# Patient Record
Sex: FEMALE | Race: WHITE | NOT HISPANIC OR LATINO | Employment: OTHER | URBAN - NONMETROPOLITAN AREA
[De-identification: names, ages, dates, MRNs, and addresses within clinical notes are randomized per-mention and may not be internally consistent; named-entity substitution may affect disease eponyms.]

---

## 2017-02-17 ENCOUNTER — APPOINTMENT (OUTPATIENT)
Dept: GENERAL RADIOLOGY | Facility: HOSPITAL | Age: 82
End: 2017-02-17

## 2017-02-17 ENCOUNTER — APPOINTMENT (OUTPATIENT)
Dept: ULTRASOUND IMAGING | Facility: HOSPITAL | Age: 82
End: 2017-02-17

## 2017-02-17 ENCOUNTER — HOSPITAL ENCOUNTER (INPATIENT)
Facility: HOSPITAL | Age: 82
LOS: 5 days | Discharge: HOME | End: 2017-02-22
Attending: EMERGENCY MEDICINE | Admitting: INTERNAL MEDICINE

## 2017-02-17 DIAGNOSIS — Z74.09 IMPAIRED FUNCTIONAL MOBILITY, BALANCE, GAIT, AND ENDURANCE: ICD-10-CM

## 2017-02-17 DIAGNOSIS — I10 ESSENTIAL HYPERTENSION: ICD-10-CM

## 2017-02-17 DIAGNOSIS — I50.23 ACUTE ON CHRONIC SYSTOLIC CONGESTIVE HEART FAILURE (HCC): Primary | ICD-10-CM

## 2017-02-17 DIAGNOSIS — R09.02 HYPOXIA: ICD-10-CM

## 2017-02-17 DIAGNOSIS — R06.09 DYSPNEA ON EXERTION: ICD-10-CM

## 2017-02-17 DIAGNOSIS — J43.9 PULMONARY EMPHYSEMA, UNSPECIFIED EMPHYSEMA TYPE (HCC): ICD-10-CM

## 2017-02-17 LAB
ALBUMIN SERPL-MCNC: 4.2 G/DL (ref 3.5–5)
ALBUMIN/GLOB SERPL: 1.5 G/DL (ref 1.1–2.5)
ALP SERPL-CCNC: 80 U/L (ref 24–120)
ALT SERPL W P-5'-P-CCNC: 30 U/L (ref 0–54)
ANION GAP SERPL CALCULATED.3IONS-SCNC: 10 MMOL/L (ref 4–13)
ARTERIAL PATENCY WRIST A: ABNORMAL
AST SERPL-CCNC: 19 U/L (ref 7–45)
ATMOSPHERIC PRESS: ABNORMAL MMHG
BACTERIA UR QL AUTO: ABNORMAL /HPF
BASE EXCESS BLDA CALC-SCNC: 2.2 MMOL/L (ref -2–2)
BASOPHILS # BLD AUTO: 0.01 10*3/MM3 (ref 0–0.2)
BASOPHILS NFR BLD AUTO: 0.2 % (ref 0–2)
BDY SITE: ABNORMAL
BILIRUB SERPL-MCNC: 0.7 MG/DL (ref 0.1–1)
BILIRUB UR QL STRIP: NEGATIVE
BUN BLD-MCNC: 19 MG/DL (ref 5–21)
BUN/CREAT SERPL: 16.5 (ref 7–25)
CALCIUM SPEC-SCNC: 9.2 MG/DL (ref 8.4–10.4)
CHLORIDE SERPL-SCNC: 101 MMOL/L (ref 98–110)
CLARITY UR: CLEAR
CO2 SERPL-SCNC: 31 MMOL/L (ref 24–31)
COLOR UR: YELLOW
CREAT BLD-MCNC: 1.15 MG/DL (ref 0.5–1.4)
DEPRECATED RDW RBC AUTO: 43.9 FL (ref 40–54)
DIGOXIN SERPL-MCNC: <0.4 NG/ML (ref 0.8–2)
EOSINOPHIL # BLD AUTO: 0.06 10*3/MM3 (ref 0–0.7)
EOSINOPHIL NFR BLD AUTO: 1.1 % (ref 0–4)
ERYTHROCYTE [DISTWIDTH] IN BLOOD BY AUTOMATED COUNT: 15.3 % (ref 12–15)
FLUAV AG NPH QL: NEGATIVE
FLUBV AG NPH QL IA: NEGATIVE
GAS FLOW AIRWAY: 4 LPM
GFR SERPL CREATININE-BSD FRML MDRD: 46 ML/MIN/1.73
GLOBULIN UR ELPH-MCNC: 2.8 GM/DL
GLUCOSE BLD-MCNC: 126 MG/DL (ref 70–100)
GLUCOSE UR STRIP-MCNC: NEGATIVE MG/DL
HCO3 BLDA-SCNC: 28.3 MMOL/L (ref 22–26)
HCT VFR BLD AUTO: 37.1 % (ref 37–47)
HGB BLD-MCNC: 11.9 G/DL (ref 12–16)
HGB UR QL STRIP.AUTO: NEGATIVE
HOLD SPECIMEN: NORMAL
HOLD SPECIMEN: NORMAL
HYALINE CASTS UR QL AUTO: ABNORMAL /LPF
IMM GRANULOCYTES # BLD: 0.04 10*3/MM3 (ref 0–0.03)
IMM GRANULOCYTES NFR BLD: 0.7 % (ref 0–5)
KETONES UR QL STRIP: NEGATIVE
LEUKOCYTE ESTERASE UR QL STRIP.AUTO: ABNORMAL
LYMPHOCYTES # BLD AUTO: 1.25 10*3/MM3 (ref 0.72–4.86)
LYMPHOCYTES NFR BLD AUTO: 21.9 % (ref 15–45)
MCH RBC QN AUTO: 25.5 PG (ref 28–32)
MCHC RBC AUTO-ENTMCNC: 32.1 G/DL (ref 33–36)
MCV RBC AUTO: 79.6 FL (ref 82–98)
MODALITY: ABNORMAL
MONOCYTES # BLD AUTO: 0.25 10*3/MM3 (ref 0.19–1.3)
MONOCYTES NFR BLD AUTO: 4.4 % (ref 4–12)
NEUTROPHILS # BLD AUTO: 4.09 10*3/MM3 (ref 1.87–8.4)
NEUTROPHILS NFR BLD AUTO: 71.7 % (ref 39–78)
NITRITE UR QL STRIP: NEGATIVE
NRBC BLD MANUAL-RTO: 0 /100 WBC (ref 0–0)
NT-PROBNP SERPL-MCNC: 3250 PG/ML (ref 0–1800)
NT-PROBNP SERPL-MCNC: 3300 PG/ML (ref 0–1800)
PCO2 BLDA: 49.5 MM HG (ref 35–45)
PH BLDA: 7.38 PH UNITS (ref 7.35–7.45)
PH UR STRIP.AUTO: 6 [PH] (ref 5–8)
PLATELET # BLD AUTO: 101 10*3/MM3 (ref 130–400)
PMV BLD AUTO: 10.2 FL (ref 6–12)
PO2 BLDA: 78.7 MM HG (ref 80–100)
POTASSIUM BLD-SCNC: 4.3 MMOL/L (ref 3.5–5.3)
PROT SERPL-MCNC: 7 G/DL (ref 6.3–8.7)
PROT UR QL STRIP: ABNORMAL
RBC # BLD AUTO: 4.66 10*6/MM3 (ref 4.2–5.4)
RBC # UR: ABNORMAL /HPF
REF LAB TEST METHOD: ABNORMAL
SAO2 % BLDCOA: 95.3 % (ref 94–100)
SAO2 % BLDCOA: 95.3 % (ref 94–100)
SODIUM BLD-SCNC: 142 MMOL/L (ref 135–145)
SP GR UR STRIP: 1.02 (ref 1–1.03)
SQUAMOUS #/AREA URNS HPF: ABNORMAL /HPF
TROPONIN I SERPL-MCNC: 0 NG/ML (ref 0–0.07)
TROPONIN I SERPL-MCNC: 0.02 NG/ML (ref 0–0.03)
UROBILINOGEN UR QL STRIP: ABNORMAL
WBC NRBC COR # BLD: 5.7 10*3/MM3 (ref 4.8–10.8)
WBC UR QL AUTO: ABNORMAL /HPF
WHOLE BLOOD HOLD SPECIMEN: NORMAL
WHOLE BLOOD HOLD SPECIMEN: NORMAL

## 2017-02-17 PROCEDURE — 93005 ELECTROCARDIOGRAM TRACING: CPT | Performed by: EMERGENCY MEDICINE

## 2017-02-17 PROCEDURE — 85025 COMPLETE CBC W/AUTO DIFF WBC: CPT | Performed by: EMERGENCY MEDICINE

## 2017-02-17 PROCEDURE — 99285 EMERGENCY DEPT VISIT HI MDM: CPT

## 2017-02-17 PROCEDURE — 25010000002 FUROSEMIDE PER 20 MG: Performed by: INTERNAL MEDICINE

## 2017-02-17 PROCEDURE — 80162 ASSAY OF DIGOXIN TOTAL: CPT | Performed by: EMERGENCY MEDICINE

## 2017-02-17 PROCEDURE — 93970 EXTREMITY STUDY: CPT | Performed by: SURGERY

## 2017-02-17 PROCEDURE — 25010000002 ENOXAPARIN PER 10 MG: Performed by: INTERNAL MEDICINE

## 2017-02-17 PROCEDURE — 25010000002 FUROSEMIDE PER 20 MG: Performed by: EMERGENCY MEDICINE

## 2017-02-17 PROCEDURE — 94640 AIRWAY INHALATION TREATMENT: CPT

## 2017-02-17 PROCEDURE — 83880 ASSAY OF NATRIURETIC PEPTIDE: CPT | Performed by: EMERGENCY MEDICINE

## 2017-02-17 PROCEDURE — 84484 ASSAY OF TROPONIN QUANT: CPT

## 2017-02-17 PROCEDURE — 81001 URINALYSIS AUTO W/SCOPE: CPT | Performed by: EMERGENCY MEDICINE

## 2017-02-17 PROCEDURE — 82803 BLOOD GASES ANY COMBINATION: CPT

## 2017-02-17 PROCEDURE — 93970 EXTREMITY STUDY: CPT

## 2017-02-17 PROCEDURE — 71010 HC CHEST PA OR AP: CPT

## 2017-02-17 PROCEDURE — 80053 COMPREHEN METABOLIC PANEL: CPT | Performed by: EMERGENCY MEDICINE

## 2017-02-17 PROCEDURE — 84484 ASSAY OF TROPONIN QUANT: CPT | Performed by: EMERGENCY MEDICINE

## 2017-02-17 PROCEDURE — 94799 UNLISTED PULMONARY SVC/PX: CPT

## 2017-02-17 PROCEDURE — 93010 ELECTROCARDIOGRAM REPORT: CPT | Performed by: INTERNAL MEDICINE

## 2017-02-17 PROCEDURE — 87086 URINE CULTURE/COLONY COUNT: CPT | Performed by: EMERGENCY MEDICINE

## 2017-02-17 PROCEDURE — 87804 INFLUENZA ASSAY W/OPTIC: CPT | Performed by: EMERGENCY MEDICINE

## 2017-02-17 PROCEDURE — 36600 WITHDRAWAL OF ARTERIAL BLOOD: CPT

## 2017-02-17 RX ORDER — LABETALOL HYDROCHLORIDE 5 MG/ML
10 INJECTION, SOLUTION INTRAVENOUS EVERY 4 HOURS PRN
Status: DISCONTINUED | OUTPATIENT
Start: 2017-02-17 | End: 2017-02-22 | Stop reason: HOSPADM

## 2017-02-17 RX ORDER — ASPIRIN 81 MG/1
81 TABLET ORAL
COMMUNITY

## 2017-02-17 RX ORDER — ACETAZOLAMIDE 125 MG/1
125 TABLET ORAL EVERY MORNING
COMMUNITY
End: 2017-02-22 | Stop reason: HOSPADM

## 2017-02-17 RX ORDER — FUROSEMIDE 10 MG/ML
80 INJECTION INTRAMUSCULAR; INTRAVENOUS 2 TIMES DAILY
Status: DISCONTINUED | OUTPATIENT
Start: 2017-02-17 | End: 2017-02-19

## 2017-02-17 RX ORDER — HYDROCODONE BITARTRATE AND ACETAMINOPHEN 7.5; 325 MG/1; MG/1
1 TABLET ORAL 2 TIMES DAILY PRN
COMMUNITY
Start: 2017-02-06 | End: 2018-04-06 | Stop reason: HOSPADM

## 2017-02-17 RX ORDER — ONDANSETRON 2 MG/ML
4 INJECTION INTRAMUSCULAR; INTRAVENOUS EVERY 6 HOURS PRN
Status: DISCONTINUED | OUTPATIENT
Start: 2017-02-17 | End: 2017-02-22 | Stop reason: HOSPADM

## 2017-02-17 RX ORDER — FUROSEMIDE 40 MG/1
40 TABLET ORAL DAILY
COMMUNITY
Start: 2016-08-08 | End: 2017-02-22 | Stop reason: HOSPADM

## 2017-02-17 RX ORDER — BUDESONIDE AND FORMOTEROL FUMARATE DIHYDRATE 160; 4.5 UG/1; UG/1
2 AEROSOL RESPIRATORY (INHALATION)
COMMUNITY

## 2017-02-17 RX ORDER — FUROSEMIDE 10 MG/ML
40 INJECTION INTRAMUSCULAR; INTRAVENOUS ONCE
Status: COMPLETED | OUTPATIENT
Start: 2017-02-17 | End: 2017-02-17

## 2017-02-17 RX ORDER — NITROGLYCERIN 0.4 MG/1
0.4 TABLET SUBLINGUAL
Status: DISCONTINUED | OUTPATIENT
Start: 2017-02-17 | End: 2017-02-22 | Stop reason: HOSPADM

## 2017-02-17 RX ORDER — CLONIDINE HYDROCHLORIDE 0.1 MG/1
0.1 TABLET ORAL ONCE
Status: COMPLETED | OUTPATIENT
Start: 2017-02-17 | End: 2017-02-17

## 2017-02-17 RX ORDER — ACETAZOLAMIDE 250 MG/1
125 TABLET ORAL EVERY MORNING
Status: DISCONTINUED | OUTPATIENT
Start: 2017-02-18 | End: 2017-02-20

## 2017-02-17 RX ORDER — ACETAZOLAMIDE 250 MG/1
125 TABLET ORAL DAILY
COMMUNITY
End: 2017-02-17 | Stop reason: SDDI

## 2017-02-17 RX ORDER — ASPIRIN 81 MG/1
81 TABLET ORAL
Status: DISCONTINUED | OUTPATIENT
Start: 2017-02-18 | End: 2017-02-22 | Stop reason: HOSPADM

## 2017-02-17 RX ORDER — PHENOL 1.4 %
600 AEROSOL, SPRAY (ML) MUCOUS MEMBRANE 2 TIMES DAILY WITH MEALS
COMMUNITY
End: 2017-02-17 | Stop reason: ALTCHOICE

## 2017-02-17 RX ORDER — SODIUM CHLORIDE 0.9 % (FLUSH) 0.9 %
1-10 SYRINGE (ML) INJECTION AS NEEDED
Status: DISCONTINUED | OUTPATIENT
Start: 2017-02-17 | End: 2017-02-22 | Stop reason: HOSPADM

## 2017-02-17 RX ORDER — SODIUM CHLORIDE 0.9 % (FLUSH) 0.9 %
10 SYRINGE (ML) INJECTION AS NEEDED
Status: DISCONTINUED | OUTPATIENT
Start: 2017-02-17 | End: 2017-02-22 | Stop reason: HOSPADM

## 2017-02-17 RX ORDER — HYDROXYZINE 50 MG/1
50 TABLET, FILM COATED ORAL DAILY
COMMUNITY
End: 2018-04-06 | Stop reason: HOSPADM

## 2017-02-17 RX ORDER — PANTOPRAZOLE SODIUM 40 MG/1
40 TABLET, DELAYED RELEASE ORAL DAILY
Status: DISCONTINUED | OUTPATIENT
Start: 2017-02-17 | End: 2017-02-22 | Stop reason: HOSPADM

## 2017-02-17 RX ORDER — ACETAMINOPHEN 325 MG/1
650 TABLET ORAL EVERY 4 HOURS PRN
Status: DISCONTINUED | OUTPATIENT
Start: 2017-02-17 | End: 2017-02-22 | Stop reason: HOSPADM

## 2017-02-17 RX ORDER — IPRATROPIUM BROMIDE AND ALBUTEROL SULFATE 2.5; .5 MG/3ML; MG/3ML
3 SOLUTION RESPIRATORY (INHALATION)
Status: DISCONTINUED | OUTPATIENT
Start: 2017-02-17 | End: 2017-02-22 | Stop reason: HOSPADM

## 2017-02-17 RX ORDER — HYDROCODONE BITARTRATE AND ACETAMINOPHEN 7.5; 325 MG/1; MG/1
1 TABLET ORAL 2 TIMES DAILY PRN
Status: DISCONTINUED | OUTPATIENT
Start: 2017-02-17 | End: 2017-02-17

## 2017-02-17 RX ORDER — LISINOPRIL 10 MG/1
10 TABLET ORAL 2 TIMES DAILY
COMMUNITY
Start: 2016-08-08

## 2017-02-17 RX ORDER — GABAPENTIN 300 MG/1
300 CAPSULE ORAL 3 TIMES DAILY PRN
COMMUNITY
Start: 2016-08-08 | End: 2018-02-21 | Stop reason: HOSPADM

## 2017-02-17 RX ORDER — LEVOTHYROXINE SODIUM 0.15 MG/1
150 TABLET ORAL
Status: DISCONTINUED | OUTPATIENT
Start: 2017-02-18 | End: 2017-02-22 | Stop reason: HOSPADM

## 2017-02-17 RX ORDER — HYDROCODONE BITARTRATE AND ACETAMINOPHEN 7.5; 325 MG/1; MG/1
1 TABLET ORAL 2 TIMES DAILY PRN
Status: DISCONTINUED | OUTPATIENT
Start: 2017-02-17 | End: 2017-02-22 | Stop reason: HOSPADM

## 2017-02-17 RX ORDER — POTASSIUM CHLORIDE 20 MEQ/1
20 TABLET, EXTENDED RELEASE ORAL 2 TIMES DAILY
COMMUNITY
End: 2017-02-22 | Stop reason: HOSPADM

## 2017-02-17 RX ORDER — ATORVASTATIN CALCIUM 20 MG/1
20 TABLET, FILM COATED ORAL NIGHTLY
COMMUNITY

## 2017-02-17 RX ORDER — ATORVASTATIN CALCIUM 10 MG/1
20 TABLET, FILM COATED ORAL NIGHTLY
Status: DISCONTINUED | OUTPATIENT
Start: 2017-02-17 | End: 2017-02-22 | Stop reason: HOSPADM

## 2017-02-17 RX ORDER — POTASSIUM CHLORIDE 750 MG/1
10 TABLET, EXTENDED RELEASE ORAL 2 TIMES DAILY WITH MEALS
Status: DISCONTINUED | OUTPATIENT
Start: 2017-02-17 | End: 2017-02-18 | Stop reason: CLARIF

## 2017-02-17 RX ORDER — GABAPENTIN 300 MG/1
300 CAPSULE ORAL 3 TIMES DAILY PRN
Status: DISCONTINUED | OUTPATIENT
Start: 2017-02-17 | End: 2017-02-22 | Stop reason: HOSPADM

## 2017-02-17 RX ORDER — BUDESONIDE AND FORMOTEROL FUMARATE DIHYDRATE 160; 4.5 UG/1; UG/1
2 AEROSOL RESPIRATORY (INHALATION)
Status: DISCONTINUED | OUTPATIENT
Start: 2017-02-17 | End: 2017-02-22 | Stop reason: HOSPADM

## 2017-02-17 RX ORDER — LISINOPRIL 10 MG/1
10 TABLET ORAL EVERY 12 HOURS SCHEDULED
Status: DISCONTINUED | OUTPATIENT
Start: 2017-02-17 | End: 2017-02-22 | Stop reason: HOSPADM

## 2017-02-17 RX ORDER — TRAMADOL HYDROCHLORIDE 50 MG/1
50 TABLET ORAL EVERY 8 HOURS PRN
Status: DISCONTINUED | OUTPATIENT
Start: 2017-02-17 | End: 2017-02-22 | Stop reason: HOSPADM

## 2017-02-17 RX ADMIN — POTASSIUM CHLORIDE 10 MEQ: 750 TABLET, EXTENDED RELEASE ORAL at 18:46

## 2017-02-17 RX ADMIN — ENOXAPARIN SODIUM 30 MG: 30 INJECTION SUBCUTANEOUS at 17:47

## 2017-02-17 RX ADMIN — ATORVASTATIN CALCIUM 20 MG: 10 TABLET, FILM COATED ORAL at 20:54

## 2017-02-17 RX ADMIN — TRAMADOL HYDROCHLORIDE 50 MG: 50 TABLET, COATED ORAL at 17:47

## 2017-02-17 RX ADMIN — PANTOPRAZOLE SODIUM 40 MG: 40 TABLET, DELAYED RELEASE ORAL at 17:47

## 2017-02-17 RX ADMIN — METOPROLOL TARTRATE 25 MG: 25 TABLET, FILM COATED ORAL at 20:54

## 2017-02-17 RX ADMIN — BUDESONIDE AND FORMOTEROL FUMARATE DIHYDRATE 2 PUFF: 160; 4.5 AEROSOL RESPIRATORY (INHALATION) at 23:11

## 2017-02-17 RX ADMIN — FUROSEMIDE 80 MG: 10 INJECTION, SOLUTION INTRAMUSCULAR; INTRAVENOUS at 17:47

## 2017-02-17 RX ADMIN — HYDROCODONE BITARTRATE AND ACETAMINOPHEN 1 TABLET: 7.5; 325 TABLET ORAL at 20:54

## 2017-02-17 RX ADMIN — GABAPENTIN 300 MG: 300 CAPSULE ORAL at 22:42

## 2017-02-17 RX ADMIN — CLONIDINE HYDROCHLORIDE 0.1 MG: 0.1 TABLET ORAL at 15:04

## 2017-02-17 RX ADMIN — FUROSEMIDE 40 MG: 10 INJECTION, SOLUTION INTRAMUSCULAR; INTRAVENOUS at 14:23

## 2017-02-17 NOTE — ED PROVIDER NOTES
Subjective shortness of breath and leg swelling  History of Present Illness  Ms. Cox is a 81-year-old white female who presents today with chief complaint of shortness of breath.  The patient states that she has been feeling bad for the last 2-3 days.  He describes feeling achy all over.  She denied any chills but later tells me that she has been feeling hot all over and sweaty.  She is also noticed recently that her legs have been swelling as well as her feet.   She is currently on home O2 at 2-1/2 L. Despite this she has been feeling increasingly short of breath.  SHe denies any nausea vomiting or diffuse arthralgias or myalgias.  She has been compliant with her medications until today.  Review of Systems   Constitutional: Positive for diaphoresis. Negative for chills.   HENT: Negative.    Eyes: Negative.    Respiratory: Positive for shortness of breath.    Cardiovascular: Positive for leg swelling.   Gastrointestinal: Negative.  Negative for diarrhea, nausea and vomiting.   Endocrine: Negative.    Genitourinary: Negative.    Musculoskeletal: Positive for arthralgias and myalgias.   Skin: Negative.    Allergic/Immunologic: Negative.    Neurological: Negative.    Hematological: Negative.    Psychiatric/Behavioral: Negative.    All other systems reviewed and are negative.      Past Medical History   Diagnosis Date   • CAD (coronary artery disease)    • CHF (congestive heart failure)    • COPD (chronic obstructive pulmonary disease)    • Hypertension    • Renal disorder        Allergies   Allergen Reactions   • Fluarix [Flu Virus Vaccine] Swelling   • Pneumococcal Vaccines Swelling   • Codeine Rash       Past Surgical History   Procedure Laterality Date   • Cholecystectomy     • Back surgery     • Eye surgery Bilateral      cataract       Family History   Problem Relation Age of Onset   • Coronary artery disease Father        Social History     Social History   • Marital status:      Spouse name: N/A   •  Number of children: N/A   • Years of education: N/A     Social History Main Topics   • Smoking status: Former Smoker   • Smokeless tobacco: None   • Alcohol use No   • Drug use: No   • Sexual activity: Defer     Other Topics Concern   • None     Social History Narrative           Objective   Physical Exam   Constitutional: She is oriented to person, place, and time. She appears well-developed and well-nourished.   HENT:   Head: Normocephalic and atraumatic.   Right Ear: External ear normal.   Left Ear: External ear normal.   Nose: Nose normal.   Mouth/Throat: Oropharynx is clear and moist.   Eyes: Conjunctivae and EOM are normal. Pupils are equal, round, and reactive to light. Right eye exhibits no discharge. Left eye exhibits no discharge.   Neck: Normal range of motion. Neck supple. No thyromegaly present.   Cardiovascular: Normal rate, regular rhythm, normal heart sounds and intact distal pulses.  Exam reveals no friction rub.    No murmur heard.  Pulmonary/Chest: Effort normal and breath sounds normal. No respiratory distress.   Abdominal: Soft. Bowel sounds are normal. She exhibits no distension. There is no tenderness.   Musculoskeletal: Normal range of motion. She exhibits no edema or deformity.   Neurological: She is alert and oriented to person, place, and time. She has normal reflexes. No cranial nerve deficit.   Skin: Skin is warm and dry. No rash noted.   Psychiatric: Judgment normal.   Nursing note and vitals reviewed.      Procedures         ED Course  ED Course   Comment By Time   The lab data has been reviewed with the family and the patient.  She has received a dose of Lasix while here.   She will need to be admitted for further diuresis.   I have reviewed all the findings with the family.    Melida Walker MD 02/17 2649          Differential diagnosis included but was not limited to Dyspnea: COPD, Pneumonia, PE and Drug interactions>>CHF. All labs and imaging results were reviewed by  Melida Walker MD        [facility]  Number of Diagnoses or Management Options  Acute on chronic systolic congestive heart failure: new and requires workup  Dyspnea on exertion: new and requires workup  Essential hypertension: new and requires workup  Hypoxia: new and requires workup     Amount and/or Complexity of Data Reviewed  Clinical lab tests: ordered and reviewed  Tests in the radiology section of CPT®: ordered and reviewed  Discuss the patient with other providers: yes    Risk of Complications, Morbidity, and/or Mortality  Presenting problems: high  Diagnostic procedures: high  Management options: high    Patient Progress  Patient progress: stable      Final diagnoses:   Acute on chronic systolic congestive heart failure   Hypoxia   Dyspnea on exertion   Essential hypertension            Melida Walker MD  02/19/17 0641

## 2017-02-17 NOTE — H&P
"    [facility] Medicine Services  HISTORY AND PHYSICAL    Date of Admission: 2/17/2017  Primary Care Physician: Jeremi Kan MD    Subjective     Chief Complaint: Shortness of Breath    Shortness of Breath   Associated symptoms include leg swelling. Pertinent negatives include no wheezing.   Leg Swelling   Associated symptoms include weakness.    patient is a 81-year-old  female with past medical history significant for systolic congestive heart failure (last echocardiogram revealing ejection fraction of 45%), chronic objective pulmonary disease on 2-1/2 L by nasal cannula chronically, that presented to our hospital today with a chief complaint shortness of breath.  Patient reports the symptoms got worse over the course of the past 48 hours.  She reports that her legs have gotten much more swollen and \"tight\".  She has had orthopnea and states that she \"suffocates\" if she were to attempt to lie flat.  She does report paroxysmal nocturnal dyspnea.  She does not check her weight on a daily basis, but she does estimate that she is holding onto a lot of extra water weight.  She has tried to be compliant with her outpatient diuretic regimen, however she does report that her urine output has not been as good after taking her diuretic.  She reports no fevers or chills.  She has not had any significant cough or congestion.  She reports no significant wheezing.  She denies any chest pain or palpitations.  Finally, she reports that her swelling in her legs has gotten so tight that it is making it difficult for her to ambulate.    Review of Systems   HENT: Negative.    Eyes: Negative.    Respiratory: Positive for shortness of breath. Negative for wheezing.    Cardiovascular: Positive for leg swelling.   Gastrointestinal: Negative.    Endocrine: Negative.    Skin: Negative.    Neurological: Positive for weakness.   Hematological: Negative.    Psychiatric/Behavioral: Negative.     "   Otherwise complete ROS reviewed and negative except as mentioned in the HPI.      Past Medical History:   Past Medical History   Diagnosis Date   • CHF (congestive heart failure)    • COPD (chronic obstructive pulmonary disease)    • Hypertension    • Renal disorder        Past Surgical History:  Past Surgical History   Procedure Laterality Date   • Cholecystectomy     • Back surgery     • Eye surgery Bilateral      cataract       Social History:  reports that she has quit smoking. She does not have any smokeless tobacco history on file. She reports that she does not drink alcohol or use illicit drugs. Patient states that she quit smoking about 10 years ago.    Family History:   Coronary artery disease and diabetes run in the family    Allergies:  Allergies   Allergen Reactions   • Fluarix [Flu Virus Vaccine] Swelling   • Pneumococcal Vaccines Swelling   • Codeine Rash       Medications:  Prior to Admission medications    Medication Sig Start Date End Date Taking? Authorizing Provider   aspirin 81 MG EC tablet Take 81 mg by mouth Every Morning Before Breakfast. Taken with breakfast   Yes NEYDA Acevedo   atorvastatin (LIPITOR) 20 MG tablet Take 20 mg by mouth Every Night.   Yes Historical Provider, MD   budesonide-formoterol (SYMBICORT) 160-4.5 MCG/ACT inhaler Inhale 2 puffs 2 (Two) Times a Day.   Yes Historical Provider, MD   calcium carbonate (OS-NICOLE) 600 MG tablet Take 600 mg by mouth 2 (Two) Times a Day With Meals.   Yes Historical Provider, MD   furosemide (LASIX) 40 MG tablet Take 40 mg by mouth Daily.   Yes Historical Provider, MD   HYDROcodone-acetaminophen (NORCO) 7.5-325 MG per tablet Take 1 tablet by mouth 2 (Two) Times a Day As Needed for moderate pain (4-6).   Yes Historical Provider, MD   potassium chloride (K-DUR,KLOR-CON) 20 MEQ CR tablet Take 20 mEq by mouth 2 (Two) Times a Day.   Yes Historical Provider, MD   traMADol (ULTRAM) 50 MG tablet Take 50 mg by mouth Every 8 (Eight) Hours As  Needed for moderate pain (4-6).   Yes Jeremi Kan MD   bumetanide (BUMEX) 0.5 MG tablet Take 0.5 mg by mouth Daily. Patient does not recall taking this medication.    Historical Provider, MD   Calcium Carb-Cholecalciferol (OYSTER SHELL CALCIUM + D) 500-200 MG-UNIT tablet Take  by mouth.    Historical Provider, MD   carboxymethylcellulose (REFRESH TEARS) 0.5 % solution 3 (Three) Times a Day As Needed for dry eyes.    Historical Provider, MD   digoxin (LANOXIN) 125 MCG tablet Take 125 mcg by mouth Daily.    Historical Provider, MD   dronedarone (MULTAQ) 400 MG tablet Take 400 mg by mouth 2 (Two) Times a Day With Meals.    Historical Provider, MD   ezetimibe-simvastatin (VYTORIN) 10-20 MG per tablet Take 1 tablet by mouth Every Night.    Historical Provider, MD   gabapentin (NEURONTIN) 300 MG capsule Take 300 mg by mouth 3 (Three) Times a Day. Pharmacy states this med on hold.    Historical Provider, MD   GABAPENTIN, ONCE-DAILY, PO Take  by mouth.    Historical Provider, MD   hydrOXYzine (ATARAX) 50 MG tablet Take 50 mg by mouth Daily.    Historical Provider, MD   levothyroxine (SYNTHROID, LEVOTHROID) 150 MCG tablet Take 150 mcg by mouth Daily.    Historical Provider, MD   lisinopril (PRINIVIL,ZESTRIL) 10 MG tablet Take 10 mg by mouth 2 (Two) Times a Day. Pharmacy states this med is on hold.    Historical Provider, MD   metoprolol tartrate (LOPRESSOR) 25 MG tablet Take 25 mg by mouth 2 (Two) Times a Day.    Historical Provider, MD   nitroglycerin (NITROSTAT) 0.4 MG SL tablet Place 0.4 mg under the tongue Every 5 (Five) Minutes As Needed for chest pain. Take no more than 3 doses in 15 minutes.    Historical Provider, MD   pantoprazole (PROTONIX) 40 MG EC tablet Take 40 mg by mouth Daily.    Historical Provider, MD   potassium chloride (K-DUR,KLOR-CON) 10 MEQ CR tablet Take 10 mEq by mouth 2 (Two) Times a Day. Patient unsure if they are taking this medication.    Historical Provider, MD   acetaZOLAMIDE (DIAMOX) 250  "MG tablet Take 125 mg by mouth Daily.  2/17/17  Historical Provider, MD       Objective     Vital Signs:   Visit Vitals   • /88   • Pulse 97   • Temp 97.9 °F (36.6 °C) (Oral)   • Resp 23   • Ht 66\" (167.6 cm)   • Wt 220 lb (99.8 kg)   • SpO2 94%   • BMI 35.51 kg/m2     Physical Exam   Constitutional: She is oriented to person, place, and time. She appears well-developed. No distress.   disheveled   HENT:   Head: Normocephalic and atraumatic.   Mouth/Throat: No oropharyngeal exudate (poor dentition; hirsutism).   Eyes: Pupils are equal, round, and reactive to light. No scleral icterus.   Neck: Normal range of motion. No tracheal deviation present.   Cardiovascular: Normal rate and regular rhythm.    Pulmonary/Chest: Effort normal. No respiratory distress. She has wheezes. She has rales.   Abdominal: Soft.   Musculoskeletal: She exhibits edema.   Neurological: She is alert and oriented to person, place, and time.   Skin: Skin is warm and dry.   Psychiatric: She has a normal mood and affect. Her behavior is normal.   Vitals reviewed.    Results Reviewed:  Lab Results (last 24 hours)     Procedure Component Value Units Date/Time    Blood Gas, Arterial [[id number]]  (Abnormal) Collected:  02/17/17 1140    Specimen:  Arterial Blood Updated:  02/17/17 1142     Site Arterial: right brachial      Rickey's Test --       Documented in Rapid Comm        pH, Arterial 7.375 pH units      pCO2, Arterial 49.5 (H) mm Hg      pO2, Arterial 78.7 (L) mm Hg      HCO3, Arterial 28.3 (H) mmol/L      Base Excess, Arterial 2.2 (H) mmol/L      O2 Saturation, Arterial 95.3 %      O2 Saturation Calculated 95.3 %      Barometric Pressure for Blood Gas -- mmHg       Component not reported at this site.        Modality Cannula      Flow Rate 4.00 lpm     Narrative:       Serial Number: [id number]    : [id number]    Lavender Top [[id number]] Collected:  02/17/17 1202    Specimen:  Blood Updated:  02/17/17 1206    Light Blue Top [[id number]] " Collected:  02/17/17 1203    Specimen:  Blood Updated:  02/17/17 1206    Red Top [[id number]] Collected:  02/17/17 1203    Specimen:  Blood Updated:  02/17/17 1206    Green Top (Gel) [[id number]] Collected:  02/17/17 1202    Specimen:  Blood Updated:  02/17/17 1206    POC Troponin, Rapid [[id number]]  (Normal) Collected:  02/17/17 1207    Specimen:  Blood Updated:  02/17/17 1221     Troponin I 0.00 ng/mL       Serial Number: [id number]    : [id number]       Comprehensive Metabolic Panel [[id number]]  (Abnormal) Collected:  02/17/17 1202    Specimen:  Blood Updated:  02/17/17 1221     Glucose 126 (H) mg/dL      BUN 19 mg/dL      Creatinine 1.15 mg/dL      Sodium 142 mmol/L      Potassium 4.3 mmol/L      Chloride 101 mmol/L      CO2 31.0 mmol/L      Calcium 9.2 mg/dL      Total Protein 7.0 g/dL      Albumin 4.20 g/dL      ALT (SGPT) 30 U/L      AST (SGOT) 19 U/L      Alkaline Phosphatase 80 U/L      Total Bilirubin 0.7 mg/dL      eGFR Non African Amer 46 (L) mL/min/1.73      Globulin 2.8 gm/dL      A/G Ratio 1.5 g/dL      BUN/Creatinine Ratio 16.5      Anion Gap 10.0 mmol/L     Narrative:       The MDRD GFR formula is only valid for adults with stable renal function between ages 18 and 70.    CBC & Differential [[id number]] Collected:  02/17/17 1202    Specimen:  Blood Updated:  02/17/17 1225    Narrative:       The following orders were created for panel order CBC & Differential.  Procedure                               Abnormality         Status                     ---------                               -----------         ------                     CBC Auto Differential[[id number]]         Abnormal            Final result                 Please view results for these tests on the individual orders.    CBC Auto Differential [[id number]]  (Abnormal) Collected:  02/17/17 1202    Specimen:  Blood Updated:  02/17/17 1225     WBC 5.70 10*3/mm3      RBC 4.66 10*6/mm3      Hemoglobin 11.9 (L) g/dL      Hematocrit 37.1 %      MCV 79.6  (L) fL      MCH 25.5 (L) pg      MCHC 32.1 (L) g/dL      RDW 15.3 (H) %      RDW-SD 43.9 fl      MPV 10.2 fL      Platelets 101 (L) 10*3/mm3      Neutrophil % 71.7 %      Lymphocyte % 21.9 %      Monocyte % 4.4 %      Eosinophil % 1.1 %      Basophil % 0.2 %      Immature Grans % 0.7 %      Neutrophils, Absolute 4.09 10*3/mm3      Lymphocytes, Absolute 1.25 10*3/mm3      Monocytes, Absolute 0.25 10*3/mm3      Eosinophils, Absolute 0.06 10*3/mm3      Basophils, Absolute 0.01 10*3/mm3      Immature Grans, Absolute 0.04 (H) 10*3/mm3      nRBC 0.0 /100 WBC     Influenza Antigen [[id number]]  (Normal) Collected:  02/17/17 1207    Specimen:  Swab from Nasopharynx Updated:  02/17/17 1232     Influenza A Ag, EIA Negative      Influenza B Ag, EIA Negative     Narrative:         Recommend confirmation of negative results by viral culture or molecular assay.    BNP [[id number]]  (Abnormal) Collected:  02/17/17 1202    Specimen:  Blood Updated:  02/17/17 1234     proBNP 3300.0 (H) pg/mL     Troponin [[id number]]  (Normal) Collected:  02/17/17 1202    Specimen:  Blood Updated:  02/17/17 1234     Troponin I 0.020 ng/mL     Digoxin Level [[id number]]  (Abnormal) Collected:  02/17/17 1203    Specimen:  Blood Updated:  02/17/17 1256     Digoxin <0.40 (L) ng/mL     BNP [[id number]]  (Abnormal) Collected:  02/17/17 1203    Specimen:  Blood Updated:  02/17/17 1302     proBNP 3250.0 (H) pg/mL     [place] Draw [[id number]] Collected:  02/17/17 1202    Specimen:  Blood Updated:  02/17/17 1306    Narrative:       The following orders were created for panel order [place] Draw.  Procedure                               Abnormality         Status                     ---------                               -----------         ------                     Light Blue Top[[id number]]                                    In process                 Green Top (Gel)[[id number]]                                   In process                 Lavender Top[[id number]]                                       In process                 Red Top[[id number]]                                           In process                 Green Top (No Gel)[[id number]]                                                             Please view results for these tests on the individual orders.    Urine Culture [[id number]] Collected:  02/17/17 1426    Specimen:  Urine from Urine, Clean Catch Updated:  02/17/17 1436    Urinalysis With / Culture If Indicated [[id number]]  (Abnormal) Collected:  02/17/17 1426    Specimen:  Urine from Urine, Clean Catch Updated:  02/17/17 1500     Color, UA Yellow      Appearance, UA Clear      pH, UA 6.0      Specific Gravity, UA 1.020      Glucose, UA Negative      Ketones, UA Negative      Bilirubin, UA Negative      Blood, UA Negative      Protein, UA 30 mg/dL (1+) (A)      Leuk Esterase, UA Moderate (2+) (A)      Nitrite, UA Negative      Urobilinogen, UA 1.0 E.U./dL     Urinalysis, Microscopic Only [[id number]]  (Abnormal) Collected:  02/17/17 1426    Specimen:  Urine from Urine, Clean Catch Updated:  02/17/17 1500     RBC, UA 3-5 (A) /HPF      WBC, UA 13-20 (A) /HPF      Bacteria, UA None Seen /HPF      Squamous Epithelial Cells, UA 7-12 (A) /HPF      Hyaline Casts, UA 3-6 /LPF      Methodology Automated Microscopy         Imaging Results (last 24 hours)     Procedure Component Value Units Date/Time    XR Chest 1 View [[id number]] Collected:  02/17/17 1318     Updated:  02/17/17 1322    Narrative:       EXAMINATION: XR CHEST 1 VW- 2/17/2017 1:18 PM CST     HISTORY: Shortness of breath.     REPORT: Comparison is made with the study from 09/03/2016.     Lungs are grossly hypoaerated, there is diffuse cardiomegaly which has  increased, as well as central vascular congestion and mild pulmonary  edema. No pneumothorax is seen. Small bilateral pleural effusions are  not excluded. The osseous structures show nothing acute. There is  moderate ectasia of the thoracic aorta.       Impression:        Gross hypoaeration of the lungs with cardiomegaly and  evidence of CHF. Probable small bilateral pleural effusions. No  pneumothorax is seen.  This report was finalized on 02/17/2017 13:19 by Dr. Onel Renae MD.    US Venous Doppler Lower Extremity Bilateral (duplex) [[id number]]      Updated:  02/17/17 1421          I have personally reviewed and interpreted the radiology studies and ECG obtained at time of admission.     Assessment / Plan     Assessment & Plan  Hospital Problem List     Acute on chronic systolic congestive heart failure        Assessment:  1.  Acute systolic CHF with exacerbation  2.  COPD - no exac; home 02 at 2.5LNC  3.  History of CKD III  4.  Generalized Weakness  5.  HTN  6.  History of SVT and MAT  7.  Hypothyroid    Plan:  1.  Lasix 80mg IV BID  2.  Check Echo  3.  Strict I/Os; daily weights  4.  Supp 02  5.  Nebs  6.  Telemetry  7.  Elevate lower extremities  8.  Will need PT eval  9.  Follow-up urine culture  10.  Repeat CXR tomorrow  11.  Workup ongoing    Code Status: DNI     Salo Bruce MD   02/17/17   3:35 PM

## 2017-02-18 ENCOUNTER — APPOINTMENT (OUTPATIENT)
Dept: CARDIOLOGY | Facility: HOSPITAL | Age: 82
End: 2017-02-18
Attending: INTERNAL MEDICINE

## 2017-02-18 ENCOUNTER — APPOINTMENT (OUTPATIENT)
Dept: GENERAL RADIOLOGY | Facility: HOSPITAL | Age: 82
End: 2017-02-18

## 2017-02-18 LAB
ANION GAP SERPL CALCULATED.3IONS-SCNC: 12 MMOL/L (ref 4–13)
BH CV ECHO MEAS - AO MAX PG (FULL): 4.4 MMHG
BH CV ECHO MEAS - AO MAX PG: 8.1 MMHG
BH CV ECHO MEAS - AO MEAN PG (FULL): 2 MMHG
BH CV ECHO MEAS - AO MEAN PG: 4 MMHG
BH CV ECHO MEAS - AO ROOT AREA: 8.6 CM^2
BH CV ECHO MEAS - AO ROOT DIAM: 3.3 CM
BH CV ECHO MEAS - AO V2 MAX: 142 CM/SEC
BH CV ECHO MEAS - AO V2 MEAN: 92.7 CM/SEC
BH CV ECHO MEAS - AO V2 VTI: 31.6 CM
BH CV ECHO MEAS - AVA(I,A): 1.7 CM^2
BH CV ECHO MEAS - AVA(I,D): 1.7 CM^2
BH CV ECHO MEAS - AVA(V,A): 1.9 CM^2
BH CV ECHO MEAS - AVA(V,D): 1.9 CM^2
BH CV ECHO MEAS - CONTRAST EF 4CH: 40 ML/M^2
BH CV ECHO MEAS - EDV(CUBED): 156 ML
BH CV ECHO MEAS - EDV(MOD-SP4): 205 ML
BH CV ECHO MEAS - EDV(TEICH): 140.3 ML
BH CV ECHO MEAS - EF(CUBED): 54.2 %
BH CV ECHO MEAS - EF(TEICH): 45.6 %
BH CV ECHO MEAS - ESV(CUBED): 71.5 ML
BH CV ECHO MEAS - ESV(MOD-SP4): 123 ML
BH CV ECHO MEAS - ESV(TEICH): 76.4 ML
BH CV ECHO MEAS - FS: 22.9 %
BH CV ECHO MEAS - IVS/LVPW: 1.1
BH CV ECHO MEAS - IVSD: 1.3 CM
BH CV ECHO MEAS - LA DIMENSION: 3.8 CM
BH CV ECHO MEAS - LA/AO: 1.2
BH CV ECHO MEAS - LV MASS(C)D: 283.1 GRAMS
BH CV ECHO MEAS - LV MAX PG: 3.7 MMHG
BH CV ECHO MEAS - LV MEAN PG: 2 MMHG
BH CV ECHO MEAS - LV V1 MAX: 96.1 CM/SEC
BH CV ECHO MEAS - LV V1 MEAN: 62.6 CM/SEC
BH CV ECHO MEAS - LV V1 VTI: 18.8 CM
BH CV ECHO MEAS - LVIDD: 5.4 CM
BH CV ECHO MEAS - LVIDS: 4.2 CM
BH CV ECHO MEAS - LVLD AP4: 8.7 CM
BH CV ECHO MEAS - LVLS AP4: 8.2 CM
BH CV ECHO MEAS - LVOT AREA (M): 2.8 CM^2
BH CV ECHO MEAS - LVOT AREA: 2.8 CM^2
BH CV ECHO MEAS - LVOT DIAM: 1.9 CM
BH CV ECHO MEAS - LVPWD: 1.2 CM
BH CV ECHO MEAS - MV A MAX VEL: 148 CM/SEC
BH CV ECHO MEAS - MV DEC TIME: 0.22 SEC
BH CV ECHO MEAS - MV E MAX VEL: 141 CM/SEC
BH CV ECHO MEAS - MV E/A: 0.95
BH CV ECHO MEAS - SV(AO): 270.3 ML
BH CV ECHO MEAS - SV(CUBED): 84.5 ML
BH CV ECHO MEAS - SV(LVOT): 53.3 ML
BH CV ECHO MEAS - SV(MOD-SP4): 82 ML
BH CV ECHO MEAS - SV(TEICH): 63.9 ML
BUN BLD-MCNC: 27 MG/DL (ref 5–21)
BUN/CREAT SERPL: 19.1 (ref 7–25)
CALCIUM SPEC-SCNC: 9.3 MG/DL (ref 8.4–10.4)
CHLORIDE SERPL-SCNC: 93 MMOL/L (ref 98–110)
CO2 SERPL-SCNC: 36 MMOL/L (ref 24–31)
CREAT BLD-MCNC: 1.41 MG/DL (ref 0.5–1.4)
GFR SERPL CREATININE-BSD FRML MDRD: 36 ML/MIN/1.73
GLUCOSE BLD-MCNC: 122 MG/DL (ref 70–100)
MAGNESIUM SERPL-MCNC: 1.8 MG/DL (ref 1.4–2.2)
POTASSIUM BLD-SCNC: 3.9 MMOL/L (ref 3.5–5.3)
SODIUM BLD-SCNC: 141 MMOL/L (ref 135–145)
TSH SERPL DL<=0.05 MIU/L-ACNC: 1.38 MIU/ML (ref 0.47–4.68)

## 2017-02-18 PROCEDURE — 71020 HC CHEST PA AND LATERAL: CPT

## 2017-02-18 PROCEDURE — 97161 PT EVAL LOW COMPLEX 20 MIN: CPT

## 2017-02-18 PROCEDURE — 80048 BASIC METABOLIC PNL TOTAL CA: CPT | Performed by: NURSE PRACTITIONER

## 2017-02-18 PROCEDURE — 84443 ASSAY THYROID STIM HORMONE: CPT | Performed by: NURSE PRACTITIONER

## 2017-02-18 PROCEDURE — 83735 ASSAY OF MAGNESIUM: CPT | Performed by: NURSE PRACTITIONER

## 2017-02-18 PROCEDURE — 94640 AIRWAY INHALATION TREATMENT: CPT

## 2017-02-18 PROCEDURE — G8978 MOBILITY CURRENT STATUS: HCPCS

## 2017-02-18 PROCEDURE — 94799 UNLISTED PULMONARY SVC/PX: CPT

## 2017-02-18 PROCEDURE — 99222 1ST HOSP IP/OBS MODERATE 55: CPT | Performed by: INTERNAL MEDICINE

## 2017-02-18 PROCEDURE — 25010000002 PERFLUTREN (DEFINITY) 8.476 MG IN SODIUM CHLORIDE 10 ML INJECTION: Performed by: INTERNAL MEDICINE

## 2017-02-18 PROCEDURE — 25010000002 FUROSEMIDE PER 20 MG: Performed by: INTERNAL MEDICINE

## 2017-02-18 PROCEDURE — G8979 MOBILITY GOAL STATUS: HCPCS

## 2017-02-18 PROCEDURE — 94760 N-INVAS EAR/PLS OXIMETRY 1: CPT

## 2017-02-18 PROCEDURE — 93306 TTE W/DOPPLER COMPLETE: CPT | Performed by: INTERNAL MEDICINE

## 2017-02-18 PROCEDURE — C8929 TTE W OR WO FOL WCON,DOPPLER: HCPCS

## 2017-02-18 RX ORDER — POTASSIUM CHLORIDE 750 MG/1
10 CAPSULE, EXTENDED RELEASE ORAL 2 TIMES DAILY WITH MEALS
Status: COMPLETED | OUTPATIENT
Start: 2017-02-18 | End: 2017-02-18

## 2017-02-18 RX ORDER — POTASSIUM CHLORIDE 750 MG/1
20 CAPSULE, EXTENDED RELEASE ORAL DAILY
Status: DISCONTINUED | OUTPATIENT
Start: 2017-02-19 | End: 2017-02-22 | Stop reason: HOSPADM

## 2017-02-18 RX ADMIN — LISINOPRIL 10 MG: 10 TABLET ORAL at 09:41

## 2017-02-18 RX ADMIN — GABAPENTIN 300 MG: 300 CAPSULE ORAL at 20:09

## 2017-02-18 RX ADMIN — ACETAZOLAMIDE 125 MG: 250 TABLET ORAL at 20:09

## 2017-02-18 RX ADMIN — BUDESONIDE AND FORMOTEROL FUMARATE DIHYDRATE 2 PUFF: 160; 4.5 AEROSOL RESPIRATORY (INHALATION) at 19:50

## 2017-02-18 RX ADMIN — ATORVASTATIN CALCIUM 20 MG: 10 TABLET, FILM COATED ORAL at 20:09

## 2017-02-18 RX ADMIN — IPRATROPIUM BROMIDE AND ALBUTEROL SULFATE 3 ML: .5; 3 SOLUTION RESPIRATORY (INHALATION) at 07:40

## 2017-02-18 RX ADMIN — METOPROLOL TARTRATE 25 MG: 25 TABLET, FILM COATED ORAL at 09:41

## 2017-02-18 RX ADMIN — FUROSEMIDE 80 MG: 10 INJECTION, SOLUTION INTRAMUSCULAR; INTRAVENOUS at 09:41

## 2017-02-18 RX ADMIN — METOPROLOL TARTRATE 12.5 MG: 25 TABLET, FILM COATED ORAL at 20:09

## 2017-02-18 RX ADMIN — BUDESONIDE AND FORMOTEROL FUMARATE DIHYDRATE 2 PUFF: 160; 4.5 AEROSOL RESPIRATORY (INHALATION) at 07:41

## 2017-02-18 RX ADMIN — POTASSIUM CHLORIDE 10 MEQ: 750 CAPSULE, EXTENDED RELEASE ORAL at 17:42

## 2017-02-18 RX ADMIN — IPRATROPIUM BROMIDE AND ALBUTEROL SULFATE 3 ML: .5; 3 SOLUTION RESPIRATORY (INHALATION) at 15:25

## 2017-02-18 RX ADMIN — POTASSIUM CHLORIDE 10 MEQ: 750 CAPSULE, EXTENDED RELEASE ORAL at 09:47

## 2017-02-18 RX ADMIN — HYDROCODONE BITARTRATE AND ACETAMINOPHEN 1 TABLET: 7.5; 325 TABLET ORAL at 15:07

## 2017-02-18 RX ADMIN — IPRATROPIUM BROMIDE AND ALBUTEROL SULFATE 3 ML: .5; 3 SOLUTION RESPIRATORY (INHALATION) at 19:50

## 2017-02-18 RX ADMIN — TRAMADOL HYDROCHLORIDE 50 MG: 50 TABLET, COATED ORAL at 01:50

## 2017-02-18 RX ADMIN — PANTOPRAZOLE SODIUM 40 MG: 40 TABLET, DELAYED RELEASE ORAL at 09:41

## 2017-02-18 RX ADMIN — SODIUM CHLORIDE 8 ML: 9 INJECTION INTRAMUSCULAR; INTRAVENOUS; SUBCUTANEOUS at 10:11

## 2017-02-18 RX ADMIN — LISINOPRIL 10 MG: 10 TABLET ORAL at 20:09

## 2017-02-18 RX ADMIN — IPRATROPIUM BROMIDE AND ALBUTEROL SULFATE 3 ML: .5; 3 SOLUTION RESPIRATORY (INHALATION) at 11:48

## 2017-02-18 RX ADMIN — TRAMADOL HYDROCHLORIDE 50 MG: 50 TABLET, COATED ORAL at 20:09

## 2017-02-18 RX ADMIN — FUROSEMIDE 80 MG: 10 INJECTION, SOLUTION INTRAMUSCULAR; INTRAVENOUS at 17:41

## 2017-02-18 NOTE — PLAN OF CARE
Problem: Patient Care Overview (Adult)  Goal: Plan of Care Review  Outcome: Ongoing (interventions implemented as appropriate)    02/17/17 1723   Coping/Psychosocial Response Interventions   Plan Of Care Reviewed With patient;family   Patient Care Overview   Progress no change   Outcome Evaluation   Outcome Summary/Follow up Plan pt on 3L O2 NC. normally on 2-2.5L at home. c/o chronic leg pain. medicated with good relief. VSS. diuresing        Goal: Adult Individualization and Mutuality  Outcome: Ongoing (interventions implemented as appropriate)  Goal: Discharge Needs Assessment  Outcome: Ongoing (interventions implemented as appropriate)    Problem: Cardiac: Heart Failure (Adult)  Goal: Signs and Symptoms of Listed Potential Problems Will be Absent or Manageable (Cardiac: Heart Failure)  Outcome: Ongoing (interventions implemented as appropriate)

## 2017-02-18 NOTE — PLAN OF CARE
Problem: Patient Care Overview (Adult)  Goal: Plan of Care Review  Outcome: Ongoing (interventions implemented as appropriate)    02/18/17 1115   Coping/Psychosocial Response Interventions   Plan Of Care Reviewed With patient   Outcome Evaluation   Outcome Summary/Follow up Plan PT eval completed. Pt completed bed mobility with supervision. Pt transfered sit<>stand with Jeison to steady using RW, and transfered bed<>bedside commode with RW and Jeison. Pt's O2 staturation dropped to 84% with O2 at 3L via nc, but quickly raised to WNL with cues to breath in nose. Pt would benefit from skilled PT intervention to address decreased mobility, weakness, decreased balance, and impaired activity tolerance. Anticipated d/c is to home with assist and HH or with 24/7 assist and HH pending pt progress(SNF if 24/7 care indicated and unavailable).         Problem: Inpatient Physical Therapy  Goal: Bed Mobility Goal LTG- PT  Outcome: Ongoing (interventions implemented as appropriate)    02/18/17 1115   Bed Mobility PT LTG   Bed Mobility PT LTG, Date Established 02/18/17   Bed Mobility PT LTG, Time to Achieve by discharge   Bed Mobility PT LTG, Activity Type all bed mobility   Bed Mobility PT LTG, [place] Level conditional independence  (HOB elevated PRN)   Bed Mobility PT Goal LTG, Assist Device bed rails       Goal: Transfer Training Goal 1 LTG- PT  Outcome: Ongoing (interventions implemented as appropriate)    02/18/17 1115   Transfer Training PT LTG   Transfer Training PT LTG, Date Established 02/18/17   Transfer Training PT LTG, Time to Achieve by discharge   Transfer Training PT LTG, Activity Type bed to chair /chair to bed;sit to stand/stand to sit   Transfer Training PT LTG, [place] Level conditional independence   Transfer Training PT LTG, Assist Device walker, rolling       Goal: Gait Training Goal LTG- PT  Outcome: Ongoing (interventions implemented as appropriate)    02/18/17 1115   Gait Training PT LTG   Gait  Training Goal PT LTG, Date Established 02/18/17   Gait Training Goal PT LTG, Time to Achieve by discharge   Gait Training Goal PT LTG, [place] Level conditional independence  (assist to manage O2)   Gait Training Goal PT LTG, Assist Device walker, rolling   Gait Training Goal PT LTG, Distance to Achieve 150ft with standing rests PRN        Goal: Strength Goal LTG- PT  Outcome: Ongoing (interventions implemented as appropriate)    02/18/17 1115   Strength Goal PT LTG   Strength Goal PT LTG, Date Established 02/18/17   Strength Goal PT LTG, Time to Achieve by discharge   Strength Goal PT LTG, Functional Goal BLE AROM x20 in sitting and supine

## 2017-02-18 NOTE — CONSULTS
Consults   Consult from:  Abhishek Bruce MD, Hospitalist service  Reason for Consultation:  CHF and heart block    Chief Complaint   Patient presents with   • Shortness of Breath   • Leg Swelling     HPI: 81-year-old white female with a history of mild chronic systolic dysfunction, coronary artery disease, status post PCI per Dr. Hicks, presents with shortness of breath and increasing lower extremity edema.   The patient notes that she has severe COPD and is on home oxygen therapy 24 hours a day.  Reportedly, over the last 2 or 3 days, she noted increasing lower extremity edema and orthopnea.  She denies any fevers, chills, wheezing, chest pain, palpitations.  She was admitted to the hospitalist service and has been given diuretics and feels somewhat better today.  She notes that her legs are less tight than they were yesterday.  Overnight, on telemetry, she was noted to have variable degrees of heart block.  The patient denies any lightheadedness, dizziness, syncope, palpitations, etc.  The patient notes not 100% compliant with medical therapy - has missed some dose of lasix recently.  Also failed to keep most recent follow-up with Dr. Hicks    Shortness of Breath   This is a chronic problem. The problem has been gradually worsening. Associated symptoms include leg swelling, orthopnea and PND. Pertinent negatives include no abdominal pain, chest pain, claudication, fever, headaches, hemoptysis, neck pain, rash, sputum production, syncope, vomiting or wheezing. The symptoms are aggravated by any activity. Her past medical history is significant for CAD, chronic lung disease, COPD and a heart failure.   Leg Swelling   This is a recurrent problem. The problem has been gradually improving. Pertinent negatives include no abdominal pain, change in bowel habit, chest pain, chills, congestion, coughing, fever, headaches, joint swelling, myalgias, nausea, neck pain, numbness, rash or vomiting. Nothing aggravates the symptoms.  Treatments tried: Diuretics. The treatment provided mild relief.     Past Medical History   Diagnosis Date   • CAD (coronary artery disease)    • CHF (congestive heart failure)    • COPD (chronic obstructive pulmonary disease)    • Hypertension    • Renal disorder      Past Surgical History   Procedure Laterality Date   • Cholecystectomy     • Back surgery     • Eye surgery Bilateral      cataract     Medications:    acetaZOLAMIDE 125 mg Oral QAM   aspirin 81 mg Oral QAM AC   atorvastatin 20 mg Oral Nightly   budesonide-formoterol 2 puff Inhalation BID - RT   furosemide 80 mg Intravenous BID   ipratropium-albuterol 3 mL Nebulization 4x Daily - RT   levothyroxine 150 mcg Oral Q AM   lisinopril 10 mg Oral Q12H   metoprolol tartrate 12.5 mg Oral Q12H   pantoprazole 40 mg Oral Daily   potassium chloride 10 mEq Oral BID With Meals   [START ON 2/19/2017] potassium chloride 20 mEq Oral Daily     Allergies   Allergen Reactions   • Fluarix [Flu Virus Vaccine] Swelling   • Pneumococcal Vaccines Swelling   • Codeine Rash     Social History   Substance Use Topics   • Smoking status: Former Smoker   • Smokeless tobacco: Not on file   • Alcohol use No     Family History   Problem Relation Age of Onset   • Coronary artery disease Father      Review of Systems   Constitution: Negative for chills, fever, night sweats, weight gain and weight loss.   HENT: Negative for congestion, headaches and hearing loss.    Eyes: Negative for blurred vision and pain.   Cardiovascular: Positive for dyspnea on exertion, leg swelling, orthopnea and paroxysmal nocturnal dyspnea. Negative for chest pain, claudication, irregular heartbeat, palpitations and syncope.   Respiratory: Positive for shortness of breath and sleep disturbances due to breathing. Negative for cough, hemoptysis, sputum production and wheezing.    Endocrine: Negative for cold intolerance, heat intolerance, polydipsia and polyuria.   Hematologic/Lymphatic: Negative for adenopathy and  "bleeding problem. Does not bruise/bleed easily.   Skin: Negative for color change, poor wound healing and rash.   Musculoskeletal: Positive for arthritis and muscle cramps. Negative for back pain, joint pain, joint swelling, myalgias and neck pain.   Gastrointestinal: Negative for abdominal pain, change in bowel habit, constipation, diarrhea, heartburn, hematochezia, melena, nausea and vomiting.   Genitourinary: Negative for bladder incontinence, dysuria, frequency, hematuria and nocturia.   Neurological: Negative for dizziness, focal weakness, light-headedness, loss of balance, numbness and seizures.   Psychiatric/Behavioral: Negative for altered mental status, memory loss and substance abuse.   Allergic/Immunologic: Negative for hives and persistent infections.     Physical Exam:  Visit Vitals   • /67 (BP Location: Left arm, Patient Position: Sitting)   • Pulse 61   • Temp 97.9 °F (36.6 °C) (Temporal Artery )   • Resp 20   • Ht 64\" (162.6 cm)   • Wt 219 lb 6.4 oz (99.5 kg)   • SpO2 97%   • BMI 37.66 kg/m2     Temp:  [97.6 °F (36.4 °C)-98.2 °F (36.8 °C)] 97.9 °F (36.6 °C)  Heart Rate:  [58-97] 61  Resp:  [20-23] 20  BP: (113-162)/(50-91) 145/67    Physical Exam   Constitutional: She is oriented to person, place, and time. Vital signs are normal. She appears well-developed and well-nourished. She is cooperative.  Non-toxic appearance. No distress.   HENT:   Head: Normocephalic and atraumatic.   Right Ear: External ear normal.   Left Ear: External ear normal.   Nose: Nose normal.   Mouth/Throat: Oropharynx is clear and moist. No oropharyngeal exudate.   Eyes: Conjunctivae, EOM and lids are normal. Pupils are equal, round, and reactive to light. No scleral icterus.   Neck: Neck supple. No hepatojugular reflux and no JVD present. Carotid bruit is not present. No thyroid mass and no thyromegaly present.   Cardiovascular: Normal rate, regular rhythm, S1 normal, S2 normal, normal heart sounds and intact distal " pulses.   No extrasystoles are present. Exam reveals no gallop and no friction rub.    No murmur heard.  Pulses:       Carotid pulses are 2+ on the right side, and 2+ on the left side.       Radial pulses are 2+ on the right side, and 2+ on the left side.        Femoral pulses are 2+ on the right side, and 2+ on the left side.       Left popliteal pulse not accessible.        Dorsalis pedis pulses are 1+ on the right side, and 2+ on the left side.        Posterior tibial pulses are 1+ on the right side, and 1+ on the left side.   Pulmonary/Chest: Effort normal. No respiratory distress. She has decreased breath sounds. She has no wheezes. She has no rhonchi. She has no rales. She exhibits no tenderness.   Abdominal: Soft. Normal appearance and bowel sounds are normal. She exhibits no distension, no abdominal bruit and no mass. There is no hepatosplenomegaly. There is no tenderness. There is no rebound and no guarding.   Musculoskeletal: Normal range of motion. She exhibits edema. She exhibits no tenderness or deformity.   Lymphadenopathy:     She has no cervical adenopathy.   Neurological: She is alert and oriented to person, place, and time. No cranial nerve deficit. She exhibits normal muscle tone.   Skin: Skin is warm, dry and intact. No rash noted. She is not diaphoretic. No erythema. No pallor.   Psychiatric: She has a normal mood and affect. Her behavior is normal. Judgment and thought content normal.   Vitals reviewed.    Diagnostic Data:    Lab Results   Component Value Date    WBC 5.70 02/17/2017    HGB 11.9 (L) 02/17/2017    HCT 37.1 02/17/2017    MCV 79.6 (L) 02/17/2017     (L) 02/17/2017     Lab Results   Component Value Date    GLUCOSE 126 (H) 02/17/2017    CALCIUM 9.2 02/17/2017     02/17/2017    K 4.3 02/17/2017    CO2 31.0 02/17/2017     02/17/2017    BUN 19 02/17/2017    CREATININE 1.15 02/17/2017    EGFRIFNONA 46 (L) 02/17/2017    BCR 16.5 02/17/2017    ANIONGAP 10.0 02/17/2017      CXR: Cardiomegaly with bibasilar atelectasis and slight increase in small right-sided effusion.    Venous Duplex, LE: No DVT or STP    Echocardiogram:  · Left ventricular function is mildly decreased. Difficult to determine LVEF, given suboptimal images.  · No obvious valvular abnormalities, although cardiac valves not well visualized.  · Trace to small pericardial effusion without any obvious evidence of tamponade.    ASSESSMENT/PLAN:    1. Acute on chronic systolic congestive heart failure  2. 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  Also some pauses overnight.  Does not appear, in my opinion to represent complete heart block.  Either way, however, the patient has remained asymptomatic and hemodynamically stable.  She does admit to being somewhat noncompliant with medical therapy, so one must wonder if she has been taking beta blocker therapy at home.  3.  Peripheral edema, due to problem #1  4. History of native CAD, s/p PCI per Dr. Hicks in the past - no signs or symptoms of cardiac ischemia at present time  5. History of stage III CKD  6. Essential HTN  7. Morbid obesity  8. Medical noncompliance, to some degree    - At this time, she appears to be clinically improving with diuresis.  - Continue current diuretic regimen with Lasix 80 mg IV bid and monitor output and clinical response.  - In regards to her heart block, would lower metoprolol to 12.5 mg PO bid and continue telemetry monitoring.  No indication for pacemaker or other measures at this time, as the patient is asymptomatic and hemodynamically stable.  - Continue other medical therapies, including ASA, Lipitor, lisinopril.  - Will continue to follow.  Thank you for this consultation.

## 2017-02-18 NOTE — PROGRESS NOTES
Acute Care - Physical Therapy Initial Evaluation  [facility]     Patient Name: Erin Cox  : 1935  MRN: [id number]  Today's Date: 2017   Onset of Illness/Injury or Date of Surgery Date: 17  Date of Referral to PT: 17  Referring Physician: Dr. Bruce       Admit Date: 2017     Visit Dx:    ICD-10-CM ICD-9-CM   1. Acute on chronic systolic congestive heart failure I50.23 428.23     428.0   2. Hypoxia R09.02 799.02   3. Dyspnea on exertion R06.09 786.09   4. Essential hypertension I10 401.9   5. Impaired functional mobility, balance, gait, and endurance Z74.09 V49.89     Patient Active Problem List   Diagnosis   • Mitral regurgitation   • Aortic stenosis   • Acute on chronic systolic congestive heart failure     Past Medical History   Diagnosis Date   • CHF (congestive heart failure)    • COPD (chronic obstructive pulmonary disease)    • Hypertension    • Renal disorder      Past Surgical History   Procedure Laterality Date   • Cholecystectomy     • Back surgery     • Eye surgery Bilateral      cataract          PT ASSESSMENT (last 72 hours)      PT Evaluation       17 0851 17 0420    Rehab Evaluation    Document Type evaluation   see MAR  -PB (r) MC (t) PB (c)     Subjective Information agree to therapy;complains of;fatigue;dyspnea;pain  -PB (r) MC (t) PB (c)     Patient Effort, Rehab Treatment adequate  -PB (r) MC (t) PB (c)     Symptoms Noted During/After Treatment shortness of breath  -PB (r) MC (t) PB (c)     General Information    Patient Profile Review yes  -PB (r) MC (t) PB (c)     Onset of Illness/Injury or Date of Surgery Date 17  -PB (r) MC (t) PB (c)     Referring Physician Dr. Bruce   -PB (r) MC (t) PB (c)     General Observations Pt sitting EOB and appears to be in distress. She has telemetry on, O2 at 3L via nc, and appears to have just transfered from bedside commode to bed without assist  -PB (r) MC (t) PB (c)     Pertinent History Of Current  Problem 2/17 CC: SOB and BLE swelling; Dx: acute systolic CHF exacerbation, COPD, CKD III, generalized weakness, HTN, Hx of SVT & MAT, hypothyroid  -PB (r) MC (t) PB (c)     Precautions/Limitations fall precautions;oxygen therapy device and L/min  -PB (r) MC (t) PB (c)     Prior Level of Function max assist:;cooking;ADL's;grooming;dressing;bathing;dependent:;home management;using stairs;shopping;independent:;all household mobility  -PB (r) MC (t) PB (c)     Equipment Currently Used at Home oxygen;shower chair;grab bar;dressing device;hospital bed   rollator, 2.5-3L O2 nc, grab bars at toilet and shower  -PB (r) MC (t) PB (c) oxygen  -KJ    Plans/Goals Discussed With patient;agreed upon  -PB (r) MC (t) PB (c)     Risks Reviewed patient:;LOB;nausea/vomiting;dizziness;increased discomfort;change in vital signs;increased drainage  -PB (r) MC (t) PB (c)     Benefits Reviewed patient:;improve function;increase independence;increase strength;increase balance;decrease pain;decrease risk of DVT  -PB (r) MC (t) PB (c)     Barriers to Rehab medically complex;previous functional deficit  -PB (r) MC (t) PB (c)     Living Environment    Lives With alone  -PB (r) MC (t) PB (c)     Living Arrangements apartment  -PB (r) MC (t) PB (c)     Home Accessibility grab bars present (bathtub);grab bars present (toilet);bed and bath on same level  -PB (r) MC (t) PB (c)     Clinical Impression    Date of Referral to PT 02/17/17  -PB (r) MC (t) PB (c)     PT Diagnosis impaired mobility, decreased balance, weakness, decreased activity tolerance  -PB (r) MC (t) PB (c)     Patient/Family Goals Statement return home  -PB (r) MC (t) PB (c)     Criteria for Skilled Therapeutic Interventions Met yes;treatment indicated  -PB (r) MC (t) PB (c)     Pathology/Pathophysiology Noted (Describe Specifically for Each System) musculoskeletal;pulmonary  -PB (r) MC (t) PB (c)     Impairments Found (describe specific impairments) aerobic capacity/endurance;gait,  locomotion, and balance  -PB (r) MC (t) PB (c)     Functional Limitations in Following Categories (Describe Specific Limitations) self-care  -PB (r) MC (t) PB (c)     Disability: Inability to Perform Actions/Activities of Required Roles (describe specific disability) community/leisure  -PB (r) MC (t) PB (c)     Rehab Potential good, to achieve stated therapy goals  -PB (r) MC (t) PB (c)     Predicted Duration of Therapy Intervention (days/wks) until d/c   -PB (r) MC (t) PB (c)     Vital Signs    Pre SpO2 (%) 96  -PB (r) MC (t) PB (c)     O2 Delivery Pre Treatment supplemental O2   3L  -PB (r) MC (t) PB (c)     Intra SpO2 (%) 84  -PB (r) MC (t) PB (c)     O2 Delivery Intra Treatment supplemental O2   3L  -PB (r) MC (t) PB (c)     Post SpO2 (%) 92  -PB (r) MC (t) PB (c)     O2 Delivery Post Treatment supplemental O2   3L  -PB (r) MC (t) PB (c)     Pre Patient Position Sitting  -PB (r) MC (t) PB (c)     Intra Patient Position Standing  -PB (r) MC (t) PB (c)     Post Patient Position Sitting  -PB (r) MC (t) PB (c)     Pain Assessment    Pain Assessment 0-10  -PB (r) MC (t) PB (c)     Pain Score 5  -PB (r) MC (t) PB (c)     Pain Type Acute pain  -PB (r) MC (t) PB (c)     Pain Location Leg  -PB (r) MC (t) PB (c)     Pain Orientation Right;Left;Lower  -PB (r) MC (t) PB (c)     Pain Descriptors Cramping  -PB (r) MC (t) PB (c)     Pain Frequency Intermittent   several times off day  -PB (r) MC (t) PB (c)     Pain Intervention(s) Medication (See MAR);Repositioned;Ambulation/increased activity;Heat applied   stretch  -PB (r) MC (t) PB (c)     Vision Assessment/Intervention    Visual Impairment WFL  -PB (r) MC (t) PB (c)     Cognitive Assessment/Intervention    Current Cognitive/Communication Assessment functional  -PB (r) MC (t) PB (c)     Orientation Status oriented x 4  -PB (r) MC (t) PB (c)     Follows Commands/Answers Questions 100% of the time;able to follow multi-step instructions  -PB (r) MC (t) PB (c)     Personal  Safety decreased awareness, need for assist;decreased awareness, need for safety;decreased insight to deficits;unaware of consequences of deficits;impulsive  -PB (r) MC (t) PB (c)     Personal Safety Interventions fall prevention program maintained;gait belt;nonskid shoes/slippers when out of bed;supervised activity  -PB (r) MC (t) PB (c)     ROM (Range of Motion)    General ROM Detail BLE AROM WFL  -PB (r) MC (t) PB (c)     MMT (Manual Muscle Testing)    General MMT Assessment Detail BLE functionally 3+/5  -PB (r) MC (t) PB (c)     Bed Mobility, Assessment/Treatment    Bed Mobility, Assistive Device bed rails;head of bed elevated  -PB (r) MC (t) PB (c)     Bed Mobility, Scoot/Bridge, [place] supervision required  -PB (r) MC (t) PB (c)     Bed Mob, Supine to Sit, [place] supervision required;verbal cues required;nonverbal cues required (demo/gesture)  -PB (r) MC (t) PB (c)     Bed Mob, Sit to Supine, [place] supervision required;verbal cues required;nonverbal cues required (demo/gesture)  -PB (r) MC (t) PB (c)     Bed Mobility, Safety Issues decreased use of arms for pushing/pulling;decreased use of legs for bridging/pushing   pt is not mindful of EOB  -PB (r) MC (t) PB (c)     Bed Mobility, Impairments strength decreased;impaired balance;pain   SOB, hypoxia  -PB (r) MC (t) PB (c)     Bed Mobility, Comment pt likes to get into bed on hands and knees  -PB (r) MC (t) PB (c)     Transfer Assessment/Treatment    Transfers, Bed-Chair [place] verbal cues required;nonverbal cues required (demo/gesture);minimum assist (75% patient effort)   bs commode  -PB (r) MC (t) PB (c)     Transfers, Chair-Bed [place] verbal cues required;nonverbal cues required (demo/gesture);minimum assist (75% patient effort)   bs commode  -PB (r) MC (t) PB (c)     Transfers, Bed-Chair-Bed, Assist Device rolling walker  -PB (r) MC (t) PB (c)     Transfers, Sit-Stand [place] verbal cues required;nonverbal cues required  (demo/gesture);minimum assist (75% patient effort)  -PB (r) MC (t) PB (c)     Transfers, Stand-Sit [place] verbal cues required;nonverbal cues required (demo/gesture);contact guard assist  -PB (r) MC (t) PB (c)     Transfers, Sit-Stand-Sit, Assist Device rolling walker  -PB (r) MC (t) PB (c)     Transfer, Safety Issues balance decreased during turns;step length decreased;impulsivity;sequencing ability decreased  -PB (r) MC (t) PB (c)     Transfer, Impairments strength decreased;impaired balance   SOB, hypoxia  -PB (r) MC (t) PB (c)     Transfer, Comment pt states that she doesnt think she needs help getting from bed to commode, and she doesn't think she needs the RW  -PB (r) MC (t) PB (c)     Gait Assessment/Treatment    Gait, Comment pt refused walking  -PB (r) MC (t) PB (c)     Motor Skills/Interventions    Additional Documentation Balance Skills Training (Group)  -PB (r) MC (t) PB (c) Balance Skills Training (Group)  -PB (r) MC (t) PB (c)    Balance Skills Training    Sitting-Level of Assistance Independent;Close supervision   independent long sitting, close supervision EOB  -PB (r) MC (t) PB (c)     Sitting-Balance Support Right upper extremity supported;Left upper extremity supported;Feet supported  -PB (r) MC (t) PB (c)     Standing-Level of Assistance Contact guard;Minimum assistance   Jeison to steady   -PB (r) MC (t) PB (c)     Static Standing Balance Support assistive device  -PB (r) MC (t) PB (c)     Sensory Assessment/Intervention    Light Touch --   BLE WFL  -PB (r) MC (t) PB (c)     Positioning and Restraints    Pre-Treatment Position in bed  -PB (r) MC (t) PB (c)     Post Treatment Position bed  -PB (r) MC (t) PB (c)     In Bed call light within reach;encouraged to call for assist;sitting;side rails up x2   long sitting   -PB (r) MC (t) PB (c)       02/17/17 1643 02/17/17 1640    General Information    Equipment Currently Used at Home  none  -DW    Living Environment    Lives With alone  -DW      Living Arrangements apartment  -DW     Home Accessibility no concerns  -DW     Stair Railings at Home none  -DW     Type of Financial/Environmental Concern none  -DW     Transportation Available none  -DW       User Key  (r) = Recorded By, (t) = Taken By, (c) = Cosigned By    Initials Name Provider Type    OLINDA Shelley, RN Registered Nurse    CATHI Xie, RN Registered Nurse    POLLY Nixon, PT DPT Physical Therapist    SHAZIA Black, PT Student PT Student          Physical Therapy Education     Title: PT OT SLP Therapies (Active)     Topic: Physical Therapy (Active)     Point: Mobility training (Active)    Learning Progress Summary    Learner Readiness Method Response Comment Documented by Status   Patient Acceptance E NR Discussed the role of PT. Instructed the pt to always call for assist before getting out of bed to prevent falls/injuries. Discussed the benefits of mobility and activity and the appropriate usage of a RW for transfer and gait.  02/18/17 1111 Active               Point: Precautions (Active)    Learning Progress Summary    Learner Readiness Method Response Comment Documented by Status   Patient Acceptance E NR Discussed the role of PT. Instructed the pt to always call for assist before getting out of bed to prevent falls/injuries. Discussed the benefits of mobility and activity and the appropriate usage of a RW for transfer and gait.  02/18/17 1111 Active                      User Key     Initials Effective Dates Name Provider Type Discipline     12/19/16 -  Shaniqua Black, PT Student PT Student PT                PT Recommendation and Plan  Anticipated Equipment Needs At Discharge: front wheeled walker  Anticipated Discharge Disposition: home with 24/7 care, home with home health, skilled nursing facility (pending ability to get 24/7 care at home and pt progress)  Planned Therapy Interventions: balance training, bed mobility training, gait training, home exercise  program, patient/family education, strengthening, transfer training  PT Frequency: daily, 2 times/day, per priority policy  Plan of Care Review  Plan Of Care Reviewed With: patient  Outcome Summary/Follow up Plan: PT eval completed. Pt completed bed mobility with supervision. Pt transfered sit<>stand with Jeison to steady using RW, and transfered bed<>bedside commode with RW and Jeison. Pt's O2 staturation dropped to 84% with O2 at 3L via nc, but quickly raised to WNL with cues to breath in nose. Pt would benefit from skilled PT intervention to address decreased mobility, weakness, decreased balance, and impaired activity tolerance. Anticipated d/c is to home with assist and HH or with 24/7 assist and HH pending pt progress(SNF if 24/7 care indicated and unavailable).          IP PT Goals       02/18/17 1115          Bed Mobility PT LTG    Bed Mobility PT LTG, Date Established 02/18/17  -PB (r) MC (t) PB (c)      Bed Mobility PT LTG, Time to Achieve by discharge  -PB (r) MC (t) PB (c)      Bed Mobility PT LTG, Activity Type all bed mobility  -PB (r) MC (t) PB (c)      Bed Mobility PT LTG, [place] Level conditional independence   HOB elevated PRN  -PB (r) MC (t) PB (c)      Bed Mobility PT Goal  LTG, Assist Device bed rails  -PB (r) MC (t) PB (c)      Transfer Training PT LTG    Transfer Training PT LTG, Date Established 02/18/17  -PB (r) MC (t) PB (c)      Transfer Training PT LTG, Time to Achieve by discharge  -PB (r) MC (t) PB (c)      Transfer Training PT LTG, Activity Type bed to chair /chair to bed;sit to stand/stand to sit  -PB (r) MC (t) PB (c)      Transfer Training PT LTG, [place] Level conditional independence  -PB (r) MC (t) PB (c)      Transfer Training PT LTG, Assist Device walker, rolling  -PB (r) MC (t) PB (c)      Gait Training PT LTG    Gait Training Goal PT LTG, Date Established 02/18/17  -PB (r) MC (t) PB (c)      Gait Training Goal PT LTG, Time to Achieve by discharge  -PB (r) MC (t) PB (c)       Gait Training Goal PT LTG, [place] Level conditional independence   assist to manage O2  -PB (r) MC (t) PB (c)      Gait Training Goal PT LTG, Assist Device walker, rolling  -PB (r) MC (t) PB (c)      Gait Training Goal PT LTG, Distance to Achieve 150ft with standing rests PRN   -PB (r) MC (t) PB (c)      Strength Goal PT LTG    Strength Goal PT LTG, Date Established 02/18/17  -PB (r) MC (t) PB (c)      Strength Goal PT LTG, Time to Achieve by discharge  -PB (r) MC (t) PB (c)      Strength Goal PT LTG, Functional Goal BLE AROM x20 in sitting and supine  -PB (r) MC (t) PB (c)        User Key  (r) = Recorded By, (t) = Taken By, (c) = Cosigned By    Initials Name Provider Type    POLLY Nixon, PT DPT Physical Therapist    SHAZIA Black, PT Student PT Student                Outcome Measures       02/18/17 0851          How much help from another person do you currently need...    Turning from your back to your side while in flat bed without using bedrails? 3  -PB (r) MC (t) PB (c)      Moving from lying on back to sitting on the side of a flat bed without bedrails? 3  -PB (r) MC (t) PB (c)      Moving to and from a bed to a chair (including a wheelchair)? 3  -PB (r) MC (t) PB (c)      Standing up from a chair using your arms (e.g., wheelchair, bedside chair)? 3  -PB (r) MC (t) PB (c)      Climbing 3-5 steps with a railing? 2  -PB (r) MC (t) PB (c)      To walk in hospital room? 3  -PB (r) MC (t) PB (c)      AM-PAC 6 Clicks Score 17  -PB (r) MC (t)      Functional Assessment    Outcome Measure Options AM-PAC 6 Clicks Basic Mobility (PT)  -PB (r) MC (t) PB (c)        User Key  (r) = Recorded By, (t) = Taken By, (c) = Cosigned By    Initials Name Provider Type    POLLY Nixon, PT DPT Physical Therapist    SHAZIA Black, PT Student PT Student           Time Calculation:         PT Charges       02/18/17 1112          Time Calculation    Start Time 1011   additional time [id number]; total PT  time 44 min  -PB (r) MC (t) PB (c)      Stop Time 1043  -PB (r) MC (t) PB (c)      Time Calculation (min) 32 min  -PB (r) MC (t)      PT Received On 02/18/17  -PB (r) MC (t) PB (c)      PT Goal Re-Cert Due Date 02/28/17  -PB (r) MC (t) PB (c)        User Key  (r) = Recorded By, (t) = Taken By, (c) = Cosigned By    Initials Name Provider Type    POLLY Nixon, PT DPT Physical Therapist    SHAZIA Black, PT Student PT Student          Therapy Charges for Today     Code Description Service Date Service Provider Modifiers Qty    [id number] HC PT EVAL LOW COMPLEXITY 3 2/18/2017 Shaniqua Black, PT Student GP 1          PT G-Codes  Outcome Measure Options: AM-PAC 6 Clicks Basic Mobility (PT)  Score: 17  Functional Limitation: Mobility: Walking and moving around  Mobility: Walking and Moving Around Current Status (): At least 40 percent but less than 60 percent impaired, limited or restricted  Mobility: Walking and Moving Around Goal Status (): At least 20 percent but less than 40 percent impaired, limited or restricted      Shaniqua Black, PT Student  2/18/2017

## 2017-02-18 NOTE — PROGRESS NOTES
[facility] Medicine Services  INPATIENT PROGRESS NOTE    Length of Stay: 1  Date of Admission: 2/17/2017  Primary Care Physician: Jeremi Kan MD    Subjective   Chief Complaint: shortness of breath  HPI   Pt sitting up on side of bed. States she is feeling better. Less SOA. Is having good UOP from Lasix. Does complain of leg cramps. Had a bowel movement today, was previously constipated. Denies any chest pain. Family tells me she is trying to have them get her some Cheetos. There is a bag of goldfish crackers at the bedside. I explained to pt that she cannot eat those. On telemetry, she is running sinus rhythm with 1st degree with intermittent 2nd degree type 2 and had 2nd degree and 3rd degree heart block through the noc. Urine culture reveals contaminant.     Review of Systems   All pertinent negatives and positives are as above. All other systems have been reviewed and are negative unless otherwise stated.     Objective    Temp:  [97.6 °F (36.4 °C)-98.2 °F (36.8 °C)] 97.9 °F (36.6 °C)  Heart Rate:  [58-97] 61  Resp:  [20-23] 20  BP: (113-162)/(50-91) 145/67  Physical Exam   Constitutional: She is oriented to person, place, and time. She appears well-developed and well-nourished.   obese   HENT:   Head: Normocephalic and atraumatic.   Eyes: Conjunctivae and EOM are normal. Pupils are equal, round, and reactive to light.   Neck: Neck supple. No JVD present. No thyromegaly present.   Cardiovascular: Normal rate, regular rhythm, normal heart sounds and intact distal pulses.  Exam reveals no gallop and no friction rub.    No murmur heard.  Pulmonary/Chest: Effort normal. No respiratory distress. She has no wheezes. She has no rales. She exhibits no tenderness.   Grossly diminished R>L   Abdominal: Soft. Bowel sounds are normal. She exhibits no distension. There is no tenderness. There is no rebound and no guarding.   Musculoskeletal: Normal range of motion. She exhibits edema  (1+ edema bilaterally. pt states is improvement-VENKATA hose noted. ). She exhibits no tenderness or deformity.   Lymphadenopathy:     She has no cervical adenopathy.   Neurological: She is alert and oriented to person, place, and time. She displays normal reflexes. No cranial nerve deficit. She exhibits normal muscle tone.   Skin: Skin is warm and dry. No rash noted.   Psychiatric: She has a normal mood and affect. Her behavior is normal. Judgment and thought content normal.     Results Review:  I have reviewed the labs, radiology results, and diagnostic studies.    Laboratory Data:     Results from last 7 days  Lab Units 02/17/17  1202   WBC 10*3/mm3 5.70   HEMOGLOBIN g/dL 11.9*   HEMATOCRIT % 37.1   PLATELETS 10*3/mm3 101*          Results from last 7 days  Lab Units 02/17/17  1202   SODIUM mmol/L 142   POTASSIUM mmol/L 4.3   CHLORIDE mmol/L 101   TOTAL CO2 mmol/L 31.0   BUN mg/dL 19   CREATININE mg/dL 1.15   CALCIUM mg/dL 9.2   BILIRUBIN mg/dL 0.7   ALK PHOS U/L 80   ALT (SGPT) U/L 30   AST (SGOT) U/L 19   GLUCOSE mg/dL 126*       Culture Data:   URINE CULTURE   Date Value Ref Range Status   02/17/2017 >100,000 CFU/mL Mixed Gram Positive Lenora (A)  Preliminary   · 2 d echocardiogram  · Left ventricular function is mildly decreased. Difficult to determine LVEF, given suboptimal images.  · No obvious valvular abnormalities, although cardiac valves not well visualized.  · Trace to small pericardial effusion without any obvious evidence of tamponade.    Radiology Data:   Imaging Results (last 24 hours)     Procedure Component Value Units Date/Time    XR Chest PA & Lateral [[id number]] Collected:  02/18/17 0841     Updated:  02/18/17 0909    Narrative:       HISTORY: Follow-up CHF     CHEST 2 VIEWS, 0813 HOURS:     Comparison study dated the preceding day is reviewed. There is continued  cardiomegaly with some slight increase in small right-sided effusion.  Bibasilar atelectasis is felt present.     IMPRESSIONS:    Cardiomegaly with bibasilar atelectasis and slight increase in small  right-sided effusion.  This report was finalized on 02/18/2017 09:07 by Dr. Jordan Bearden MD.    US Venous Doppler Lower Extremity Bilateral (duplex) [[id number]] Collected:  02/18/17 1142     Updated:  02/18/17 1144    Narrative:       History: Swelling       Impression:       Impression: There is no evidence of deep venous thrombosis or  superficial thrombophlebitis of right or left lower extremities.     Comments: Bilateral lower extremity venous duplex exam was performed  using color Doppler flow, Doppler waveform analysis, and grayscale  imaging, with and without compression. There is no evidence of deep  venous thrombosis in the common femoral, superficial femoral, popliteal,  peroneal, anterior tibial, and posterior tibial veins bilaterally. No  thrombus is identified in the saphenofemoral junctions and greater  saphenous veins bilaterally.         This report was finalized on 02/18/2017 11:42 by Dr. Raymon Stern MD.          I have reviewed the patient current medications.     Assessment/Plan     Hospital Problem List     Acute on chronic systolic congestive heart failure      Assessment:  1. Acute systolic CHF with exacerbation   2. COPD - no exac; home 02 at 2.5LNC  3. History of CKD III  4. Generalized Weakness  5. HTN  6. History of SVT and MAT  7. Hypothyroidism  8. 2nd and 3rd degree heart block    Plan:  1. Continue Lasix 80 mg bid for now as pt is diuresing well.   2. Consult cardiology regarding heart block  3. Daily weights, strict I&O  4. Will consult physical therapy regarding weakness.   5. Repeat labs in am, cbc, bmp.   6. Will get bmp, TSH, Magnesium now.       Discharge Planning: I expect patient to be discharged to home in 2-3 days.    NEYDA Chi   02/18/17   1:27 PM     I personally evaluated and examined the patient in conjunction with NEYDA Cardoso and agree with the assessment, treatment  plan, and disposition of the patient as recorded by her. My history, exam, and further recommendations are: Continue diuresis with IV Lasix.  Patient did report that she has not been compliant with her Lasix on an outpatient basis, stating that she missed a couple of days of diuresis, and then transition from twice a day dosing down to once daily dosing. We will check a metabolic panel today to follow-up on her renal function and electrolyte status.  Her telemetry does not appear to be consistent with third degree heart block, and discussed second-degree AV block versus sinus pauses with cardiology.  Please note that the patient has remained asymptomatic as it pertains to the events on telemetry.  We will go ahead and reduce her dose of beta blocker (25 mg to 12.5 mg of metoprolol tartrate).  Case was discussed with Dr. Louis.  Echo results reviewed revealing that her LV function was slightly decreased but difficult to determine ejection fraction given suboptimal images.  There is also a trace to small pericardial effusion but no evidence of tamponade.  Clinically, patient reports that she feels better.  She is wearing her compression stockings (knee-high).  She continues to have some crackles at the bases, however this is better today as compared to yesterday.    Salo Bruce MD  02/18/17  2:29 PM

## 2017-02-18 NOTE — PLAN OF CARE
Problem: Patient Care Overview (Adult)  Goal: Plan of Care Review  Outcome: Ongoing (interventions implemented as appropriate)    02/18/17 0420   Coping/Psychosocial Response Interventions   Plan Of Care Reviewed With patient   Patient Care Overview   Progress no change   Outcome Evaluation   Outcome Summary/Follow up Plan VSS. c/o right leg cramps. Medicated with Norco and tramadol. Applied aqua K pad to leg with relief. Good output throughout shift. Patient HR dropped to 32 once during the night. No labs ordered for today.        Goal: Adult Individualization and Mutuality  Outcome: Ongoing (interventions implemented as appropriate)  Goal: Discharge Needs Assessment  Outcome: Ongoing (interventions implemented as appropriate)    Problem: Cardiac: Heart Failure (Adult)  Goal: Signs and Symptoms of Listed Potential Problems Will be Absent or Manageable (Cardiac: Heart Failure)  Outcome: Ongoing (interventions implemented as appropriate)    Problem: Fall Risk (Adult)  Goal: Identify Related Risk Factors and Signs and Symptoms  Outcome: Ongoing (interventions implemented as appropriate)  Goal: Absence of Falls  Outcome: Ongoing (interventions implemented as appropriate)

## 2017-02-19 LAB
ANION GAP SERPL CALCULATED.3IONS-SCNC: ABNORMAL MMOL/L (ref 4–13)
BACTERIA SPEC AEROBE CULT: ABNORMAL
BUN BLD-MCNC: 28 MG/DL (ref 5–21)
BUN/CREAT SERPL: 15.9 (ref 7–25)
CALCIUM SPEC-SCNC: 9.1 MG/DL (ref 8.4–10.4)
CHLORIDE SERPL-SCNC: 90 MMOL/L (ref 98–110)
CO2 SERPL-SCNC: >40 MMOL/L (ref 24–31)
CREAT BLD-MCNC: 1.76 MG/DL (ref 0.5–1.4)
GFR SERPL CREATININE-BSD FRML MDRD: 28 ML/MIN/1.73
GLUCOSE BLD-MCNC: 108 MG/DL (ref 70–100)
POTASSIUM BLD-SCNC: 3.6 MMOL/L (ref 3.5–5.3)
SODIUM BLD-SCNC: 143 MMOL/L (ref 135–145)

## 2017-02-19 PROCEDURE — 99232 SBSQ HOSP IP/OBS MODERATE 35: CPT | Performed by: NURSE PRACTITIONER

## 2017-02-19 PROCEDURE — 25010000002 FUROSEMIDE PER 20 MG: Performed by: INTERNAL MEDICINE

## 2017-02-19 PROCEDURE — 94760 N-INVAS EAR/PLS OXIMETRY 1: CPT

## 2017-02-19 PROCEDURE — 80048 BASIC METABOLIC PNL TOTAL CA: CPT | Performed by: NURSE PRACTITIONER

## 2017-02-19 PROCEDURE — 94640 AIRWAY INHALATION TREATMENT: CPT

## 2017-02-19 PROCEDURE — 94799 UNLISTED PULMONARY SVC/PX: CPT

## 2017-02-19 RX ORDER — NYSTATIN 100000 [USP'U]/G
POWDER TOPICAL EVERY 12 HOURS SCHEDULED
Status: DISCONTINUED | OUTPATIENT
Start: 2017-02-19 | End: 2017-02-22 | Stop reason: HOSPADM

## 2017-02-19 RX ORDER — FUROSEMIDE 10 MG/ML
40 INJECTION INTRAMUSCULAR; INTRAVENOUS 2 TIMES DAILY
Status: DISCONTINUED | OUTPATIENT
Start: 2017-02-19 | End: 2017-02-19

## 2017-02-19 RX ORDER — POLYETHYLENE GLYCOL 3350 17 G/17G
17 POWDER, FOR SOLUTION ORAL 2 TIMES DAILY
Status: DISCONTINUED | OUTPATIENT
Start: 2017-02-19 | End: 2017-02-22 | Stop reason: HOSPADM

## 2017-02-19 RX ADMIN — ASPIRIN 81 MG: 81 TABLET ORAL at 17:28

## 2017-02-19 RX ADMIN — POTASSIUM CHLORIDE 20 MEQ: 750 CAPSULE, EXTENDED RELEASE ORAL at 09:45

## 2017-02-19 RX ADMIN — BUDESONIDE AND FORMOTEROL FUMARATE DIHYDRATE 2 PUFF: 160; 4.5 AEROSOL RESPIRATORY (INHALATION) at 19:27

## 2017-02-19 RX ADMIN — PANTOPRAZOLE SODIUM 40 MG: 40 TABLET, DELAYED RELEASE ORAL at 09:45

## 2017-02-19 RX ADMIN — IPRATROPIUM BROMIDE AND ALBUTEROL SULFATE 3 ML: .5; 3 SOLUTION RESPIRATORY (INHALATION) at 10:47

## 2017-02-19 RX ADMIN — POLYETHYLENE GLYCOL 3350 17 G: 17 POWDER, FOR SOLUTION ORAL at 17:28

## 2017-02-19 RX ADMIN — ACETAZOLAMIDE 125 MG: 250 TABLET ORAL at 09:45

## 2017-02-19 RX ADMIN — METOPROLOL TARTRATE 12.5 MG: 25 TABLET, FILM COATED ORAL at 09:45

## 2017-02-19 RX ADMIN — IPRATROPIUM BROMIDE AND ALBUTEROL SULFATE 3 ML: .5; 3 SOLUTION RESPIRATORY (INHALATION) at 06:47

## 2017-02-19 RX ADMIN — FUROSEMIDE 80 MG: 10 INJECTION, SOLUTION INTRAMUSCULAR; INTRAVENOUS at 09:46

## 2017-02-19 RX ADMIN — NYSTATIN: 100000 POWDER TOPICAL at 17:28

## 2017-02-19 RX ADMIN — ATORVASTATIN CALCIUM 20 MG: 10 TABLET, FILM COATED ORAL at 20:05

## 2017-02-19 RX ADMIN — IPRATROPIUM BROMIDE AND ALBUTEROL SULFATE 3 ML: .5; 3 SOLUTION RESPIRATORY (INHALATION) at 15:04

## 2017-02-19 RX ADMIN — HYDROCODONE BITARTRATE AND ACETAMINOPHEN 1 TABLET: 7.5; 325 TABLET ORAL at 05:13

## 2017-02-19 RX ADMIN — BUDESONIDE AND FORMOTEROL FUMARATE DIHYDRATE 2 PUFF: 160; 4.5 AEROSOL RESPIRATORY (INHALATION) at 06:47

## 2017-02-19 RX ADMIN — HYDROCODONE BITARTRATE AND ACETAMINOPHEN 1 TABLET: 7.5; 325 TABLET ORAL at 20:05

## 2017-02-19 RX ADMIN — METOPROLOL TARTRATE 12.5 MG: 25 TABLET, FILM COATED ORAL at 20:05

## 2017-02-19 RX ADMIN — LISINOPRIL 10 MG: 10 TABLET ORAL at 09:45

## 2017-02-19 RX ADMIN — NYSTATIN: 100000 POWDER TOPICAL at 20:09

## 2017-02-19 RX ADMIN — GABAPENTIN 300 MG: 300 CAPSULE ORAL at 20:05

## 2017-02-19 RX ADMIN — IPRATROPIUM BROMIDE AND ALBUTEROL SULFATE 3 ML: .5; 3 SOLUTION RESPIRATORY (INHALATION) at 19:27

## 2017-02-19 RX ADMIN — LISINOPRIL 10 MG: 10 TABLET ORAL at 20:05

## 2017-02-19 NOTE — PROGRESS NOTES
[facility] Medicine Services  INPATIENT PROGRESS NOTE    Length of Stay: 2  Date of Admission: 2/17/2017  Primary Care Physician: Jeremi Kan MD    Subjective   Chief Complaint: dyspnea  HPI   Has remained normal sinus rhythm with intermittent 2nd degree type II heart block. Pt denies any chest pain. States breathing is overall better. Complains of leg cramps. Using AK pad for comfort. States legs are less edematous.    Review of Systems   All pertinent negatives and positives are as above. All other systems have been reviewed and are negative unless otherwise stated.     Objective    Temp:  [96.9 °F (36.1 °C)-97.9 °F (36.6 °C)] 96.9 °F (36.1 °C)  Heart Rate:  [46-75] 75  Resp:  [18-24] 20  BP: (126-147)/(63-90) 147/78     Physical Exam   Constitutional: She is oriented to person, place, and time. She appears well-developed and well-nourished.   HENT:   Head: Normocephalic and atraumatic.   Eyes: Conjunctivae and EOM are normal. Pupils are equal, round, and reactive to light.   Neck: Normal range of motion. Neck supple. No JVD present. No thyromegaly present.   Cardiovascular: Normal rate, regular rhythm, normal heart sounds and intact distal pulses.  Exam reveals no gallop and no friction rub.    No murmur heard.  Pulmonary/Chest: No respiratory distress. She has no wheezes. She has no rales. She exhibits no tenderness.   Pt purse lipped breathes with minimal exertion. Diminished bilaterally R>L   Abdominal: Soft. Bowel sounds are normal. She exhibits no distension. There is no tenderness. There is no rebound and no guarding.   Musculoskeletal: Normal range of motion. She exhibits no edema, tenderness or deformity.   Lymphadenopathy:     She has no cervical adenopathy.   Neurological: She is alert and oriented to person, place, and time. She displays normal reflexes. No cranial nerve deficit. She exhibits normal muscle tone.   Skin: Skin is warm and dry. No rash (redness  under bilateral breasts) noted.   Psychiatric: She has a normal mood and affect. Her behavior is normal. Judgment and thought content normal.        Results Review:  I have reviewed the labs, radiology results, and diagnostic studies.    Laboratory Data:     Results from last 7 days  Lab Units 02/17/17  1202   WBC 10*3/mm3 5.70   HEMOGLOBIN g/dL 11.9*   HEMATOCRIT % 37.1   PLATELETS 10*3/mm3 101*          Results from last 7 days  Lab Units 02/19/17  0347 02/18/17  1424 02/17/17  1202   SODIUM mmol/L 143 141 142   POTASSIUM mmol/L 3.6 3.9 4.3   CHLORIDE mmol/L 90* 93* 101   TOTAL CO2 mmol/L >40.0* 36.0* 31.0   BUN mg/dL 28* 27* 19   CREATININE mg/dL 1.76* 1.41* 1.15   CALCIUM mg/dL 9.1 9.3 9.2   BILIRUBIN mg/dL  --   --  0.7   ALK PHOS U/L  --   --  80   ALT (SGPT) U/L  --   --  30   AST (SGOT) U/L  --   --  19   GLUCOSE mg/dL 108* 122* 126*       Culture Data:   URINE CULTURE   Date Value Ref Range Status   02/17/2017 >100,000 CFU/mL Mixed Gram Positive Lenora (A)  Final       Radiology Data:   Imaging Results (last 24 hours)     Procedure Component Value Units Date/Time    US Venous Doppler Lower Extremity Bilateral (duplex) [[id number]] Collected:  02/18/17 1142     Updated:  02/18/17 1144    Narrative:       History: Swelling       Impression:       Impression: There is no evidence of deep venous thrombosis or  superficial thrombophlebitis of right or left lower extremities.     Comments: Bilateral lower extremity venous duplex exam was performed  using color Doppler flow, Doppler waveform analysis, and grayscale  imaging, with and without compression. There is no evidence of deep  venous thrombosis in the common femoral, superficial femoral, popliteal,  peroneal, anterior tibial, and posterior tibial veins bilaterally. No  thrombus is identified in the saphenofemoral junctions and greater  saphenous veins bilaterally.         This report was finalized on 02/18/2017 11:42 by Dr. Raymon Stern MD.          I  have reviewed the patient current medications.     Assessment/Plan     Hospital Problem List     Acute on chronic systolic congestive heart failure      Assessment:  1. Acute systolic CHF with exacerbation   2. COPD - no exac; home 02 at 2.5LNC  3. History of CKD III  4. Generalized Weakness  5. HTN  6. History of SVT and MAT  7. Hypothyroidism  8. 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 Constipation    Plan:  1. PT recommends continued work with PT.   2. Start Miralax.   3. Decrease lasix to 40 mg bid.   4. Add nystatin powder to breasts      NEYDA Chi   02/19/17   10:07 AM     I personally evaluated and examined the patient in conjunction with NEYDA Cardoso and agree with the assessment, treatment plan, and disposition of the patient as recorded by her. My history, exam, and further recommendations are: Patient is breathing much better, and by physical examination her breath sounds are improved.  She is down 6 pounds compared to her admission weight following diuresis.  I will hold her diuresis this evening as she is starting to have a bump in her creatinine.  Check basic metabolic panel in the morning tomorrow.  Physical therapy will continue to follow patient.  Continue reduced dose of oral metoprolol; continue telemetry monitoring.  She reports that her primary symptom now is weakness in her legs which is generalized with difficulty ambulating.      Salo Bruce MD  02/19/17  1:58 PM

## 2017-02-19 NOTE — PLAN OF CARE
Problem: Patient Care Overview (Adult)  Goal: Plan of Care Review  Outcome: Ongoing (interventions implemented as appropriate)    02/19/17 0417   Coping/Psychosocial Response Interventions   Plan Of Care Reviewed With patient   Patient Care Overview   Progress no change   Outcome Evaluation   Outcome Summary/Follow up Plan VSS. Pt c/o leg cramping once, medicated with relief. Patient has had large urine output. Labs pending for this AM.          Problem: Cardiac: Heart Failure (Adult)  Goal: Signs and Symptoms of Listed Potential Problems Will be Absent or Manageable (Cardiac: Heart Failure)  Outcome: Ongoing (interventions implemented as appropriate)    Problem: Fall Risk (Adult)  Goal: Identify Related Risk Factors and Signs and Symptoms  Outcome: Ongoing (interventions implemented as appropriate)  Goal: Absence of Falls  Outcome: Ongoing (interventions implemented as appropriate)

## 2017-02-19 NOTE — PROGRESS NOTES
[facility] -  Progress Note     LOS: 2 days   Patient Care Team:  Jeremi Kan MD as PCP - General (Internal Medicine)  Rubens Hicks MD as Cardiologist (Cardiology)    Chief Complaint: Shortness of Breath, Swelling  Subjective     Interval History:   I feel much better, Breathing better, swelling down.     Review of Systems:   Review of Systems   Constitution: Negative for chills, decreased appetite, fever, malaise/fatigue, weight gain and weight loss.   HENT: Negative for headaches and nosebleeds.    Eyes: Negative for visual disturbance.   Cardiovascular: Positive for dyspnea on exertion and leg swelling. Negative for chest pain, near-syncope, orthopnea, palpitations, paroxysmal nocturnal dyspnea and syncope.   Respiratory: Positive for shortness of breath. Negative for cough, hemoptysis and snoring.    Endocrine: Negative for cold intolerance and heat intolerance.   Hematologic/Lymphatic: Negative for bleeding problem. Does not bruise/bleed easily.   Skin: Negative for rash.   Musculoskeletal: Negative for back pain and falls.   Gastrointestinal: Negative for abdominal pain, constipation, diarrhea, heartburn, melena, nausea and vomiting.   Genitourinary: Negative for hematuria.   Neurological: Negative for dizziness and light-headedness.   Psychiatric/Behavioral: Negative for altered mental status.   Allergic/Immunologic: Negative for persistent infections.        Objective     Vital Sign Min/Max for last 24 hours  Temp  Min: 96.9 °F (36.1 °C)  Max: 97.9 °F (36.6 °C)   BP  Min: 126/90  Max: 147/78   Pulse  Min: 46  Max: 75   Resp  Min: 18  Max: 24   SpO2  Min: 92 %  Max: 99 %   Flow (L/min)  Min: 2  Max: 3   Weight  Min: 214 lb 9.6 oz (97.3 kg)  Max: 214 lb 9.6 oz (97.3 kg)     Last Weight    02/18/17  1900   Weight: 214 lb 9.6 oz (97.3 kg)         Intake/Output Summary (Last 24 hours) at 02/19/17 1226  Last data filed at 02/19/17 0348   Gross per 24 hour   Intake    220 ml   Output    3600 ml   Net  -3380 ml         Physical Exam:  Physical Exam   Constitutional: She is oriented to person, place, and time. She appears well-developed and well-nourished.   HENT:   Head: Normocephalic and atraumatic.   Eyes: Pupils are equal, round, and reactive to light.   Neck: Normal range of motion. Neck supple. No JVD present. Carotid bruit is not present.   Cardiovascular: Normal rate, regular rhythm, normal heart sounds and intact distal pulses.    Edema improving   Pulmonary/Chest: Effort normal.   Coarse breath sounds improving   Abdominal: Soft. Bowel sounds are normal.   Musculoskeletal: Normal range of motion.   Neurological: She is alert and oriented to person, place, and time. She has normal reflexes.   Skin: Skin is warm and dry.   Psychiatric: She has a normal mood and affect. Her behavior is normal. Judgment and thought content normal.        Results Review:   Lab Results (last 72 hours)     Procedure Component Value Units Date/Time    Blood Gas, Arterial [[id number]]  (Abnormal) Collected:  02/17/17 1140    Specimen:  Arterial Blood Updated:  02/17/17 1142     Site Arterial: right brachial      Rickey's Test --       Documented in Rapid Comm        pH, Arterial 7.375 pH units      pCO2, Arterial 49.5 (H) mm Hg      pO2, Arterial 78.7 (L) mm Hg      HCO3, Arterial 28.3 (H) mmol/L      Base Excess, Arterial 2.2 (H) mmol/L      O2 Saturation, Arterial 95.3 %      O2 Saturation Calculated 95.3 %      Barometric Pressure for Blood Gas -- mmHg       Component not reported at this site.        Modality Cannula      Flow Rate 4.00 lpm     Narrative:       Serial Number: [id number]    : [id number]    POC Troponin, Rapid [[id number]]  (Normal) Collected:  02/17/17 1207    Specimen:  Blood Updated:  02/17/17 1221     Troponin I 0.00 ng/mL       Serial Number: [id number]    : [id number]       Comprehensive Metabolic Panel [[id number]]  (Abnormal) Collected:  02/17/17 1202    Specimen:  Blood Updated:  02/17/17 1221      Glucose 126 (H) mg/dL      BUN 19 mg/dL      Creatinine 1.15 mg/dL      Sodium 142 mmol/L      Potassium 4.3 mmol/L      Chloride 101 mmol/L      CO2 31.0 mmol/L      Calcium 9.2 mg/dL      Total Protein 7.0 g/dL      Albumin 4.20 g/dL      ALT (SGPT) 30 U/L      AST (SGOT) 19 U/L      Alkaline Phosphatase 80 U/L      Total Bilirubin 0.7 mg/dL      eGFR Non African Amer 46 (L) mL/min/1.73      Globulin 2.8 gm/dL      A/G Ratio 1.5 g/dL      BUN/Creatinine Ratio 16.5      Anion Gap 10.0 mmol/L     Narrative:       The MDRD GFR formula is only valid for adults with stable renal function between ages 18 and 70.    CBC & Differential [[id number]] Collected:  02/17/17 1202    Specimen:  Blood Updated:  02/17/17 1225    Narrative:       The following orders were created for panel order CBC & Differential.  Procedure                               Abnormality         Status                     ---------                               -----------         ------                     CBC Auto Differential[[id number]]         Abnormal            Final result                 Please view results for these tests on the individual orders.    CBC Auto Differential [[id number]]  (Abnormal) Collected:  02/17/17 1202    Specimen:  Blood Updated:  02/17/17 1225     WBC 5.70 10*3/mm3      RBC 4.66 10*6/mm3      Hemoglobin 11.9 (L) g/dL      Hematocrit 37.1 %      MCV 79.6 (L) fL      MCH 25.5 (L) pg      MCHC 32.1 (L) g/dL      RDW 15.3 (H) %      RDW-SD 43.9 fl      MPV 10.2 fL      Platelets 101 (L) 10*3/mm3      Neutrophil % 71.7 %      Lymphocyte % 21.9 %      Monocyte % 4.4 %      Eosinophil % 1.1 %      Basophil % 0.2 %      Immature Grans % 0.7 %      Neutrophils, Absolute 4.09 10*3/mm3      Lymphocytes, Absolute 1.25 10*3/mm3      Monocytes, Absolute 0.25 10*3/mm3      Eosinophils, Absolute 0.06 10*3/mm3      Basophils, Absolute 0.01 10*3/mm3      Immature Grans, Absolute 0.04 (H) 10*3/mm3      nRBC 0.0 /100 WBC     Influenza Antigen  [[id number]]  (Normal) Collected:  02/17/17 1207    Specimen:  Swab from Nasopharynx Updated:  02/17/17 1232     Influenza A Ag, EIA Negative      Influenza B Ag, EIA Negative     Narrative:         Recommend confirmation of negative results by viral culture or molecular assay.    BNP [[id number]]  (Abnormal) Collected:  02/17/17 1202    Specimen:  Blood Updated:  02/17/17 1234     proBNP 3300.0 (H) pg/mL     Troponin [[id number]]  (Normal) Collected:  02/17/17 1202    Specimen:  Blood Updated:  02/17/17 1234     Troponin I 0.020 ng/mL     Digoxin Level [[id number]]  (Abnormal) Collected:  02/17/17 1203    Specimen:  Blood Updated:  02/17/17 1256     Digoxin <0.40 (L) ng/mL     BNP [[id number]]  (Abnormal) Collected:  02/17/17 1203    Specimen:  Blood Updated:  02/17/17 1302     proBNP 3250.0 (H) pg/mL     Urinalysis With / Culture If Indicated [[id number]]  (Abnormal) Collected:  02/17/17 1426    Specimen:  Urine from Urine, Clean Catch Updated:  02/17/17 1500     Color, UA Yellow      Appearance, UA Clear      pH, UA 6.0      Specific Gravity, UA 1.020      Glucose, UA Negative      Ketones, UA Negative      Bilirubin, UA Negative      Blood, UA Negative      Protein, UA 30 mg/dL (1+) (A)      Leuk Esterase, UA Moderate (2+) (A)      Nitrite, UA Negative      Urobilinogen, UA 1.0 E.U./dL     Urinalysis, Microscopic Only [[id number]]  (Abnormal) Collected:  02/17/17 1426    Specimen:  Urine from Urine, Clean Catch Updated:  02/17/17 1500     RBC, UA 3-5 (A) /HPF      WBC, UA 13-20 (A) /HPF      Bacteria, UA None Seen /HPF      Squamous Epithelial Cells, UA 7-12 (A) /HPF      Hyaline Casts, UA 3-6 /LPF      Methodology Automated Microscopy     Basic Metabolic Panel [[id number]]  (Abnormal) Collected:  02/18/17 1424    Specimen:  Blood Updated:  02/18/17 1449     Glucose 122 (H) mg/dL      BUN 27 (H) mg/dL      Creatinine 1.41 (H) mg/dL      Sodium 141 mmol/L      Potassium 3.9 mmol/L      Chloride 93 (L) mmol/L      CO2 36.0 (H)  mmol/L      Calcium 9.3 mg/dL      eGFR Non African Amer 36 (L) mL/min/1.73      BUN/Creatinine Ratio 19.1      Anion Gap 12.0 mmol/L     Narrative:       The MDRD GFR formula is only valid for adults with stable renal function between ages 18 and 70.    Magnesium [[id number]]  (Normal) Collected:  02/18/17 1424    Specimen:  Blood Updated:  02/18/17 1449     Magnesium 1.8 mg/dL     TSH [[id number]]  (Normal) Collected:  02/18/17 1424    Specimen:  Blood Updated:  02/18/17 1519     TSH 1.380 mIU/mL     Basic Metabolic Panel [[id number]]  (Abnormal) Collected:  02/19/17 0347    Specimen:  Blood Updated:  02/19/17 0441     Glucose 108 (H) mg/dL      BUN 28 (H) mg/dL      Creatinine 1.76 (H) mg/dL      Sodium 143 mmol/L      Potassium 3.6 mmol/L      Chloride 90 (L) mmol/L      CO2 >40.0 (C) mmol/L      Calcium 9.1 mg/dL      eGFR Non African Amer 28 (L) mL/min/1.73      BUN/Creatinine Ratio 15.9      Anion Gap -- mmol/L       Unable to calculate Anion Gap.       Narrative:       The MDRD GFR formula is only valid for adults with stable renal function between ages 18 and 70.    Urine Culture [[id number]]  (Abnormal) Collected:  02/17/17 1426    Specimen:  Urine from Urine, Clean Catch Updated:  02/19/17 0746     Urine Culture        >100,000 CFU/mL Mixed Gram Positive Lenora (A)    Narrative:       Probable contaminant        Medication Review: yes  Current Facility-Administered Medications   Medication Dose Route Frequency Provider Last Rate Last Dose   • acetaminophen (TYLENOL) tablet 650 mg  650 mg Oral Q4H PRN Salo Bruce MD       • acetaZOLAMIDE (DIAMOX) tablet 125 mg  125 mg Oral QAM Salo Bruce MD   125 mg at 02/19/17 0945   • aspirin EC tablet 81 mg  81 mg Oral QAM AC Salo Bruce MD   81 mg at 02/18/17 0900   • atorvastatin (LIPITOR) tablet 20 mg  20 mg Oral Nightly Salo Bruce MD   20 mg at 02/18/17 2009   • budesonide-formoterol (SYMBICORT) 160-4.5 MCG/ACT inhaler 2 puff  2 puff  Inhalation BID - RT Salo Bruce MD   2 puff at 02/19/17 0647   • furosemide (LASIX) injection 40 mg  40 mg Intravenous BID NEYDA Chi       • gabapentin (NEURONTIN) capsule 300 mg  300 mg Oral TID PRN Salo Bruce MD   300 mg at 02/18/17 2009   • HYDROcodone-acetaminophen (NORCO) 7.5-325 MG per tablet 1 tablet  1 tablet Oral BID PRN Salo Bruce MD   1 tablet at 02/19/17 0513   • ipratropium-albuterol (DUO-NEB) nebulizer solution 3 mL  3 mL Nebulization 4x Daily - RT Salo Bruce MD   3 mL at 02/19/17 1047   • labetalol (NORMODYNE,TRANDATE) injection 10 mg  10 mg Intravenous Q4H PRN Salo Bruce MD       • levothyroxine (SYNTHROID, LEVOTHROID) tablet 150 mcg  150 mcg Oral Q AM Salo Bruce MD   150 mcg at 02/18/17 0534   • lisinopril (PRINIVIL,ZESTRIL) tablet 10 mg  10 mg Oral Q12H Salo Bruce MD   10 mg at 02/19/17 0945   • metoprolol tartrate (LOPRESSOR) tablet 12.5 mg  12.5 mg Oral Q12H Salo Bruce MD   12.5 mg at 02/19/17 0945   • nitroglycerin (NITROSTAT) SL tablet 0.4 mg  0.4 mg Sublingual Q5 Min PRN Salo Bruce MD       • nystatin (MYCOSTATIN) powder   Topical Q12H NEYDA Chi       • ondansetron (ZOFRAN) injection 4 mg  4 mg Intravenous Q6H PRN Salo Bruce MD       • pantoprazole (PROTONIX) EC tablet 40 mg  40 mg Oral Daily Salo Bruce MD   40 mg at 02/19/17 0945   • polyethylene glycol (MIRALAX) packet 17 g  17 g Oral BID NEYDA Chi       • potassium chloride (MICRO-K) CR capsule 20 mEq  20 mEq Oral Daily NEYDA Chi   20 mEq at 02/19/17 0945   • sodium chloride 0.9 % flush 1-10 mL  1-10 mL Intravenous PRN Salo Bruce MD       • sodium chloride 0.9 % flush 10 mL  10 mL Intravenous PRN Melida Walker MD       • traMADol (ULTRAM) tablet 50 mg  50 mg Oral Q8H PRN Salo Bruce MD   50 mg at 02/18/17 2009          Assessment/Plan     Active Problems:    Acute on chronic systolic congestive heart failure    Heart Block, patient remains asymptomatic     History of CAD    CKD Stage III    Essential HTN    Morbid Obesity    Medical noncompliance    Plan:  CPT  Heart Block improved with   Daily Weights  Strict I &O  Low Salt Diet  Continue Telemetry    Further orders per Dr. Heriberto Norman, APRN  02/19/17  12:26 PM

## 2017-02-20 LAB
ANION GAP SERPL CALCULATED.3IONS-SCNC: ABNORMAL MMOL/L (ref 4–13)
ARTERIAL PATENCY WRIST A: ABNORMAL
ATMOSPHERIC PRESS: ABNORMAL MMHG
BASE EXCESS BLDA CALC-SCNC: 7.7 MMOL/L (ref -2–2)
BDY SITE: ABNORMAL
BUN BLD-MCNC: 32 MG/DL (ref 5–21)
BUN/CREAT SERPL: 16.9 (ref 7–25)
CALCIUM SPEC-SCNC: 8.9 MG/DL (ref 8.4–10.4)
CHLORIDE SERPL-SCNC: 93 MMOL/L (ref 98–110)
CO2 SERPL-SCNC: >40 MMOL/L (ref 24–31)
CREAT BLD-MCNC: 1.89 MG/DL (ref 0.5–1.4)
GAS FLOW AIRWAY: 2.5 LPM
GFR SERPL CREATININE-BSD FRML MDRD: 26 ML/MIN/1.73
GLUCOSE BLD-MCNC: 104 MG/DL (ref 70–100)
HCO3 BLDA-SCNC: 35 MMOL/L (ref 22–26)
MODALITY: ABNORMAL
PCO2 BLDA: 60.3 MM HG (ref 35–45)
PH BLDA: 7.38 PH UNITS (ref 7.35–7.45)
PO2 BLDA: 82.8 MM HG (ref 80–100)
POTASSIUM BLD-SCNC: 3.7 MMOL/L (ref 3.5–5.3)
SAO2 % BLDCOA: 95.7 % (ref 94–100)
SAO2 % BLDCOA: 95.7 % (ref 94–100)
SODIUM BLD-SCNC: 142 MMOL/L (ref 135–145)

## 2017-02-20 PROCEDURE — 82803 BLOOD GASES ANY COMBINATION: CPT

## 2017-02-20 PROCEDURE — 94799 UNLISTED PULMONARY SVC/PX: CPT

## 2017-02-20 PROCEDURE — 94640 AIRWAY INHALATION TREATMENT: CPT

## 2017-02-20 PROCEDURE — 94762 N-INVAS EAR/PLS OXIMTRY CONT: CPT

## 2017-02-20 PROCEDURE — 97116 GAIT TRAINING THERAPY: CPT

## 2017-02-20 PROCEDURE — 99233 SBSQ HOSP IP/OBS HIGH 50: CPT | Performed by: INTERNAL MEDICINE

## 2017-02-20 PROCEDURE — 36600 WITHDRAWAL OF ARTERIAL BLOOD: CPT

## 2017-02-20 PROCEDURE — 80048 BASIC METABOLIC PNL TOTAL CA: CPT | Performed by: NURSE PRACTITIONER

## 2017-02-20 PROCEDURE — 97530 THERAPEUTIC ACTIVITIES: CPT

## 2017-02-20 PROCEDURE — 97110 THERAPEUTIC EXERCISES: CPT

## 2017-02-20 RX ADMIN — LISINOPRIL 10 MG: 10 TABLET ORAL at 21:32

## 2017-02-20 RX ADMIN — POLYETHYLENE GLYCOL 3350 17 G: 17 POWDER, FOR SOLUTION ORAL at 17:01

## 2017-02-20 RX ADMIN — ATORVASTATIN CALCIUM 20 MG: 10 TABLET, FILM COATED ORAL at 21:31

## 2017-02-20 RX ADMIN — ASPIRIN 81 MG: 81 TABLET ORAL at 08:25

## 2017-02-20 RX ADMIN — IPRATROPIUM BROMIDE AND ALBUTEROL SULFATE 3 ML: .5; 3 SOLUTION RESPIRATORY (INHALATION) at 15:09

## 2017-02-20 RX ADMIN — TRAMADOL HYDROCHLORIDE 50 MG: 50 TABLET, COATED ORAL at 08:24

## 2017-02-20 RX ADMIN — METOPROLOL TARTRATE 12.5 MG: 25 TABLET, FILM COATED ORAL at 08:25

## 2017-02-20 RX ADMIN — NYSTATIN 1 APPLICATION: 100000 POWDER TOPICAL at 08:26

## 2017-02-20 RX ADMIN — BUDESONIDE AND FORMOTEROL FUMARATE DIHYDRATE 2 PUFF: 160; 4.5 AEROSOL RESPIRATORY (INHALATION) at 07:01

## 2017-02-20 RX ADMIN — ACETAZOLAMIDE 125 MG: 250 TABLET ORAL at 08:25

## 2017-02-20 RX ADMIN — IPRATROPIUM BROMIDE AND ALBUTEROL SULFATE 3 ML: .5; 3 SOLUTION RESPIRATORY (INHALATION) at 20:09

## 2017-02-20 RX ADMIN — PANTOPRAZOLE SODIUM 40 MG: 40 TABLET, DELAYED RELEASE ORAL at 08:25

## 2017-02-20 RX ADMIN — LISINOPRIL 10 MG: 10 TABLET ORAL at 08:25

## 2017-02-20 RX ADMIN — BUDESONIDE AND FORMOTEROL FUMARATE DIHYDRATE 2 PUFF: 160; 4.5 AEROSOL RESPIRATORY (INHALATION) at 20:10

## 2017-02-20 RX ADMIN — HYDROCODONE BITARTRATE AND ACETAMINOPHEN 1 TABLET: 7.5; 325 TABLET ORAL at 15:21

## 2017-02-20 RX ADMIN — POTASSIUM CHLORIDE 20 MEQ: 750 CAPSULE, EXTENDED RELEASE ORAL at 08:25

## 2017-02-20 RX ADMIN — METOPROLOL TARTRATE 12.5 MG: 25 TABLET, FILM COATED ORAL at 21:30

## 2017-02-20 RX ADMIN — IPRATROPIUM BROMIDE AND ALBUTEROL SULFATE 3 ML: .5; 3 SOLUTION RESPIRATORY (INHALATION) at 07:01

## 2017-02-20 RX ADMIN — IPRATROPIUM BROMIDE AND ALBUTEROL SULFATE 3 ML: .5; 3 SOLUTION RESPIRATORY (INHALATION) at 10:54

## 2017-02-20 RX ADMIN — NYSTATIN: 100000 POWDER TOPICAL at 21:34

## 2017-02-20 RX ADMIN — GABAPENTIN 300 MG: 300 CAPSULE ORAL at 08:24

## 2017-02-20 NOTE — PROGRESS NOTES
[facility] Medicine Services  INPATIENT PROGRESS NOTE    Length of Stay: 3  Date of Admission: 2/17/2017  Primary Care Physician: Jeremi Kan MD    Subjective   Chief Complaint: dyspnea  HPI   Pt ambulated a total of 20 feet with PT. Still having muscle cramps. Off diuretics for now. States her breathing is fair. Had difficulty breathing with ambulation.  No nausea/vomiting. No bowel movement. Pt remains in normal sinus rhythm at 65 with intermittent 2nd degree type II heart block. Beta blocker was decreased in dose.     Review of Systems   All pertinent negatives and positives are as above. All other systems have been reviewed and are negative unless otherwise stated.     Objective    Temp:  [96.9 °F (36.1 °C)-97.9 °F (36.6 °C)] 97.9 °F (36.6 °C)  Heart Rate:  [61-69] 66  Resp:  [18] 18  BP: (116-147)/(51-71) 116/61  Physical Exam   Constitutional: She is oriented to person, place, and time. She appears well-developed and well-nourished.   HENT:   Head: Normocephalic and atraumatic.   Eyes: Conjunctivae and EOM are normal. Pupils are equal, round, and reactive to light.   Neck: Neck supple. No JVD present. No thyromegaly present.   Cardiovascular: Normal rate, regular rhythm, normal heart sounds and intact distal pulses.  Exam reveals no gallop and no friction rub.    No murmur heard.  Pulmonary/Chest: Effort normal. No respiratory distress. She has no wheezes. She has no rales. She exhibits no tenderness.   diminished with mild crackles in the bases.    Abdominal: Soft. Bowel sounds are normal. She exhibits no distension. There is no tenderness. There is no rebound and no guarding.   Musculoskeletal: Normal range of motion. She exhibits no edema, tenderness or deformity.   Lymphadenopathy:     She has no cervical adenopathy.   Neurological: She is alert and oriented to person, place, and time. She displays normal reflexes. No cranial nerve deficit. She exhibits normal  muscle tone.   Skin: Skin is warm and dry. No rash noted.   Psychiatric: She has a normal mood and affect. Her behavior is normal. Judgment and thought content normal.     Results Review:  I have reviewed the labs, radiology results, and diagnostic studies.    Laboratory Data:     Results from last 7 days  Lab Units 02/17/17  1202   WBC 10*3/mm3 5.70   HEMOGLOBIN g/dL 11.9*   HEMATOCRIT % 37.1   PLATELETS 10*3/mm3 101*          Results from last 7 days  Lab Units 02/20/17  0317 02/19/17  0347 02/18/17  1424 02/17/17  1202   SODIUM mmol/L 142 143 141 142   POTASSIUM mmol/L 3.7 3.6 3.9 4.3   CHLORIDE mmol/L 93* 90* 93* 101   TOTAL CO2 mmol/L >40.0* >40.0* 36.0* 31.0   BUN mg/dL 32* 28* 27* 19   CREATININE mg/dL 1.89* 1.76* 1.41* 1.15   CALCIUM mg/dL 8.9 9.1 9.3 9.2   BILIRUBIN mg/dL  --   --   --  0.7   ALK PHOS U/L  --   --   --  80   ALT (SGPT) U/L  --   --   --  30   AST (SGOT) U/L  --   --   --  19   GLUCOSE mg/dL 104* 108* 122* 126*       Culture Data:   URINE CULTURE   Date Value Ref Range Status   02/17/2017 >100,000 CFU/mL Mixed Gram Positive Lenora (A)  Final       Radiology Data:   Imaging Results (last 24 hours)     ** No results found for the last 24 hours. **          I have reviewed the patient current medications.     Assessment/Plan     Hospital Problem List     Acute on chronic systolic congestive heart failure      Assessment:  1. Acute systolic CHF with exacerbation   2. COPD - no exac; home 02 at 2.5LNC  3. History of CKD III  4. Generalized Weakness  5. HTN  6. History of SVT and MAT  7. Hypothyroidism  8. 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 Constipation    Plan:  1. Will check ABG's. Determine if home biPap would be beneficial.   2. Will check overnight pulse ox with oxygen in place.   3. Repeat labs in am  4. Pt declines TCU or SNF. States she has help at home.     Dr. Neil:  Patient seen and examined with NP. Agree with plan. Reviewed ABG. Patient up in chair. Will attempt to get Bipap for  chronic use. Patient has heating pads on her legs. Appears to be diuresing well.    Discharge Planning: I expect patient to be discharged to home in 1-2 days.    Xena Boland, APRN   02/20/17   12:31 PM     Dr. Neil:

## 2017-02-20 NOTE — PLAN OF CARE
Problem: Patient Care Overview (Adult)  Goal: Plan of Care Review  Outcome: Ongoing (interventions implemented as appropriate)    02/20/17 1126   Coping/Psychosocial Response Interventions   Plan Of Care Reviewed With patient   Patient Care Overview   Progress progress toward functional goals is gradual   Outcome Evaluation   Outcome Summary/Follow up Plan SBA bed mobililty, Min2 sit to stand, Min-Mod2 gait with RW x 5', 5',10' with one sitting rest break, Pt flexes knees due to pain in calves when up walking.

## 2017-02-20 NOTE — PLAN OF CARE
Problem: Patient Care Overview (Adult)  Goal: Plan of Care Review  Outcome: Ongoing (interventions implemented as appropriate)    02/20/17 1434   Coping/Psychosocial Response Interventions   Plan Of Care Reviewed With patient   Patient Care Overview   Progress no change   Outcome Evaluation   Outcome Summary/Follow up Plan PATIENT C/O LEG PAIN. ULTRAM GIVEN AND HEAT APPLIED. D/C'D DIAMOX. OVERNIGHT PULSE OX TONIGHT ON 02. CONTINUE TO MONITOR.       Goal: Adult Individualization and Mutuality  Outcome: Ongoing (interventions implemented as appropriate)  Goal: Discharge Needs Assessment  Outcome: Ongoing (interventions implemented as appropriate)    Problem: Cardiac: Heart Failure (Adult)  Goal: Signs and Symptoms of Listed Potential Problems Will be Absent or Manageable (Cardiac: Heart Failure)  Outcome: Ongoing (interventions implemented as appropriate)    Problem: Fall Risk (Adult)  Goal: Identify Related Risk Factors and Signs and Symptoms  Outcome: Ongoing (interventions implemented as appropriate)  Goal: Absence of Falls  Outcome: Ongoing (interventions implemented as appropriate)

## 2017-02-20 NOTE — PLAN OF CARE
Problem: Patient Care Overview (Adult)  Goal: Plan of Care Review  Outcome: Ongoing (interventions implemented as appropriate)    02/20/17 0321   Coping/Psychosocial Response Interventions   Plan Of Care Reviewed With patient   Patient Care Overview   Progress no change   Outcome Evaluation   Outcome Summary/Follow up Plan VSS. Pt c/o leg cramps once, medicated with relief. Good urine output. Pt has lost a large amount of weight since admission. Labs pending for this AM         Problem: Cardiac: Heart Failure (Adult)  Goal: Signs and Symptoms of Listed Potential Problems Will be Absent or Manageable (Cardiac: Heart Failure)  Outcome: Ongoing (interventions implemented as appropriate)    Problem: Fall Risk (Adult)  Goal: Identify Related Risk Factors and Signs and Symptoms  Outcome: Ongoing (interventions implemented as appropriate)  Goal: Absence of Falls  Outcome: Ongoing (interventions implemented as appropriate)

## 2017-02-20 NOTE — PROGRESS NOTES
Discharge Planning Assessment  [facility]     Patient Name: Erin Cox  MRN: [id number]  Today's Date: 2/20/2017    Admit Date: 2/17/2017          Discharge Needs Assessment       02/20/17 1050    Living Environment    Lives With (P)  alone    Living Arrangements (P)  house    Quality Of Family Relationships (P)  supportive    Able to Return to Prior Living Arrangements (P)  yes    Discharge Needs Assessment    Concerns To Be Addressed (P)  no discharge needs identified;denies needs/concerns at this time    Readmission Within The Last 30 Days (P)  no previous admission in last 30 days    Equipment Currently Used at Home (P)  walker, standard;walker, rolling;shower chair;oxygen;hospital bed    Equipment Needed After Discharge (P)  none    Transportation Available (P)  family or friend will provide            Discharge Plan       02/20/17 1052    Case Management/Social Work Plan    Plan (P)  PT plans to dc home alone. PT does have family avaliable to help. PT is connected to [facility] for contracted homemaker services and gets her oxygen from [facility]. PT has a call for assistance device that she uses. PT is refusing  services and TCU. PT denies any dc needs at this time. Sw will follow.     Patient/Family In Agreement With Plan (P)  yes        Discharge Placement     No information found                Demographic Summary     None            Functional Status     None            Psychosocial     None            Abuse/Neglect     None            Legal     None            Substance Abuse     None            Patient Forms     None          Jennie Roman

## 2017-02-21 LAB
ANION GAP SERPL CALCULATED.3IONS-SCNC: 9 MMOL/L (ref 4–13)
BASOPHILS # BLD AUTO: 0.01 10*3/MM3 (ref 0–0.2)
BASOPHILS NFR BLD AUTO: 0.1 % (ref 0–2)
BUN BLD-MCNC: 41 MG/DL (ref 5–21)
BUN/CREAT SERPL: 22.5 (ref 7–25)
CALCIUM SPEC-SCNC: 8.5 MG/DL (ref 8.4–10.4)
CHLORIDE SERPL-SCNC: 95 MMOL/L (ref 98–110)
CO2 SERPL-SCNC: 37 MMOL/L (ref 24–31)
CREAT BLD-MCNC: 1.82 MG/DL (ref 0.5–1.4)
DEPRECATED RDW RBC AUTO: 44.9 FL (ref 40–54)
EOSINOPHIL # BLD AUTO: 0.08 10*3/MM3 (ref 0–0.7)
EOSINOPHIL NFR BLD AUTO: 1.2 % (ref 0–4)
ERYTHROCYTE [DISTWIDTH] IN BLOOD BY AUTOMATED COUNT: 15.5 % (ref 12–15)
GFR SERPL CREATININE-BSD FRML MDRD: 27 ML/MIN/1.73
GLUCOSE BLD-MCNC: 106 MG/DL (ref 70–100)
HCT VFR BLD AUTO: 33.8 % (ref 37–47)
HGB BLD-MCNC: 10.3 G/DL (ref 12–16)
IMM GRANULOCYTES # BLD: 0.02 10*3/MM3 (ref 0–0.03)
IMM GRANULOCYTES NFR BLD: 0.3 % (ref 0–5)
LYMPHOCYTES # BLD AUTO: 2.42 10*3/MM3 (ref 0.72–4.86)
LYMPHOCYTES NFR BLD AUTO: 35.9 % (ref 15–45)
MCH RBC QN AUTO: 25 PG (ref 28–32)
MCHC RBC AUTO-ENTMCNC: 30.5 G/DL (ref 33–36)
MCV RBC AUTO: 82 FL (ref 82–98)
MONOCYTES # BLD AUTO: 0.59 10*3/MM3 (ref 0.19–1.3)
MONOCYTES NFR BLD AUTO: 8.8 % (ref 4–12)
NEUTROPHILS # BLD AUTO: 3.62 10*3/MM3 (ref 1.87–8.4)
NEUTROPHILS NFR BLD AUTO: 53.7 % (ref 39–78)
PLATELET # BLD AUTO: 114 10*3/MM3 (ref 130–400)
PMV BLD AUTO: 10 FL (ref 6–12)
POTASSIUM BLD-SCNC: 4.3 MMOL/L (ref 3.5–5.3)
RBC # BLD AUTO: 4.12 10*6/MM3 (ref 4.2–5.4)
SODIUM BLD-SCNC: 141 MMOL/L (ref 135–145)
WBC NRBC COR # BLD: 6.74 10*3/MM3 (ref 4.8–10.8)

## 2017-02-21 PROCEDURE — 94799 UNLISTED PULMONARY SVC/PX: CPT

## 2017-02-21 PROCEDURE — 97110 THERAPEUTIC EXERCISES: CPT

## 2017-02-21 PROCEDURE — 80048 BASIC METABOLIC PNL TOTAL CA: CPT | Performed by: NURSE PRACTITIONER

## 2017-02-21 PROCEDURE — 94660 CPAP INITIATION&MGMT: CPT

## 2017-02-21 PROCEDURE — 97116 GAIT TRAINING THERAPY: CPT

## 2017-02-21 PROCEDURE — 97530 THERAPEUTIC ACTIVITIES: CPT

## 2017-02-21 PROCEDURE — 85025 COMPLETE CBC W/AUTO DIFF WBC: CPT | Performed by: NURSE PRACTITIONER

## 2017-02-21 PROCEDURE — 99233 SBSQ HOSP IP/OBS HIGH 50: CPT | Performed by: INTERNAL MEDICINE

## 2017-02-21 RX ADMIN — ASPIRIN 81 MG: 81 TABLET ORAL at 10:07

## 2017-02-21 RX ADMIN — TRAMADOL HYDROCHLORIDE 50 MG: 50 TABLET, COATED ORAL at 10:07

## 2017-02-21 RX ADMIN — NYSTATIN: 100000 POWDER TOPICAL at 09:35

## 2017-02-21 RX ADMIN — IPRATROPIUM BROMIDE AND ALBUTEROL SULFATE 3 ML: .5; 3 SOLUTION RESPIRATORY (INHALATION) at 14:57

## 2017-02-21 RX ADMIN — POTASSIUM CHLORIDE 20 MEQ: 750 CAPSULE, EXTENDED RELEASE ORAL at 09:33

## 2017-02-21 RX ADMIN — HYDROCODONE BITARTRATE AND ACETAMINOPHEN 1 TABLET: 7.5; 325 TABLET ORAL at 15:00

## 2017-02-21 RX ADMIN — IPRATROPIUM BROMIDE AND ALBUTEROL SULFATE 3 ML: .5; 3 SOLUTION RESPIRATORY (INHALATION) at 06:49

## 2017-02-21 RX ADMIN — LISINOPRIL 10 MG: 10 TABLET ORAL at 20:40

## 2017-02-21 RX ADMIN — POLYETHYLENE GLYCOL 3350 17 G: 17 POWDER, FOR SOLUTION ORAL at 17:44

## 2017-02-21 RX ADMIN — METOPROLOL TARTRATE 12.5 MG: 25 TABLET, FILM COATED ORAL at 09:34

## 2017-02-21 RX ADMIN — BUDESONIDE AND FORMOTEROL FUMARATE DIHYDRATE 2 PUFF: 160; 4.5 AEROSOL RESPIRATORY (INHALATION) at 06:49

## 2017-02-21 RX ADMIN — POLYETHYLENE GLYCOL 3350 17 G: 17 POWDER, FOR SOLUTION ORAL at 09:33

## 2017-02-21 RX ADMIN — ATORVASTATIN CALCIUM 20 MG: 10 TABLET, FILM COATED ORAL at 20:40

## 2017-02-21 RX ADMIN — PANTOPRAZOLE SODIUM 40 MG: 40 TABLET, DELAYED RELEASE ORAL at 09:33

## 2017-02-21 RX ADMIN — LISINOPRIL 10 MG: 10 TABLET ORAL at 09:34

## 2017-02-21 RX ADMIN — NYSTATIN: 100000 POWDER TOPICAL at 20:40

## 2017-02-21 RX ADMIN — TRAMADOL HYDROCHLORIDE 50 MG: 50 TABLET, COATED ORAL at 20:43

## 2017-02-21 RX ADMIN — IPRATROPIUM BROMIDE AND ALBUTEROL SULFATE 3 ML: .5; 3 SOLUTION RESPIRATORY (INHALATION) at 20:08

## 2017-02-21 RX ADMIN — LEVOTHYROXINE SODIUM 150 MCG: 150 TABLET ORAL at 06:03

## 2017-02-21 RX ADMIN — BUDESONIDE AND FORMOTEROL FUMARATE DIHYDRATE 2 PUFF: 160; 4.5 AEROSOL RESPIRATORY (INHALATION) at 20:08

## 2017-02-21 RX ADMIN — IPRATROPIUM BROMIDE AND ALBUTEROL SULFATE 3 ML: .5; 3 SOLUTION RESPIRATORY (INHALATION) at 10:39

## 2017-02-21 NOTE — PROGRESS NOTES
[facility] Medicine Services  INPATIENT PROGRESS NOTE    Length of Stay: 4  Date of Admission: 2/17/2017  Primary Care Physician: Jeremi Kan MD    Subjective   Chief Complaint: generalized weakness    HPI   Patient setting in bed side chair.  She denies shortness of breathing or chest pain.  She states lower extremity pain has improved.  She does reports continued weakness.  Again she refuses placement in rehabilitation or TCU.  She states she has done that before and does not think she needs that as she has plenty of help at home.  She is agreeable to home BiPAP.  She has no needs moist today.    Review of Systems   Unable to determine, all palpation efforts elicit painful response    All pertinent negatives and positives are as above. All other systems have been reviewed and are negative unless otherwise stated.     Objective    Temp:  [96.3 °F (35.7 °C)-98.1 °F (36.7 °C)] 97.4 °F (36.3 °C)  Heart Rate:  [53-73] 54  Resp:  [18-20] 18  BP: ()/(53-62) 140/62    Physical Exam   Constitutional: She is oriented to person, place, and time. She appears well-developed and well-nourished. No distress.   HENT:   Head: Normocephalic and atraumatic.   Eyes: Conjunctivae and EOM are normal. Pupils are equal, round, and reactive to light. No scleral icterus.   Neck: Normal range of motion. Neck supple. No JVD present. No tracheal deviation present.   Cardiovascular: Normal rate, regular rhythm, normal heart sounds and intact distal pulses.  Exam reveals no gallop.    No murmur heard.  Pulmonary/Chest: Effort normal and breath sounds normal. No respiratory distress. She has no wheezes. She has no rales.   Abdominal: Soft. Bowel sounds are normal. She exhibits no distension. There is no tenderness. There is no guarding.   Musculoskeletal: Normal range of motion. She exhibits no edema.   Neurological: She is alert and oriented to person, place, and time.   No obvious deficits noted.    Skin: Skin is warm and dry. No rash noted. She is not diaphoretic. No erythema. No pallor.   Psychiatric: She has a normal mood and affect. Her behavior is normal.   Vitals reviewed.      Results Review:  Recent Results (from the past 12 hour(s))   Basic Metabolic Panel    Collection Time: 02/21/17  4:41 AM   Result Value Ref Range    Glucose 106 (H) 70 - 100 mg/dL    BUN 41 (H) 5 - 21 mg/dL    Creatinine 1.82 (H) 0.50 - 1.40 mg/dL    Sodium 141 135 - 145 mmol/L    Potassium 4.3 3.5 - 5.3 mmol/L    Chloride 95 (L) 98 - 110 mmol/L    CO2 37.0 (H) 24.0 - 31.0 mmol/L    Calcium 8.5 8.4 - 10.4 mg/dL    eGFR Non African Amer 27 (L) >60 mL/min/1.73    BUN/Creatinine Ratio 22.5 7.0 - 25.0    Anion Gap 9.0 4.0 - 13.0 mmol/L   CBC Auto Differential    Collection Time: 02/21/17  4:41 AM   Result Value Ref Range    WBC 6.74 4.80 - 10.80 10*3/mm3    RBC 4.12 (L) 4.20 - 5.40 10*6/mm3    Hemoglobin 10.3 (L) 12.0 - 16.0 g/dL    Hematocrit 33.8 (L) 37.0 - 47.0 %    MCV 82.0 82.0 - 98.0 fL    MCH 25.0 (L) 28.0 - 32.0 pg    MCHC 30.5 (L) 33.0 - 36.0 g/dL    RDW 15.5 (H) 12.0 - 15.0 %    RDW-SD 44.9 40.0 - 54.0 fl    MPV 10.0 6.0 - 12.0 fL    Platelets 114 (L) 130 - 400 10*3/mm3    Neutrophil % 53.7 39.0 - 78.0 %    Lymphocyte % 35.9 15.0 - 45.0 %    Monocyte % 8.8 4.0 - 12.0 %    Eosinophil % 1.2 0.0 - 4.0 %    Basophil % 0.1 0.0 - 2.0 %    Immature Grans % 0.3 0.0 - 5.0 %    Neutrophils, Absolute 3.62 1.87 - 8.40 10*3/mm3    Lymphocytes, Absolute 2.42 0.72 - 4.86 10*3/mm3    Monocytes, Absolute 0.59 0.19 - 1.30 10*3/mm3    Eosinophils, Absolute 0.08 0.00 - 0.70 10*3/mm3    Basophils, Absolute 0.01 0.00 - 0.20 10*3/mm3    Immature Grans, Absolute 0.02 0.00 - 0.03 10*3/mm3       Cultures:  URINE CULTURE   Date Value Ref Range Status   02/17/2017 >100,000 CFU/mL Mixed Gram Positive Lenora (A)  Final       Radiology Data:    Imaging Results (last 24 hours)     ** No results found for the last 24 hours. **            Intake/Output  "Summary (Last 24 hours) at 02/21/17 0930  Last data filed at 02/21/17 0805   Gross per 24 hour   Intake    600 ml   Output   1150 ml   Net   -550 ml       Allergies   Allergen Reactions   • Fluarix [Flu Virus Vaccine] Swelling   • Pneumococcal Vaccines Swelling   • Codeine Rash       Scheduled meds:     aspirin 81 mg Oral QAM AC   atorvastatin 20 mg Oral Nightly   budesonide-formoterol 2 puff Inhalation BID - RT   ipratropium-albuterol 3 mL Nebulization 4x Daily - RT   levothyroxine 150 mcg Oral Q AM   lisinopril 10 mg Oral Q12H   metoprolol tartrate 12.5 mg Oral Q12H   nystatin  Topical Q12H   pantoprazole 40 mg Oral Daily   polyethylene glycol 17 g Oral BID   potassium chloride 20 mEq Oral Daily       PRN meds:  •  acetaminophen  •  gabapentin  •  HYDROcodone-acetaminophen  •  labetalol  •  nitroglycerin  •  ondansetron  •  sodium chloride  •  sodium chloride  •  traMADol    Assessment/Plan     Active Problems:    Acute on chronic systolic congestive heart failure    Assessment:  1. Acute systolic CHF with exacerbation   2. COPD - no exac; home 02 at 2.5LNC  3. History of CKD III - now stage 4  4. Generalized Weakness  5. HTN  6. History of SVT and MAT  7. Hypothyroidism  8. 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 Constipation  10. Thrombocytopenia, normocytic anemia     Plan:  1. Order for home biPap    2. Ongoing PT   3. Repeat labs in am  4. Check anemia substrates  5. Will need to see nephrology after discharge.      Discharge Planning: I expect patient to be discharged to home tomorrow with [facility] for O2 needs     Dr. Neil:  Patient seen and examined, sitting up in chair. States she was having leg cramps in her left leg. Heating pad \"popped\" last night. Breathing improved, and has been ambulating. Lung sounds improved. Skin dry.    Dhara Bruce, APRN   02/21/17   9:30 AM                "

## 2017-02-21 NOTE — PLAN OF CARE
Problem: Patient Care Overview (Adult)  Goal: Plan of Care Review  Outcome: Ongoing (interventions implemented as appropriate)    02/21/17 0843   Coping/Psychosocial Response Interventions   Plan Of Care Reviewed With patient   Patient Care Overview   Progress progress toward functional goals is gradual   Outcome Evaluation   Outcome Summary/Follow up Plan Pt conditionally independent with bed mobility, sba-cga for transfers, cga for gait with rw x 75' x 2 reps.

## 2017-02-21 NOTE — PROGRESS NOTES
LOS: 3 days   Patient Care Team:  Jeremi Kan MD as PCP - General (Internal Medicine)  Rubens Hicks MD as Cardiologist (Cardiology)    Chief Complaint: Shortness of breath  Subjective  Feeling  better  No chest pain   mild shortness of breath  No new complaints    Interval History: Improved overall    Patient Complaints:   Denies chest pain currently. Mild shortness of breath. Denies abdominal pain, nausea vomiting or diarrhoea.    Telemetry: no malignant arrhythmia. No significant pauses.    Review of Systems:    The following systems were reviewed and negative; ENT, gastrointestinal, integument, hematologic / lymphatic, neurological, behavioral/psych and allergies / immunologic    Labs:    WBC No results found for: WBC   HGB No results found for: HGB   HCT No results found for: HCT   Platlets No results found for: PLT   MCV No results found for: MCV     Results from last 7 days  Lab Units 02/20/17  0317 02/19/17  0347 02/18/17  1424 02/17/17  1202   SODIUM mmol/L 142 143 141 142   POTASSIUM mmol/L 3.7 3.6 3.9 4.3   CHLORIDE mmol/L 93* 90* 93* 101   TOTAL CO2 mmol/L >40.0* >40.0* 36.0* 31.0   BUN mg/dL 32* 28* 27* 19   CREATININE mg/dL 1.89* 1.76* 1.41* 1.15   CALCIUM mg/dL 8.9 9.1 9.3 9.2   BILIRUBIN mg/dL  --   --   --  0.7   ALK PHOS U/L  --   --   --  80   ALT (SGPT) U/L  --   --   --  30   AST (SGOT) U/L  --   --   --  19   GLUCOSE mg/dL 104* 108* 122* 126*     Lab Results   Component Value Date    CKMB 0.34 05/02/2015    TROPONINI 0.020 02/17/2017     PT/INR:  No results found for: PROTIME/No results found for: INR    Imaging Results (last 72 hours)     Procedure Component Value Units Date/Time    XR Chest PA & Lateral [[id number]] Collected:  02/18/17 0841     Updated:  02/18/17 0909    Narrative:       HISTORY: Follow-up CHF     CHEST 2 VIEWS, 0813 HOURS:     Comparison study dated the preceding day is reviewed. There is continued  cardiomegaly with some slight increase in small right-sided  effusion.  Bibasilar atelectasis is felt present.     IMPRESSIONS:   Cardiomegaly with bibasilar atelectasis and slight increase in small  right-sided effusion.  This report was finalized on 02/18/2017 09:07 by Dr. Jordan Bearden MD.    US Venous Doppler Lower Extremity Bilateral (duplex) [[id number]] Collected:  02/18/17 1142     Updated:  02/18/17 1144    Narrative:       History: Swelling       Impression:       Impression: There is no evidence of deep venous thrombosis or  superficial thrombophlebitis of right or left lower extremities.     Comments: Bilateral lower extremity venous duplex exam was performed  using color Doppler flow, Doppler waveform analysis, and grayscale  imaging, with and without compression. There is no evidence of deep  venous thrombosis in the common femoral, superficial femoral, popliteal,  peroneal, anterior tibial, and posterior tibial veins bilaterally. No  thrombus is identified in the saphenofemoral junctions and greater  saphenous veins bilaterally.         This report was finalized on 02/18/2017 11:42 by Dr. Raymon Stern MD.          Objective     Medication Review:   Current Facility-Administered Medications   Medication Dose Route Frequency Provider Last Rate Last Dose   • acetaminophen (TYLENOL) tablet 650 mg  650 mg Oral Q4H PRN Salo Bruce MD       • aspirin EC tablet 81 mg  81 mg Oral QAM AC Salo Bruce MD   81 mg at 02/20/17 0825   • atorvastatin (LIPITOR) tablet 20 mg  20 mg Oral Nightly Salo Bruce MD   20 mg at 02/20/17 2131   • budesonide-formoterol (SYMBICORT) 160-4.5 MCG/ACT inhaler 2 puff  2 puff Inhalation BID - RT Salo Bruce MD   2 puff at 02/20/17 2010   • gabapentin (NEURONTIN) capsule 300 mg  300 mg Oral TID PRN Salo Bruce MD   300 mg at 02/20/17 0824   • HYDROcodone-acetaminophen (NORCO) 7.5-325 MG per tablet 1 tablet  1 tablet Oral BID PRN Salo Bruce MD   1 tablet at 02/20/17 1521   •  "ipratropium-albuterol (DUO-NEB) nebulizer solution 3 mL  3 mL Nebulization 4x Daily - RT Salo Bruce MD   3 mL at 02/20/17 2009   • labetalol (NORMODYNE,TRANDATE) injection 10 mg  10 mg Intravenous Q4H PRN Salo Bruce MD       • levothyroxine (SYNTHROID, LEVOTHROID) tablet 150 mcg  150 mcg Oral Q AM Salo Bruce MD   150 mcg at 02/18/17 0534   • lisinopril (PRINIVIL,ZESTRIL) tablet 10 mg  10 mg Oral Q12H Salo Bruce MD   10 mg at 02/20/17 2132   • metoprolol tartrate (LOPRESSOR) tablet 12.5 mg  12.5 mg Oral Q12H Salo Bruce MD   12.5 mg at 02/20/17 2130   • nitroglycerin (NITROSTAT) SL tablet 0.4 mg  0.4 mg Sublingual Q5 Min PRN Salo Bruce MD       • nystatin (MYCOSTATIN) powder   Topical Q12H NEYDA Chi       • ondansetron (ZOFRAN) injection 4 mg  4 mg Intravenous Q6H PRN Salo Bruce MD       • pantoprazole (PROTONIX) EC tablet 40 mg  40 mg Oral Daily Salo Bruce MD   40 mg at 02/20/17 0825   • polyethylene glycol (MIRALAX) packet 17 g  17 g Oral BID NEYDA Chi   17 g at 02/20/17 1701   • potassium chloride (MICRO-K) CR capsule 20 mEq  20 mEq Oral Daily NEYDA Chi   20 mEq at 02/20/17 0825   • sodium chloride 0.9 % flush 1-10 mL  1-10 mL Intravenous PRN Salo Bruce MD       • sodium chloride 0.9 % flush 10 mL  10 mL Intravenous PRN Melida Walker MD       • traMADol (ULTRAM) tablet 50 mg  50 mg Oral Q8H PRN Salo Bruce MD   50 mg at 02/20/17 0824       Vital Sign Min/Max for last 24 hours  Temp  Min: 97.3 °F (36.3 °C)  Max: 98.1 °F (36.7 °C)   BP  Min: 94/58  Max: 147/51   Pulse  Min: 61  Max: 73   Resp  Min: 18  Max: 20   SpO2  Min: 93 %  Max: 98 %   Flow (L/min)  Min: 2  Max: 33   Weight  Min: 206 lb 6 oz (93.6 kg)  Max: 206 lb 6 oz (93.6 kg)     Flowsheet Rows         First Filed Value    Admission Height  66\" (167.6 cm) Documented at 02/17/2017 1147    Admission " Weight  220 lb (99.8 kg) Documented at 02/17/2017 1147          Physical Exam:  Ears ears appear intact with no abnormalities noted  Nose nares normal, septum midline, mucosa normal and no drainage  Neck suppple, trachea midline, no thyromegaly, no carotid bruit and no JVD  Back no kyphosis present, no scoliosis present, no skin lesions, erythema, or scars, no tenderness to percussion or palpation and range of motion normal  Lungs respirations regular, respirations even and respirations unlabored  Heart normal S1, S2,  2/6 pansystolic murmur in the left sternal border,   no rub and no click  Abdomen normal bowel sounds, no masses, no hepatomegaly, no splenomegaly, no guarding and no rebound tenderness  Skin no bleeding, bruising or rash     Results Review:   I reviewed the patient's new clinical results.  I reviewed the patient's new imaging results and agree with the interpretation.  I reviewed the patient's other test results and agree with the interpretation  I personally viewed and interpreted the patient's EKG/Telemetry data  Discussed with patient    Medication Review: Performed    Assessment/Plan     Active Problems:    Acute on chronic systolic congestive heart failure  COPD  History of CKD III  Generalized Weakness  HTN  History of SVT and MAT  Hypothyroidism  Constipation  Mild LV dysfunction    Plan  Same treatment  Agree with TCU  Will monitor Telemetry for now  Supportive care  Optimal medical therapy  Deep vein thrombosis prophylaxis/precautions  Counseled regarding disease appropriate diet, fluid, sodium, caffeine, stimulants intake   Stressed compliance to diet and medications   Avoid NSAIDS  Monitor kidney functions    Rubens Hicks MD  02/20/17  11:12 PM

## 2017-02-21 NOTE — PLAN OF CARE
Problem: Patient Care Overview (Adult)  Goal: Plan of Care Review  Outcome: Ongoing (interventions implemented as appropriate)    02/21/17 0334   Coping/Psychosocial Response Interventions   Plan Of Care Reviewed With patient   Patient Care Overview   Progress progress toward functional goals as expected   Outcome Evaluation   Outcome Summary/Follow up Plan No c/o pain voiced, EF 45-50%. DC lasix and diamox. NO iv access. Possible d/c home today. Continue to monitor.        Goal: Adult Individualization and Mutuality  Outcome: Ongoing (interventions implemented as appropriate)  Goal: Discharge Needs Assessment  Outcome: Ongoing (interventions implemented as appropriate)    Problem: Cardiac: Heart Failure (Adult)  Goal: Signs and Symptoms of Listed Potential Problems Will be Absent or Manageable (Cardiac: Heart Failure)  Outcome: Ongoing (interventions implemented as appropriate)    Problem: Fall Risk (Adult)  Goal: Identify Related Risk Factors and Signs and Symptoms  Outcome: Ongoing (interventions implemented as appropriate)  Goal: Absence of Falls  Outcome: Ongoing (interventions implemented as appropriate)

## 2017-02-21 NOTE — DISCHARGE PLACEMENT REQUEST
"Erin Domingo (81 y.o. Female)     Date of Birth Social Security Number Address Home Phone MRN    [id number] [address] [phone] [id number]    Zoroastrian Marital Status          None        Admission Date Admission Type Admitting Provider Attending Provider Department, Room/Bed    2/17/17 Emergency Tito Neil DO Jessee, Ali Janell, DO [facility] 4B, 411/1    Discharge Date Discharge Disposition Discharge Destination                      Attending Provider: Tito Neil DO     Allergies:  Fluarix [Flu Virus Vaccine], Pneumococcal Vaccines, Codeine    Isolation:  None   Infection:  None   Code Status:  Conditional    Ht:  64\" (162.6 cm)   Wt:  206 lb 6 oz (93.6 kg)    Admission Cmt:  None   Principal Problem:  None                Active Insurance as of 2/17/2017     Primary Coverage     Payor Plan Insurance Group Employer/Plan Group    MEDICARE MEDICARE A & B      Payor Plan Address Payor Plan Phone Number Effective From Effective To    [address] [phone] 11/1/1992     [place] [address]       Subscriber Name Subscriber Birth Date Member ID       ERIN DOMINGO 1935 [id number]                 Emergency Contacts      (Rel.) Home Phone Work Phone Mobile Phone    Aziza Gold (Daughter) [phone] -- --               History & Physical      Salo Bruce MD at 2/17/2017  3:35 PM              [facility] Medicine Services  HISTORY AND PHYSICAL    Date of Admission: 2/17/2017  Primary Care Physician: Jeremi Kan MD    Subjective     Chief Complaint: Shortness of Breath    Shortness of Breath   Associated symptoms include leg swelling. Pertinent negatives include no wheezing.   Leg Swelling   Associated symptoms include weakness.    patient is a 81-year-old  female with past medical history significant for systolic congestive heart failure (last echocardiogram " "revealing ejection fraction of 45%), chronic objective pulmonary disease on 2-1/2 L by nasal cannula chronically, that presented to our hospital today with a chief complaint shortness of breath.  Patient reports the symptoms got worse over the course of the past 48 hours.  She reports that her legs have gotten much more swollen and \"tight\".  She has had orthopnea and states that she \"suffocates\" if she were to attempt to lie flat.  She does report paroxysmal nocturnal dyspnea.  She does not check her weight on a daily basis, but she does estimate that she is holding onto a lot of extra water weight.  She has tried to be compliant with her outpatient diuretic regimen, however she does report that her urine output has not been as good after taking her diuretic.  She reports no fevers or chills.  She has not had any significant cough or congestion.  She reports no significant wheezing.  She denies any chest pain or palpitations.  Finally, she reports that her swelling in her legs has gotten so tight that it is making it difficult for her to ambulate.    Review of Systems   HENT: Negative.    Eyes: Negative.    Respiratory: Positive for shortness of breath. Negative for wheezing.    Cardiovascular: Positive for leg swelling.   Gastrointestinal: Negative.    Endocrine: Negative.    Skin: Negative.    Neurological: Positive for weakness.   Hematological: Negative.    Psychiatric/Behavioral: Negative.       Otherwise complete ROS reviewed and negative except as mentioned in the HPI.      Past Medical History:   Past Medical History   Diagnosis Date   • CHF (congestive heart failure)    • COPD (chronic obstructive pulmonary disease)    • Hypertension    • Renal disorder        Past Surgical History:  Past Surgical History   Procedure Laterality Date   • Cholecystectomy     • Back surgery     • Eye surgery Bilateral      cataract       Social History:  reports that she has quit smoking. She does not have any smokeless " tobacco history on file. She reports that she does not drink alcohol or use illicit drugs. Patient states that she quit smoking about 10 years ago.    Family History:   Coronary artery disease and diabetes run in the family    Allergies:  Allergies   Allergen Reactions   • Fluarix [Flu Virus Vaccine] Swelling   • Pneumococcal Vaccines Swelling   • Codeine Rash       Medications:  Prior to Admission medications    Medication Sig Start Date End Date Taking? Authorizing Provider   aspirin 81 MG EC tablet Take 81 mg by mouth Every Morning Before Breakfast. Taken with breakfast   Yes NEYDA Acevedo   atorvastatin (LIPITOR) 20 MG tablet Take 20 mg by mouth Every Night.   Yes Historical Provider, MD   budesonide-formoterol (SYMBICORT) 160-4.5 MCG/ACT inhaler Inhale 2 puffs 2 (Two) Times a Day.   Yes Historical Provider, MD   calcium carbonate (OS-NICOLE) 600 MG tablet Take 600 mg by mouth 2 (Two) Times a Day With Meals.   Yes Historical Provider, MD   furosemide (LASIX) 40 MG tablet Take 40 mg by mouth Daily.   Yes Historical Provider, MD   HYDROcodone-acetaminophen (NORCO) 7.5-325 MG per tablet Take 1 tablet by mouth 2 (Two) Times a Day As Needed for moderate pain (4-6).   Yes Historical Provider, MD   potassium chloride (K-DUR,KLOR-CON) 20 MEQ CR tablet Take 20 mEq by mouth 2 (Two) Times a Day.   Yes Historical Provider, MD   traMADol (ULTRAM) 50 MG tablet Take 50 mg by mouth Every 8 (Eight) Hours As Needed for moderate pain (4-6).   Yes Jeremi Kan MD   bumetanide (BUMEX) 0.5 MG tablet Take 0.5 mg by mouth Daily. Patient does not recall taking this medication.    Historical Provider, MD   Calcium Carb-Cholecalciferol (OYSTER SHELL CALCIUM + D) 500-200 MG-UNIT tablet Take  by mouth.    Historical Provider, MD   carboxymethylcellulose (REFRESH TEARS) 0.5 % solution 3 (Three) Times a Day As Needed for dry eyes.    Historical Provider, MD   digoxin (LANOXIN) 125 MCG tablet Take 125 mcg by mouth Daily.    Historical  "Provider, MD   dronedarone (MULTAQ) 400 MG tablet Take 400 mg by mouth 2 (Two) Times a Day With Meals.    Historical Provider, MD   ezetimibe-simvastatin (VYTORIN) 10-20 MG per tablet Take 1 tablet by mouth Every Night.    Historical Provider, MD   gabapentin (NEURONTIN) 300 MG capsule Take 300 mg by mouth 3 (Three) Times a Day. Pharmacy states this med on hold.    Historical Provider, MD   GABAPENTIN, ONCE-DAILY, PO Take  by mouth.    Historical Provider, MD   hydrOXYzine (ATARAX) 50 MG tablet Take 50 mg by mouth Daily.    Historical Provider, MD   levothyroxine (SYNTHROID, LEVOTHROID) 150 MCG tablet Take 150 mcg by mouth Daily.    Historical Provider, MD   lisinopril (PRINIVIL,ZESTRIL) 10 MG tablet Take 10 mg by mouth 2 (Two) Times a Day. Pharmacy states this med is on hold.    Historical Provider, MD   metoprolol tartrate (LOPRESSOR) 25 MG tablet Take 25 mg by mouth 2 (Two) Times a Day.    Historical Provider, MD   nitroglycerin (NITROSTAT) 0.4 MG SL tablet Place 0.4 mg under the tongue Every 5 (Five) Minutes As Needed for chest pain. Take no more than 3 doses in 15 minutes.    Historical Provider, MD   pantoprazole (PROTONIX) 40 MG EC tablet Take 40 mg by mouth Daily.    Historical Provider, MD   potassium chloride (K-DUR,KLOR-CON) 10 MEQ CR tablet Take 10 mEq by mouth 2 (Two) Times a Day. Patient unsure if they are taking this medication.    Historical Provider, MD   acetaZOLAMIDE (DIAMOX) 250 MG tablet Take 125 mg by mouth Daily.  2/17/17  Historical Provider, MD       Objective     Vital Signs:   Visit Vitals   • /88   • Pulse 97   • Temp 97.9 °F (36.6 °C) (Oral)   • Resp 23   • Ht 66\" (167.6 cm)   • Wt 220 lb (99.8 kg)   • SpO2 94%   • BMI 35.51 kg/m2     Physical Exam   Constitutional: She is oriented to person, place, and time. She appears well-developed. No distress.   disheveled   HENT:   Head: Normocephalic and atraumatic.   Mouth/Throat: No oropharyngeal exudate (poor dentition; hirsutism). "   Eyes: Pupils are equal, round, and reactive to light. No scleral icterus.   Neck: Normal range of motion. No tracheal deviation present.   Cardiovascular: Normal rate and regular rhythm.    Pulmonary/Chest: Effort normal. No respiratory distress. She has wheezes. She has rales.   Abdominal: Soft.   Musculoskeletal: She exhibits edema.   Neurological: She is alert and oriented to person, place, and time.   Skin: Skin is warm and dry.   Psychiatric: She has a normal mood and affect. Her behavior is normal.   Vitals reviewed.    Results Reviewed:  Lab Results (last 24 hours)     Procedure Component Value Units Date/Time    Blood Gas, Arterial [[id number]]  (Abnormal) Collected:  02/17/17 1140    Specimen:  Arterial Blood Updated:  02/17/17 1142     Site Arterial: right brachial      Rickey's Test --       Documented in Rapid Comm        pH, Arterial 7.375 pH units      pCO2, Arterial 49.5 (H) mm Hg      pO2, Arterial 78.7 (L) mm Hg      HCO3, Arterial 28.3 (H) mmol/L      Base Excess, Arterial 2.2 (H) mmol/L      O2 Saturation, Arterial 95.3 %      O2 Saturation Calculated 95.3 %      Barometric Pressure for Blood Gas -- mmHg       Component not reported at this site.        Modality Cannula      Flow Rate 4.00 lpm     Narrative:       Serial Number: [id number]    : [id number]    Lavender Top [[id number]] Collected:  02/17/17 1202    Specimen:  Blood Updated:  02/17/17 1206    Light Blue Top [[id number]] Collected:  02/17/17 1203    Specimen:  Blood Updated:  02/17/17 1206    Red Top [[id number]] Collected:  02/17/17 1203    Specimen:  Blood Updated:  02/17/17 1206    Green Top (Gel) [[id number]] Collected:  02/17/17 1202    Specimen:  Blood Updated:  02/17/17 1206    POC Troponin, Rapid [[id number]]  (Normal) Collected:  02/17/17 1207    Specimen:  Blood Updated:  02/17/17 1221     Troponin I 0.00 ng/mL       Serial Number: [id number]    : [id number]       Comprehensive Metabolic Panel [[id number]]  (Abnormal) Collected:   02/17/17 1202    Specimen:  Blood Updated:  02/17/17 1221     Glucose 126 (H) mg/dL      BUN 19 mg/dL      Creatinine 1.15 mg/dL      Sodium 142 mmol/L      Potassium 4.3 mmol/L      Chloride 101 mmol/L      CO2 31.0 mmol/L      Calcium 9.2 mg/dL      Total Protein 7.0 g/dL      Albumin 4.20 g/dL      ALT (SGPT) 30 U/L      AST (SGOT) 19 U/L      Alkaline Phosphatase 80 U/L      Total Bilirubin 0.7 mg/dL      eGFR Non African Amer 46 (L) mL/min/1.73      Globulin 2.8 gm/dL      A/G Ratio 1.5 g/dL      BUN/Creatinine Ratio 16.5      Anion Gap 10.0 mmol/L     Narrative:       The MDRD GFR formula is only valid for adults with stable renal function between ages 18 and 70.    CBC & Differential [[id number]] Collected:  02/17/17 1202    Specimen:  Blood Updated:  02/17/17 1225    Narrative:       The following orders were created for panel order CBC & Differential.  Procedure                               Abnormality         Status                     ---------                               -----------         ------                     CBC Auto Differential[[id number]]         Abnormal            Final result                 Please view results for these tests on the individual orders.    CBC Auto Differential [[id number]]  (Abnormal) Collected:  02/17/17 1202    Specimen:  Blood Updated:  02/17/17 1225     WBC 5.70 10*3/mm3      RBC 4.66 10*6/mm3      Hemoglobin 11.9 (L) g/dL      Hematocrit 37.1 %      MCV 79.6 (L) fL      MCH 25.5 (L) pg      MCHC 32.1 (L) g/dL      RDW 15.3 (H) %      RDW-SD 43.9 fl      MPV 10.2 fL      Platelets 101 (L) 10*3/mm3      Neutrophil % 71.7 %      Lymphocyte % 21.9 %      Monocyte % 4.4 %      Eosinophil % 1.1 %      Basophil % 0.2 %      Immature Grans % 0.7 %      Neutrophils, Absolute 4.09 10*3/mm3      Lymphocytes, Absolute 1.25 10*3/mm3      Monocytes, Absolute 0.25 10*3/mm3      Eosinophils, Absolute 0.06 10*3/mm3      Basophils, Absolute 0.01 10*3/mm3      Immature Grans, Absolute 0.04  (H) 10*3/mm3      nRBC 0.0 /100 WBC     Influenza Antigen [[id number]]  (Normal) Collected:  02/17/17 1207    Specimen:  Swab from Nasopharynx Updated:  02/17/17 1232     Influenza A Ag, EIA Negative      Influenza B Ag, EIA Negative     Narrative:         Recommend confirmation of negative results by viral culture or molecular assay.    BNP [[id number]]  (Abnormal) Collected:  02/17/17 1202    Specimen:  Blood Updated:  02/17/17 1234     proBNP 3300.0 (H) pg/mL     Troponin [[id number]]  (Normal) Collected:  02/17/17 1202    Specimen:  Blood Updated:  02/17/17 1234     Troponin I 0.020 ng/mL     Digoxin Level [[id number]]  (Abnormal) Collected:  02/17/17 1203    Specimen:  Blood Updated:  02/17/17 1256     Digoxin <0.40 (L) ng/mL     BNP [[id number]]  (Abnormal) Collected:  02/17/17 1203    Specimen:  Blood Updated:  02/17/17 1302     proBNP 3250.0 (H) pg/mL     [place] Draw [[id number]] Collected:  02/17/17 1202    Specimen:  Blood Updated:  02/17/17 1306    Narrative:       The following orders were created for panel order [place] Draw.  Procedure                               Abnormality         Status                     ---------                               -----------         ------                     Light Blue Top[[id number]]                                    In process                 Green Top (Gel)[[id number]]                                   In process                 Lavender Top[[id number]]                                      In process                 Red Top[[id number]]                                           In process                 Green Top (No Gel)[[id number]]                                                             Please view results for these tests on the individual orders.    Urine Culture [[id number]] Collected:  02/17/17 1426    Specimen:  Urine from Urine, Clean Catch Updated:  02/17/17 1436    Urinalysis With / Culture If Indicated [[id number]]  (Abnormal) Collected:  02/17/17 1426    Specimen:   Urine from Urine, Clean Catch Updated:  02/17/17 1500     Color, UA Yellow      Appearance, UA Clear      pH, UA 6.0      Specific Gravity, UA 1.020      Glucose, UA Negative      Ketones, UA Negative      Bilirubin, UA Negative      Blood, UA Negative      Protein, UA 30 mg/dL (1+) (A)      Leuk Esterase, UA Moderate (2+) (A)      Nitrite, UA Negative      Urobilinogen, UA 1.0 E.U./dL     Urinalysis, Microscopic Only [[id number]]  (Abnormal) Collected:  02/17/17 1426    Specimen:  Urine from Urine, Clean Catch Updated:  02/17/17 1500     RBC, UA 3-5 (A) /HPF      WBC, UA 13-20 (A) /HPF      Bacteria, UA None Seen /HPF      Squamous Epithelial Cells, UA 7-12 (A) /HPF      Hyaline Casts, UA 3-6 /LPF      Methodology Automated Microscopy         Imaging Results (last 24 hours)     Procedure Component Value Units Date/Time    XR Chest 1 View [[id number]] Collected:  02/17/17 1318     Updated:  02/17/17 1322    Narrative:       EXAMINATION: XR CHEST 1 VW- 2/17/2017 1:18 PM CST     HISTORY: Shortness of breath.     REPORT: Comparison is made with the study from 09/03/2016.     Lungs are grossly hypoaerated, there is diffuse cardiomegaly which has  increased, as well as central vascular congestion and mild pulmonary  edema. No pneumothorax is seen. Small bilateral pleural effusions are  not excluded. The osseous structures show nothing acute. There is  moderate ectasia of the thoracic aorta.       Impression:       Gross hypoaeration of the lungs with cardiomegaly and  evidence of CHF. Probable small bilateral pleural effusions. No  pneumothorax is seen.  This report was finalized on 02/17/2017 13:19 by Dr. Onel Renae MD.    US Venous Doppler Lower Extremity Bilateral (duplex) [[id number]]      Updated:  02/17/17 1421          I have personally reviewed and interpreted the radiology studies and ECG obtained at time of admission.     Assessment / Plan     Assessment & Plan  Hospital Problem List     Acute on chronic  systolic congestive heart failure        Assessment:  1.  Acute systolic CHF with exacerbation  2.  COPD - no exac; home 02 at 2.5LNC  3.  History of CKD III  4.  Generalized Weakness  5.  HTN  6.  History of SVT and MAT  7.  Hypothyroid    Plan:  1.  Lasix 80mg IV BID  2.  Check Echo  3.  Strict I/Os; daily weights  4.  Supp 02  5.  Nebs  6.  Telemetry  7.  Elevate lower extremities  8.  Will need PT eval  9.  Follow-up urine culture  10.  Repeat CXR tomorrow  11.  Workup ongoing    Code Status: DNI     Salo Bruce MD   02/17/17   3:35 PM             Electronically signed by Salo Bruce MD at 2/17/2017  3:56 PM             Physician Progress Notes (most recent note)      Tito Neil DO at 2/21/2017  9:30 AM  Version 1 of 1             [facility] Medicine Services  INPATIENT PROGRESS NOTE    Length of Stay: 4  Date of Admission: 2/17/2017  Primary Care Physician: Jeremi Kan MD    Subjective   Chief Complaint: generalized weakness    HPI   Patient setting in bed side chair.  She denies shortness of breathing or chest pain.  She states lower extremity pain has improved.  She does reports continued weakness.  Again she refuses placement in rehabilitation or TCU.  She states she has done that before and does not think she needs that as she has plenty of help at home.  She is agreeable to home BiPAP.  She has no needs moist today.    Review of Systems   Unable to determine, all palpation efforts elicit painful response    All pertinent negatives and positives are as above. All other systems have been reviewed and are negative unless otherwise stated.     Objective    Temp:  [96.3 °F (35.7 °C)-98.1 °F (36.7 °C)] 97.4 °F (36.3 °C)  Heart Rate:  [53-73] 54  Resp:  [18-20] 18  BP: ()/(53-62) 140/62    Physical Exam   Constitutional: She is oriented to person, place, and time. She appears well-developed and well-nourished. No distress.   HENT:   Head:  Normocephalic and atraumatic.   Eyes: Conjunctivae and EOM are normal. Pupils are equal, round, and reactive to light. No scleral icterus.   Neck: Normal range of motion. Neck supple. No JVD present. No tracheal deviation present.   Cardiovascular: Normal rate, regular rhythm, normal heart sounds and intact distal pulses.  Exam reveals no gallop.    No murmur heard.  Pulmonary/Chest: Effort normal and breath sounds normal. No respiratory distress. She has no wheezes. She has no rales.   Abdominal: Soft. Bowel sounds are normal. She exhibits no distension. There is no tenderness. There is no guarding.   Musculoskeletal: Normal range of motion. She exhibits no edema.   Neurological: She is alert and oriented to person, place, and time.   No obvious deficits noted.   Skin: Skin is warm and dry. No rash noted. She is not diaphoretic. No erythema. No pallor.   Psychiatric: She has a normal mood and affect. Her behavior is normal.   Vitals reviewed.      Results Review:  Recent Results (from the past 12 hour(s))   Basic Metabolic Panel    Collection Time: 02/21/17  4:41 AM   Result Value Ref Range    Glucose 106 (H) 70 - 100 mg/dL    BUN 41 (H) 5 - 21 mg/dL    Creatinine 1.82 (H) 0.50 - 1.40 mg/dL    Sodium 141 135 - 145 mmol/L    Potassium 4.3 3.5 - 5.3 mmol/L    Chloride 95 (L) 98 - 110 mmol/L    CO2 37.0 (H) 24.0 - 31.0 mmol/L    Calcium 8.5 8.4 - 10.4 mg/dL    eGFR Non African Amer 27 (L) >60 mL/min/1.73    BUN/Creatinine Ratio 22.5 7.0 - 25.0    Anion Gap 9.0 4.0 - 13.0 mmol/L   CBC Auto Differential    Collection Time: 02/21/17  4:41 AM   Result Value Ref Range    WBC 6.74 4.80 - 10.80 10*3/mm3    RBC 4.12 (L) 4.20 - 5.40 10*6/mm3    Hemoglobin 10.3 (L) 12.0 - 16.0 g/dL    Hematocrit 33.8 (L) 37.0 - 47.0 %    MCV 82.0 82.0 - 98.0 fL    MCH 25.0 (L) 28.0 - 32.0 pg    MCHC 30.5 (L) 33.0 - 36.0 g/dL    RDW 15.5 (H) 12.0 - 15.0 %    RDW-SD 44.9 40.0 - 54.0 fl    MPV 10.0 6.0 - 12.0 fL    Platelets 114 (L) 130 - 400  10*3/mm3    Neutrophil % 53.7 39.0 - 78.0 %    Lymphocyte % 35.9 15.0 - 45.0 %    Monocyte % 8.8 4.0 - 12.0 %    Eosinophil % 1.2 0.0 - 4.0 %    Basophil % 0.1 0.0 - 2.0 %    Immature Grans % 0.3 0.0 - 5.0 %    Neutrophils, Absolute 3.62 1.87 - 8.40 10*3/mm3    Lymphocytes, Absolute 2.42 0.72 - 4.86 10*3/mm3    Monocytes, Absolute 0.59 0.19 - 1.30 10*3/mm3    Eosinophils, Absolute 0.08 0.00 - 0.70 10*3/mm3    Basophils, Absolute 0.01 0.00 - 0.20 10*3/mm3    Immature Grans, Absolute 0.02 0.00 - 0.03 10*3/mm3       Cultures:  URINE CULTURE   Date Value Ref Range Status   02/17/2017 >100,000 CFU/mL Mixed Gram Positive Lenora (A)  Final       Radiology Data:    Imaging Results (last 24 hours)     ** No results found for the last 24 hours. **            Intake/Output Summary (Last 24 hours) at 02/21/17 0930  Last data filed at 02/21/17 0805   Gross per 24 hour   Intake    600 ml   Output   1150 ml   Net   -550 ml       Allergies   Allergen Reactions   • Fluarix [Flu Virus Vaccine] Swelling   • Pneumococcal Vaccines Swelling   • Codeine Rash       Scheduled meds:     aspirin 81 mg Oral QAM AC   atorvastatin 20 mg Oral Nightly   budesonide-formoterol 2 puff Inhalation BID - RT   ipratropium-albuterol 3 mL Nebulization 4x Daily - RT   levothyroxine 150 mcg Oral Q AM   lisinopril 10 mg Oral Q12H   metoprolol tartrate 12.5 mg Oral Q12H   nystatin  Topical Q12H   pantoprazole 40 mg Oral Daily   polyethylene glycol 17 g Oral BID   potassium chloride 20 mEq Oral Daily       PRN meds:  •  acetaminophen  •  gabapentin  •  HYDROcodone-acetaminophen  •  labetalol  •  nitroglycerin  •  ondansetron  •  sodium chloride  •  sodium chloride  •  traMADol    Assessment/Plan     Active Problems:    Acute on chronic systolic congestive heart failure    Assessment:  1. Acute systolic CHF with exacerbation   2. COPD - no exac; home 02 at 2.5LNC  3. History of CKD III - now stage 4  4. Generalized Weakness  5. HTN  6. History of SVT and MAT  7.  "Hypothyroidism  8. 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 Constipation  10. Thrombocytopenia, normocytic anemia     Plan:  1. Order for home biPap    2. Ongoing PT   3. Repeat labs in am  4. Check anemia substrates  5. Will need to see nephrology after discharge.      Discharge Planning: I expect patient to be discharged to home tomorrow with [facility] for O2 needs     Dr. Neil:  Patient seen and examined, sitting up in chair. States she was having leg cramps in her left leg. Heating pad \"popped\" last night. Breathing improved, and has been ambulating. Lung sounds improved. Skin dry.    Dhara Bruce, APRN   02/21/17   9:30 AM                   Electronically signed by Tito Neil DO at 2/21/2017 11:33 AM        NIPPV CPAP OR BIPAP [RT9] (Order [id number])   Respiratory Care   Date: 2/21/2017 Department: [address] Released By/Authorizing: Tito Neil DO (auto-released)   Order History   Inpatient   Date/Time Action Taken User Additional Information   02/21/17 1354 Release Tito Neil DO (auto-released) From Order: [id number]   Order Details   Frequency Duration Priority Order Class   Until Discontinued Until Specified Routine Hospital Performed   Start Date/Time   Start Date Start Time   02/21/17 1354   Comments   Chronic CO2 retention   Reprint Order Requisition   NIPPV CPAP OR BIPAP (Order #[id number]) on 2/21/17   Encounter-Level Cardiology Documents:   There are no encounter-level cardiology documents.   Encounter   View Encounter   Order Provider Info       Office phone Pager E-mail   Ordering User Tito Neil [phone] -- --   Authorizing Provider Tito Neil [phone] -- --   Attending Provider Tito Neil [phone] -- --   Linked Charges   No charges linked to this procedure   Order Transmittal Tracking   NIPPV CPAP OR BIPAP (Order #[id number]) on 2/21/17   Authorized by: Tito Neil DO (NPI: [id number])           "

## 2017-02-21 NOTE — PROGRESS NOTES
Continued Stay Note  LIDIA Dickerson     Patient Name: Erin Cox  MRN: [id number]  Today's Date: 2/21/2017    Admit Date: 2/17/2017          Discharge Plan       02/21/17 1500    Case Management/Social Work Plan    Plan PT has orders for BIPAP for home use. PT prefers to use [facility], as this is where she currently gets her home O2. BIPAP orders have been faxed to [facility], will await set up.     Patient/Family In Agreement With Plan yes              Discharge Codes     None            DEMETRICE Navarrete

## 2017-02-21 NOTE — PLAN OF CARE
Problem: Patient Care Overview (Adult)  Goal: Plan of Care Review  Outcome: Ongoing (interventions implemented as appropriate)    02/21/17 1018   Coping/Psychosocial Response Interventions   Plan Of Care Reviewed With patient   Patient Care Overview   Progress no change   Outcome Evaluation   Outcome Summary/Follow up Plan Initial RD assessment. Pt reports eating very well. RD and Pt did review Low Na diet recommendations.

## 2017-02-21 NOTE — PLAN OF CARE
"Problem: Patient Care Overview (Adult)  Goal: Plan of Care Review  Outcome: Ongoing (interventions implemented as appropriate)    02/21/17 1433   Coping/Psychosocial Response Interventions   Plan Of Care Reviewed With patient   Patient Care Overview   Progress progress towards functional goals is fair   Outcome Evaluation   Outcome Summary/Follow up Plan Pt continues to demonstrate weakness, BLANTON, decreased tolerance to activity, and balance deficits. She is progressing with her functional ability and levels but these impairments continue to limit her from further progression. She amb a total of 128ft today w/ RW but the furthest she made it without a sitting break was 34ft. She still requires CGAx2 with one therapist following with the chair in order to provide her seated rests when she becomes SOB and feels her \"knees are buckling.\" We will continue to address these limitations in order to increase her independence with functional mobility before her return home. We suggest  PT at this time upon d/c acute.            "

## 2017-02-22 VITALS
RESPIRATION RATE: 20 BRPM | SYSTOLIC BLOOD PRESSURE: 139 MMHG | WEIGHT: 210.5 LBS | HEART RATE: 66 BPM | HEIGHT: 64 IN | OXYGEN SATURATION: 98 % | BODY MASS INDEX: 35.94 KG/M2 | TEMPERATURE: 96.9 F | DIASTOLIC BLOOD PRESSURE: 51 MMHG

## 2017-02-22 LAB
ANION GAP SERPL CALCULATED.3IONS-SCNC: 8 MMOL/L (ref 4–13)
BUN BLD-MCNC: 39 MG/DL (ref 5–21)
BUN/CREAT SERPL: 25 (ref 7–25)
CALCIUM SPEC-SCNC: 8.9 MG/DL (ref 8.4–10.4)
CHLORIDE SERPL-SCNC: 98 MMOL/L (ref 98–110)
CO2 SERPL-SCNC: 35 MMOL/L (ref 24–31)
CREAT BLD-MCNC: 1.56 MG/DL (ref 0.5–1.4)
DEPRECATED RDW RBC AUTO: 44.8 FL (ref 40–54)
ERYTHROCYTE [DISTWIDTH] IN BLOOD BY AUTOMATED COUNT: 15.4 % (ref 12–15)
FERRITIN SERPL-MCNC: 42 NG/ML (ref 11.1–264)
FOLATE SERPL-MCNC: 5.62 NG/ML
GFR SERPL CREATININE-BSD FRML MDRD: 32 ML/MIN/1.73
GLUCOSE BLD-MCNC: 104 MG/DL (ref 70–100)
HCT VFR BLD AUTO: 34.5 % (ref 37–47)
HGB BLD-MCNC: 10.7 G/DL (ref 12–16)
IRON 24H UR-MRATE: 41 MCG/DL (ref 42–180)
IRON SATN MFR SERPL: 14 % (ref 20–45)
MCH RBC QN AUTO: 25.4 PG (ref 28–32)
MCHC RBC AUTO-ENTMCNC: 31 G/DL (ref 33–36)
MCV RBC AUTO: 81.8 FL (ref 82–98)
PLATELET # BLD AUTO: 108 10*3/MM3 (ref 130–400)
PMV BLD AUTO: 10 FL (ref 6–12)
POTASSIUM BLD-SCNC: 4.9 MMOL/L (ref 3.5–5.3)
RBC # BLD AUTO: 4.22 10*6/MM3 (ref 4.2–5.4)
SODIUM BLD-SCNC: 141 MMOL/L (ref 135–145)
TIBC SERPL-MCNC: 302 MCG/DL (ref 225–420)
VIT B12 BLD-MCNC: 399 PG/ML (ref 239–931)
WBC NRBC COR # BLD: 5.57 10*3/MM3 (ref 4.8–10.8)

## 2017-02-22 PROCEDURE — 94799 UNLISTED PULMONARY SVC/PX: CPT

## 2017-02-22 PROCEDURE — 82746 ASSAY OF FOLIC ACID SERUM: CPT | Performed by: NURSE PRACTITIONER

## 2017-02-22 PROCEDURE — 94760 N-INVAS EAR/PLS OXIMETRY 1: CPT

## 2017-02-22 PROCEDURE — 82607 VITAMIN B-12: CPT | Performed by: NURSE PRACTITIONER

## 2017-02-22 PROCEDURE — 97110 THERAPEUTIC EXERCISES: CPT

## 2017-02-22 PROCEDURE — 82728 ASSAY OF FERRITIN: CPT | Performed by: NURSE PRACTITIONER

## 2017-02-22 PROCEDURE — 99233 SBSQ HOSP IP/OBS HIGH 50: CPT | Performed by: INTERNAL MEDICINE

## 2017-02-22 PROCEDURE — 94660 CPAP INITIATION&MGMT: CPT

## 2017-02-22 PROCEDURE — 83550 IRON BINDING TEST: CPT | Performed by: NURSE PRACTITIONER

## 2017-02-22 PROCEDURE — 83540 ASSAY OF IRON: CPT | Performed by: NURSE PRACTITIONER

## 2017-02-22 PROCEDURE — 97116 GAIT TRAINING THERAPY: CPT

## 2017-02-22 PROCEDURE — 85027 COMPLETE CBC AUTOMATED: CPT | Performed by: NURSE PRACTITIONER

## 2017-02-22 PROCEDURE — 80048 BASIC METABOLIC PNL TOTAL CA: CPT | Performed by: NURSE PRACTITIONER

## 2017-02-22 RX ORDER — POLYETHYLENE GLYCOL 3350 17 G/17G
17 POWDER, FOR SOLUTION ORAL 2 TIMES DAILY
Qty: 7 EACH
Start: 2017-02-22

## 2017-02-22 RX ORDER — NYSTATIN 100000 [USP'U]/G
POWDER TOPICAL EVERY 12 HOURS SCHEDULED
Qty: 1 EACH | Refills: 0 | Status: SHIPPED | OUTPATIENT
Start: 2017-02-22 | End: 2018-02-21 | Stop reason: HOSPADM

## 2017-02-22 RX ADMIN — LISINOPRIL 10 MG: 10 TABLET ORAL at 09:12

## 2017-02-22 RX ADMIN — IPRATROPIUM BROMIDE AND ALBUTEROL SULFATE 3 ML: .5; 3 SOLUTION RESPIRATORY (INHALATION) at 15:35

## 2017-02-22 RX ADMIN — IPRATROPIUM BROMIDE AND ALBUTEROL SULFATE 3 ML: .5; 3 SOLUTION RESPIRATORY (INHALATION) at 11:44

## 2017-02-22 RX ADMIN — NYSTATIN: 100000 POWDER TOPICAL at 09:13

## 2017-02-22 RX ADMIN — HYDROCODONE BITARTRATE AND ACETAMINOPHEN 1 TABLET: 7.5; 325 TABLET ORAL at 06:33

## 2017-02-22 RX ADMIN — METOPROLOL TARTRATE 12.5 MG: 25 TABLET, FILM COATED ORAL at 09:12

## 2017-02-22 RX ADMIN — PANTOPRAZOLE SODIUM 40 MG: 40 TABLET, DELAYED RELEASE ORAL at 09:13

## 2017-02-22 RX ADMIN — IPRATROPIUM BROMIDE AND ALBUTEROL SULFATE 3 ML: .5; 3 SOLUTION RESPIRATORY (INHALATION) at 07:40

## 2017-02-22 RX ADMIN — POLYETHYLENE GLYCOL 3350 17 G: 17 POWDER, FOR SOLUTION ORAL at 09:13

## 2017-02-22 RX ADMIN — BUDESONIDE AND FORMOTEROL FUMARATE DIHYDRATE 2 PUFF: 160; 4.5 AEROSOL RESPIRATORY (INHALATION) at 07:41

## 2017-02-22 RX ADMIN — ASPIRIN 81 MG: 81 TABLET ORAL at 09:12

## 2017-02-22 RX ADMIN — LEVOTHYROXINE SODIUM 150 MCG: 150 TABLET ORAL at 06:33

## 2017-02-22 RX ADMIN — POTASSIUM CHLORIDE 20 MEQ: 750 CAPSULE, EXTENDED RELEASE ORAL at 09:12

## 2017-02-22 NOTE — PLAN OF CARE
Problem: Patient Care Overview (Adult)  Goal: Plan of Care Review  Outcome: Ongoing (interventions implemented as appropriate)    02/22/17 0916   Coping/Psychosocial Response Interventions   Plan Of Care Reviewed With patient   Patient Care Overview   Progress progress toward functional goals as expected   Outcome Evaluation   Outcome Summary/Follow up Plan Pt conditionally independent with bed mobility, sit to stand and stand pivot transfers with RW to/from bedside commode, cga with rw x 25'.. .plan to DC home today.

## 2017-02-22 NOTE — DISCHARGE PLACEMENT REQUEST
"Erin Domingo (81 y.o. Female)     Date of Birth Social Security Number Address Home Phone MRN    1935 [id number] [address] [phone] [id number]    [employer] Marital Status          None        Admission Date Admission Type Admitting Provider Attending Provider Department, Room/Bed    2/17/17 Emergency Tito Neil DO Jessee, Ali Janell, DO [facility] 4B, 411/1    Discharge Date Discharge Disposition Discharge Destination         Home or Self Care             Attending Provider: Tito Neil DO     Allergies:  Fluarix [Flu Virus Vaccine], Pneumococcal Vaccines, Codeine    Isolation:  None   Infection:  None   Code Status:  Conditional    Ht:  64\" (162.6 cm)   Wt:  210 lb 8 oz (95.5 kg)    Admission Cmt:  None   Principal Problem:  None                Active Insurance as of 2/17/2017     Primary Coverage     Payor Plan Insurance Group Employer/Plan Group    MEDICARE MEDICARE A & B      Payor Plan Address Payor Plan Phone Number Effective From Effective To    [address] [phone] 11/1/1992     [place] [address]       Subscriber Name Subscriber Birth Date Member ID       ERIN DOMINGO 1935 [id number]                 Emergency Contacts      (Rel.) Home Phone Work Phone Mobile Phone    Aziza Gold (Daughter) [phone] -- --               History & Physical      Salo Bruce MD at 2/17/2017  3:35 PM              [facility] Medicine Services  HISTORY AND PHYSICAL    Date of Admission: 2/17/2017  Primary Care Physician: Jeremi Kan MD    Subjective     Chief Complaint: Shortness of Breath    Shortness of Breath   Associated symptoms include leg swelling. Pertinent negatives include no wheezing.   Leg Swelling   Associated symptoms include weakness.    patient is a 81-year-old  female with past medical history significant for systolic congestive heart failure (last " "echocardiogram revealing ejection fraction of 45%), chronic objective pulmonary disease on 2-1/2 L by nasal cannula chronically, that presented to our hospital today with a chief complaint shortness of breath.  Patient reports the symptoms got worse over the course of the past 48 hours.  She reports that her legs have gotten much more swollen and \"tight\".  She has had orthopnea and states that she \"suffocates\" if she were to attempt to lie flat.  She does report paroxysmal nocturnal dyspnea.  She does not check her weight on a daily basis, but she does estimate that she is holding onto a lot of extra water weight.  She has tried to be compliant with her outpatient diuretic regimen, however she does report that her urine output has not been as good after taking her diuretic.  She reports no fevers or chills.  She has not had any significant cough or congestion.  She reports no significant wheezing.  She denies any chest pain or palpitations.  Finally, she reports that her swelling in her legs has gotten so tight that it is making it difficult for her to ambulate.    Review of Systems   HENT: Negative.    Eyes: Negative.    Respiratory: Positive for shortness of breath. Negative for wheezing.    Cardiovascular: Positive for leg swelling.   Gastrointestinal: Negative.    Endocrine: Negative.    Skin: Negative.    Neurological: Positive for weakness.   Hematological: Negative.    Psychiatric/Behavioral: Negative.       Otherwise complete ROS reviewed and negative except as mentioned in the HPI.      Past Medical History:   Past Medical History   Diagnosis Date   • CHF (congestive heart failure)    • COPD (chronic obstructive pulmonary disease)    • Hypertension    • Renal disorder        Past Surgical History:  Past Surgical History   Procedure Laterality Date   • Cholecystectomy     • Back surgery     • Eye surgery Bilateral      cataract       Social History:  reports that she has quit smoking. She does not have any " smokeless tobacco history on file. She reports that she does not drink alcohol or use illicit drugs. Patient states that she quit smoking about 10 years ago.    Family History:   Coronary artery disease and diabetes run in the family    Allergies:  Allergies   Allergen Reactions   • Fluarix [Flu Virus Vaccine] Swelling   • Pneumococcal Vaccines Swelling   • Codeine Rash       Medications:  Prior to Admission medications    Medication Sig Start Date End Date Taking? Authorizing Provider   aspirin 81 MG EC tablet Take 81 mg by mouth Every Morning Before Breakfast. Taken with breakfast   Yes NEYDA Acevedo   atorvastatin (LIPITOR) 20 MG tablet Take 20 mg by mouth Every Night.   Yes Historical Provider, MD   budesonide-formoterol (SYMBICORT) 160-4.5 MCG/ACT inhaler Inhale 2 puffs 2 (Two) Times a Day.   Yes Historical Provider, MD   calcium carbonate (OS-NICOLE) 600 MG tablet Take 600 mg by mouth 2 (Two) Times a Day With Meals.   Yes Historical Provider, MD   furosemide (LASIX) 40 MG tablet Take 40 mg by mouth Daily.   Yes Historical Provider, MD   HYDROcodone-acetaminophen (NORCO) 7.5-325 MG per tablet Take 1 tablet by mouth 2 (Two) Times a Day As Needed for moderate pain (4-6).   Yes Historical Provider, MD   potassium chloride (K-DUR,KLOR-CON) 20 MEQ CR tablet Take 20 mEq by mouth 2 (Two) Times a Day.   Yes Historical Provider, MD   traMADol (ULTRAM) 50 MG tablet Take 50 mg by mouth Every 8 (Eight) Hours As Needed for moderate pain (4-6).   Yes Jeremi Kan MD   bumetanide (BUMEX) 0.5 MG tablet Take 0.5 mg by mouth Daily. Patient does not recall taking this medication.    Historical Provider, MD   Calcium Carb-Cholecalciferol (OYSTER SHELL CALCIUM + D) 500-200 MG-UNIT tablet Take  by mouth.    Historical Provider, MD   carboxymethylcellulose (REFRESH TEARS) 0.5 % solution 3 (Three) Times a Day As Needed for dry eyes.    Historical Provider, MD   digoxin (LANOXIN) 125 MCG tablet Take 125 mcg by mouth Daily.     "Historical Provider, MD   dronedarone (MULTAQ) 400 MG tablet Take 400 mg by mouth 2 (Two) Times a Day With Meals.    Historical Provider, MD   ezetimibe-simvastatin (VYTORIN) 10-20 MG per tablet Take 1 tablet by mouth Every Night.    Historical Provider, MD   gabapentin (NEURONTIN) 300 MG capsule Take 300 mg by mouth 3 (Three) Times a Day. Pharmacy states this med on hold.    Historical Provider, MD   GABAPENTIN, ONCE-DAILY, PO Take  by mouth.    Historical Provider, MD   hydrOXYzine (ATARAX) 50 MG tablet Take 50 mg by mouth Daily.    Historical Provider, MD   levothyroxine (SYNTHROID, LEVOTHROID) 150 MCG tablet Take 150 mcg by mouth Daily.    Historical Provider, MD   lisinopril (PRINIVIL,ZESTRIL) 10 MG tablet Take 10 mg by mouth 2 (Two) Times a Day. Pharmacy states this med is on hold.    Historical Provider, MD   metoprolol tartrate (LOPRESSOR) 25 MG tablet Take 25 mg by mouth 2 (Two) Times a Day.    Historical Provider, MD   nitroglycerin (NITROSTAT) 0.4 MG SL tablet Place 0.4 mg under the tongue Every 5 (Five) Minutes As Needed for chest pain. Take no more than 3 doses in 15 minutes.    Historical Provider, MD   pantoprazole (PROTONIX) 40 MG EC tablet Take 40 mg by mouth Daily.    Historical Provider, MD   potassium chloride (K-DUR,KLOR-CON) 10 MEQ CR tablet Take 10 mEq by mouth 2 (Two) Times a Day. Patient unsure if they are taking this medication.    Historical Provider, MD   acetaZOLAMIDE (DIAMOX) 250 MG tablet Take 125 mg by mouth Daily.  2/17/17  Historical Provider, MD       Objective     Vital Signs:   Visit Vitals   • /88   • Pulse 97   • Temp 97.9 °F (36.6 °C) (Oral)   • Resp 23   • Ht 66\" (167.6 cm)   • Wt 220 lb (99.8 kg)   • SpO2 94%   • BMI 35.51 kg/m2     Physical Exam   Constitutional: She is oriented to person, place, and time. She appears well-developed. No distress.   disheveled   HENT:   Head: Normocephalic and atraumatic.   Mouth/Throat: No oropharyngeal exudate (poor dentition; " hirsutism).   Eyes: Pupils are equal, round, and reactive to light. No scleral icterus.   Neck: Normal range of motion. No tracheal deviation present.   Cardiovascular: Normal rate and regular rhythm.    Pulmonary/Chest: Effort normal. No respiratory distress. She has wheezes. She has rales.   Abdominal: Soft.   Musculoskeletal: She exhibits edema.   Neurological: She is alert and oriented to person, place, and time.   Skin: Skin is warm and dry.   Psychiatric: She has a normal mood and affect. Her behavior is normal.   Vitals reviewed.    Results Reviewed:  Lab Results (last 24 hours)     Procedure Component Value Units Date/Time    Blood Gas, Arterial [[id number]]  (Abnormal) Collected:  02/17/17 1140    Specimen:  Arterial Blood Updated:  02/17/17 1142     Site Arterial: right brachial      Rickey's Test --       Documented in Rapid Comm        pH, Arterial 7.375 pH units      pCO2, Arterial 49.5 (H) mm Hg      pO2, Arterial 78.7 (L) mm Hg      HCO3, Arterial 28.3 (H) mmol/L      Base Excess, Arterial 2.2 (H) mmol/L      O2 Saturation, Arterial 95.3 %      O2 Saturation Calculated 95.3 %      Barometric Pressure for Blood Gas -- mmHg       Component not reported at this site.        Modality Cannula      Flow Rate 4.00 lpm     Narrative:       Serial Number: [id number]    : [id number]    Lavender Top [[id number]] Collected:  02/17/17 1202    Specimen:  Blood Updated:  02/17/17 1206    Light Blue Top [[id number]] Collected:  02/17/17 1203    Specimen:  Blood Updated:  02/17/17 1206    Red Top [[id number]] Collected:  02/17/17 1203    Specimen:  Blood Updated:  02/17/17 1206    Green Top (Gel) [[id number]] Collected:  02/17/17 1202    Specimen:  Blood Updated:  02/17/17 1206    POC Troponin, Rapid [[id number]]  (Normal) Collected:  02/17/17 1207    Specimen:  Blood Updated:  02/17/17 1221     Troponin I 0.00 ng/mL       Serial Number: [id number]    : [id number]       Comprehensive Metabolic Panel [[id number]]  (Abnormal)  Collected:  02/17/17 1202    Specimen:  Blood Updated:  02/17/17 1221     Glucose 126 (H) mg/dL      BUN 19 mg/dL      Creatinine 1.15 mg/dL      Sodium 142 mmol/L      Potassium 4.3 mmol/L      Chloride 101 mmol/L      CO2 31.0 mmol/L      Calcium 9.2 mg/dL      Total Protein 7.0 g/dL      Albumin 4.20 g/dL      ALT (SGPT) 30 U/L      AST (SGOT) 19 U/L      Alkaline Phosphatase 80 U/L      Total Bilirubin 0.7 mg/dL      eGFR Non African Amer 46 (L) mL/min/1.73      Globulin 2.8 gm/dL      A/G Ratio 1.5 g/dL      BUN/Creatinine Ratio 16.5      Anion Gap 10.0 mmol/L     Narrative:       The MDRD GFR formula is only valid for adults with stable renal function between ages 18 and 70.    CBC & Differential [[id number]] Collected:  02/17/17 1202    Specimen:  Blood Updated:  02/17/17 1225    Narrative:       The following orders were created for panel order CBC & Differential.  Procedure                               Abnormality         Status                     ---------                               -----------         ------                     CBC Auto Differential[[id number]]         Abnormal            Final result                 Please view results for these tests on the individual orders.    CBC Auto Differential [[id number]]  (Abnormal) Collected:  02/17/17 1202    Specimen:  Blood Updated:  02/17/17 1225     WBC 5.70 10*3/mm3      RBC 4.66 10*6/mm3      Hemoglobin 11.9 (L) g/dL      Hematocrit 37.1 %      MCV 79.6 (L) fL      MCH 25.5 (L) pg      MCHC 32.1 (L) g/dL      RDW 15.3 (H) %      RDW-SD 43.9 fl      MPV 10.2 fL      Platelets 101 (L) 10*3/mm3      Neutrophil % 71.7 %      Lymphocyte % 21.9 %      Monocyte % 4.4 %      Eosinophil % 1.1 %      Basophil % 0.2 %      Immature Grans % 0.7 %      Neutrophils, Absolute 4.09 10*3/mm3      Lymphocytes, Absolute 1.25 10*3/mm3      Monocytes, Absolute 0.25 10*3/mm3      Eosinophils, Absolute 0.06 10*3/mm3      Basophils, Absolute 0.01 10*3/mm3      Immature Grans,  Absolute 0.04 (H) 10*3/mm3      nRBC 0.0 /100 WBC     Influenza Antigen [[id number]]  (Normal) Collected:  02/17/17 1207    Specimen:  Swab from Nasopharynx Updated:  02/17/17 1232     Influenza A Ag, EIA Negative      Influenza B Ag, EIA Negative     Narrative:         Recommend confirmation of negative results by viral culture or molecular assay.    BNP [[id number]]  (Abnormal) Collected:  02/17/17 1202    Specimen:  Blood Updated:  02/17/17 1234     proBNP 3300.0 (H) pg/mL     Troponin [[id number]]  (Normal) Collected:  02/17/17 1202    Specimen:  Blood Updated:  02/17/17 1234     Troponin I 0.020 ng/mL     Digoxin Level [[id number]]  (Abnormal) Collected:  02/17/17 1203    Specimen:  Blood Updated:  02/17/17 1256     Digoxin <0.40 (L) ng/mL     BNP [[id number]]  (Abnormal) Collected:  02/17/17 1203    Specimen:  Blood Updated:  02/17/17 1302     proBNP 3250.0 (H) pg/mL     [place] Draw [[id number]] Collected:  02/17/17 1202    Specimen:  Blood Updated:  02/17/17 1306    Narrative:       The following orders were created for panel order [place] Draw.  Procedure                               Abnormality         Status                     ---------                               -----------         ------                     Light Blue Top[[id number]]                                    In process                 Green Top (Gel)[[id number]]                                   In process                 Lavender Top[[id number]]                                      In process                 Red Top[[id number]]                                           In process                 Green Top (No Gel)[[id number]]                                                             Please view results for these tests on the individual orders.    Urine Culture [[id number]] Collected:  02/17/17 1426    Specimen:  Urine from Urine, Clean Catch Updated:  02/17/17 1436    Urinalysis With / Culture If Indicated [[id number]]  (Abnormal) Collected:  02/17/17 1426     Specimen:  Urine from Urine, Clean Catch Updated:  02/17/17 1500     Color, UA Yellow      Appearance, UA Clear      pH, UA 6.0      Specific Gravity, UA 1.020      Glucose, UA Negative      Ketones, UA Negative      Bilirubin, UA Negative      Blood, UA Negative      Protein, UA 30 mg/dL (1+) (A)      Leuk Esterase, UA Moderate (2+) (A)      Nitrite, UA Negative      Urobilinogen, UA 1.0 E.U./dL     Urinalysis, Microscopic Only [[id number]]  (Abnormal) Collected:  02/17/17 1426    Specimen:  Urine from Urine, Clean Catch Updated:  02/17/17 1500     RBC, UA 3-5 (A) /HPF      WBC, UA 13-20 (A) /HPF      Bacteria, UA None Seen /HPF      Squamous Epithelial Cells, UA 7-12 (A) /HPF      Hyaline Casts, UA 3-6 /LPF      Methodology Automated Microscopy         Imaging Results (last 24 hours)     Procedure Component Value Units Date/Time    XR Chest 1 View [[id number]] Collected:  02/17/17 1318     Updated:  02/17/17 1322    Narrative:       EXAMINATION: XR CHEST 1 VW- 2/17/2017 1:18 PM CST     HISTORY: Shortness of breath.     REPORT: Comparison is made with the study from 09/03/2016.     Lungs are grossly hypoaerated, there is diffuse cardiomegaly which has  increased, as well as central vascular congestion and mild pulmonary  edema. No pneumothorax is seen. Small bilateral pleural effusions are  not excluded. The osseous structures show nothing acute. There is  moderate ectasia of the thoracic aorta.       Impression:       Gross hypoaeration of the lungs with cardiomegaly and  evidence of CHF. Probable small bilateral pleural effusions. No  pneumothorax is seen.  This report was finalized on 02/17/2017 13:19 by Dr. Onel Renae MD.    US Venous Doppler Lower Extremity Bilateral (duplex) [[id number]]      Updated:  02/17/17 1421          I have personally reviewed and interpreted the radiology studies and ECG obtained at time of admission.     Assessment / Plan     Assessment & Plan  Hospital Problem List     Acute on  chronic systolic congestive heart failure        Assessment:  1.  Acute systolic CHF with exacerbation  2.  COPD - no exac; home 02 at 2.5LNC  3.  History of CKD III  4.  Generalized Weakness  5.  HTN  6.  History of SVT and MAT  7.  Hypothyroid    Plan:  1.  Lasix 80mg IV BID  2.  Check Echo  3.  Strict I/Os; daily weights  4.  Supp 02  5.  Nebs  6.  Telemetry  7.  Elevate lower extremities  8.  Will need PT eval  9.  Follow-up urine culture  10.  Repeat CXR tomorrow  11.  Workup ongoing    Code Status: DNI     Salo Bruce MD   02/17/17   3:35 PM             Electronically signed by Salo Bruce MD at 2/17/2017  3:56 PM          Home CPAP/BiPAP Settings [[id number]] (Order [id number])   Nursing    Date: 2/22/2017   Department: [address]   Ordering/Authorizing: NEYDA Emery           Order History  Outpatient       Date/Time Action Taken User Additional Information       02/22/17 1151 Sign NEYDA Emery            Order Details        Frequency Duration Priority Order Class       None None Routine Clinic Performed           Start Date/Time        Start Date       02/22/17           Source Order Set / Preference List        Order Set        IP GEN EXPRESS DISCHARGE [[id number]]           Reprint Order Requisition        DISCHARGE INSTRUCTIONS (Order #[id number]) on 2/22/17         Encounter-Level Cardiology Documents:        There are no encounter-level cardiology documents.         Encounter        View Encounter         Order Provider Info            Office phone Pager E-mail       Ordering User NEYDA Emery [phone] -- --       Authorizing Provider NEYDA Emery [phone] -- --       Attending Provider Tito Neil [phone] -- --           Linked Charges        No charges linked to this procedure         Order Transmittal Tracking        DISCHARGE INSTRUCTIONS (Order #[id number]) on 2/22/17         Authorized by: Dhara Bruce  NEYDA (NPI: [id number])             Blood Gas, Arterial [LAB76] (Order [id number])   Lab   Date: 2/20/2017 Department: [address] Released By: Interface, Poct Results To Zoran (auto-released) Authorizing: Tito Neil DO          Blood Gas, Arterial   Order: [id number]   Status:  Final result   Visible to patient:  No (Not Released)      Ref Range & Units 2d ago     Site  Arterial: right brachial   Rickey's Test     Comments: Documented in Rapid Comm   pH, Arterial 7.350 - 7.450 pH units 7.382   pCO2, Arterial 35.0 - 45.0 mm Hg 60.3 (H)   pO2, Arterial 80.0 - 100.0 mm Hg 82.8   HCO3, Arterial 22.0 - 26.0 mmol/L 35.0 (H)   Base Excess, Arterial -2.0 - 2.0 mmol/L 7.7 (H)   O2 Saturation, Arterial 94.0 - 100.0 % 95.7   O2 Saturation Calculated 94.0 - 100.0 % 95.7   Barometric Pressure for Blood Gas mmHg    Comments: Component not reported at this site.   Modality  Cannula   Flow Rate lpm 2.50   Resulting Agency   [facility]   Narrative   Serial Number: [id number]    : [id number]      Specimen Collected: 02/20/17  3:19 PM Last Resulted: 02/20/17  3:21 PM                       Other Results from 2/17/2017    Basic Metabolic Panel  Final result 2/22/2017    CBC (No Diff)  Final result 2/22/2017    Iron Profile  Final result 2/22/2017    Ferritin  Final result 2/22/2017    Folate  Final result 2/22/2017    Vitamin B12  Final result 2/22/2017    Basic Metabolic Panel  Final result 2/21/2017    CBC Auto Differential  Final result 2/21/2017    Basic Metabolic Panel  Final result 2/20/2017    Basic Metabolic Panel  Final result 2/19/2017    Basic Metabolic Panel  Final result 2/18/2017    TSH  Final result 2/18/2017    Magnesium  Final result 2/18/2017    Urinalysis With / Culture If Indicated  Final result 2/17/2017    Urinalysis, Microscopic Only  Final result 2/17/2017    Urine Culture  Final result 2/17/2017    Influenza Antigen  Final result 2/17/2017    POC Troponin, Rapid  Final result 2/17/2017    Light  Blue Top  Final result 2/17/2017    Red Top  Final result      Warning: Additional results from 2/17/2017 are available but are not displayed in this report.         Blood Gas, Arterial [LAB76] (Order [id number])   Lab   Date: 2/20/2017 Department: [address] Released By: Interface, Poct Results To Zoran (auto-released) Authorizing: Tito Neil, DO   Results   Blood Gas, Arterial (Order [id number])   2/20/2017  3:21 PM   Component Results   Component Value Flag Ref Range Units Status   Site     Final   Arterial: right brachial   Rickey's Test     Final   Comment:   Documented in Rapid Comm   pH, Arterial 7.382  7.350 - 7.450 pH units Final   pCO2, Arterial 60.3 (H) 35.0 - 45.0 mm Hg Final   pO2, Arterial 82.8  80.0 - 100.0 mm Hg Final   HCO3, Arterial 35.0 (H) 22.0 - 26.0 mmol/L Final   Base Excess, Arterial 7.7 (H) -2.0 - 2.0 mmol/L Final   O2 Saturation, Arterial 95.7  94.0 - 100.0 % Final   O2 Saturation Calculated 95.7  94.0 - 100.0 % Final   Barometric Pressure for Blood Gas    mmHg Final   Comment:   Component not reported at this site.   Modality Cannula    Final   Flow Rate 2.50   lpm Final   Narrative   Serial Number: [id number]    : [id number]   Lab and Collection   Blood Gas, Arterial (Order #[id number]) on 2/20/2017 - Lab and Collection Information   Call Information      Department Center   2/17/2017 11:37 AM [facility]b [facility]   Testing Performed By   Lab - Abbreviation Name Director Address Valid Date Range   [id number] [facility] RESPIRATORY THERAPY Manpreet Moreno MD [[id number]] <br>[address] [facility] [address] 10/15/15 1527-Present   Additional Information   Specimen ID Draw Type Specimen Type Specimen Source Bill Type Client ID   [id number]  Arterial Blood      Specimen Date Taken Specimen Time Taken Specimen Received Date Specimen Received Time Result Date Result Time   Feb 20, 2017  3:19 PM Feb 20, 2017  3:21 PM Feb 20, 2017  3:21 PM   Order History    Inpatient   Date/Time Action Taken User Additional Information   02/20/17 1521 Result Lab, Background User Final   02/20/17 1521 Release Interface, Poct Results To Zoran (auto-released) From Order: [id number]   Order Details   Frequency Duration Priority Order Class   Once 1  occurrence Routine Lab Collect   Order Information   Order Date/Time Release Date/Time Start Date/Time End Date/Time   02/20/17 03:21 PM 02/20/17 03:21 PM 02/20/17 03:21 PM 2/20/2017   Other Results from 2/17/2017    Basic Metabolic Panel  Final result 2/22/2017    CBC (No Diff)  Final result 2/22/2017    Iron Profile  Final result 2/22/2017    Ferritin  Final result 2/22/2017    Folate  Final result 2/22/2017    Vitamin B12  Final result 2/22/2017    Basic Metabolic Panel  Final result 2/21/2017    CBC Auto Differential  Final result 2/21/2017    Basic Metabolic Panel  Final result 2/20/2017    Basic Metabolic Panel  Final result 2/19/2017    Basic Metabolic Panel  Final result 2/18/2017    TSH  Final result 2/18/2017    Magnesium  Final result 2/18/2017    Urinalysis With / Culture If Indicated  Final result 2/17/2017    Urinalysis, Microscopic Only  Final result 2/17/2017    Urine Culture  Final result 2/17/2017    Influenza Antigen  Final result 2/17/2017    POC Troponin, Rapid  Final result 2/17/2017    Light Blue Top  Final result 2/17/2017    Red Top  Final result 2/17/2017

## 2017-02-22 NOTE — DISCHARGE SUMMARY
[facility] Medicine Services  DISCHARGE SUMMARY       Date of Admission: 2/17/2017  Date of Discharge:  2/22/2017  Primary Care Physician: Jeremi Kan MD    Discharge Diagnoses:  Hospital Problem List     Acute on chronic systolic congestive heart failure        1. Acute systolic CHF with exacerbation   2. COPD - no exac; home 02 at 2.5LNC, now need BiPAP, CPAP not sufficient.   3. History of CKD III - now stage 4  4. Generalized Weakness  5. HTN  6. History of SVT and MAT  7. Hypothyroidism  8. 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 Constipation  10. Thrombocytopenia, normocytic anemia    Procedures Performed: None    Pertinent Test Results:   Lab Results (last 7 days)     Procedure Component Value Units Date/Time    Blood Gas, Arterial [[id number]]  (Abnormal) Collected:  02/17/17 1140    Specimen:  Arterial Blood Updated:  02/17/17 1142     Site Arterial: right brachial      Rickey's Test --       Documented in Rapid Comm        pH, Arterial 7.375 pH units      pCO2, Arterial 49.5 (H) mm Hg      pO2, Arterial 78.7 (L) mm Hg      HCO3, Arterial 28.3 (H) mmol/L      Base Excess, Arterial 2.2 (H) mmol/L      O2 Saturation, Arterial 95.3 %      O2 Saturation Calculated 95.3 %      Barometric Pressure for Blood Gas -- mmHg       Component not reported at this site.        Modality Cannula      Flow Rate 4.00 lpm     Narrative:       Serial Number: [id number]    : [id number]    POC Troponin, Rapid [[id number]]  (Normal) Collected:  02/17/17 1207    Specimen:  Blood Updated:  02/17/17 1221     Troponin I 0.00 ng/mL       Serial Number: [id number]    : [id number]       Comprehensive Metabolic Panel [[id number]]  (Abnormal) Collected:  02/17/17 1202    Specimen:  Blood Updated:  02/17/17 1221     Glucose 126 (H) mg/dL      BUN 19 mg/dL      Creatinine 1.15 mg/dL      Sodium 142 mmol/L      Potassium 4.3 mmol/L      Chloride 101 mmol/L      CO2 31.0 mmol/L      Calcium 9.2 mg/dL      Total  Protein 7.0 g/dL      Albumin 4.20 g/dL      ALT (SGPT) 30 U/L      AST (SGOT) 19 U/L      Alkaline Phosphatase 80 U/L      Total Bilirubin 0.7 mg/dL      eGFR Non African Amer 46 (L) mL/min/1.73      Globulin 2.8 gm/dL      A/G Ratio 1.5 g/dL      BUN/Creatinine Ratio 16.5      Anion Gap 10.0 mmol/L     CBC Auto Differential [[id number]]  (Abnormal) Collected:  02/17/17 1202    Specimen:  Blood Updated:  02/17/17 1225     WBC 5.70 10*3/mm3      RBC 4.66 10*6/mm3      Hemoglobin 11.9 (L) g/dL      Hematocrit 37.1 %      MCV 79.6 (L) fL      MCH 25.5 (L) pg      MCHC 32.1 (L) g/dL      RDW 15.3 (H) %      RDW-SD 43.9 fl      MPV 10.2 fL      Platelets 101 (L) 10*3/mm3      Neutrophil % 71.7 %      Lymphocyte % 21.9 %      Monocyte % 4.4 %      Eosinophil % 1.1 %      Basophil % 0.2 %      Immature Grans % 0.7 %      Neutrophils, Absolute 4.09 10*3/mm3      Lymphocytes, Absolute 1.25 10*3/mm3      Monocytes, Absolute 0.25 10*3/mm3      Eosinophils, Absolute 0.06 10*3/mm3      Basophils, Absolute 0.01 10*3/mm3      Immature Grans, Absolute 0.04 (H) 10*3/mm3      nRBC 0.0 /100 WBC     Influenza Antigen [[id number]]  (Normal) Collected:  02/17/17 1207    Specimen:  Swab from Nasopharynx Updated:  02/17/17 1232     Influenza A Ag, EIA Negative      Influenza B Ag, EIA Negative     BNP [[id number]]  (Abnormal) Collected:  02/17/17 1202    Specimen:  Blood Updated:  02/17/17 1234     proBNP 3300.0 (H) pg/mL     Troponin [[id number]]  (Normal) Collected:  02/17/17 1202    Specimen:  Blood Updated:  02/17/17 1234     Troponin I 0.020 ng/mL     Digoxin Level [[id number]]  (Abnormal) Collected:  02/17/17 1203    Specimen:  Blood Updated:  02/17/17 1256     Digoxin <0.40 (L) ng/mL     BNP [[id number]]  (Abnormal) Collected:  02/17/17 1203    Specimen:  Blood Updated:  02/17/17 1302     proBNP 3250.0 (H) pg/mL     Urinalysis With / Culture If Indicated [[id number]]  (Abnormal) Collected:  02/17/17 1426    Specimen:  Urine from Urine, Clean  Catch Updated:  02/17/17 1500     Color, UA Yellow      Appearance, UA Clear      pH, UA 6.0      Specific Gravity, UA 1.020      Glucose, UA Negative      Ketones, UA Negative      Bilirubin, UA Negative      Blood, UA Negative      Protein, UA 30 mg/dL (1+) (A)      Leuk Esterase, UA Moderate (2+) (A)      Nitrite, UA Negative      Urobilinogen, UA 1.0 E.U./dL     Urinalysis, Microscopic Only [[id number]]  (Abnormal) Collected:  02/17/17 1426    Specimen:  Urine from Urine, Clean Catch Updated:  02/17/17 1500     RBC, UA 3-5 (A) /HPF      WBC, UA 13-20 (A) /HPF      Bacteria, UA None Seen /HPF      Squamous Epithelial Cells, UA 7-12 (A) /HPF      Hyaline Casts, UA 3-6 /LPF      Methodology Automated Microscopy     Basic Metabolic Panel [[id number]]  (Abnormal) Collected:  02/18/17 1424    Specimen:  Blood Updated:  02/18/17 1449     Glucose 122 (H) mg/dL      BUN 27 (H) mg/dL      Creatinine 1.41 (H) mg/dL      Sodium 141 mmol/L      Potassium 3.9 mmol/L      Chloride 93 (L) mmol/L      CO2 36.0 (H) mmol/L      Calcium 9.3 mg/dL      eGFR Non African Amer 36 (L) mL/min/1.73      BUN/Creatinine Ratio 19.1      Anion Gap 12.0 mmol/L     Narrative:       The MDRD GFR formula is only valid for adults with stable renal function between ages 18 and 70.    Magnesium [[id number]]  (Normal) Collected:  02/18/17 1424    Specimen:  Blood Updated:  02/18/17 1449     Magnesium 1.8 mg/dL     TSH [[id number]]  (Normal) Collected:  02/18/17 1424    Specimen:  Blood Updated:  02/18/17 1519     TSH 1.380 mIU/mL     Basic Metabolic Panel [[id number]]  (Abnormal) Collected:  02/19/17 0347    Specimen:  Blood Updated:  02/19/17 0441     Glucose 108 (H) mg/dL      BUN 28 (H) mg/dL      Creatinine 1.76 (H) mg/dL      Sodium 143 mmol/L      Potassium 3.6 mmol/L      Chloride 90 (L) mmol/L      CO2 >40.0 (C) mmol/L      Calcium 9.1 mg/dL      eGFR Non African Amer 28 (L) mL/min/1.73      BUN/Creatinine Ratio 15.9      Anion Gap -- mmol/L        Unable to calculate Anion Gap.       Urine Culture [[id number]]  (Abnormal) Collected:  02/17/17 1426    Specimen:  Urine from Urine, Clean Catch Updated:  02/19/17 0746     Urine Culture        >100,000 CFU/mL Mixed Gram Positive Lenora (A)    Narrative:       Probable contaminant    Blood Gas, Arterial [[id number]]  (Abnormal) Collected:  02/20/17 1519    Specimen:  Arterial Blood Updated:  02/20/17 1521     Site Arterial: right brachial      Rickey's Test --       Documented in Rapid Comm        pH, Arterial 7.382 pH units      pCO2, Arterial 60.3 (H) mm Hg      pO2, Arterial 82.8 mm Hg      HCO3, Arterial 35.0 (H) mmol/L      Base Excess, Arterial 7.7 (H) mmol/L      O2 Saturation, Arterial 95.7 %      O2 Saturation Calculated 95.7 %      Barometric Pressure for Blood Gas -- mmHg       Component not reported at this site.        Modality Cannula      Flow Rate 2.50 lpm     CBC (No Diff) [[id number]]  (Abnormal) Collected:  02/22/17 0327    Specimen:  Blood Updated:  02/22/17 0427     WBC 5.57 10*3/mm3      RBC 4.22 10*6/mm3      Hemoglobin 10.7 (L) g/dL      Hematocrit 34.5 (L) %      MCV 81.8 (L) fL      MCH 25.4 (L) pg      MCHC 31.0 (L) g/dL      RDW 15.4 (H) %      RDW-SD 44.8 fl      MPV 10.0 fL      Platelets 108 (L) 10*3/mm3     Basic Metabolic Panel [[id number]]  (Abnormal) Collected:  02/22/17 0327    Specimen:  Blood Updated:  02/22/17 0453     Glucose 104 (H) mg/dL      BUN 39 (H) mg/dL      Creatinine 1.56 (H) mg/dL      Sodium 141 mmol/L      Potassium 4.9 mmol/L      Chloride 98 mmol/L      CO2 35.0 (H) mmol/L      Calcium 8.9 mg/dL      eGFR Non African Amer 32 (L) mL/min/1.73      BUN/Creatinine Ratio 25.0      Anion Gap 8.0 mmol/L     Narrative:       The MDRD GFR formula is only valid for adults with stable renal function between ages 18 and 70.    Iron Profile [[id number]]  (Abnormal) Collected:  02/22/17 0327    Specimen:  Blood Updated:  02/22/17 0504     Iron 41 (L) mcg/dL      TIBC 302 mcg/dL       Iron Saturation 14 (L) %     Ferritin [[id number]]  (Normal) Collected:  02/22/17 0327    Specimen:  Blood Updated:  02/22/17 0520     Ferritin 42.00 ng/mL     Folate [[id number]] Collected:  02/22/17 0327    Specimen:  Blood Updated:  02/22/17 0550     Folate 5.62 ng/mL     Vitamin B12 [[id number]]  (Normal) Collected:  02/22/17 0327    Specimen:  Blood Updated:  02/22/17 0550     Vitamin B-12 399 pg/mL         Imaging Results (last 7 days)     Procedure Component Value Units Date/Time    XR Chest 1 View [[id number]] Collected:  02/17/17 1318     Updated:  02/17/17 1322    Narrative:       EXAMINATION: XR CHEST 1 VW- 2/17/2017 1:18 PM CST     HISTORY: Shortness of breath.     REPORT: Comparison is made with the study from 09/03/2016.     Lungs are grossly hypoaerated, there is diffuse cardiomegaly which has  increased, as well as central vascular congestion and mild pulmonary  edema. No pneumothorax is seen. Small bilateral pleural effusions are  not excluded. The osseous structures show nothing acute. There is  moderate ectasia of the thoracic aorta.       Impression:       Gross hypoaeration of the lungs with cardiomegaly and  evidence of CHF. Probable small bilateral pleural effusions. No  pneumothorax is seen.  This report was finalized on 02/17/2017 13:19 by Dr. Onel Renae MD.    XR Chest PA & Lateral [[id number]] Collected:  02/18/17 0841     Updated:  02/18/17 0909    Narrative:       HISTORY: Follow-up CHF     CHEST 2 VIEWS, 0813 HOURS:     Comparison study dated the preceding day is reviewed. There is continued  cardiomegaly with some slight increase in small right-sided effusion.  Bibasilar atelectasis is felt present.     IMPRESSIONS:   Cardiomegaly with bibasilar atelectasis and slight increase in small  right-sided effusion.  This report was finalized on 02/18/2017 09:07 by Dr. Jordan Bearden MD.    US Venous Doppler Lower Extremity Bilateral (duplex) [[id number]] Collected:  02/18/17 1142     Updated:   02/18/17 1144    Narrative:       History: Swelling       Impression:       Impression: There is no evidence of deep venous thrombosis or  superficial thrombophlebitis of right or left lower extremities.     Comments: Bilateral lower extremity venous duplex exam was performed  using color Doppler flow, Doppler waveform analysis, and grayscale  imaging, with and without compression. There is no evidence of deep  venous thrombosis in the common femoral, superficial femoral, popliteal,  peroneal, anterior tibial, and posterior tibial veins bilaterally. No  thrombus is identified in the saphenofemoral junctions and greater  saphenous veins bilaterally.         This report was finalized on 02/18/2017 11:42 by Dr. Raymon Stern MD.        ECHO Interpretation Summary      · Left ventricular function is mildly decreased. Difficult to determine LVEF, given suboptimal images.  · No obvious valvular abnormalities, although cardiac valves not well visualized.  · Trace to small pericardial effusion without any obvious evidence of tamponade.           Consults: Rubens Hicks MD Cardiology    Chief Complaint on Day of Discharge: weakness    Hospital Course  Patient is a 81 y.o. female presented with complaints of shortness of breathing and leg swelling.  Patient has a past medical history significant for systolic congestive heart failure, last echocardiogram revealed ejection fraction of 45%, COPD on chronic 2-1/2 L nasal cannula oxygen, chronic kidney disease stage III-now for, hypertension, now anemia of iron deficiency.  She was admitted with acute exacerbation of CHF.  Cardiology was consulted.  Repeat echocardiogram was nondiagnostic, sick due to suboptimal images did reveals trace to small pericardial effusion without obvious tamponade.  Her medications have been manipulated and currently she is stable from cardiac standpoint for discharge.  COPD has been managed with breathing treatments and oxygen management.  BiPAP has  "been ordered and will continue at home per her DME provider.  She has received 300 units of Venofer for iron deficiency anemia.  She has improved significantly with physical therapy and this to will continue at home per aunt's home health.  I have asked for skilled nursing to continue with heart failure and COPD management.  Due to worsening kidney function diuretics have been discontinued and patient will need to be followed closely for recurrent edema, diet management will be crucial.  Patient is extremely stable at this time however I feel without close follow-up she will return within the next 30 days with same.    Condition on Discharge:  stable    Physical Exam on Discharge:  Visit Vitals   • /51 (BP Location: Right arm, Patient Position: Lying)   • Pulse 66   • Temp 96.9 °F (36.1 °C) (Temporal Artery )   • Resp 20   • Ht 64\" (162.6 cm)   • Wt 210 lb 8 oz (95.5 kg)   • SpO2 98%   • BMI 36.13 kg/m2     Physical Exam   Constitutional: She is oriented to person, place, and time. She appears well-developed and well-nourished. No distress.   HENT:   Head: Normocephalic and atraumatic.   Eyes: Conjunctivae and EOM are normal. Pupils are equal, round, and reactive to light. No scleral icterus.   Neck: Normal range of motion. Neck supple. No JVD present. No tracheal deviation present.   Cardiovascular: Normal rate, regular rhythm, normal heart sounds and intact distal pulses.  Exam reveals no gallop.    No murmur heard.  Pulmonary/Chest: Effort normal. No respiratory distress. She has wheezes (scattered throughout). She has no rales.   Abdominal: Soft. Bowel sounds are normal. She exhibits no distension. There is no tenderness. There is no guarding.   Musculoskeletal: Normal range of motion. She exhibits no edema.   Generalized weakness   Neurological: She is alert and oriented to person, place, and time.   No obvious deficits noted.   Skin: Skin is warm and dry. No rash noted. She is not diaphoretic. No " erythema. No pallor.   Psychiatric: She has a normal mood and affect. Her behavior is normal.   Vitals reviewed.      Discharge Disposition:  Home or Self Care    Discharge Medications:   Erin Cox   Home Medication Instructions RIC:[id number]    Printed on:02/22/17 7913   Medication Information                      aspirin 81 MG EC tablet  Take 81 mg by mouth Every Morning Before Breakfast. Taken with breakfast             atorvastatin (LIPITOR) 20 MG tablet  Take 20 mg by mouth Every Night.             budesonide-formoterol (SYMBICORT) 160-4.5 MCG/ACT inhaler  Inhale 2 puffs 2 (Two) Times a Day.             Calcium Carb-Cholecalciferol (OYSTER SHELL CALCIUM + D) 500-200 MG-UNIT tablet  Take 1 tablet by mouth Daily.             gabapentin (NEURONTIN) 300 MG capsule  Take 300 mg by mouth 3 (Three) Times a Day As Needed. Do not take antacids within 2 hours of taking this medicine.             HYDROcodone-acetaminophen (NORCO) 7.5-325 MG per tablet  Take 1 tablet by mouth 2 (Two) Times a Day As Needed for mild pain (1-3) or moderate pain (4-6) (for pain).             hydrOXYzine (ATARAX) 50 MG tablet  Take 50 mg by mouth Daily.             levothyroxine (SYNTHROID, LEVOTHROID) 150 MCG tablet  Take 150 mcg by mouth Daily.             lisinopril (PRINIVIL,ZESTRIL) 10 MG tablet  Take 10 mg by mouth 2 (Two) Times a Day.             metoprolol tartrate (LOPRESSOR) 25 MG tablet  Take 0.5 tablets by mouth Every 12 (Twelve) Hours. Use current home meds and break in half             nitroglycerin (NITROSTAT) 0.4 MG SL tablet  Place 0.4 mg under the tongue Every 5 (Five) Minutes As Needed for chest pain. Take no more than 3 doses in 15 minutes.             nystatin (MYCOSTATIN) 253718 UNIT/GM powder  Apply  topically Every 12 (Twelve) Hours. Apply under breasts until gone             pantoprazole (PROTONIX) 40 MG EC tablet  Take 40 mg by mouth Daily.             polyethylene glycol (MIRALAX) pack packet  Take 17 g by  mouth 2 (Two) Times a Day.             tiotropium (SPIRIVA) 18 MCG per inhalation capsule  Place 1 capsule into inhaler and inhale Daily.             traMADol (ULTRAM) 50 MG tablet  Take 50 mg by mouth Every 8 (Eight) Hours As Needed for moderate pain (4-6).                 Discharge Diet:   Diet Instructions     Diet: Cardiac, Renal; Thin Liquids, No Restrictions       Discharge Diet:   Cardiac  Renal      Fluid Consistency:  Thin Liquids, No Restrictions                 Discharge Care Plan / Instructions: [facility] to follow with skilled nursing, disease management - PT continued strengthening; [facility] will follow for O2 and BiPAP needs.     Activity at Discharge:   Activity Instructions     Activity as Tolerated       Physical Therapy: Pt conditionally independent with bed mobility, sit to stand and stand pivot transfers with RW to/from bedside commode, cga with rw x 25'                 Follow-up Appointments: Dr. Hicks cardiology 1 month     Dr Kan 1 week     [facility] - 2-3 weeks     Test Results Pending at Discharge: None     Plan discussed with Dr. Tito Seaman.   Dr. Neil:  Patient seen and examined. Feels like she is ready to go home. Leg pain resolved at discharge. Patient has been ambulatory. Dry skin. Plan discussed with NP. Close OP follow up.     Time spent in face-to-face evaluation, chart review, planning and education 40 minutes.    Dhara Bruce, APRN  02/22/17  11:55 AM

## 2017-02-22 NOTE — PROGRESS NOTES
LOS: 4 days   Patient Care Team:  Jeremi Kan MD as PCP - General (Internal Medicine)  Rubens Hicks MD as Cardiologist (Cardiology)    Chief Complaint: Shortness of breath  Subjective  Further improvement  Feeling  better  No chest pain   mild shortness of breath  No new complaints    Interval History: Improved overall    Patient Complaints:   Denies chest pain currently. Mild shortness of breath. Denies abdominal pain, nausea vomiting or diarrhoea.    Telemetry: no malignant arrhythmia. No significant pauses.    Review of Systems:    The following systems were reviewed and negative; ENT, gastrointestinal, integument, hematologic / lymphatic, neurological, behavioral/psych and allergies / immunologic    Labs:    WBC WBC   Date Value Ref Range Status   02/21/2017 6.74 4.80 - 10.80 10*3/mm3 Final      HGB HEMOGLOBIN   Date Value Ref Range Status   02/21/2017 10.3 (L) 12.0 - 16.0 g/dL Final      HCT HEMATOCRIT   Date Value Ref Range Status   02/21/2017 33.8 (L) 37.0 - 47.0 % Final      Platlets PLATELETS   Date Value Ref Range Status   02/21/2017 114 (L) 130 - 400 10*3/mm3 Final      MCV MCV   Date Value Ref Range Status   02/21/2017 82.0 82.0 - 98.0 fL Final        Results from last 7 days  Lab Units 02/21/17  0441 02/20/17  0317 02/19/17  0347  02/17/17  1202   SODIUM mmol/L 141 142 143  < > 142   POTASSIUM mmol/L 4.3 3.7 3.6  < > 4.3   CHLORIDE mmol/L 95* 93* 90*  < > 101   TOTAL CO2 mmol/L 37.0* >40.0* >40.0*  < > 31.0   BUN mg/dL 41* 32* 28*  < > 19   CREATININE mg/dL 1.82* 1.89* 1.76*  < > 1.15   CALCIUM mg/dL 8.5 8.9 9.1  < > 9.2   BILIRUBIN mg/dL  --   --   --   --  0.7   ALK PHOS U/L  --   --   --   --  80   ALT (SGPT) U/L  --   --   --   --  30   AST (SGOT) U/L  --   --   --   --  19   GLUCOSE mg/dL 106* 104* 108*  < > 126*   < > = values in this interval not displayed.  Lab Results   Component Value Date    CKMB 0.34 05/02/2015    TROPONINI 0.020 02/17/2017     PT/INR:  No results found for:  PROTIME/No results found for: INR    Imaging Results (last 72 hours)     Procedure Component Value Units Date/Time    XR Chest PA & Lateral [[id number]] Collected:  02/18/17 0841     Updated:  02/18/17 0909    Narrative:       HISTORY: Follow-up CHF     CHEST 2 VIEWS, 0813 HOURS:     Comparison study dated the preceding day is reviewed. There is continued  cardiomegaly with some slight increase in small right-sided effusion.  Bibasilar atelectasis is felt present.     IMPRESSIONS:   Cardiomegaly with bibasilar atelectasis and slight increase in small  right-sided effusion.  This report was finalized on 02/18/2017 09:07 by Dr. Jordan Bearden MD.    US Venous Doppler Lower Extremity Bilateral (duplex) [[id number]] Collected:  02/18/17 1142     Updated:  02/18/17 1144    Narrative:       History: Swelling       Impression:       Impression: There is no evidence of deep venous thrombosis or  superficial thrombophlebitis of right or left lower extremities.     Comments: Bilateral lower extremity venous duplex exam was performed  using color Doppler flow, Doppler waveform analysis, and grayscale  imaging, with and without compression. There is no evidence of deep  venous thrombosis in the common femoral, superficial femoral, popliteal,  peroneal, anterior tibial, and posterior tibial veins bilaterally. No  thrombus is identified in the saphenofemoral junctions and greater  saphenous veins bilaterally.         This report was finalized on 02/18/2017 11:42 by Dr. Raymon Stern MD.          Objective     Medication Review:   Current Facility-Administered Medications   Medication Dose Route Frequency Provider Last Rate Last Dose   • acetaminophen (TYLENOL) tablet 650 mg  650 mg Oral Q4H PRN Salo Bruce MD       • aspirin EC tablet 81 mg  81 mg Oral QAM AC Salo Bruce MD   81 mg at 02/21/17 1007   • atorvastatin (LIPITOR) tablet 20 mg  20 mg Oral Nightly Salo Bruce MD   20 mg at 02/21/17 2040   •  budesonide-formoterol (SYMBICORT) 160-4.5 MCG/ACT inhaler 2 puff  2 puff Inhalation BID - RT Salo Bruce MD   2 puff at 02/21/17 2008   • gabapentin (NEURONTIN) capsule 300 mg  300 mg Oral TID PRN Salo Bruce MD   300 mg at 02/20/17 0824   • HYDROcodone-acetaminophen (NORCO) 7.5-325 MG per tablet 1 tablet  1 tablet Oral BID PRN Salo Bruce MD   1 tablet at 02/21/17 1500   • ipratropium-albuterol (DUO-NEB) nebulizer solution 3 mL  3 mL Nebulization 4x Daily - RT Salo Bruce MD   3 mL at 02/21/17 2008   • labetalol (NORMODYNE,TRANDATE) injection 10 mg  10 mg Intravenous Q4H PRN Salo Bruce MD       • levothyroxine (SYNTHROID, LEVOTHROID) tablet 150 mcg  150 mcg Oral Q AM Salo Bruce MD   150 mcg at 02/21/17 0603   • lisinopril (PRINIVIL,ZESTRIL) tablet 10 mg  10 mg Oral Q12H Salo Bruce MD   10 mg at 02/21/17 2040   • metoprolol tartrate (LOPRESSOR) tablet 12.5 mg  12.5 mg Oral Q12H Salo Bruce MD   12.5 mg at 02/21/17 0934   • nitroglycerin (NITROSTAT) SL tablet 0.4 mg  0.4 mg Sublingual Q5 Min PRN Salo Bruce MD       • nystatin (MYCOSTATIN) powder   Topical Q12H NEYDA Chi       • ondansetron (ZOFRAN) injection 4 mg  4 mg Intravenous Q6H PRN Salo Bruce MD       • pantoprazole (PROTONIX) EC tablet 40 mg  40 mg Oral Daily Salo Bruce MD   40 mg at 02/21/17 0933   • polyethylene glycol (MIRALAX) packet 17 g  17 g Oral BID NEYDA Chi   17 g at 02/21/17 1744   • potassium chloride (MICRO-K) CR capsule 20 mEq  20 mEq Oral Daily NEYDA Chi   20 mEq at 02/21/17 0933   • sodium chloride 0.9 % flush 1-10 mL  1-10 mL Intravenous PRN Salo Bruce MD       • sodium chloride 0.9 % flush 10 mL  10 mL Intravenous PRN Melida Walker MD       • traMADol (ULTRAM) tablet 50 mg  50 mg Oral Q8H PRN Salo Bruce MD   50 mg at 02/21/17 2043       Vital Sign  "Min/Max for last 24 hours  Temp  Min: 96.3 °F (35.7 °C)  Max: 97.9 °F (36.6 °C)   BP  Min: 102/73  Max: 142/48   Pulse  Min: 53  Max: 59   Resp  Min: 18  Max: 20   SpO2  Min: 93 %  Max: 97 %   Flow (L/min)  Min: 2  Max: 2.5   Weight  Min: 210 lb 8 oz (95.5 kg)  Max: 210 lb 8 oz (95.5 kg)     Flowsheet Rows         First Filed Value    Admission Height  66\" (167.6 cm) Documented at 02/17/2017 1147    Admission Weight  220 lb (99.8 kg) Documented at 02/17/2017 1147          Physical Exam:  Ears ears appear intact with no abnormalities noted  Nose nares normal, septum midline, mucosa normal and no drainage  Neck suppple, trachea midline, no thyromegaly, no carotid bruit and no JVD  Back no kyphosis present, no scoliosis present, no skin lesions, erythema, or scars, no tenderness to percussion or palpation and range of motion normal  Lungs respirations regular, respirations even and respirations unlabored  Heart normal S1, S2,  2/6 pansystolic murmur in the left sternal border,   no rub and no click  Abdomen normal bowel sounds, no masses, no hepatomegaly, no splenomegaly, no guarding and no rebound tenderness  Skin no bleeding, bruising or rash     Results Review:   I reviewed the patient's new clinical results.  I reviewed the patient's new imaging results and agree with the interpretation.  I reviewed the patient's other test results and agree with the interpretation  I personally viewed and interpreted the patient's EKG/Telemetry data  Discussed with patient    Medication Review: Performed    Assessment/Plan     Active Problems:    Acute on chronic systolic congestive heart failure  COPD  History of CKD III  Generalized Weakness  HTN  History of SVT and MAT  Hypothyroidism  Constipation  Mild LV dysfunction    Plan  Same treatment  OK to discharge  See me in 1 month after discharge  Will monitor Telemetry for now  Supportive care  Optimal medical therapy  Deep vein thrombosis prophylaxis/precautions  Counseled " regarding disease appropriate diet, fluid, sodium, caffeine, stimulants intake   Stressed compliance to diet and medications   Avoid NSAIDS  Monitor kidney functions  Improved    Rubens Hicks MD  02/21/17  11:06 PM

## 2017-02-22 NOTE — PLAN OF CARE
Problem: Patient Care Overview (Adult)  Goal: Plan of Care Review  Outcome: Ongoing (interventions implemented as appropriate)    02/21/17 4414   Coping/Psychosocial Response Interventions   Plan Of Care Reviewed With patient   Patient Care Overview   Progress progress toward functional goals as expected   Outcome Evaluation   Outcome Summary/Follow up Plan Pt planning on going home tomorrow.        Goal: Adult Individualization and Mutuality  Outcome: Ongoing (interventions implemented as appropriate)  Goal: Discharge Needs Assessment  Outcome: Ongoing (interventions implemented as appropriate)    Problem: Cardiac: Heart Failure (Adult)  Goal: Signs and Symptoms of Listed Potential Problems Will be Absent or Manageable (Cardiac: Heart Failure)  Outcome: Ongoing (interventions implemented as appropriate)    Problem: Fall Risk (Adult)  Goal: Identify Related Risk Factors and Signs and Symptoms  Outcome: Ongoing (interventions implemented as appropriate)  Goal: Absence of Falls  Outcome: Ongoing (interventions implemented as appropriate)

## 2017-02-22 NOTE — PROGRESS NOTES
Continued Stay Note   [facility]     Patient Name: Erin Cox  MRN: [id number]  Today's Date: 2/22/2017    Admit Date: 2/17/2017          Discharge Plan       02/22/17 1158    Case Management/Social Work Plan    Plan (P)  [facility] was unable to provide a BIPAP for home use. PT has chosen [facility] for DME. Sw sent the DME referral. Will follow.     Patient/Family In Agreement With Plan (P)  yes              Discharge Codes     None        Expected Discharge Date and Time     Expected Discharge Date Expected Discharge Time    Feb 22, 2017             Jennie Roman

## 2017-02-22 NOTE — PLAN OF CARE
Problem: Patient Care Overview (Adult)  Goal: Plan of Care Review  Outcome: Ongoing (interventions implemented as appropriate)    02/22/17 0351   Coping/Psychosocial Response Interventions   Plan Of Care Reviewed With patient   Patient Care Overview   Progress progress toward functional goals as expected   Outcome Evaluation   Outcome Summary/Follow up Plan VSS, c/o pain, relief with medication, possible DC today.         Problem: Cardiac: Heart Failure (Adult)  Goal: Signs and Symptoms of Listed Potential Problems Will be Absent or Manageable (Cardiac: Heart Failure)  Outcome: Ongoing (interventions implemented as appropriate)    02/22/17 0351   Cardiac: Heart Failure   Problems Assessed (Heart Failure) all   Problems Present (Heart Failure) cardiac pump dysfunction;fluid/electrolyte imbalance         Problem: Fall Risk (Adult)  Goal: Absence of Falls  Outcome: Ongoing (interventions implemented as appropriate)    02/22/17 0351   Fall Risk (Adult)   Absence of Falls achieves outcome

## 2017-02-23 NOTE — PLAN OF CARE
Problem: Inpatient Physical Therapy  Goal: Bed Mobility Goal LTG- PT  Outcome: Outcome(s) achieved Date Met:  02/23/17 02/23/17 0753   Bed Mobility PT LTG   Bed Mobility PT LTG, Date Goal Reviewed 02/23/17   Bed Mobility PT LTG, Outcome goal met       Goal: Transfer Training Goal 1 LTG- PT  Outcome: Outcome(s) achieved Date Met:  02/23/17 02/23/17 0753   Transfer Training PT LTG   Transfer Training PT LTG, Date Goal Reviewed 02/23/17   Transfer Training PT LTG, Outcome goal met       Goal: Gait Training Goal LTG- PT  Outcome: Unable to achieve outcome(s) by discharge Date Met:  02/23/17 02/23/17 0753   Gait Training PT LTG   Gait Training Goal PT LTG, Date Goal Reviewed 02/23/17   Gait Training Goal PT LTG, Outcome goal not met   Gait Training Goal PT LTG, Reason Goal Not Met discharged from facility;unable to make needed progress       Goal: Strength Goal LTG- PT  Outcome: Unable to achieve outcome(s) by discharge Date Met:  02/23/17 02/23/17 0753   Strength Goal PT LTG   Strength Goal PT LTG, Date Goal Reviewed 02/23/17   Strength Goal PT LTG, Outcome goal not met   Strength Goal PT LTG, Reason Goal Not Met unable to make needed progress;discharged from facility

## 2017-02-23 NOTE — THERAPY DISCHARGE NOTE
Acute Care - Physical Therapy Discharge Summary  [facility]       Patient Name: Erin Cox  : 1935  MRN: [id number]    Today's Date: 2017  Onset of Illness/Injury or Date of Surgery Date: 17    Date of Referral to PT: 17  Referring Physician: Dr. Bruce       Admit Date: 2017      PT Recommendation and Plan    Visit Dx:    ICD-10-CM ICD-9-CM   1. Acute on chronic systolic congestive heart failure I50.23 428.23     428.0   2. Hypoxia R09.02 799.02   3. Dyspnea on exertion R06.09 786.09   4. Essential hypertension I10 401.9   5. Impaired functional mobility, balance, gait, and endurance Z74.09 V49.89   6. Pulmonary emphysema, unspecified emphysema type J43.9 492.8             Outcome Measures       17 0947 17 1400 17 0843    How much help from another person do you currently need...    Turning from your back to your side while in flat bed without using bedrails? 3  -LG 3  -TC 3  -LG    Moving from lying on back to sitting on the side of a flat bed without bedrails? 3  -LG 3  -TC 3  -LG    Moving to and from a bed to a chair (including a wheelchair)? 3  -LG 3  -TC 3  -LG    Standing up from a chair using your arms (e.g., wheelchair, bedside chair)? 3  -LG 3  -TC 3  -LG    Climbing 3-5 steps with a railing? 2  -LG 2  -TC 2  -LG    To walk in hospital room? 3  -LG 3  -TC 3  -LG    AM-PAC 6 Clicks Score 17  -LG 17  -TC 17  -LG    Functional Assessment    Outcome Measure Options AM-PAC 6 Clicks Basic Mobility (PT)  -LG AM-PAC 6 Clicks Basic Mobility (PT)  -TC AM-PAC 6 Clicks Basic Mobility (PT)  -LG      User Key  (r) = Recorded By, (t) = Taken By, (c) = Cosigned By    Initials Name Provider Type    ALDAIR Steel PTA Physical Therapy Assistant    TC Tammie Alcazar, PT Physical Therapist                      IP PT Goals       17 0753 17 1115       Bed Mobility PT LTG    Bed Mobility PT LTG, Date Established  17  -PB (r) MC (t) PB (c)     Bed  Mobility PT LTG, Time to Achieve  by discharge  -PB (r) MC (t) PB (c)     Bed Mobility PT LTG, Activity Type  all bed mobility  -PB (r) MC (t) PB (c)     Bed Mobility PT LTG, [place] Level  conditional independence   HOB elevated PRN  -PB (r) MC (t) PB (c)     Bed Mobility PT Goal  LTG, Assist Device  bed rails  -PB (r) MC (t) PB (c)     Bed Mobility PT LTG, Date Goal Reviewed 02/23/17  -      Bed Mobility PT LTG, Outcome goal met  -THERESE      Transfer Training PT LTG    Transfer Training PT LTG, Date Established  02/18/17  -PB (r) MC (t) PB (c)     Transfer Training PT LTG, Time to Achieve  by discharge  -PB (r) MC (t) PB (c)     Transfer Training PT LTG, Activity Type  bed to chair /chair to bed;sit to stand/stand to sit  -PB (r) MC (t) PB (c)     Transfer Training PT LTG, [place] Level  conditional independence  -PB (r) MC (t) PB (c)     Transfer Training PT LTG, Assist Device  walker, rolling  -PB (r) MC (t) PB (c)     Transfer Training PT  LTG, Date Goal Reviewed 02/23/17  -      Transfer Training PT LTG, Outcome goal met  -THERESE      Gait Training PT LTG    Gait Training Goal PT LTG, Date Established  02/18/17  -PB (r) MC (t) PB (c)     Gait Training Goal PT LTG, Time to Achieve  by discharge  -PB (r) MC (t) PB (c)     Gait Training Goal PT LTG, [place] Level  conditional independence   assist to manage O2  -PB (r) MC (t) PB (c)     Gait Training Goal PT LTG, Assist Device  walker, rolling  -PB (r) MC (t) PB (c)     Gait Training Goal PT LTG, Distance to Achieve  150ft with standing rests PRN   -PB (r) MC (t) PB (c)     Gait Training Goal PT LTG, Date Goal Reviewed 02/23/17  -      Gait Training Goal PT LTG, Outcome goal not met  -THERESE      Gait Training Goal PT LTG, Reason Goal Not Met discharged from facility;unable to make needed progress  -      Strength Goal PT LTG    Strength Goal PT LTG, Date Established  02/18/17  -PB (r) MC (t) PB (c)     Strength Goal PT LTG, Time to Achieve  by  discharge  -PB (r) MC (t) PB (c)     Strength Goal PT LTG, Functional Goal  BLE AROM x20 in sitting and supine  -PB (r) MC (t) PB (c)     Strength Goal PT LTG, Date Goal Reviewed 02/23/17  -      Strength Goal PT LTG, Outcome goal not met  -THERESE      Strength Goal PT LTG, Reason Goal Not Met unable to make needed progress;discharged from facility  -        User Key  (r) = Recorded By, (t) = Taken By, (c) = Cosigned By    Initials Name Provider Type    THERESE Trejo, PTA Physical Therapy Assistant    POLLY Nixon, PT DPT Physical Therapist    SHAZIA Black, PT Student PT Student              PT Discharge Summary  Anticipated Discharge Disposition: home  Reason for Discharge: Discharge from facility  Outcomes Achieved: Patient able to partially acheive established goals  Discharge Destination: Home      Claudette Trejo PTA   2/23/2017

## 2018-02-15 ENCOUNTER — APPOINTMENT (OUTPATIENT)
Dept: GENERAL RADIOLOGY | Facility: HOSPITAL | Age: 83
End: 2018-02-15

## 2018-02-15 ENCOUNTER — HOSPITAL ENCOUNTER (INPATIENT)
Facility: HOSPITAL | Age: 83
LOS: 6 days | Discharge: HOME HEALTH | End: 2018-02-21
Attending: INTERNAL MEDICINE | Admitting: INTERNAL MEDICINE

## 2018-02-15 ENCOUNTER — HOSPITAL ENCOUNTER (INPATIENT)
Dept: HOSPITAL 58 - ED | Age: 83
Discharge: NURSING, REHAB OR LONG-TERM CARE | DRG: 189 | End: 2018-02-15
Attending: INTERNAL MEDICINE | Admitting: INTERNAL MEDICINE

## 2018-02-15 VITALS — BODY MASS INDEX: 36 KG/M2

## 2018-02-15 VITALS — DIASTOLIC BLOOD PRESSURE: 102 MMHG | SYSTOLIC BLOOD PRESSURE: 198 MMHG

## 2018-02-15 VITALS — TEMPERATURE: 97.9 F

## 2018-02-15 DIAGNOSIS — I10: ICD-10-CM

## 2018-02-15 DIAGNOSIS — E66.9: ICD-10-CM

## 2018-02-15 DIAGNOSIS — Z79.899: ICD-10-CM

## 2018-02-15 DIAGNOSIS — R60.0: ICD-10-CM

## 2018-02-15 DIAGNOSIS — J96.00: Primary | ICD-10-CM

## 2018-02-15 DIAGNOSIS — R30.0: ICD-10-CM

## 2018-02-15 DIAGNOSIS — I48.0: ICD-10-CM

## 2018-02-15 DIAGNOSIS — R06.02: ICD-10-CM

## 2018-02-15 DIAGNOSIS — R00.2: ICD-10-CM

## 2018-02-15 DIAGNOSIS — Z74.09 IMPAIRED FUNCTIONAL MOBILITY, BALANCE, GAIT, AND ENDURANCE: ICD-10-CM

## 2018-02-15 DIAGNOSIS — A41.9: ICD-10-CM

## 2018-02-15 DIAGNOSIS — J18.9: ICD-10-CM
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
## 2018-02-15 DIAGNOSIS — N18.9: ICD-10-CM

## 2018-02-15 DIAGNOSIS — J20.9: ICD-10-CM

## 2018-02-15 DIAGNOSIS — I50.9: ICD-10-CM

## 2018-02-15 DIAGNOSIS — J44.0: ICD-10-CM

## 2018-02-15 DIAGNOSIS — R00.0: ICD-10-CM

## 2018-02-15 DIAGNOSIS — Z99.81: ICD-10-CM

## 2018-02-15 DIAGNOSIS — D63.8: ICD-10-CM

## 2018-02-15 PROBLEM — J96.01 ACUTE RESPIRATORY FAILURE WITH HYPOXIA (HCC): Status: ACTIVE | Noted: 2018-02-15

## 2018-02-15 PROBLEM — R06.03 RESPIRATORY DISTRESS: Status: ACTIVE | Noted: 2018-02-15

## 2018-02-15 PROBLEM — J96.90 RESPIRATORY FAILURE (HCC): Status: ACTIVE | Noted: 2018-02-15

## 2018-02-15 LAB
ALBUMIN SERPL-MCNC: 3.8 G/DL (ref 3.5–5)
ALBUMIN/GLOB SERPL: 1.2 G/DL (ref 1.1–2.5)
ALP SERPL-CCNC: 64 U/L (ref 24–120)
ALT SERPL W P-5'-P-CCNC: 15 U/L (ref 0–54)
ANION GAP SERPL CALCULATED.3IONS-SCNC: 18 MMOL/L (ref 4–13)
APTT PPP: 36 SECONDS (ref 24.1–34.8)
ARTERIAL PATENCY WRIST A: POSITIVE
AST SERPL-CCNC: 30 U/L (ref 7–45)
ATMOSPHERIC PRESS: 746 MMHG
BACTERIA UR QL AUTO: ABNORMAL /HPF
BASE EXCESS BLDA CALC-SCNC: -1.3 MMOL/L (ref 0–2)
BDY SITE: ABNORMAL
BILIRUB SERPL-MCNC: 1.5 MG/DL (ref 0.1–1)
BILIRUB UR QL STRIP: NEGATIVE
BODY TEMPERATURE: 37 C
BUN BLD-MCNC: 42 MG/DL (ref 5–21)
BUN/CREAT SERPL: 23.9 (ref 7–25)
CALCIUM SPEC-SCNC: 8.9 MG/DL (ref 8.4–10.4)
CHLORIDE SERPL-SCNC: 96 MMOL/L (ref 98–110)
CLARITY UR: CLEAR
CO2 SERPL-SCNC: 23 MMOL/L (ref 24–31)
COLOR UR: YELLOW
CREAT BLD-MCNC: 1.76 MG/DL (ref 0.5–1.4)
D-LACTATE SERPL-SCNC: 1.9 MMOL/L (ref 0.5–2)
D-LACTATE SERPL-SCNC: 2.6 MMOL/L (ref 0.5–2)
DEPRECATED RDW RBC AUTO: 43.7 FL (ref 40–54)
ELLIPTOCYTES BLD QL SMEAR: ABNORMAL
ERYTHROCYTE [DISTWIDTH] IN BLOOD BY AUTOMATED COUNT: 16.3 % (ref 12–15)
FLUAV AG NPH QL: NEGATIVE
FLUBV AG NPH QL IA: NEGATIVE
GFR SERPL CREATININE-BSD FRML MDRD: 28 ML/MIN/1.73
GLOBULIN UR ELPH-MCNC: 3.2 GM/DL
GLUCOSE BLD-MCNC: 218 MG/DL (ref 70–100)
GLUCOSE UR STRIP-MCNC: NEGATIVE MG/DL
HCO3 BLDA-SCNC: 22.8 MMOL/L (ref 20–26)
HCT VFR BLD AUTO: 33 % (ref 37–47)
HGB BLD-MCNC: 11.1 G/DL (ref 12–16)
HGB UR QL STRIP.AUTO: ABNORMAL
HOLD SPECIMEN: NORMAL
HOROWITZ INDEX BLD+IHG-RTO: 45 %
HYALINE CASTS UR QL AUTO: ABNORMAL /LPF
HYPOCHROMIA BLD QL: ABNORMAL
INR PPP: 1.48 (ref 0.91–1.09)
KETONES UR QL STRIP: NEGATIVE
L PNEUMO1 AG UR QL IA: NEGATIVE
LEUKOCYTE ESTERASE UR QL STRIP.AUTO: ABNORMAL
LYMPHOCYTES # BLD MANUAL: 3.64 10*3/MM3 (ref 0.72–4.86)
LYMPHOCYTES NFR BLD MANUAL: 27 % (ref 15–45)
Lab: ABNORMAL
MCH RBC QN AUTO: 25.1 PG (ref 28–32)
MCHC RBC AUTO-ENTMCNC: 33.6 G/DL (ref 33–36)
MCV RBC AUTO: 74.5 FL (ref 82–98)
MODALITY: ABNORMAL
NEUTROPHILS # BLD AUTO: 9.85 10*3/MM3 (ref 1.87–8.4)
NEUTROPHILS NFR BLD MANUAL: 57 % (ref 39–78)
NEUTS BAND NFR BLD MANUAL: 16 % (ref 0–10)
NITRITE UR QL STRIP: NEGATIVE
NRBC BLD MANUAL-RTO: 0 /100 WBC (ref 0–0)
PCO2 BLDA: 35.4 MM HG (ref 35–45)
PH BLDA: 7.42 PH UNITS (ref 7.35–7.45)
PH UR STRIP.AUTO: <=5 [PH] (ref 5–8)
PLAT MORPH BLD: NORMAL
PLATELET # BLD AUTO: 128 10*3/MM3 (ref 130–400)
PMV BLD AUTO: 10.1 FL (ref 6–12)
PO2 BLDA: 71.7 MM HG (ref 83–108)
POTASSIUM BLD-SCNC: 4.3 MMOL/L (ref 3.5–5.3)
POTASSIUM BLD-SCNC: 4.5 MMOL/L (ref 3.5–5.3)
PROT SERPL-MCNC: 7 G/DL (ref 6.3–8.7)
PROT UR QL STRIP: NEGATIVE
PROTHROMBIN TIME: 18.4 SECONDS (ref 11.9–14.6)
RBC # BLD AUTO: 4.43 10*6/MM3 (ref 4.2–5.4)
RBC # UR: ABNORMAL /HPF
REF LAB TEST METHOD: ABNORMAL
S PNEUM AG SPEC QL LA: NEGATIVE
SAO2 % BLDCOA: 94.9 % (ref 94–99)
SODIUM BLD-SCNC: 137 MMOL/L (ref 135–145)
SP GR UR STRIP: 1.01 (ref 1–1.03)
SQUAMOUS #/AREA URNS HPF: ABNORMAL /HPF
TROPONIN I SERPL-MCNC: 0.12 NG/ML (ref 0–0.03)
UROBILINOGEN UR QL STRIP: ABNORMAL
VARIANT LYMPHS NFR BLD MANUAL: 0 % (ref 0–5)
VENTILATOR MODE: ABNORMAL
WBC MORPH BLD: NORMAL
WBC NRBC COR # BLD: 13.49 10*3/MM3 (ref 4.8–10.8)
WBC UR QL AUTO: ABNORMAL /HPF

## 2018-02-15 PROCEDURE — 84132 ASSAY OF SERUM POTASSIUM: CPT | Performed by: INTERNAL MEDICINE

## 2018-02-15 PROCEDURE — 93010 ELECTROCARDIOGRAM REPORT: CPT | Performed by: INTERNAL MEDICINE

## 2018-02-15 PROCEDURE — 82553 CREATINE MB FRACTION: CPT

## 2018-02-15 PROCEDURE — 85730 THROMBOPLASTIN TIME PARTIAL: CPT | Performed by: INTERNAL MEDICINE

## 2018-02-15 PROCEDURE — 84484 ASSAY OF TROPONIN QUANT: CPT

## 2018-02-15 PROCEDURE — 85610 PROTHROMBIN TIME: CPT | Performed by: INTERNAL MEDICINE

## 2018-02-15 PROCEDURE — 93005 ELECTROCARDIOGRAM TRACING: CPT

## 2018-02-15 PROCEDURE — 81001 URINALYSIS AUTO W/SCOPE: CPT

## 2018-02-15 PROCEDURE — 87899 AGENT NOS ASSAY W/OPTIC: CPT | Performed by: INTERNAL MEDICINE

## 2018-02-15 PROCEDURE — 80053 COMPREHEN METABOLIC PANEL: CPT

## 2018-02-15 PROCEDURE — 83605 ASSAY OF LACTIC ACID: CPT | Performed by: INTERNAL MEDICINE

## 2018-02-15 PROCEDURE — 71045 X-RAY EXAM CHEST 1 VIEW: CPT

## 2018-02-15 PROCEDURE — 87086 URINE CULTURE/COLONY COUNT: CPT | Performed by: INTERNAL MEDICINE

## 2018-02-15 PROCEDURE — 82550 ASSAY OF CK (CPK): CPT

## 2018-02-15 PROCEDURE — 85025 COMPLETE CBC W/AUTO DIFF WBC: CPT | Performed by: INTERNAL MEDICINE

## 2018-02-15 PROCEDURE — 87804 INFLUENZA ASSAY W/OPTIC: CPT | Performed by: INTERNAL MEDICINE

## 2018-02-15 PROCEDURE — 25010000002 METHYLPREDNISOLONE PER 40 MG: Performed by: INTERNAL MEDICINE

## 2018-02-15 PROCEDURE — 84145 PROCALCITONIN (PCT): CPT

## 2018-02-15 PROCEDURE — 25010000002 CEFTRIAXONE PER 250 MG: Performed by: INTERNAL MEDICINE

## 2018-02-15 PROCEDURE — 93005 ELECTROCARDIOGRAM TRACING: CPT | Performed by: INTERNAL MEDICINE

## 2018-02-15 PROCEDURE — 96375 TX/PRO/DX INJ NEW DRUG ADDON: CPT

## 2018-02-15 PROCEDURE — 94660 CPAP INITIATION&MGMT: CPT

## 2018-02-15 PROCEDURE — 36600 WITHDRAWAL OF ARTERIAL BLOOD: CPT

## 2018-02-15 PROCEDURE — 25010000002 LEVOFLOXACIN PER 250 MG: Performed by: INTERNAL MEDICINE

## 2018-02-15 PROCEDURE — 82803 BLOOD GASES ANY COMBINATION: CPT

## 2018-02-15 PROCEDURE — 85007 BL SMEAR W/DIFF WBC COUNT: CPT | Performed by: INTERNAL MEDICINE

## 2018-02-15 PROCEDURE — 83605 ASSAY OF LACTIC ACID: CPT

## 2018-02-15 PROCEDURE — 87040 BLOOD CULTURE FOR BACTERIA: CPT | Performed by: INTERNAL MEDICINE

## 2018-02-15 PROCEDURE — 96372 THER/PROPH/DIAG INJ SC/IM: CPT

## 2018-02-15 PROCEDURE — 25010000002 ENOXAPARIN PER 10 MG: Performed by: INTERNAL MEDICINE

## 2018-02-15 PROCEDURE — 99284 EMERGENCY DEPT VISIT MOD MDM: CPT

## 2018-02-15 PROCEDURE — 96365 THER/PROPH/DIAG IV INF INIT: CPT

## 2018-02-15 PROCEDURE — 99285 EMERGENCY DEPT VISIT HI MDM: CPT

## 2018-02-15 PROCEDURE — 83880 ASSAY OF NATRIURETIC PEPTIDE: CPT

## 2018-02-15 PROCEDURE — 93010 ELECTROCARDIOGRAM REPORT: CPT

## 2018-02-15 PROCEDURE — 87040 BLOOD CULTURE FOR BACTERIA: CPT

## 2018-02-15 PROCEDURE — 84484 ASSAY OF TROPONIN QUANT: CPT | Performed by: INTERNAL MEDICINE

## 2018-02-15 PROCEDURE — 80053 COMPREHEN METABOLIC PANEL: CPT | Performed by: INTERNAL MEDICINE

## 2018-02-15 PROCEDURE — 94760 N-INVAS EAR/PLS OXIMETRY 1: CPT

## 2018-02-15 PROCEDURE — 36415 COLL VENOUS BLD VENIPUNCTURE: CPT

## 2018-02-15 PROCEDURE — 87070 CULTURE OTHR SPECIMN AEROBIC: CPT

## 2018-02-15 PROCEDURE — 99236 HOSP IP/OBS SAME DATE HI 85: CPT

## 2018-02-15 PROCEDURE — 87086 URINE CULTURE/COLONY COUNT: CPT

## 2018-02-15 PROCEDURE — 25010000002 FUROSEMIDE PER 20 MG

## 2018-02-15 PROCEDURE — 85025 COMPLETE CBC W/AUTO DIFF WBC: CPT

## 2018-02-15 PROCEDURE — 94640 AIRWAY INHALATION TREATMENT: CPT

## 2018-02-15 PROCEDURE — 87186 SC STD MICRODIL/AGAR DIL: CPT

## 2018-02-15 PROCEDURE — 94799 UNLISTED PULMONARY SVC/PX: CPT

## 2018-02-15 PROCEDURE — 25010000002 FUROSEMIDE PER 20 MG: Performed by: INTERNAL MEDICINE

## 2018-02-15 PROCEDURE — 81001 URINALYSIS AUTO W/SCOPE: CPT | Performed by: INTERNAL MEDICINE

## 2018-02-15 PROCEDURE — 87150 DNA/RNA AMPLIFIED PROBE: CPT | Performed by: INTERNAL MEDICINE

## 2018-02-15 RX ORDER — METOPROLOL TARTRATE 5 MG/5ML
5 INJECTION INTRAVENOUS EVERY 6 HOURS
Status: DISCONTINUED | OUTPATIENT
Start: 2018-02-15 | End: 2018-02-16

## 2018-02-15 RX ORDER — LEVOFLOXACIN 5 MG/ML
750 INJECTION, SOLUTION INTRAVENOUS EVERY 24 HOURS
Status: DISCONTINUED | OUTPATIENT
Start: 2018-02-16 | End: 2018-02-16

## 2018-02-15 RX ORDER — FAMOTIDINE 10 MG/ML
20 INJECTION, SOLUTION INTRAVENOUS EVERY 12 HOURS SCHEDULED
Status: DISCONTINUED | OUTPATIENT
Start: 2018-02-15 | End: 2018-02-16

## 2018-02-15 RX ORDER — ALBUTEROL SULFATE 90 UG/1
2 AEROSOL, METERED RESPIRATORY (INHALATION) EVERY 6 HOURS PRN
COMMUNITY
End: 2018-04-06 | Stop reason: HOSPADM

## 2018-02-15 RX ORDER — CARVEDILOL 6.25 MG/1
6.25 TABLET ORAL 2 TIMES DAILY WITH MEALS
COMMUNITY

## 2018-02-15 RX ORDER — SODIUM CHLORIDE 0.9 % (FLUSH) 0.9 %
1-10 SYRINGE (ML) INJECTION AS NEEDED
Status: DISCONTINUED | OUTPATIENT
Start: 2018-02-15 | End: 2018-02-21 | Stop reason: HOSPADM

## 2018-02-15 RX ORDER — BISACODYL 10 MG
10 SUPPOSITORY, RECTAL RECTAL DAILY PRN
Status: DISCONTINUED | OUTPATIENT
Start: 2018-02-15 | End: 2018-02-21 | Stop reason: HOSPADM

## 2018-02-15 RX ORDER — METHYLPREDNISOLONE SODIUM SUCCINATE 40 MG/ML
40 INJECTION, POWDER, LYOPHILIZED, FOR SOLUTION INTRAMUSCULAR; INTRAVENOUS EVERY 8 HOURS
Status: DISCONTINUED | OUTPATIENT
Start: 2018-02-15 | End: 2018-02-16

## 2018-02-15 RX ORDER — FUROSEMIDE 10 MG/ML
INJECTION INTRAMUSCULAR; INTRAVENOUS
Status: COMPLETED
Start: 2018-02-15 | End: 2018-02-15

## 2018-02-15 RX ORDER — FUROSEMIDE 10 MG/ML
40 INJECTION INTRAMUSCULAR; INTRAVENOUS EVERY 12 HOURS
Status: DISCONTINUED | OUTPATIENT
Start: 2018-02-15 | End: 2018-02-16

## 2018-02-15 RX ORDER — FUROSEMIDE 40 MG/1
40 TABLET ORAL DAILY
Status: ON HOLD | COMMUNITY
End: 2018-02-21

## 2018-02-15 RX ORDER — LOSARTAN POTASSIUM 50 MG/1
100 TABLET ORAL DAILY
COMMUNITY
End: 2018-02-21 | Stop reason: HOSPADM

## 2018-02-15 RX ORDER — AMLODIPINE BESYLATE 5 MG/1
5 TABLET ORAL DAILY
COMMUNITY
End: 2018-02-21 | Stop reason: HOSPADM

## 2018-02-15 RX ORDER — TRAMADOL HYDROCHLORIDE 50 MG/1
50 TABLET ORAL EVERY 8 HOURS PRN
COMMUNITY

## 2018-02-15 RX ORDER — ONDANSETRON 2 MG/ML
4 INJECTION INTRAMUSCULAR; INTRAVENOUS EVERY 6 HOURS PRN
Status: DISCONTINUED | OUTPATIENT
Start: 2018-02-15 | End: 2018-02-21 | Stop reason: HOSPADM

## 2018-02-15 RX ORDER — NYSTATIN 100000 [USP'U]/G
POWDER TOPICAL EVERY 12 HOURS SCHEDULED
Status: DISCONTINUED | OUTPATIENT
Start: 2018-02-15 | End: 2018-02-16

## 2018-02-15 RX ORDER — LEVOFLOXACIN 5 MG/ML
750 INJECTION, SOLUTION INTRAVENOUS EVERY 24 HOURS
Status: DISCONTINUED | OUTPATIENT
Start: 2018-02-15 | End: 2018-02-15 | Stop reason: SDUPTHER

## 2018-02-15 RX ORDER — LEVALBUTEROL 1.25 MG/.5ML
1.25 SOLUTION, CONCENTRATE RESPIRATORY (INHALATION)
Status: DISCONTINUED | OUTPATIENT
Start: 2018-02-15 | End: 2018-02-21 | Stop reason: HOSPADM

## 2018-02-15 RX ORDER — HYDRALAZINE HYDROCHLORIDE 20 MG/ML
10 INJECTION INTRAMUSCULAR; INTRAVENOUS EVERY 4 HOURS PRN
Status: DISCONTINUED | OUTPATIENT
Start: 2018-02-15 | End: 2018-02-21 | Stop reason: HOSPADM

## 2018-02-15 RX ORDER — GABAPENTIN 300 MG/1
300 CAPSULE ORAL ONCE
Status: COMPLETED | OUTPATIENT
Start: 2018-02-15 | End: 2018-02-15

## 2018-02-15 RX ORDER — POTASSIUM CHLORIDE 750 MG/1
20 CAPSULE, EXTENDED RELEASE ORAL 2 TIMES DAILY
COMMUNITY
End: 2018-02-21 | Stop reason: HOSPADM

## 2018-02-15 RX ORDER — SUCRALFATE 1 G/1
1 TABLET ORAL 4 TIMES DAILY
COMMUNITY
End: 2018-04-06 | Stop reason: HOSPADM

## 2018-02-15 RX ORDER — LEVOFLOXACIN 5 MG/ML
750 INJECTION, SOLUTION INTRAVENOUS ONCE
Status: COMPLETED | OUTPATIENT
Start: 2018-02-15 | End: 2018-02-15

## 2018-02-15 RX ORDER — DEXTROSE AND SODIUM CHLORIDE 5; .45 G/100ML; G/100ML
20 INJECTION, SOLUTION INTRAVENOUS CONTINUOUS
Status: DISCONTINUED | OUTPATIENT
Start: 2018-02-15 | End: 2018-02-16

## 2018-02-15 RX ADMIN — ENOXAPARIN SODIUM 40 MG: 40 INJECTION SUBCUTANEOUS at 18:43

## 2018-02-15 RX ADMIN — NYSTATIN 1 APPLICATION: 100000 POWDER TOPICAL at 21:09

## 2018-02-15 RX ADMIN — CEFTRIAXONE SODIUM 1 G: 1 INJECTION, POWDER, FOR SOLUTION INTRAMUSCULAR; INTRAVENOUS at 18:49

## 2018-02-15 RX ADMIN — GABAPENTIN 300 MG: 300 CAPSULE ORAL at 23:29

## 2018-02-15 RX ADMIN — METHYLPREDNISOLONE SODIUM SUCCINATE 40 MG: 40 INJECTION, POWDER, FOR SOLUTION INTRAMUSCULAR; INTRAVENOUS at 18:49

## 2018-02-15 RX ADMIN — IPRATROPIUM BROMIDE 0.5 MG: 0.5 SOLUTION RESPIRATORY (INHALATION) at 21:41

## 2018-02-15 RX ADMIN — METOPROLOL TARTRATE 5 MG: 1 INJECTION, SOLUTION INTRAVENOUS at 18:43

## 2018-02-15 RX ADMIN — FUROSEMIDE 40 MG: 10 INJECTION, SOLUTION INTRAMUSCULAR; INTRAVENOUS at 18:44

## 2018-02-15 RX ADMIN — FUROSEMIDE 60 MG: 10 INJECTION, SOLUTION INTRAMUSCULAR; INTRAVENOUS at 17:31

## 2018-02-15 RX ADMIN — DIGOXIN STA MCG: 0.25 INJECTION INTRAMUSCULAR; INTRAVENOUS at 15:05

## 2018-02-15 RX ADMIN — LEVALBUTEROL HYDROCHLORIDE 1.25 MG: 1.25 SOLUTION, CONCENTRATE RESPIRATORY (INHALATION) at 21:41

## 2018-02-15 RX ADMIN — LEVOFLOXACIN 750 MG: 5 INJECTION, SOLUTION INTRAVENOUS at 18:49

## 2018-02-15 RX ADMIN — DEXTROSE AND SODIUM CHLORIDE 20 ML/HR: 5; 450 INJECTION, SOLUTION INTRAVENOUS at 18:43

## 2018-02-15 RX ADMIN — DIGOXIN STA MCG: 0.25 INJECTION INTRAMUSCULAR; INTRAVENOUS at 15:07

## 2018-02-15 RX ADMIN — FAMOTIDINE 20 MG: 10 INJECTION, SOLUTION INTRAVENOUS at 21:09

## 2018-02-15 NOTE — DI
EXAM:  Single view of the chest. 

  

History:  Severe shortness of breath 

  

Comparison:  Chest radiograph 05/22/2017 

  

Findings:  Heart is enlarged with left ventricular configuration.  Right basilar consolidation and sm
all right pleural .  Interstitial edema.  No pneumothorax.  No acute osseous abnormalities.  Atherosc
lerotic vascular calcifications. Degenerative changes of the bilateral glenohumeral joints 

  

Impression: 

1.  Right lower lobe pneumonia and small right pleural effusion. 

2.  Cardiomegaly with interstitial edema

## 2018-02-15 NOTE — ED.PDOC
General


ED Provider: 


Dr. LAQUITA PATEL





Chief Complaint: Shortness of Air


Stated Complaint: Patient presents with shortness of breath. Her daughter 

advised that she apparently fell to the floor was most likely lying on the 

floor all night. Her Granddaughter found her this morning. She was reportedly 

cold, skin and lips were pale and she was short of breath. Normally wears 

oxygen at home.


Time Seen by Physician: 11:10


Mode of Arrival: Walk-In


Information Source: Patient


Exam Limitations: No limitations


Primary Care Provider: 


MINDY MCELROY





Referred to ED by: PCP (Dr Mcelroy)


Nursing and Triage Documentation Reviewed and Agree: Yes


Reviewed sepsis parameters & appropriate labs ordered?: Yes


System Inflammatory Response Syndrome: Temp 96.8F or Lower, Pulse >90 BPM, Resp 

>20/Minute


Sepsis Protocol: 


For patient's 13 years and over:





Temp is 96.8 and below  and greater


Pulse >90 BPM


Resp >20/minute


Acutely Altered Mental Status





Are patient's symptoms suggestive of a new infection, such as:


   -Pneumonia


   -Skin, Soft Tissue


   -Endocarditis


   -UTI


   -Bone, Joint Infection


   -Implantable Device


   -Acute Abdominal Infection


   -Wound Infection


   -Meningitis


   -Blood Stream Catheter Infection


   -Unknown





System Inflammatory Response Syndrome: Not Applicable





Respiratory Complaint Exam





- Shortness of Air Complaint/Exam


Symptoms Are: Still present


Timing: Constant


Initial Severity: Moderate


Current Severity: Moderate


Character: Reports: Dyspnea at rest, Dyspnea on exertion


Aggravating: Reports: Movement, Recumbent position


Alleviating: Reports: Bronchodilators, Oxygen, Upright position


Associated Signs and Symptoms: Reports: Cough, Wheezing, Chills, Nasal 

congestion


History of Healthcare-Acquired Pneumonia: No


Pseudomonas Risk Factors: Reports: None


Tuberculosis Risk Factors: Reports: None


Home Oxygen Use: Yes





Review of Systems





- Review Of Systems


Constitutional: Reports: Chills, Weakness


Eyes: Reports: No symptoms


Ears, Nose, Mouth, Throat: Reports: No symptoms


Respiratory: Reports: Cough, Short of air, Wheezing


Cardiac: Reports: Edema, Irregular heart rate, Palpitations


GI: Reports: No symptoms


: Reports: No symptoms, Dysuria


Musculoskeletal: Reports: No symptoms


Endocrine: Reports: No symptoms


Hematologic/Lymphatic: Reports: No symptoms


All Other Systems: Reviewed and Negative





Past Medical History





- Past Medical History


Previously Healthy: No


Endocrine: Reports: Unknown


Cardiovascular: Reports: Hypertension, CHF, A-Fib, Unknown


Respiratory: Reports: COPD


Hematological: Reports: None


Gastrointestinal: Reports: None


Genitourinary: Reports: None


Neuro/Psych: Reports: None


Musculoskeletal: Reports: None


Cancer: Reports: None


Last Menstrual Period: N/A





- Surgical History


General Surgical History: Reports: Unknown





- Family History


Family History: Reports: Unknown





- Social History


Smoking Status: Former smoker


Hx Substance Use: No


Alcohol Screening: None





- Immunizations


Tetanus Shot up to Date: Yes


Pneumococcal Vaccine up to Date: No





Physical Exam





- Physical Exam


Appearance: Ill-appearing, Obese


Ill-appearing: Severe


Pain Distress: Moderate


Eyes: ANITA, EOMI, Conjunctiva clear


ENT: Ears normal, Nose normal, Oropharynx normal


Neck: Nonsupple


Respiratory: Breath sounds diminished, Wheezes


Cardiovascular: Irregular rhythm, Tachycardia


GI/: Soft, Nontender, No masses


Skin: Warm


Neurological: Sensation intact, Motor intact, Alert, Oriented


Psychiatric: Anxious





Interpretation





- Radiology Interpretation


Radiology Interpretation By: Radiologist


Radiology Results: Positive


Xray Comments: RML pneumonia and CHF changes





Critical Care Note





- Critical Care Note


Total Time (mins): 90





Course





- Course


Hematology/Chemistry: 


 02/15/18 11:45





 02/15/18 11:45


Orders, Labs, Meds: 


Lab Review











  02/15/18 02/15/18 02/15/18





  11:34 11:45 11:45


 


WBC   14.88 H 


 


RBC   4.50 


 


Hgb   11.4 L 


 


Hct   34.8 L 


 


MCV   77.3 L 


 


MCH   25.3 L 


 


MCHC   32.8 


 


RDW Coeff of Sandra   16.4 H 


 


Plt Count   118 L 


 


Immature Gran % (Auto)   1.1 


 


Neut % (Auto)   74.3 


 


Lymph % (Auto)   15.8 


 


Mono % (Auto)   8.7 


 


Eos % (Auto)   0.0 


 


Baso % (Auto)   0.1 


 


Immature Gran # (Auto)   0.2 


 


Neut #   11.0 H 


 


Lymph #   2.4 


 


Mono #   1.3 


 


Eos #   0.0 


 


Baso #   0.0 


 


Puncture Site  L rad  


 


O2 Saturation  90.0 L  


 


ABG pH  7.44  


 


ABG pCO2  30.0 L  


 


ABG pO2  56.0 L*  


 


ABG HCO3  21 L  


 


ABG Total CO2  21 L  


 


ABG Base Excess  -4 L  


 


Chon Test  +  


 


O2 Delivery Device  Nc  


 


Oxygen Liter Flow  3.00  


 


FiO2 %  32.0  


 


Sodium    135 L


 


Potassium    4.6


 


Chloride    99


 


Carbon Dioxide    24


 


Anion Gap    16.6


 


BUN    42 H


 


Creatinine    1.98 H


 


Estimated GFR (MDRD)    24.00


 


BUN/Creatinine Ratio    21.21


 


Glucose    188 H


 


Lactic Acid   


 


Calcium    9.5


 


Total Bilirubin    1.8 H


 


AST    17


 


ALT    9 L


 


Alkaline Phosphatase    70


 


Total Creatine Kinase   


 


CK-MB (CK-2)   


 


CK-MB (CK-2) %   


 


Troponin I   


 


B-Natriuretic Peptide   


 


Total Protein    7.5


 


Albumin    3.5


 


Globulin    4.0


 


Albumin/Globulin Ratio    0.88


 


Procalcitonin   


 


Urine Color   


 


Urine Clarity   


 


Urine pH   


 


Ur Specific Gravity   


 


Urine Protein   


 


Urine Glucose (UA)   


 


Urine Ketones   


 


Urine Blood   


 


Urine Nitrite   


 


Urine Bilirubin   


 


Urine Urobilinogen   


 


Ur Leukocyte Esterase   


 


Urine Microscopic RBC   


 


Urine Microscopic WBC   


 


Ur Squamous Epith Cells   


 


Urine Bacteria   














  02/15/18 02/15/18 02/15/18





  11:45 11:45 11:48


 


WBC   


 


RBC   


 


Hgb   


 


Hct   


 


MCV   


 


MCH   


 


MCHC   


 


RDW Coeff of Sandra   


 


Plt Count   


 


Immature Gran % (Auto)   


 


Neut % (Auto)   


 


Lymph % (Auto)   


 


Mono % (Auto)   


 


Eos % (Auto)   


 


Baso % (Auto)   


 


Immature Gran # (Auto)   


 


Neut #   


 


Lymph #   


 


Mono #   


 


Eos #   


 


Baso #   


 


Puncture Site   


 


O2 Saturation   


 


ABG pH   


 


ABG pCO2   


 


ABG pO2   


 


ABG HCO3   


 


ABG Total CO2   


 


ABG Base Excess   


 


Chon Test   


 


O2 Delivery Device   


 


Oxygen Liter Flow   


 


FiO2 %   


 


Sodium   


 


Potassium   


 


Chloride   


 


Carbon Dioxide   


 


Anion Gap   


 


BUN   


 


Creatinine   


 


Estimated GFR (MDRD)   


 


BUN/Creatinine Ratio   


 


Glucose   


 


Lactic Acid   


 


Calcium   


 


Total Bilirubin   


 


AST   


 


ALT   


 


Alkaline Phosphatase   


 


Total Creatine Kinase   


 


CK-MB (CK-2)   


 


CK-MB (CK-2) %   


 


Troponin I   


 


B-Natriuretic Peptide   1290 H 


 


Total Protein   


 


Albumin   


 


Globulin   


 


Albumin/Globulin Ratio   


 


Procalcitonin  11.25  


 


Urine Color    Yellow


 


Urine Clarity    Slightly


 


Urine pH    5.5


 


Ur Specific Gravity    1.020


 


Urine Protein    2+


 


Urine Glucose (UA)    Negative


 


Urine Ketones    Negative


 


Urine Blood    1+


 


Urine Nitrite    Negative


 


Urine Bilirubin    1+


 


Urine Urobilinogen    0.2


 


Ur Leukocyte Esterase    1+


 


Urine Microscopic RBC    0-2


 


Urine Microscopic WBC    Tntc


 


Ur Squamous Epith Cells    2-5


 


Urine Bacteria    4+














  02/15/18 02/15/18





  11:49 11:49


 


WBC  


 


RBC  


 


Hgb  


 


Hct  


 


MCV  


 


MCH  


 


MCHC  


 


RDW Coeff of Sandra  


 


Plt Count  


 


Immature Gran % (Auto)  


 


Neut % (Auto)  


 


Lymph % (Auto)  


 


Mono % (Auto)  


 


Eos % (Auto)  


 


Baso % (Auto)  


 


Immature Gran # (Auto)  


 


Neut #  


 


Lymph #  


 


Mono #  


 


Eos #  


 


Baso #  


 


Puncture Site  


 


O2 Saturation  


 


ABG pH  


 


ABG pCO2  


 


ABG pO2  


 


ABG HCO3  


 


ABG Total CO2  


 


ABG Base Excess  


 


Chon Test  


 


O2 Delivery Device  


 


Oxygen Liter Flow  


 


FiO2 %  


 


Sodium  


 


Potassium  


 


Chloride  


 


Carbon Dioxide  


 


Anion Gap  


 


BUN  


 


Creatinine  


 


Estimated GFR (MDRD)  


 


BUN/Creatinine Ratio  


 


Glucose  


 


Lactic Acid  30.3 H 


 


Calcium  


 


Total Bilirubin  


 


AST  


 


ALT  


 


Alkaline Phosphatase  


 


Total Creatine Kinase   125


 


CK-MB (CK-2)   2.3


 


CK-MB (CK-2) %   1.53841


 


Troponin I   0.1490


 


B-Natriuretic Peptide  


 


Total Protein  


 


Albumin  


 


Globulin  


 


Albumin/Globulin Ratio  


 


Procalcitonin  


 


Urine Color  


 


Urine Clarity  


 


Urine pH  


 


Ur Specific Gravity  


 


Urine Protein  


 


Urine Glucose (UA)  


 


Urine Ketones  


 


Urine Blood  


 


Urine Nitrite  


 


Urine Bilirubin  


 


Urine Urobilinogen  


 


Ur Leukocyte Esterase  


 


Urine Microscopic RBC  


 


Urine Microscopic WBC  


 


Ur Squamous Epith Cells  


 


Urine Bacteria  








Orders











 Category Date Time Status


 


 ABG DRAW REQUEST Stat CARDIO  02/15/18 11:34 Completed


 


 ABG DRAW REQUEST Stat CARDIO  02/15/18 11:36 Completed


 


 ABG DRAW REQUEST Stat CARDIO  02/15/18 11:38 Completed


 


 EKG-(ED ONLY) Stat CARDIO  02/15/18 11:47 Completed


 


 NEBULIZER TREATMENT Stat CARDIO  02/15/18 11:51 Completed


 


 OXYGEN Routine CARDIO  02/15/18 13:42 Completed


 


 SPUTUM INDUCTION Stat CARDIO  02/15/18 13:42 Completed


 


 ACTIVITY .Up ad Heidi CARE  02/15/18 13:42 Active


 


 INTAKE & OUTPUT Q8HR CARE  02/15/18 13:42 Active


 


 2 GRAM SODIUM DIET DIETARY  02/15/18 Lunch Completed


 


 ABG Stat LAB  02/15/18 11:34 Completed


 


 BLOOD CULTURE (ED ONLY) Stat LAB  02/15/18 11:45 Completed


 


 BNP [B-TYPE NATRIURETIC PEPTIDE] Stat LAB  02/15/18 11:45 Completed


 


 CBC W/ AUTO DIFF Stat LAB  02/15/18 11:45 Completed


 


 CMP [COMPREHENSIVE METABOLIC PANEL] Stat LAB  02/15/18 11:45 Completed


 


 CREATINE KINASE Stat LAB  02/15/18 11:49 Completed


 


 LACTIC ACID Stat LAB  02/15/18 11:49 Completed


 


 POS BC ID AND REGAN Stat LAB  02/16/18 11:45 Completed


 


 POS BC ID AND REGAN Stat LAB  02/16/18 11:45 Completed


 


 PROCALCITONIN Stat LAB  02/15/18 11:45 Completed


 


 SPUTUM CULTURE Stat LAB  02/15/18 15:25 Completed


 


 TROPONIN I Stat LAB  02/15/18 11:49 Completed


 


 UA [URINALYSIS C & S IF INDICATED] Stat LAB  02/15/18 11:48 Completed


 


 URINE CULTURE Stat LAB  02/15/18 11:15 Completed


 


 Budesonide [Pulmicort 0.5 mg/2 ml] MEDS  02/15/18 11:50 Discontinued





 1 vial NEB ONCE STA   


 


 Ceftriaxone Sodium [Rocephin] MEDS  02/15/18 12:26 Discontinued





 1 gm .ROUTE .STK-MED ONE   


 


 Ceftriaxone Sodium [Rocephin] 1 gm MEDS  02/16/18 09:00 Discontinued





 0.9 % Sodium Chloride [Sodium Chloride] 100 ml   





 IV DAILY   


 


 Ceftriaxone Sodium [Rocephin] 1 gm MEDS  02/15/18 12:18 Discontinued





 0.9 % Sodium Chloride [Sodium Chloride] 50 ml   





 IV ONCE   


 


 Furosemide [Lasix] MEDS  02/15/18 12:30 Discontinued





 40 mg IVP ONCE STA   


 


 Ipratropium Bromide 0.02% Neb [Atrovent 0.02% Neb] MEDS  02/15/18 11:50 

Discontinued





 1 vial NEB ONCE STA   


 


 Levalbuterol HCl [Xopenex 1.25 mg] MEDS  02/15/18 11:50 Discontinued





 1 vial NEB ONCE STA   


 


 Methylprednisolone Sod Succ/Pf [Solu-Medrol 125 mg] MEDS  02/15/18 12:21 

Discontinued





 125 mg IVP ONCE STA   


 


 Sodium Chloride 0.9% [Sodium Chloride] 1,000 ml MEDS  02/15/18 14:00 

Discontinued





 IV 40 mls/hr   


 


 CHEST, 1V AP ONLY Stat RADS  02/15/18 11:43 Completed








Medications














Discontinued Medications














Generic Name Dose Route Start Last Admin





  Trade Name Freq  PRN Reason Stop Dose Admin


 


Azithromycin  500 mg  02/15/18 14:30  02/15/18 15:16





  Zithromax  PO  02/17/18 09:01  500 mg





  DAILY HITESH   Administration


 


Budesonide  1 vial  02/15/18 11:50  02/15/18 12:14





  Pulmicort 0.5 Mg/2 Ml  NEB  02/15/18 11:51  1 vial





  ONCE STA   Administration


 


Budesonide  1 vial  02/15/18 18:00  





  Pulmicort 0.5 Mg/2 Ml  NEB   





  RTBID HITESH   


 


Digoxin  250 mcg  02/15/18 14:58  02/15/18 15:07





  Lanoxin  IVP  02/15/18 14:59  250 mcg





  ONCE STA   Administration


 


Diltiazem HCl  5 mg  02/15/18 15:01  02/15/18 15:10





  Cardizem Inj  IVP  02/15/18 15:02  5 mg





  ONCE STA   Administration


 


Enoxaparin Sodium  90 mg  02/15/18 14:00  02/15/18 14:10





  Lovenox  SUBCUT  02/15/18 14:01  90 mg





  ONCE STA   Administration


 


Enoxaparin Sodium  40 mg  02/16/18 09:00  





  Lovenox  SUBCUT   





  DAILY HITESH   


 


Furosemide  40 mg  02/15/18 12:30  02/15/18 13:55





  Lasix  IVP  02/15/18 12:31  40 mg





  ONCE STA   Administration


 


Furosemide  40 mg  02/15/18 15:24  02/15/18 15:25





  Lasix  IVP  02/15/18 15:25  40 mg





  ONCE STA   Administration


 


Hydrocortisone Sodium Succinate  125 mg  02/15/18 18:00  





  Solu-Cortef 250 Mg  IVP   





  Q6HR HITESH   


 


Ceftriaxone Sodium 1 gm/  50 mls @ 75 mls/hr  02/15/18 12:18  02/15/18 12:39





  Sodium Chloride  IV  02/15/18 12:57  75 mls/hr





  ONCE STA   Administration


 


Ceftriaxone Sodium 1 gm/  100 mls @ 200 mls/hr  02/16/18 09:00  





  Sodium Chloride  IV   





  DAILY HITESH   


 


Sodium Chloride  1,000 mls @ 40 mls/hr  02/15/18 14:00  02/15/18 15:14





  Sodium Chloride  IV   40 mls/hr





  .Q25H HITESH   Administration


 


Ceftriaxone Sodium 1 gm/  50 mls @ 75 mls/hr  02/16/18 09:00  





  Sodium Chloride  IV   





  DAILY HITESH   


 


Ipratropium Bromide  1 vial  02/15/18 11:50  02/15/18 12:13





  Atrovent 0.02% Neb  NEB  02/15/18 11:51  1 vial





  ONCE STA   Administration


 


Levalbuterol HCl  1 vial  02/15/18 11:50  02/15/18 12:06





  Xopenex 1.25 Mg  NEB  02/15/18 11:51  1 vial





  ONCE STA   Administration


 


Levalbuterol HCl  1 vial  02/15/18 18:00  





  Xopenex 1.25 Mg  NEB   





  RTQ6H HITESH   


 


Methylprednisolone Sodium Succinate  125 mg  02/15/18 12:21  02/15/18 12:38





  Solu-Medrol 125 Mg  IVP  02/15/18 12:22  125 mg





  ONCE STA   Administration


 


Morphine Sulfate  2 mg  02/15/18 15:19  02/15/18 15:19





  Morphine 2 Mg/Ml Syringe  IVP  02/15/18 15:20  2 mg





  ONCE STA   Administration











Vital Signs: 


 











  Temp Pulse Resp BP Pulse Ox


 


 02/15/18 10:59  97.5 F L  88  38 H  198/102 H  88 L














Departure





- Departure


Time of Disposition: 14:45


Disposition: DISCH/TSF TO A [facility]


Discharge Problem: 


 Acute respiratory failure, Sepsis, CHF (congestive heart failure), Hypertension

, Atrial fibrillation





Condition: Critical


Pt referred to PMD for follow-up: Yes (admitted patient to Dr Mcelroy)


IPMP verified?: Yes


Allergies/Adverse Reactions: 


Allergies





No Known Allergies Allergy (Verified 02/15/18 11:04)


 








Home Medications: 


Ambulatory Orders





Albuterol Sulfate [Proair Hfa] 2 puff IH 3-4XD PRN 10/14/14 


Budesonide/Formoterol Fumarate [Symbicort 160-4.5 Mcg Inhaler] 2 puff IH BID 10/

14/14 


Gabapentin [Gabapentin] 300 mg PO TID 10/14/14 


Hydrocodone/Acetaminophen [Hydrocodon-Acetaminoph 7.5-325] 1 tab PO BID PRN 10/

14/14 


Potassium Chloride [K-Dur] 20 meq PO DAILY 10/14/14 


Furosemide [Lasix Tab] 40 mg PO QDAC #30 tablet 10/20/14 


Tiotropium Bromide [Spiriva] 1 cap IH DAILY 04/30/15 


Atorvastatin Calcium 20 mg PO DAILY 05/19/17 


Tramadol HCl 50 mg PO TID PRN 05/19/17 


Hydroxyzine Pamoate 50 mg PO DAILY 05/20/17 


Amlodipine Besylate [Norvasc] 5 mg PO DAILY #30 tablet 05/24/17 


Losartan Potassium [Cozaar] 100 mg PO DAILY #30 tablet 05/24/17 


Carvedilol [Coreg] 6.25 mg PO BIDWM 02/15/18 


Sucralfate [Carafate] 1 gm PO ACHS 02/15/18 








Disposition Discussed With: Patient, Family, Other (Dr mcelroy/agreed to 

admission to ICU)

## 2018-02-15 NOTE — DS
DATE OF SERVICE:  02/15/18



FINAL DIAGNOSIS: 

1. Acute respiratory failure 

2. CHF 

3. Acute bronchitis with pneumonitis

4. Atrial fibrillation with moderate ventricular response.

5. Possibility of urosepsis   

6. Severe chronic lung disease 

7. Chronic kidney disease 

8. Anemia of chronic disorder 



DISCHARGE INSTRUCTIONS:

The patient will be transferred to [facility]. 



MEDICATIONS AT THE TIME OF TRANSFER:

Rocephin 1 gram given 

Zithromax 500mg IV given 

Solu-Cortef 125mg IV given 

Lanoxin 0.25mg IV given 

Cardizem 5mg IV given 

Xopenex and Pulmicort treatments given 



MEDICATION GIVEN SO FAR:

IV Lasix 40mg 

Solu-Cortef 125mg IV 

Xopenex NEBS 

Pulmicort NEBS was given 

Oxygen 3 liters 

Cardizem 5mg IV given 

Lanoxin 0.25mg given 



HOSPITAL COURSE:

82 year old white female who is known to have severe chronic lung disease and 
CHF was brought to the emergency room because she was found on the floor at 
home. The patient's problems there were diagnosed in the emergency room; Acute 
respiratory failure with pO2 56, pCo2 30 with pH 7.44 and 90% saturation on 3 
liters. The patient was given NEBS treatment and IV Solu-Cortef with IV Lasix. 
Her condition improved to some extent. The patient has settled down. The 
patient has history of CHF. Her chest x-ray showed CHF and also possibility of 
pneumonitis. The patient has already been started on Rocephin and Zithromax. On 
physical exam she has decreased breath sounds bilaterally with mild wheeze with 
good air entry better then what she had in the emergency room. Color has 
improved and she is less labored and settling down. Her echo done a couple of 
years ago showed LV ejection fraction of 45% with borderline LV cavity 
enlargement and enlarged LA cavity more than 5cm. 



LABS:

Hgb 11.4, hct 34, WBC 14,000 normal differential, pO2 56, pCO2 30, pH 7.44 with 
90% saturation on 3 liters. It was done in the emergency room, now her oxygen 
saturation is 93 to 94% on 3 liters in the [facility]. Her creatinine 1.9, 
BUN 42, potassium 4.6, BNP 1290 normal is up to 100. Procalcitonin is 11 normal 
is 0.09. Urine showed 2+ protein, 1+ blood, nitrates negative, 1+ leukocyte 
esterase. 



TIME SPENT:  More than 60 minutes. 
MTDD

## 2018-02-16 LAB
ALBUMIN SERPL-MCNC: 3.7 G/DL (ref 3.5–5)
ALBUMIN/GLOB SERPL: 1.1 G/DL (ref 1.1–2.5)
ALP SERPL-CCNC: 54 U/L (ref 24–120)
ALT SERPL W P-5'-P-CCNC: 24 U/L (ref 0–54)
ANION GAP SERPL CALCULATED.3IONS-SCNC: 18 MMOL/L (ref 4–13)
AST SERPL-CCNC: 24 U/L (ref 7–45)
BACTERIA BLD CULT: ABNORMAL
BASOPHILS # BLD AUTO: 0.01 10*3/MM3 (ref 0–0.2)
BASOPHILS NFR BLD AUTO: 0.1 % (ref 0–2)
BILIRUB SERPL-MCNC: 0.7 MG/DL (ref 0.1–1)
BUN BLD-MCNC: 56 MG/DL (ref 5–21)
BUN/CREAT SERPL: 30.1 (ref 7–25)
CALCIUM SPEC-SCNC: 8.5 MG/DL (ref 8.4–10.4)
CHLORIDE SERPL-SCNC: 92 MMOL/L (ref 98–110)
CO2 SERPL-SCNC: 31 MMOL/L (ref 24–31)
CREAT BLD-MCNC: 1.86 MG/DL (ref 0.5–1.4)
DEPRECATED RDW RBC AUTO: 44.1 FL (ref 40–54)
EOSINOPHIL # BLD AUTO: 0 10*3/MM3 (ref 0–0.7)
EOSINOPHIL NFR BLD AUTO: 0 % (ref 0–4)
ERYTHROCYTE [DISTWIDTH] IN BLOOD BY AUTOMATED COUNT: 16.1 % (ref 12–15)
GFR SERPL CREATININE-BSD FRML MDRD: 26 ML/MIN/1.73
GLOBULIN UR ELPH-MCNC: 3.3 GM/DL
GLUCOSE BLD-MCNC: 141 MG/DL (ref 70–100)
HCT VFR BLD AUTO: 31.7 % (ref 37–47)
HGB BLD-MCNC: 10.3 G/DL (ref 12–16)
IMM GRANULOCYTES # BLD: 0.09 10*3/MM3 (ref 0–0.03)
IMM GRANULOCYTES NFR BLD: 1 % (ref 0–5)
LYMPHOCYTES # BLD AUTO: 1.59 10*3/MM3 (ref 0.72–4.86)
LYMPHOCYTES NFR BLD AUTO: 18 % (ref 15–45)
MCH RBC QN AUTO: 24.7 PG (ref 28–32)
MCHC RBC AUTO-ENTMCNC: 32.5 G/DL (ref 33–36)
MCV RBC AUTO: 76 FL (ref 82–98)
MONOCYTES # BLD AUTO: 0.63 10*3/MM3 (ref 0.19–1.3)
MONOCYTES NFR BLD AUTO: 7.1 % (ref 4–12)
NEUTROPHILS # BLD AUTO: 6.5 10*3/MM3 (ref 1.87–8.4)
NEUTROPHILS NFR BLD AUTO: 73.8 % (ref 39–78)
NRBC BLD MANUAL-RTO: 0 /100 WBC (ref 0–0)
PLATELET # BLD AUTO: 120 10*3/MM3 (ref 130–400)
PMV BLD AUTO: 10.1 FL (ref 6–12)
POTASSIUM BLD-SCNC: 4.3 MMOL/L (ref 3.5–5.3)
PROT SERPL-MCNC: 7 G/DL (ref 6.3–8.7)
RBC # BLD AUTO: 4.17 10*6/MM3 (ref 4.2–5.4)
SODIUM BLD-SCNC: 141 MMOL/L (ref 135–145)
TROPONIN I SERPL-MCNC: 0.07 NG/ML (ref 0–0.03)
TROPONIN I SERPL-MCNC: 0.1 NG/ML (ref 0–0.03)
WBC NRBC COR # BLD: 8.82 10*3/MM3 (ref 4.8–10.8)

## 2018-02-16 PROCEDURE — 94799 UNLISTED PULMONARY SVC/PX: CPT

## 2018-02-16 PROCEDURE — 84484 ASSAY OF TROPONIN QUANT: CPT | Performed by: INTERNAL MEDICINE

## 2018-02-16 PROCEDURE — 25010000002 FUROSEMIDE PER 20 MG: Performed by: INTERNAL MEDICINE

## 2018-02-16 PROCEDURE — 25010000002 LEVOFLOXACIN PER 250 MG: Performed by: INTERNAL MEDICINE

## 2018-02-16 PROCEDURE — 25010000002 CEFTRIAXONE PER 250 MG: Performed by: INTERNAL MEDICINE

## 2018-02-16 PROCEDURE — 25010000002 METHYLPREDNISOLONE PER 40 MG: Performed by: INTERNAL MEDICINE

## 2018-02-16 PROCEDURE — 25010000002 ENOXAPARIN PER 10 MG: Performed by: INTERNAL MEDICINE

## 2018-02-16 PROCEDURE — 85025 COMPLETE CBC W/AUTO DIFF WBC: CPT | Performed by: INTERNAL MEDICINE

## 2018-02-16 PROCEDURE — 80053 COMPREHEN METABOLIC PANEL: CPT | Performed by: INTERNAL MEDICINE

## 2018-02-16 PROCEDURE — 94660 CPAP INITIATION&MGMT: CPT

## 2018-02-16 RX ORDER — TRAMADOL HYDROCHLORIDE 50 MG/1
50 TABLET ORAL EVERY 12 HOURS PRN
Status: DISCONTINUED | OUTPATIENT
Start: 2018-02-16 | End: 2018-02-19

## 2018-02-16 RX ORDER — METOPROLOL TARTRATE 5 MG/5ML
5 INJECTION INTRAVENOUS EVERY 6 HOURS
Status: DISCONTINUED | OUTPATIENT
Start: 2018-02-16 | End: 2018-02-20

## 2018-02-16 RX ORDER — LEVOFLOXACIN 5 MG/ML
750 INJECTION, SOLUTION INTRAVENOUS
Status: DISCONTINUED | OUTPATIENT
Start: 2018-02-16 | End: 2018-02-16

## 2018-02-16 RX ORDER — FAMOTIDINE 10 MG/ML
20 INJECTION, SOLUTION INTRAVENOUS DAILY
Status: DISCONTINUED | OUTPATIENT
Start: 2018-02-17 | End: 2018-02-18 | Stop reason: SDUPTHER

## 2018-02-16 RX ORDER — NYSTATIN 100000 [USP'U]/G
POWDER TOPICAL EVERY 12 HOURS PRN
Status: DISCONTINUED | OUTPATIENT
Start: 2018-02-16 | End: 2018-02-21 | Stop reason: HOSPADM

## 2018-02-16 RX ORDER — LEVOFLOXACIN 5 MG/ML
750 INJECTION, SOLUTION INTRAVENOUS
Status: DISCONTINUED | OUTPATIENT
Start: 2018-02-16 | End: 2018-02-17

## 2018-02-16 RX ORDER — GABAPENTIN 300 MG/1
300 CAPSULE ORAL NIGHTLY
Status: DISCONTINUED | OUTPATIENT
Start: 2018-02-16 | End: 2018-02-21 | Stop reason: HOSPADM

## 2018-02-16 RX ORDER — METHYLPREDNISOLONE SODIUM SUCCINATE 40 MG/ML
40 INJECTION, POWDER, LYOPHILIZED, FOR SOLUTION INTRAMUSCULAR; INTRAVENOUS EVERY 8 HOURS
Status: DISCONTINUED | OUTPATIENT
Start: 2018-02-16 | End: 2018-02-20

## 2018-02-16 RX ORDER — FUROSEMIDE 10 MG/ML
40 INJECTION INTRAMUSCULAR; INTRAVENOUS EVERY 12 HOURS
Status: DISCONTINUED | OUTPATIENT
Start: 2018-02-16 | End: 2018-02-16

## 2018-02-16 RX ORDER — FUROSEMIDE 10 MG/ML
40 INJECTION INTRAMUSCULAR; INTRAVENOUS DAILY
Status: DISCONTINUED | OUTPATIENT
Start: 2018-02-17 | End: 2018-02-20

## 2018-02-16 RX ORDER — LEVOFLOXACIN 5 MG/ML
750 INJECTION, SOLUTION INTRAVENOUS EVERY 24 HOURS
Status: DISCONTINUED | OUTPATIENT
Start: 2018-02-16 | End: 2018-02-16

## 2018-02-16 RX ADMIN — LEVALBUTEROL HYDROCHLORIDE 1.25 MG: 1.25 SOLUTION, CONCENTRATE RESPIRATORY (INHALATION) at 19:06

## 2018-02-16 RX ADMIN — IPRATROPIUM BROMIDE 0.5 MG: 0.5 SOLUTION RESPIRATORY (INHALATION) at 06:51

## 2018-02-16 RX ADMIN — GABAPENTIN 300 MG: 300 CAPSULE ORAL at 22:14

## 2018-02-16 RX ADMIN — METOPROLOL TARTRATE 5 MG: 1 INJECTION, SOLUTION INTRAVENOUS at 06:57

## 2018-02-16 RX ADMIN — METOPROLOL TARTRATE 5 MG: 1 INJECTION, SOLUTION INTRAVENOUS at 03:19

## 2018-02-16 RX ADMIN — TRAMADOL HYDROCHLORIDE 50 MG: 50 TABLET, COATED ORAL at 22:14

## 2018-02-16 RX ADMIN — METOPROLOL TARTRATE 5 MG: 5 INJECTION INTRAVENOUS at 18:18

## 2018-02-16 RX ADMIN — IPRATROPIUM BROMIDE 0.5 MG: 0.5 SOLUTION RESPIRATORY (INHALATION) at 01:15

## 2018-02-16 RX ADMIN — LEVALBUTEROL HYDROCHLORIDE 1.25 MG: 1.25 SOLUTION, CONCENTRATE RESPIRATORY (INHALATION) at 13:22

## 2018-02-16 RX ADMIN — METOPROLOL TARTRATE 5 MG: 5 INJECTION INTRAVENOUS at 12:08

## 2018-02-16 RX ADMIN — FUROSEMIDE 40 MG: 10 INJECTION, SOLUTION INTRAMUSCULAR; INTRAVENOUS at 06:07

## 2018-02-16 RX ADMIN — CEFTRIAXONE SODIUM 1 G: 1 INJECTION, POWDER, FOR SOLUTION INTRAMUSCULAR; INTRAVENOUS at 22:13

## 2018-02-16 RX ADMIN — METHYLPREDNISOLONE SODIUM SUCCINATE 40 MG: 40 INJECTION, POWDER, FOR SOLUTION INTRAMUSCULAR; INTRAVENOUS at 22:13

## 2018-02-16 RX ADMIN — IPRATROPIUM BROMIDE 0.5 MG: 0.5 SOLUTION RESPIRATORY (INHALATION) at 13:22

## 2018-02-16 RX ADMIN — LEVOFLOXACIN 750 MG: 5 INJECTION, SOLUTION INTRAVENOUS at 22:13

## 2018-02-16 RX ADMIN — LEVALBUTEROL HYDROCHLORIDE 1.25 MG: 1.25 SOLUTION, CONCENTRATE RESPIRATORY (INHALATION) at 06:51

## 2018-02-16 RX ADMIN — METOPROLOL TARTRATE 5 MG: 5 INJECTION INTRAVENOUS at 23:44

## 2018-02-16 RX ADMIN — IPRATROPIUM BROMIDE 0.5 MG: 0.5 SOLUTION RESPIRATORY (INHALATION) at 19:06

## 2018-02-16 RX ADMIN — LEVALBUTEROL HYDROCHLORIDE 1.25 MG: 1.25 SOLUTION, CONCENTRATE RESPIRATORY (INHALATION) at 01:15

## 2018-02-16 RX ADMIN — ENOXAPARIN SODIUM 30 MG: 100 INJECTION SUBCUTANEOUS at 22:14

## 2018-02-16 RX ADMIN — METHYLPREDNISOLONE SODIUM SUCCINATE 40 MG: 40 INJECTION, POWDER, FOR SOLUTION INTRAMUSCULAR; INTRAVENOUS at 03:20

## 2018-02-16 RX ADMIN — FAMOTIDINE 20 MG: 10 INJECTION, SOLUTION INTRAVENOUS at 09:57

## 2018-02-16 RX ADMIN — METHYLPREDNISOLONE SODIUM SUCCINATE 40 MG: 40 INJECTION, POWDER, FOR SOLUTION INTRAMUSCULAR; INTRAVENOUS at 12:03

## 2018-02-17 LAB
ARTERIAL PATENCY WRIST A: ABNORMAL
ATMOSPHERIC PRESS: 753 MMHG
BACTERIA SPEC AEROBE CULT: NORMAL
BASE EXCESS BLDA CALC-SCNC: 3.5 MMOL/L (ref 0–2)
BDY SITE: ABNORMAL
BODY TEMPERATURE: 37 C
GAS FLOW AIRWAY: 5 LPM
HCO3 BLDA-SCNC: 29.7 MMOL/L (ref 20–26)
Lab: ABNORMAL
MODALITY: ABNORMAL
PCO2 BLDA: 52.2 MM HG (ref 35–45)
PH BLDA: 7.36 PH UNITS (ref 7.35–7.45)
PO2 BLDA: 71.3 MM HG (ref 83–108)
SAO2 % BLDCOA: 94.3 % (ref 94–99)
VENTILATOR MODE: ABNORMAL

## 2018-02-17 PROCEDURE — 94799 UNLISTED PULMONARY SVC/PX: CPT

## 2018-02-17 PROCEDURE — 25010000002 CEFTRIAXONE PER 250 MG: Performed by: INTERNAL MEDICINE

## 2018-02-17 PROCEDURE — 25010000002 FUROSEMIDE PER 20 MG: Performed by: INTERNAL MEDICINE

## 2018-02-17 PROCEDURE — 25010000002 METHYLPREDNISOLONE PER 40 MG: Performed by: INTERNAL MEDICINE

## 2018-02-17 PROCEDURE — G8978 MOBILITY CURRENT STATUS: HCPCS

## 2018-02-17 PROCEDURE — 36600 WITHDRAWAL OF ARTERIAL BLOOD: CPT

## 2018-02-17 PROCEDURE — 25010000002 ENOXAPARIN PER 10 MG: Performed by: INTERNAL MEDICINE

## 2018-02-17 PROCEDURE — G8979 MOBILITY GOAL STATUS: HCPCS

## 2018-02-17 PROCEDURE — 97162 PT EVAL MOD COMPLEX 30 MIN: CPT

## 2018-02-17 PROCEDURE — 82803 BLOOD GASES ANY COMBINATION: CPT

## 2018-02-17 PROCEDURE — 94660 CPAP INITIATION&MGMT: CPT

## 2018-02-17 RX ORDER — HYDROXYZINE HYDROCHLORIDE 25 MG/1
50 TABLET, FILM COATED ORAL DAILY
Status: DISCONTINUED | OUTPATIENT
Start: 2018-02-17 | End: 2018-02-21 | Stop reason: HOSPADM

## 2018-02-17 RX ADMIN — IPRATROPIUM BROMIDE 0.5 MG: 0.5 SOLUTION RESPIRATORY (INHALATION) at 00:19

## 2018-02-17 RX ADMIN — FUROSEMIDE 40 MG: 10 INJECTION, SOLUTION INTRAMUSCULAR; INTRAVENOUS at 08:11

## 2018-02-17 RX ADMIN — BISACODYL 10 MG: 10 SUPPOSITORY RECTAL at 15:14

## 2018-02-17 RX ADMIN — METHYLPREDNISOLONE SODIUM SUCCINATE 40 MG: 40 INJECTION, POWDER, FOR SOLUTION INTRAMUSCULAR; INTRAVENOUS at 20:48

## 2018-02-17 RX ADMIN — METOPROLOL TARTRATE 5 MG: 5 INJECTION INTRAVENOUS at 23:42

## 2018-02-17 RX ADMIN — HYDROXYZINE 50 MG: 25 TABLET, FILM COATED ORAL at 13:00

## 2018-02-17 RX ADMIN — METOPROLOL TARTRATE 5 MG: 5 INJECTION INTRAVENOUS at 18:10

## 2018-02-17 RX ADMIN — METHYLPREDNISOLONE SODIUM SUCCINATE 40 MG: 40 INJECTION, POWDER, FOR SOLUTION INTRAMUSCULAR; INTRAVENOUS at 12:07

## 2018-02-17 RX ADMIN — IPRATROPIUM BROMIDE 0.5 MG: 0.5 SOLUTION RESPIRATORY (INHALATION) at 19:13

## 2018-02-17 RX ADMIN — ENOXAPARIN SODIUM 30 MG: 100 INJECTION SUBCUTANEOUS at 20:48

## 2018-02-17 RX ADMIN — LEVALBUTEROL HYDROCHLORIDE 1.25 MG: 1.25 SOLUTION, CONCENTRATE RESPIRATORY (INHALATION) at 00:19

## 2018-02-17 RX ADMIN — IPRATROPIUM BROMIDE 0.5 MG: 0.5 SOLUTION RESPIRATORY (INHALATION) at 12:07

## 2018-02-17 RX ADMIN — GABAPENTIN 300 MG: 300 CAPSULE ORAL at 20:48

## 2018-02-17 RX ADMIN — LEVALBUTEROL HYDROCHLORIDE 1.25 MG: 1.25 SOLUTION, CONCENTRATE RESPIRATORY (INHALATION) at 12:07

## 2018-02-17 RX ADMIN — METHYLPREDNISOLONE SODIUM SUCCINATE 40 MG: 40 INJECTION, POWDER, FOR SOLUTION INTRAMUSCULAR; INTRAVENOUS at 05:25

## 2018-02-17 RX ADMIN — LEVALBUTEROL HYDROCHLORIDE 1.25 MG: 1.25 SOLUTION, CONCENTRATE RESPIRATORY (INHALATION) at 06:45

## 2018-02-17 RX ADMIN — CEFTRIAXONE SODIUM 1 G: 1 INJECTION, POWDER, FOR SOLUTION INTRAMUSCULAR; INTRAVENOUS at 20:48

## 2018-02-17 RX ADMIN — METOPROLOL TARTRATE 5 MG: 5 INJECTION INTRAVENOUS at 12:07

## 2018-02-17 RX ADMIN — IPRATROPIUM BROMIDE 0.5 MG: 0.5 SOLUTION RESPIRATORY (INHALATION) at 06:45

## 2018-02-17 RX ADMIN — METOPROLOL TARTRATE 5 MG: 5 INJECTION INTRAVENOUS at 05:25

## 2018-02-17 RX ADMIN — FAMOTIDINE 20 MG: 10 INJECTION, SOLUTION INTRAVENOUS at 08:11

## 2018-02-17 RX ADMIN — LEVALBUTEROL HYDROCHLORIDE 1.25 MG: 1.25 SOLUTION, CONCENTRATE RESPIRATORY (INHALATION) at 19:13

## 2018-02-18 ENCOUNTER — APPOINTMENT (OUTPATIENT)
Dept: GENERAL RADIOLOGY | Facility: HOSPITAL | Age: 83
End: 2018-02-18

## 2018-02-18 LAB
ALBUMIN SERPL-MCNC: 3.3 G/DL (ref 3.5–5)
ALBUMIN/GLOB SERPL: 1.1 G/DL (ref 1.1–2.5)
ALP SERPL-CCNC: 71 U/L (ref 24–120)
ALT SERPL W P-5'-P-CCNC: 48 U/L (ref 0–54)
ANION GAP SERPL CALCULATED.3IONS-SCNC: 10 MMOL/L (ref 4–13)
AST SERPL-CCNC: 61 U/L (ref 7–45)
BACTERIA SPEC AEROBE CULT: ABNORMAL
BACTERIA SPEC AEROBE CULT: ABNORMAL
BASOPHILS # BLD AUTO: 0.01 10*3/MM3 (ref 0–0.2)
BASOPHILS NFR BLD AUTO: 0.2 % (ref 0–2)
BILIRUB SERPL-MCNC: 0.2 MG/DL (ref 0.1–1)
BUN BLD-MCNC: 74 MG/DL (ref 5–21)
BUN/CREAT SERPL: 46.8 (ref 7–25)
CALCIUM SPEC-SCNC: 8.5 MG/DL (ref 8.4–10.4)
CHLORIDE SERPL-SCNC: 97 MMOL/L (ref 98–110)
CO2 SERPL-SCNC: 35 MMOL/L (ref 24–31)
CREAT BLD-MCNC: 1.58 MG/DL (ref 0.5–1.4)
DEPRECATED RDW RBC AUTO: 43.7 FL (ref 40–54)
EOSINOPHIL # BLD AUTO: 0 10*3/MM3 (ref 0–0.7)
EOSINOPHIL NFR BLD AUTO: 0 % (ref 0–4)
ERYTHROCYTE [DISTWIDTH] IN BLOOD BY AUTOMATED COUNT: 15.8 % (ref 12–15)
GFR SERPL CREATININE-BSD FRML MDRD: 31 ML/MIN/1.73
GLOBULIN UR ELPH-MCNC: 3 GM/DL
GLUCOSE BLD-MCNC: 173 MG/DL (ref 70–100)
GRAM STN SPEC: ABNORMAL
HCT VFR BLD AUTO: 30 % (ref 37–47)
HGB BLD-MCNC: 9.4 G/DL (ref 12–16)
IMM GRANULOCYTES # BLD: 0.05 10*3/MM3 (ref 0–0.03)
IMM GRANULOCYTES NFR BLD: 0.8 % (ref 0–5)
ISOLATED FROM: ABNORMAL
LYMPHOCYTES # BLD AUTO: 1.33 10*3/MM3 (ref 0.72–4.86)
LYMPHOCYTES NFR BLD AUTO: 21.1 % (ref 15–45)
MCH RBC QN AUTO: 24.1 PG (ref 28–32)
MCHC RBC AUTO-ENTMCNC: 31.3 G/DL (ref 33–36)
MCV RBC AUTO: 76.9 FL (ref 82–98)
MONOCYTES # BLD AUTO: 0.22 10*3/MM3 (ref 0.19–1.3)
MONOCYTES NFR BLD AUTO: 3.5 % (ref 4–12)
NEUTROPHILS # BLD AUTO: 4.7 10*3/MM3 (ref 1.87–8.4)
NEUTROPHILS NFR BLD AUTO: 74.4 % (ref 39–78)
NRBC BLD MANUAL-RTO: 0 /100 WBC (ref 0–0)
PLATELET # BLD AUTO: 135 10*3/MM3 (ref 130–400)
PMV BLD AUTO: 10.1 FL (ref 6–12)
POTASSIUM BLD-SCNC: 4 MMOL/L (ref 3.5–5.3)
PROT SERPL-MCNC: 6.3 G/DL (ref 6.3–8.7)
RBC # BLD AUTO: 3.9 10*6/MM3 (ref 4.2–5.4)
SODIUM BLD-SCNC: 142 MMOL/L (ref 135–145)
WBC NRBC COR # BLD: 6.31 10*3/MM3 (ref 4.8–10.8)

## 2018-02-18 PROCEDURE — 94799 UNLISTED PULMONARY SVC/PX: CPT

## 2018-02-18 PROCEDURE — 97110 THERAPEUTIC EXERCISES: CPT

## 2018-02-18 PROCEDURE — 80053 COMPREHEN METABOLIC PANEL: CPT | Performed by: FAMILY MEDICINE

## 2018-02-18 PROCEDURE — 97116 GAIT TRAINING THERAPY: CPT

## 2018-02-18 PROCEDURE — 25010000002 CEFTRIAXONE PER 250 MG: Performed by: INTERNAL MEDICINE

## 2018-02-18 PROCEDURE — 71045 X-RAY EXAM CHEST 1 VIEW: CPT

## 2018-02-18 PROCEDURE — 85025 COMPLETE CBC W/AUTO DIFF WBC: CPT | Performed by: FAMILY MEDICINE

## 2018-02-18 PROCEDURE — 94760 N-INVAS EAR/PLS OXIMETRY 1: CPT

## 2018-02-18 PROCEDURE — 25010000002 ENOXAPARIN PER 10 MG: Performed by: INTERNAL MEDICINE

## 2018-02-18 PROCEDURE — 25010000002 METHYLPREDNISOLONE PER 40 MG: Performed by: INTERNAL MEDICINE

## 2018-02-18 PROCEDURE — 25010000002 FUROSEMIDE PER 20 MG: Performed by: INTERNAL MEDICINE

## 2018-02-18 RX ORDER — FAMOTIDINE 20 MG/1
20 TABLET, FILM COATED ORAL DAILY
Status: DISCONTINUED | OUTPATIENT
Start: 2018-02-19 | End: 2018-02-21 | Stop reason: HOSPADM

## 2018-02-18 RX ADMIN — IPRATROPIUM BROMIDE 0.5 MG: 0.5 SOLUTION RESPIRATORY (INHALATION) at 12:12

## 2018-02-18 RX ADMIN — METHYLPREDNISOLONE SODIUM SUCCINATE 40 MG: 40 INJECTION, POWDER, FOR SOLUTION INTRAMUSCULAR; INTRAVENOUS at 15:09

## 2018-02-18 RX ADMIN — TRAMADOL HYDROCHLORIDE 50 MG: 50 TABLET, COATED ORAL at 06:41

## 2018-02-18 RX ADMIN — IPRATROPIUM BROMIDE 0.5 MG: 0.5 SOLUTION RESPIRATORY (INHALATION) at 00:20

## 2018-02-18 RX ADMIN — CEFTRIAXONE SODIUM 1 G: 1 INJECTION, POWDER, FOR SOLUTION INTRAMUSCULAR; INTRAVENOUS at 22:20

## 2018-02-18 RX ADMIN — METOPROLOL TARTRATE 5 MG: 5 INJECTION INTRAVENOUS at 06:52

## 2018-02-18 RX ADMIN — METOPROLOL TARTRATE 5 MG: 5 INJECTION INTRAVENOUS at 15:07

## 2018-02-18 RX ADMIN — FAMOTIDINE 20 MG: 10 INJECTION, SOLUTION INTRAVENOUS at 08:26

## 2018-02-18 RX ADMIN — METHYLPREDNISOLONE SODIUM SUCCINATE 40 MG: 40 INJECTION, POWDER, FOR SOLUTION INTRAMUSCULAR; INTRAVENOUS at 22:20

## 2018-02-18 RX ADMIN — GABAPENTIN 300 MG: 300 CAPSULE ORAL at 22:19

## 2018-02-18 RX ADMIN — LEVALBUTEROL HYDROCHLORIDE 1.25 MG: 1.25 SOLUTION, CONCENTRATE RESPIRATORY (INHALATION) at 00:20

## 2018-02-18 RX ADMIN — ENOXAPARIN SODIUM 30 MG: 100 INJECTION SUBCUTANEOUS at 22:19

## 2018-02-18 RX ADMIN — IPRATROPIUM BROMIDE 0.5 MG: 0.5 SOLUTION RESPIRATORY (INHALATION) at 19:36

## 2018-02-18 RX ADMIN — HYDROXYZINE 50 MG: 25 TABLET, FILM COATED ORAL at 08:26

## 2018-02-18 RX ADMIN — METHYLPREDNISOLONE SODIUM SUCCINATE 40 MG: 40 INJECTION, POWDER, FOR SOLUTION INTRAMUSCULAR; INTRAVENOUS at 05:03

## 2018-02-18 RX ADMIN — TRAMADOL HYDROCHLORIDE 50 MG: 50 TABLET, COATED ORAL at 23:43

## 2018-02-18 RX ADMIN — IPRATROPIUM BROMIDE 0.5 MG: 0.5 SOLUTION RESPIRATORY (INHALATION) at 06:27

## 2018-02-18 RX ADMIN — LEVALBUTEROL HYDROCHLORIDE 1.25 MG: 1.25 SOLUTION, CONCENTRATE RESPIRATORY (INHALATION) at 19:36

## 2018-02-18 RX ADMIN — FUROSEMIDE 40 MG: 10 INJECTION, SOLUTION INTRAMUSCULAR; INTRAVENOUS at 08:25

## 2018-02-18 RX ADMIN — LEVALBUTEROL HYDROCHLORIDE 1.25 MG: 1.25 SOLUTION, CONCENTRATE RESPIRATORY (INHALATION) at 06:27

## 2018-02-19 LAB
ANION GAP SERPL CALCULATED.3IONS-SCNC: 10 MMOL/L (ref 4–13)
BASOPHILS # BLD AUTO: 0 10*3/MM3 (ref 0–0.2)
BASOPHILS NFR BLD AUTO: 0 % (ref 0–2)
BUN BLD-MCNC: 67 MG/DL (ref 5–21)
BUN/CREAT SERPL: 40.9 (ref 7–25)
CALCIUM SPEC-SCNC: 8.8 MG/DL (ref 8.4–10.4)
CHLORIDE SERPL-SCNC: 94 MMOL/L (ref 98–110)
CO2 SERPL-SCNC: 38 MMOL/L (ref 24–31)
CREAT BLD-MCNC: 1.64 MG/DL (ref 0.5–1.4)
DEPRECATED RDW RBC AUTO: 44 FL (ref 40–54)
EOSINOPHIL # BLD AUTO: 0 10*3/MM3 (ref 0–0.7)
EOSINOPHIL NFR BLD AUTO: 0 % (ref 0–4)
ERYTHROCYTE [DISTWIDTH] IN BLOOD BY AUTOMATED COUNT: 15.3 % (ref 12–15)
GFR SERPL CREATININE-BSD FRML MDRD: 30 ML/MIN/1.73
GLUCOSE BLD-MCNC: 184 MG/DL (ref 70–100)
HCT VFR BLD AUTO: 32 % (ref 37–47)
HGB BLD-MCNC: 9.7 G/DL (ref 12–16)
IMM GRANULOCYTES # BLD: 0.22 10*3/MM3 (ref 0–0.03)
IMM GRANULOCYTES NFR BLD: 3.6 % (ref 0–5)
LYMPHOCYTES # BLD AUTO: 1.22 10*3/MM3 (ref 0.72–4.86)
LYMPHOCYTES NFR BLD AUTO: 20.1 % (ref 15–45)
MCH RBC QN AUTO: 23.9 PG (ref 28–32)
MCHC RBC AUTO-ENTMCNC: 30.3 G/DL (ref 33–36)
MCV RBC AUTO: 78.8 FL (ref 82–98)
MONOCYTES # BLD AUTO: 0.26 10*3/MM3 (ref 0.19–1.3)
MONOCYTES NFR BLD AUTO: 4.3 % (ref 4–12)
NEUTROPHILS # BLD AUTO: 4.37 10*3/MM3 (ref 1.87–8.4)
NEUTROPHILS NFR BLD AUTO: 72 % (ref 39–78)
NRBC BLD MANUAL-RTO: 0 /100 WBC (ref 0–0)
PLATELET # BLD AUTO: 151 10*3/MM3 (ref 130–400)
PMV BLD AUTO: 9.6 FL (ref 6–12)
POTASSIUM BLD-SCNC: 4.9 MMOL/L (ref 3.5–5.3)
RBC # BLD AUTO: 4.06 10*6/MM3 (ref 4.2–5.4)
SODIUM BLD-SCNC: 142 MMOL/L (ref 135–145)
WBC NRBC COR # BLD: 6.07 10*3/MM3 (ref 4.8–10.8)

## 2018-02-19 PROCEDURE — 94799 UNLISTED PULMONARY SVC/PX: CPT

## 2018-02-19 PROCEDURE — 97110 THERAPEUTIC EXERCISES: CPT

## 2018-02-19 PROCEDURE — 25010000002 FUROSEMIDE PER 20 MG: Performed by: INTERNAL MEDICINE

## 2018-02-19 PROCEDURE — 80048 BASIC METABOLIC PNL TOTAL CA: CPT | Performed by: FAMILY MEDICINE

## 2018-02-19 PROCEDURE — 94660 CPAP INITIATION&MGMT: CPT

## 2018-02-19 PROCEDURE — 85025 COMPLETE CBC W/AUTO DIFF WBC: CPT | Performed by: FAMILY MEDICINE

## 2018-02-19 PROCEDURE — 97530 THERAPEUTIC ACTIVITIES: CPT

## 2018-02-19 PROCEDURE — 25010000002 METHYLPREDNISOLONE PER 40 MG: Performed by: INTERNAL MEDICINE

## 2018-02-19 PROCEDURE — 25010000002 CEFTRIAXONE PER 250 MG: Performed by: INTERNAL MEDICINE

## 2018-02-19 PROCEDURE — 25010000002 ENOXAPARIN PER 10 MG: Performed by: INTERNAL MEDICINE

## 2018-02-19 PROCEDURE — 97116 GAIT TRAINING THERAPY: CPT

## 2018-02-19 RX ORDER — ACETAMINOPHEN 500 MG
500 TABLET ORAL EVERY 6 HOURS SCHEDULED
Status: DISCONTINUED | OUTPATIENT
Start: 2018-02-19 | End: 2018-02-21 | Stop reason: HOSPADM

## 2018-02-19 RX ORDER — ACETAMINOPHEN 500 MG
500 TABLET ORAL EVERY 6 HOURS SCHEDULED
Status: DISCONTINUED | OUTPATIENT
Start: 2018-02-20 | End: 2018-02-19

## 2018-02-19 RX ADMIN — TRAMADOL HYDROCHLORIDE AND ACETAMINOPHEN 1 TABLET: 37.5; 325 TABLET, FILM COATED ORAL at 17:36

## 2018-02-19 RX ADMIN — HYDROXYZINE 50 MG: 25 TABLET, FILM COATED ORAL at 08:37

## 2018-02-19 RX ADMIN — ACETAMINOPHEN 500 MG: 500 TABLET ORAL at 21:05

## 2018-02-19 RX ADMIN — IPRATROPIUM BROMIDE 0.5 MG: 0.5 SOLUTION RESPIRATORY (INHALATION) at 00:16

## 2018-02-19 RX ADMIN — FAMOTIDINE 20 MG: 20 TABLET, FILM COATED ORAL at 08:37

## 2018-02-19 RX ADMIN — LEVALBUTEROL HYDROCHLORIDE 1.25 MG: 1.25 SOLUTION, CONCENTRATE RESPIRATORY (INHALATION) at 19:04

## 2018-02-19 RX ADMIN — METOPROLOL TARTRATE 5 MG: 5 INJECTION INTRAVENOUS at 01:24

## 2018-02-19 RX ADMIN — GABAPENTIN 300 MG: 300 CAPSULE ORAL at 20:13

## 2018-02-19 RX ADMIN — METOPROLOL TARTRATE 5 MG: 5 INJECTION INTRAVENOUS at 17:36

## 2018-02-19 RX ADMIN — IPRATROPIUM BROMIDE 0.5 MG: 0.5 SOLUTION RESPIRATORY (INHALATION) at 11:27

## 2018-02-19 RX ADMIN — METHYLPREDNISOLONE SODIUM SUCCINATE 40 MG: 40 INJECTION, POWDER, FOR SOLUTION INTRAMUSCULAR; INTRAVENOUS at 06:53

## 2018-02-19 RX ADMIN — METHYLPREDNISOLONE SODIUM SUCCINATE 40 MG: 40 INJECTION, POWDER, FOR SOLUTION INTRAMUSCULAR; INTRAVENOUS at 20:13

## 2018-02-19 RX ADMIN — LEVALBUTEROL HYDROCHLORIDE 1.25 MG: 1.25 SOLUTION, CONCENTRATE RESPIRATORY (INHALATION) at 11:27

## 2018-02-19 RX ADMIN — IPRATROPIUM BROMIDE 0.5 MG: 0.5 SOLUTION RESPIRATORY (INHALATION) at 07:44

## 2018-02-19 RX ADMIN — CEFTRIAXONE SODIUM 1 G: 1 INJECTION, POWDER, FOR SOLUTION INTRAMUSCULAR; INTRAVENOUS at 20:13

## 2018-02-19 RX ADMIN — LEVALBUTEROL HYDROCHLORIDE 1.25 MG: 1.25 SOLUTION, CONCENTRATE RESPIRATORY (INHALATION) at 00:16

## 2018-02-19 RX ADMIN — TRAMADOL HYDROCHLORIDE AND ACETAMINOPHEN 1 TABLET: 37.5; 325 TABLET, FILM COATED ORAL at 11:52

## 2018-02-19 RX ADMIN — LEVALBUTEROL HYDROCHLORIDE 1.25 MG: 1.25 SOLUTION, CONCENTRATE RESPIRATORY (INHALATION) at 07:44

## 2018-02-19 RX ADMIN — METHYLPREDNISOLONE SODIUM SUCCINATE 40 MG: 40 INJECTION, POWDER, FOR SOLUTION INTRAMUSCULAR; INTRAVENOUS at 12:56

## 2018-02-19 RX ADMIN — FUROSEMIDE 40 MG: 10 INJECTION, SOLUTION INTRAMUSCULAR; INTRAVENOUS at 08:37

## 2018-02-19 RX ADMIN — METOPROLOL TARTRATE 5 MG: 5 INJECTION INTRAVENOUS at 12:56

## 2018-02-19 RX ADMIN — ENOXAPARIN SODIUM 30 MG: 100 INJECTION SUBCUTANEOUS at 20:13

## 2018-02-19 NOTE — HP
DATE OF SERVICE:  02/15/18



REASON FOR HOSPITALIZATION: 

Shortness of breath 



HISTORY OF PRESENT ILLNESS:

82 year old white female was brought to the emergency room by the family 
because she was found on the floor by her granddaughter. Her granddaughter 
found her in the morning with the cold skin and lips were pale. Normally she 
wears oxygen at home but she wasn't able to wear it because she had fallen on 
the floor and unable to get up and help herself. On further workup in the 
emergency room the patient's chest x-ray showed possibility of congestive heart 
failure and pulmonary infiltrate with pneumonia. BMI was 12.90 normal is up to 
100, troponin was negative but Procalcitonin was high. The patient's U/A was 
abnormal with positive for leukocyte esterase. WBC was 14,800, abnormal 
creatinine and BUN and blood gasses showed pO2 56 with pCO2 of 30 with pH 7.44 
with 90% saturation on 3 liters. Obviously the patient was acute respiratory 
failure with possibility of CHF and had atrial fibrillation with rate of 120 
per minute. 



PAST MEDICAL HISTORY/PAST SURGICAL HISTORY: 

History of anemia 

Chronic kidney disease, stage 2

Chronic dependant leg edema 

Congestive heart failure 

Severe COPD on home oxygen 

Hypertension 

Dyslipidemia 

Obesity 

Coronary artery disease with stent 

History of back surgery 

History of leg shoulder arthritis 

Depression 

Chronic respiratory failure 

Diverticulosis of colon 



REVIEW OF SYSTEMS:

CONSTITUTIONAL:  No night sweats.  Weakness and fatigue.  No fever or chills.

HEENT:  Eyes:  No visual changes.  No eye pain.  No eye discharge.  ENT:  No 
runny nose.  No epistaxis.  No sinus pain.  No sore throat.  No odynophagia.  
No ear pain.  No congestion.

RESPIRATORY:  No cough, no congestion.  No hemoptysis.  Shortness of breath on 
minimal exertion. 

CARDIOVASCULAR:  No angina symptoms. No CHF symptoms.  No atypical chest pain 
for CAD.  No palpitations. No orthopnea. No PND. 

GASTROINTESTINAL:  No abdominal pain.  No nausea or vomiting.  No diarrhea or 
constipation.  No hematemesis.  No hematochezia.  Poor appetite. 

GENITOURINARY:  No urgency.  No frequency.  No dysuria.  No hematuria.  No 
obstructive symptoms.  No discharge.  No pain.  No significant abnormal 
bleeding.

MUSCULOSKELETAL:  No musculoskeletal pain.  No joint swelling.  No arthritis. 
Aches, especially ribs are achy. Fall likely. 

NEUROLOGICAL:  No headache. No neck pain. No syncope. No seizures.  No 
dizziness. 

PSYCHIATRIC:   Not anxious. No depression.  No suicidal thoughts. No homicidal 
thoughts. 

SKIN:  No rash.  No lesions.  No wounds.

ENDOCRINE:  No unexplained weight loss.  No weight gain. 

HEMATOLOGIC/LYMPHATIC:  No anemia.  No purpura.  No petechiae.  No prolonged or 
excessive bleeding.  No palpable lymph nodes.



PERSONAL/FAMILY/SOCIAL HISTORY:

The patient is . No alcohol use. History of smoking for 50 years, no 
longer smokes. The patient has a family history of MI, diabetes and breast 
cancer. 



MEDICATIONS: 

Albuterol 

ProAir HFA two puffs four times a day 

Symbicort 164.5mg two puffs twice a day 

Gabapentin 300mg PO three times a day 

Norco 7.5-325mg Twice a day 

Lasix 40mg PO daily 

K-Dur 20meq PO daily 

Atorvastatin 20mg PO daily 

Tramadol 50mg three times a day 

Hydroxyzine 50mg PO daily 

Amlodipine 5mg PO daily 

Keflex 500mg twice a day 

Diflucan 150mg once a day 

Losartan 100mg PO daily 

Prednisone 10mg twice a day 

Lamisil 12 grams twice a day 



ALLERGIES:

No known allergies. 



PHYSICAL EXAMINATION: 

GENERAL:  The patient is oriented to time, place and person in mild respiratory 
distress.  

VITAL SIGNS:  Temperature 97.5, pulse 88, respiratory rate 38, blood pressure 
198/102 and pulse ox 88% on room air. 

HEENT:  Head normocephalic, atraumatic.  Eyes: Extraocular muscles are intact.  
Pupils are equal, round and reactive to light and accommodation.  Ears:  No 
lesions.  Nose appeared normal. Throat: No exudate or erythema. The patient 
looks dry. Mucosa membrane dry. 

NECK: Supple. No JVP, no carotid bruit.  No lymphadenopathy or thyromegaly. 

LUNGS: Decreased breath sounds with mild wheeze expiratory.  Percussion note 
normal.  Chest  symmetrical. 

HEART:  S1, S2, no S3. No murmurs.  No cyanosis or clubbing.  No ascites.  
Pulses: Dorsalis pedis and posterior tibial pulses +1bilaterally. 

ABDOMEN:  Soft.  Nontender.  Bowel sounds active. No CVA tenderness. No mass 
felt. 

EXTREMITIES: +2 pitting edema.  Full range of motion of all extremities, equal. 

NEUROLOGIC:  No focal deficit. Cranial nerves II through XII are grossly 
intact.  No headache, no double vision or headache. 

SKIN: Not dry.  Intact.  Turgor - normal. 

LYMPHATIC:  No palpable lymph nodes/no lymphedema. 

MUSCULOSKELETAL:  Normal joints with no swelling. Muscle tone is normal.



LABS:

As mentioned above with abnormal arterial blood gasses with severe hypoxemia 
even with 3 liters. Abnormal U/A high BMP, creatinine 1.9, BUN 42, glucose 188, 
procalcitonin elevated. 



ASSESSMENT:

1. Acute respiratory failure with severe hypoxemia 

2. Congestive heart failure 

3. Pneumonitis 

4. Chronic bronchitis 

5. Chronic lung disease 

6. Hypertension 

7. Dilated hypertrophic cardiomyopathy

8. Atrial fibrillation with moderate ventricular response 

9. Chronic kidney disease stage 2 

10.Obesity 

11.Coronary artery disease with stent 

12.Dyslipidemia 

13.Chronic dependant leg edema 

14.Diverticulosis of colon 



PLAN:

1.  IV Lasix 40mg 

2.  IV Morphine Sulfate given 

3.  IV Lasix 40mg more given later on in the special care 

4.  Solu-Cortef 125mg IV given in the emergency room 

5.  NEBS treatment with Xopenex and Pulmicort given 

6.  Oxygen on Venturi mask 36% with oxygen saturation 93%

7.  Keep Telemetry 

8.  Serial EKG's 

9.  Cardiac markers that are negative for now will draw some further 

10. IV Cardizem 5mg given systolic blood pressure in the special care was more 
than 160

11. Lanoxin 0.25mg IV given 

12. The patient is full code

13. The patient has accepted to be transferred to [facility] for multisystem 
problem management, her primary care with multiple consultants 



CONDITION: STABLE



TIME SPENT: 2.5 hours critical care  
[facility]

## 2018-02-20 LAB
ANION GAP SERPL CALCULATED.3IONS-SCNC: 9 MMOL/L (ref 4–13)
BACTERIA SPEC AEROBE CULT: NORMAL
BUN BLD-MCNC: 65 MG/DL (ref 5–21)
BUN/CREAT SERPL: 52 (ref 7–25)
CALCIUM SPEC-SCNC: 8.3 MG/DL (ref 8.4–10.4)
CHLORIDE SERPL-SCNC: 93 MMOL/L (ref 98–110)
CO2 SERPL-SCNC: 37 MMOL/L (ref 24–31)
CREAT BLD-MCNC: 1.25 MG/DL (ref 0.5–1.4)
GFR SERPL CREATININE-BSD FRML MDRD: 41 ML/MIN/1.73
GLUCOSE BLD-MCNC: 185 MG/DL (ref 70–100)
POTASSIUM BLD-SCNC: 4.8 MMOL/L (ref 3.5–5.3)
SODIUM BLD-SCNC: 139 MMOL/L (ref 135–145)

## 2018-02-20 PROCEDURE — 97116 GAIT TRAINING THERAPY: CPT

## 2018-02-20 PROCEDURE — 97110 THERAPEUTIC EXERCISES: CPT

## 2018-02-20 PROCEDURE — 94799 UNLISTED PULMONARY SVC/PX: CPT

## 2018-02-20 PROCEDURE — 80048 BASIC METABOLIC PNL TOTAL CA: CPT | Performed by: FAMILY MEDICINE

## 2018-02-20 PROCEDURE — 25010000002 METHYLPREDNISOLONE PER 40 MG: Performed by: INTERNAL MEDICINE

## 2018-02-20 PROCEDURE — 94760 N-INVAS EAR/PLS OXIMETRY 1: CPT

## 2018-02-20 PROCEDURE — 25010000002 CEFTRIAXONE PER 250 MG: Performed by: INTERNAL MEDICINE

## 2018-02-20 PROCEDURE — 25010000002 FUROSEMIDE PER 20 MG: Performed by: INTERNAL MEDICINE

## 2018-02-20 PROCEDURE — 94660 CPAP INITIATION&MGMT: CPT

## 2018-02-20 RX ORDER — FUROSEMIDE 20 MG/1
20 TABLET ORAL DAILY
Status: DISCONTINUED | OUTPATIENT
Start: 2018-02-20 | End: 2018-02-21 | Stop reason: HOSPADM

## 2018-02-20 RX ORDER — MAGNESIUM CARB/ALUMINUM HYDROX 105-160MG
296 TABLET,CHEWABLE ORAL ONCE
Status: COMPLETED | OUTPATIENT
Start: 2018-02-20 | End: 2018-02-20

## 2018-02-20 RX ORDER — METHYLPREDNISOLONE SODIUM SUCCINATE 40 MG/ML
20 INJECTION, POWDER, LYOPHILIZED, FOR SOLUTION INTRAMUSCULAR; INTRAVENOUS EVERY 12 HOURS
Status: DISCONTINUED | OUTPATIENT
Start: 2018-02-21 | End: 2018-02-21 | Stop reason: HOSPADM

## 2018-02-20 RX ADMIN — LEVALBUTEROL HYDROCHLORIDE 1.25 MG: 1.25 SOLUTION, CONCENTRATE RESPIRATORY (INHALATION) at 12:11

## 2018-02-20 RX ADMIN — CEFTRIAXONE SODIUM 1 G: 1 INJECTION, POWDER, FOR SOLUTION INTRAMUSCULAR; INTRAVENOUS at 21:32

## 2018-02-20 RX ADMIN — LEVALBUTEROL HYDROCHLORIDE 1.25 MG: 1.25 SOLUTION, CONCENTRATE RESPIRATORY (INHALATION) at 06:21

## 2018-02-20 RX ADMIN — METHYLPREDNISOLONE SODIUM SUCCINATE 40 MG: 40 INJECTION, POWDER, FOR SOLUTION INTRAMUSCULAR; INTRAVENOUS at 06:18

## 2018-02-20 RX ADMIN — ACETAMINOPHEN 500 MG: 500 TABLET ORAL at 13:43

## 2018-02-20 RX ADMIN — TRAMADOL HYDROCHLORIDE AND ACETAMINOPHEN 1 TABLET: 37.5; 325 TABLET, FILM COATED ORAL at 18:11

## 2018-02-20 RX ADMIN — Medication 296 ML: at 15:21

## 2018-02-20 RX ADMIN — TRAMADOL HYDROCHLORIDE AND ACETAMINOPHEN 1 TABLET: 37.5; 325 TABLET, FILM COATED ORAL at 11:09

## 2018-02-20 RX ADMIN — LEVALBUTEROL HYDROCHLORIDE 1.25 MG: 1.25 SOLUTION, CONCENTRATE RESPIRATORY (INHALATION) at 00:04

## 2018-02-20 RX ADMIN — TRAMADOL HYDROCHLORIDE AND ACETAMINOPHEN 1 TABLET: 37.5; 325 TABLET, FILM COATED ORAL at 01:56

## 2018-02-20 RX ADMIN — ACETAMINOPHEN 500 MG: 500 TABLET ORAL at 06:18

## 2018-02-20 RX ADMIN — FAMOTIDINE 20 MG: 20 TABLET, FILM COATED ORAL at 11:09

## 2018-02-20 RX ADMIN — FUROSEMIDE 40 MG: 10 INJECTION, SOLUTION INTRAMUSCULAR; INTRAVENOUS at 09:16

## 2018-02-20 RX ADMIN — HYDROXYZINE 50 MG: 25 TABLET, FILM COATED ORAL at 09:16

## 2018-02-20 RX ADMIN — GABAPENTIN 300 MG: 300 CAPSULE ORAL at 21:35

## 2018-02-20 RX ADMIN — METHYLPREDNISOLONE SODIUM SUCCINATE 40 MG: 40 INJECTION, POWDER, FOR SOLUTION INTRAMUSCULAR; INTRAVENOUS at 13:45

## 2018-02-20 RX ADMIN — APIXABAN 2.5 MG: 2.5 TABLET, FILM COATED ORAL at 21:31

## 2018-02-20 RX ADMIN — ACETAMINOPHEN 500 MG: 500 TABLET ORAL at 18:11

## 2018-02-20 RX ADMIN — FUROSEMIDE 20 MG: 20 TABLET ORAL at 15:22

## 2018-02-20 RX ADMIN — METOPROLOL TARTRATE 5 MG: 5 INJECTION INTRAVENOUS at 00:21

## 2018-02-20 RX ADMIN — LEVALBUTEROL HYDROCHLORIDE 1.25 MG: 1.25 SOLUTION, CONCENTRATE RESPIRATORY (INHALATION) at 20:45

## 2018-02-21 VITALS
HEIGHT: 68 IN | OXYGEN SATURATION: 92 % | BODY MASS INDEX: 29.1 KG/M2 | RESPIRATION RATE: 20 BRPM | HEART RATE: 79 BPM | SYSTOLIC BLOOD PRESSURE: 159 MMHG | DIASTOLIC BLOOD PRESSURE: 74 MMHG | WEIGHT: 192 LBS | TEMPERATURE: 97.6 F

## 2018-02-21 LAB
ANION GAP SERPL CALCULATED.3IONS-SCNC: 9 MMOL/L (ref 4–13)
BUN BLD-MCNC: 49 MG/DL (ref 5–21)
BUN/CREAT SERPL: 37.1 (ref 7–25)
CALCIUM SPEC-SCNC: 8.8 MG/DL (ref 8.4–10.4)
CHLORIDE SERPL-SCNC: 90 MMOL/L (ref 98–110)
CO2 SERPL-SCNC: 40 MMOL/L (ref 24–31)
CREAT BLD-MCNC: 1.32 MG/DL (ref 0.5–1.4)
GFR SERPL CREATININE-BSD FRML MDRD: 39 ML/MIN/1.73
GLUCOSE BLD-MCNC: 215 MG/DL (ref 70–100)
POTASSIUM BLD-SCNC: 5 MMOL/L (ref 3.5–5.3)
SODIUM BLD-SCNC: 139 MMOL/L (ref 135–145)

## 2018-02-21 PROCEDURE — 94660 CPAP INITIATION&MGMT: CPT

## 2018-02-21 PROCEDURE — 25010000002 METHYLPREDNISOLONE PER 40 MG: Performed by: INTERNAL MEDICINE

## 2018-02-21 PROCEDURE — 97110 THERAPEUTIC EXERCISES: CPT

## 2018-02-21 PROCEDURE — 97116 GAIT TRAINING THERAPY: CPT

## 2018-02-21 PROCEDURE — 80048 BASIC METABOLIC PNL TOTAL CA: CPT | Performed by: INTERNAL MEDICINE

## 2018-02-21 PROCEDURE — 94799 UNLISTED PULMONARY SVC/PX: CPT

## 2018-02-21 PROCEDURE — 25010000002 HYDRALAZINE PER 20 MG: Performed by: INTERNAL MEDICINE

## 2018-02-21 RX ORDER — CEFDINIR 300 MG/1
300 CAPSULE ORAL 2 TIMES DAILY
Qty: 8 CAPSULE | Refills: 0 | Status: SHIPPED | OUTPATIENT
Start: 2018-02-21 | End: 2018-02-25

## 2018-02-21 RX ORDER — PREDNISONE 20 MG/1
TABLET ORAL
Qty: 6 TABLET | Refills: 0 | Status: ON HOLD | OUTPATIENT
Start: 2018-02-21 | End: 2018-04-01

## 2018-02-21 RX ORDER — FUROSEMIDE 40 MG/1
20 TABLET ORAL DAILY
Qty: 10 TABLET | Refills: 0 | Status: SHIPPED | OUTPATIENT
Start: 2018-02-21 | End: 2018-04-06 | Stop reason: HOSPADM

## 2018-02-21 RX ORDER — GABAPENTIN 300 MG/1
300 CAPSULE ORAL NIGHTLY
Qty: 30 CAPSULE | Refills: 0 | Status: SHIPPED | OUTPATIENT
Start: 2018-02-21

## 2018-02-21 RX ADMIN — TRAMADOL HYDROCHLORIDE AND ACETAMINOPHEN 1 TABLET: 37.5; 325 TABLET, FILM COATED ORAL at 13:40

## 2018-02-21 RX ADMIN — ACETAMINOPHEN 500 MG: 500 TABLET ORAL at 00:08

## 2018-02-21 RX ADMIN — LEVALBUTEROL HYDROCHLORIDE 1.25 MG: 1.25 SOLUTION, CONCENTRATE RESPIRATORY (INHALATION) at 11:31

## 2018-02-21 RX ADMIN — LEVALBUTEROL HYDROCHLORIDE 1.25 MG: 1.25 SOLUTION, CONCENTRATE RESPIRATORY (INHALATION) at 06:28

## 2018-02-21 RX ADMIN — ACETAMINOPHEN 500 MG: 500 TABLET ORAL at 05:49

## 2018-02-21 RX ADMIN — HYDROXYZINE 50 MG: 25 TABLET, FILM COATED ORAL at 09:43

## 2018-02-21 RX ADMIN — METHYLPREDNISOLONE SODIUM SUCCINATE 20 MG: 40 INJECTION, POWDER, FOR SOLUTION INTRAMUSCULAR; INTRAVENOUS at 00:09

## 2018-02-21 RX ADMIN — ACETAMINOPHEN 500 MG: 500 TABLET ORAL at 13:40

## 2018-02-21 RX ADMIN — APIXABAN 2.5 MG: 2.5 TABLET, FILM COATED ORAL at 09:43

## 2018-02-21 RX ADMIN — LEVALBUTEROL HYDROCHLORIDE 1.25 MG: 1.25 SOLUTION, CONCENTRATE RESPIRATORY (INHALATION) at 00:22

## 2018-02-21 RX ADMIN — FAMOTIDINE 20 MG: 20 TABLET, FILM COATED ORAL at 09:43

## 2018-02-21 RX ADMIN — TRAMADOL HYDROCHLORIDE AND ACETAMINOPHEN 1 TABLET: 37.5; 325 TABLET, FILM COATED ORAL at 02:51

## 2018-02-21 RX ADMIN — FUROSEMIDE 20 MG: 20 TABLET ORAL at 09:43

## 2018-02-21 RX ADMIN — HYDRALAZINE HYDROCHLORIDE 10 MG: 20 INJECTION INTRAMUSCULAR; INTRAVENOUS at 06:02

## 2018-03-30 ENCOUNTER — APPOINTMENT (OUTPATIENT)
Dept: GENERAL RADIOLOGY | Facility: HOSPITAL | Age: 83
End: 2018-03-30

## 2018-03-30 ENCOUNTER — HOSPITAL ENCOUNTER (EMERGENCY)
Dept: HOSPITAL 58 - ED | Age: 83
Discharge: HOME | End: 2018-03-30
Payer: COMMERCIAL

## 2018-03-30 ENCOUNTER — HOSPITAL ENCOUNTER (INPATIENT)
Facility: HOSPITAL | Age: 83
LOS: 7 days | Discharge: NURSING, REHAB OR LONG-TERM CARE | End: 2018-04-06
Attending: INTERNAL MEDICINE | Admitting: FAMILY MEDICINE

## 2018-03-30 VITALS — TEMPERATURE: 98.3 F

## 2018-03-30 VITALS — SYSTOLIC BLOOD PRESSURE: 214 MMHG | DIASTOLIC BLOOD PRESSURE: 50 MMHG

## 2018-03-30 VITALS — BODY MASS INDEX: 33.8 KG/M2

## 2018-03-30 DIAGNOSIS — I50.9: ICD-10-CM

## 2018-03-30 DIAGNOSIS — R40.2411: ICD-10-CM

## 2018-03-30 DIAGNOSIS — Z74.09 IMPAIRED FUNCTIONAL MOBILITY AND ENDURANCE: Primary | ICD-10-CM

## 2018-03-30 DIAGNOSIS — I48.91: Primary | ICD-10-CM

## 2018-03-30 DIAGNOSIS — I10: ICD-10-CM

## 2018-03-30 DIAGNOSIS — Z78.9 DECREASED ACTIVITIES OF DAILY LIVING (ADL): ICD-10-CM

## 2018-03-30 DIAGNOSIS — R13.12 OROPHARYNGEAL DYSPHAGIA: ICD-10-CM

## 2018-03-30 DIAGNOSIS — Z79.899: ICD-10-CM

## 2018-03-30 DIAGNOSIS — Z99.81: ICD-10-CM

## 2018-03-30 DIAGNOSIS — R60.0: ICD-10-CM

## 2018-03-30 DIAGNOSIS — R53.1: ICD-10-CM

## 2018-03-30 DIAGNOSIS — I25.10: ICD-10-CM

## 2018-03-30 DIAGNOSIS — R41.82: ICD-10-CM

## 2018-03-30 DIAGNOSIS — R06.02: ICD-10-CM

## 2018-03-30 DIAGNOSIS — D64.9: ICD-10-CM

## 2018-03-30 DIAGNOSIS — J96.02: ICD-10-CM

## 2018-03-30 DIAGNOSIS — E78.5: ICD-10-CM

## 2018-03-30 DIAGNOSIS — R41.89 IMPAIRED COGNITION: ICD-10-CM

## 2018-03-30 PROBLEM — J96.00 ACUTE RESPIRATORY FAILURE (HCC): Status: ACTIVE | Noted: 2018-03-30

## 2018-03-30 LAB
ALBUMIN SERPL-MCNC: 3.4 G/DL (ref 3.5–5)
ALBUMIN/GLOB SERPL: 1.2 G/DL (ref 1.1–2.5)
ALP SERPL-CCNC: 72 U/L (ref 24–120)
ALT SERPL W P-5'-P-CCNC: 22 U/L (ref 0–54)
ANION GAP SERPL CALCULATED.3IONS-SCNC: 9 MMOL/L (ref 4–13)
ANISOCYTOSIS BLD QL: NORMAL
ARTERIAL PATENCY WRIST A: POSITIVE
AST SERPL-CCNC: 46 U/L (ref 7–45)
ATMOSPHERIC PRESS: 757 MMHG
BACTERIA UR QL AUTO: ABNORMAL /HPF
BASE EXCESS BLDA CALC-SCNC: 0.3 MMOL/L (ref 0–2)
BASOPHILS # BLD AUTO: 0.02 10*3/MM3 (ref 0–0.2)
BASOPHILS NFR BLD AUTO: 0.3 % (ref 0–2)
BDY SITE: ABNORMAL
BILIRUB SERPL-MCNC: 1.3 MG/DL (ref 0.1–1)
BILIRUB UR QL STRIP: NEGATIVE
BODY TEMPERATURE: 37 C
BUN BLD-MCNC: 19 MG/DL (ref 5–21)
BUN/CREAT SERPL: 13.8 (ref 7–25)
CALCIUM SPEC-SCNC: 8.2 MG/DL (ref 8.4–10.4)
CHLORIDE SERPL-SCNC: 96 MMOL/L (ref 98–110)
CLARITY UR: CLEAR
CO2 SERPL-SCNC: 31 MMOL/L (ref 24–31)
COLOR UR: YELLOW
CREAT BLD-MCNC: 1.38 MG/DL (ref 0.5–1.4)
D DIMER PPP FEU-MCNC: >20 MG/L (FEU) (ref 0–0.5)
D-LACTATE SERPL-SCNC: 1.9 MMOL/L (ref 0.5–2)
DEPRECATED RDW RBC AUTO: 54 FL (ref 40–54)
ELLIPTOCYTES BLD QL SMEAR: NORMAL
EOSINOPHIL # BLD AUTO: 0.01 10*3/MM3 (ref 0–0.7)
EOSINOPHIL NFR BLD AUTO: 0.1 % (ref 0–4)
ERYTHROCYTE [DISTWIDTH] IN BLOOD BY AUTOMATED COUNT: 19.5 % (ref 12–15)
GFR SERPL CREATININE-BSD FRML MDRD: 37 ML/MIN/1.73
GLOBULIN UR ELPH-MCNC: 2.8 GM/DL
GLUCOSE BLD-MCNC: 257 MG/DL (ref 70–100)
GLUCOSE UR STRIP-MCNC: NEGATIVE MG/DL
HCO3 BLDA-SCNC: 27.8 MMOL/L (ref 20–26)
HCT VFR BLD AUTO: 28.2 % (ref 37–47)
HGB BLD-MCNC: 8.6 G/DL (ref 12–16)
HGB UR QL STRIP.AUTO: NEGATIVE
HOROWITZ INDEX BLD+IHG-RTO: 50 %
HYALINE CASTS UR QL AUTO: ABNORMAL /LPF
HYPOCHROMIA BLD QL: NORMAL
IMM GRANULOCYTES # BLD: 0.06 10*3/MM3 (ref 0–0.03)
IMM GRANULOCYTES NFR BLD: 0.9 % (ref 0–5)
KETONES UR QL STRIP: NEGATIVE
LEUKOCYTE ESTERASE UR QL STRIP.AUTO: NEGATIVE
LYMPHOCYTES # BLD AUTO: 1.8 10*3/MM3 (ref 0.72–4.86)
LYMPHOCYTES NFR BLD AUTO: 25.6 % (ref 15–45)
Lab: ABNORMAL
MAGNESIUM SERPL-MCNC: 2.2 MG/DL (ref 1.4–2.2)
MCH RBC QN AUTO: 24.3 PG (ref 28–32)
MCHC RBC AUTO-ENTMCNC: 30.5 G/DL (ref 33–36)
MCV RBC AUTO: 79.7 FL (ref 82–98)
MODALITY: ABNORMAL
MONOCYTES # BLD AUTO: 0.57 10*3/MM3 (ref 0.19–1.3)
MONOCYTES NFR BLD AUTO: 8.1 % (ref 4–12)
NEUTROPHILS # BLD AUTO: 4.58 10*3/MM3 (ref 1.87–8.4)
NEUTROPHILS NFR BLD AUTO: 65 % (ref 39–78)
NITRITE UR QL STRIP: NEGATIVE
NRBC BLD MANUAL-RTO: 0 /100 WBC (ref 0–0)
PCO2 BLDA: 60.3 MM HG (ref 35–45)
PEEP RESPIRATORY: 5 CM[H2O]
PH BLDA: 7.27 PH UNITS (ref 7.35–7.45)
PH UR STRIP.AUTO: 6.5 [PH] (ref 5–8)
PHOSPHATE SERPL-MCNC: 5.8 MG/DL (ref 2.5–4.5)
PLATELET # BLD AUTO: 67 10*3/MM3 (ref 130–400)
PMV BLD AUTO: 10.4 FL (ref 6–12)
PO2 BLDA: 106 MM HG (ref 83–108)
POIKILOCYTOSIS BLD QL SMEAR: NORMAL
POLYCHROMASIA BLD QL SMEAR: NORMAL
POTASSIUM BLD-SCNC: 4.8 MMOL/L (ref 3.5–5.3)
PROCALCITONIN SERPL-MCNC: 2.43 NG/ML
PROT SERPL-MCNC: 6.2 G/DL (ref 6.3–8.7)
PROT UR QL STRIP: ABNORMAL
PSV: 10 CMH2O
RBC # BLD AUTO: 3.54 10*6/MM3 (ref 4.2–5.4)
RBC # UR: ABNORMAL /HPF
REF LAB TEST METHOD: ABNORMAL
SAO2 % BLDCOA: 98.1 % (ref 94–99)
SET MECH RESP RATE: 14
SMALL PLATELETS BLD QL SMEAR: NORMAL
SODIUM BLD-SCNC: 136 MMOL/L (ref 135–145)
SP GR UR STRIP: 1.01 (ref 1–1.03)
SQUAMOUS #/AREA URNS HPF: ABNORMAL /HPF
TROPONIN I SERPL-MCNC: 0.1 NG/ML (ref 0–0.03)
UROBILINOGEN UR QL STRIP: ABNORMAL
VENTILATOR MODE: ABNORMAL
VT ON VENT VENT: 500 ML
WBC MORPH BLD: NORMAL
WBC NRBC COR # BLD: 7.04 10*3/MM3 (ref 4.8–10.8)
WBC UR QL AUTO: ABNORMAL /HPF
YEAST URNS QL MICRO: ABNORMAL /HPF

## 2018-03-30 PROCEDURE — 93010 ELECTROCARDIOGRAM REPORT: CPT | Performed by: INTERNAL MEDICINE

## 2018-03-30 PROCEDURE — 96361 HYDRATE IV INFUSION ADD-ON: CPT

## 2018-03-30 PROCEDURE — 31500 INSERT EMERGENCY AIRWAY: CPT

## 2018-03-30 PROCEDURE — 99291 CRITICAL CARE FIRST HOUR: CPT

## 2018-03-30 PROCEDURE — 93005 ELECTROCARDIOGRAM TRACING: CPT

## 2018-03-30 PROCEDURE — 83735 ASSAY OF MAGNESIUM: CPT | Performed by: FAMILY MEDICINE

## 2018-03-30 PROCEDURE — 96366 THER/PROPH/DIAG IV INF ADDON: CPT

## 2018-03-30 PROCEDURE — 93005 ELECTROCARDIOGRAM TRACING: CPT | Performed by: FAMILY MEDICINE

## 2018-03-30 PROCEDURE — 82550 ASSAY OF CK (CPK): CPT

## 2018-03-30 PROCEDURE — 85730 THROMBOPLASTIN TIME PARTIAL: CPT

## 2018-03-30 PROCEDURE — 93010 ELECTROCARDIOGRAM REPORT: CPT

## 2018-03-30 PROCEDURE — 94640 AIRWAY INHALATION TREATMENT: CPT

## 2018-03-30 PROCEDURE — 96375 TX/PRO/DX INJ NEW DRUG ADDON: CPT

## 2018-03-30 PROCEDURE — 71045 X-RAY EXAM CHEST 1 VIEW: CPT

## 2018-03-30 PROCEDURE — 80053 COMPREHEN METABOLIC PANEL: CPT

## 2018-03-30 PROCEDURE — 36415 COLL VENOUS BLD VENIPUNCTURE: CPT

## 2018-03-30 PROCEDURE — 84484 ASSAY OF TROPONIN QUANT: CPT

## 2018-03-30 PROCEDURE — 36600 WITHDRAWAL OF ARTERIAL BLOOD: CPT

## 2018-03-30 PROCEDURE — 84484 ASSAY OF TROPONIN QUANT: CPT | Performed by: FAMILY MEDICINE

## 2018-03-30 PROCEDURE — 84145 PROCALCITONIN (PCT): CPT | Performed by: FAMILY MEDICINE

## 2018-03-30 PROCEDURE — 87502 INFLUENZA DNA AMP PROBE: CPT

## 2018-03-30 PROCEDURE — 82803 BLOOD GASES ANY COMBINATION: CPT

## 2018-03-30 PROCEDURE — 96365 THER/PROPH/DIAG IV INF INIT: CPT

## 2018-03-30 PROCEDURE — 81001 URINALYSIS AUTO W/SCOPE: CPT | Performed by: FAMILY MEDICINE

## 2018-03-30 PROCEDURE — 96376 TX/PRO/DX INJ SAME DRUG ADON: CPT

## 2018-03-30 PROCEDURE — 85025 COMPLETE CBC W/AUTO DIFF WBC: CPT | Performed by: FAMILY MEDICINE

## 2018-03-30 PROCEDURE — 87205 SMEAR GRAM STAIN: CPT | Performed by: FAMILY MEDICINE

## 2018-03-30 PROCEDURE — 85379 FIBRIN DEGRADATION QUANT: CPT | Performed by: FAMILY MEDICINE

## 2018-03-30 PROCEDURE — 85610 PROTHROMBIN TIME: CPT

## 2018-03-30 PROCEDURE — 5A1955Z RESPIRATORY VENTILATION, GREATER THAN 96 CONSECUTIVE HOURS: ICD-10-PCS | Performed by: FAMILY MEDICINE

## 2018-03-30 PROCEDURE — 94799 UNLISTED PULMONARY SVC/PX: CPT

## 2018-03-30 PROCEDURE — 80053 COMPREHEN METABOLIC PANEL: CPT | Performed by: FAMILY MEDICINE

## 2018-03-30 PROCEDURE — 85025 COMPLETE CBC W/AUTO DIFF WBC: CPT

## 2018-03-30 PROCEDURE — 96368 THER/DIAG CONCURRENT INF: CPT

## 2018-03-30 PROCEDURE — 85007 BL SMEAR W/DIFF WBC COUNT: CPT | Performed by: FAMILY MEDICINE

## 2018-03-30 PROCEDURE — 94002 VENT MGMT INPAT INIT DAY: CPT

## 2018-03-30 PROCEDURE — 87086 URINE CULTURE/COLONY COUNT: CPT | Performed by: FAMILY MEDICINE

## 2018-03-30 PROCEDURE — 87070 CULTURE OTHR SPECIMN AEROBIC: CPT | Performed by: FAMILY MEDICINE

## 2018-03-30 PROCEDURE — 84145 PROCALCITONIN (PCT): CPT

## 2018-03-30 PROCEDURE — 87040 BLOOD CULTURE FOR BACTERIA: CPT

## 2018-03-30 PROCEDURE — 84100 ASSAY OF PHOSPHORUS: CPT | Performed by: FAMILY MEDICINE

## 2018-03-30 PROCEDURE — 81001 URINALYSIS AUTO W/SCOPE: CPT

## 2018-03-30 PROCEDURE — 94760 N-INVAS EAR/PLS OXIMETRY 1: CPT

## 2018-03-30 PROCEDURE — 83605 ASSAY OF LACTIC ACID: CPT | Performed by: FAMILY MEDICINE

## 2018-03-30 RX ORDER — SODIUM CHLORIDE 0.9 % (FLUSH) 0.9 %
1-10 SYRINGE (ML) INJECTION AS NEEDED
Status: DISCONTINUED | OUTPATIENT
Start: 2018-03-30 | End: 2018-04-06 | Stop reason: HOSPADM

## 2018-03-30 RX ORDER — CARVEDILOL 6.25 MG/1
6.25 TABLET ORAL 2 TIMES DAILY WITH MEALS
Status: DISCONTINUED | OUTPATIENT
Start: 2018-03-30 | End: 2018-03-31

## 2018-03-30 RX ORDER — NICOTINE POLACRILEX 4 MG
15 LOZENGE BUCCAL
Status: DISCONTINUED | OUTPATIENT
Start: 2018-03-30 | End: 2018-04-04

## 2018-03-30 RX ORDER — IPRATROPIUM BROMIDE AND ALBUTEROL SULFATE 2.5; .5 MG/3ML; MG/3ML
3 SOLUTION RESPIRATORY (INHALATION) EVERY 4 HOURS PRN
Status: DISCONTINUED | OUTPATIENT
Start: 2018-03-30 | End: 2018-04-06 | Stop reason: HOSPADM

## 2018-03-30 RX ORDER — HEPARIN SODIUM 5000 [USP'U]/ML
5000 INJECTION, SOLUTION INTRAVENOUS; SUBCUTANEOUS EVERY 8 HOURS SCHEDULED
Status: DISCONTINUED | OUTPATIENT
Start: 2018-03-30 | End: 2018-04-02

## 2018-03-30 RX ORDER — VANCOMYCIN HYDROCHLORIDE 1 G/200ML
1000 INJECTION, SOLUTION INTRAVENOUS EVERY 24 HOURS
Status: DISCONTINUED | OUTPATIENT
Start: 2018-04-01 | End: 2018-03-31

## 2018-03-30 RX ORDER — ASPIRIN 81 MG/1
81 TABLET ORAL
Status: DISCONTINUED | OUTPATIENT
Start: 2018-03-31 | End: 2018-04-02

## 2018-03-30 RX ORDER — GABAPENTIN 300 MG/1
300 CAPSULE ORAL NIGHTLY
Status: DISCONTINUED | OUTPATIENT
Start: 2018-03-30 | End: 2018-04-06 | Stop reason: HOSPADM

## 2018-03-30 RX ORDER — FUROSEMIDE 20 MG/1
20 TABLET ORAL DAILY
Status: DISCONTINUED | OUTPATIENT
Start: 2018-03-31 | End: 2018-03-31

## 2018-03-30 RX ORDER — METHYLPREDNISOLONE SODIUM SUCCINATE 125 MG/2ML
80 INJECTION, POWDER, LYOPHILIZED, FOR SOLUTION INTRAMUSCULAR; INTRAVENOUS EVERY 8 HOURS SCHEDULED
Status: DISCONTINUED | OUTPATIENT
Start: 2018-03-30 | End: 2018-03-31

## 2018-03-30 RX ORDER — LEVOFLOXACIN 5 MG/ML
750 INJECTION, SOLUTION INTRAVENOUS EVERY 24 HOURS
Status: DISCONTINUED | OUTPATIENT
Start: 2018-03-30 | End: 2018-03-30 | Stop reason: DRUGHIGH

## 2018-03-30 RX ORDER — DEXTROSE MONOHYDRATE 25 G/50ML
25 INJECTION, SOLUTION INTRAVENOUS
Status: DISCONTINUED | OUTPATIENT
Start: 2018-03-30 | End: 2018-04-04

## 2018-03-30 RX ORDER — SODIUM CHLORIDE 9 MG/ML
100 INJECTION, SOLUTION INTRAVENOUS CONTINUOUS
Status: DISCONTINUED | OUTPATIENT
Start: 2018-03-30 | End: 2018-03-31

## 2018-03-30 RX ORDER — ALBUTEROL SULFATE 90 UG/1
2 AEROSOL, METERED RESPIRATORY (INHALATION) EVERY 6 HOURS PRN
Status: DISCONTINUED | OUTPATIENT
Start: 2018-03-30 | End: 2018-03-30 | Stop reason: ALTCHOICE

## 2018-03-30 RX ORDER — LISINOPRIL 10 MG/1
10 TABLET ORAL
Status: DISCONTINUED | OUTPATIENT
Start: 2018-03-31 | End: 2018-03-31

## 2018-03-30 RX ORDER — LEVOFLOXACIN 5 MG/ML
750 INJECTION, SOLUTION INTRAVENOUS
Status: DISCONTINUED | OUTPATIENT
Start: 2018-03-31 | End: 2018-03-31 | Stop reason: SDUPTHER

## 2018-03-30 RX ORDER — ATORVASTATIN CALCIUM 10 MG/1
20 TABLET, FILM COATED ORAL NIGHTLY
Status: DISCONTINUED | OUTPATIENT
Start: 2018-03-30 | End: 2018-04-06 | Stop reason: HOSPADM

## 2018-03-30 RX ORDER — BUDESONIDE AND FORMOTEROL FUMARATE DIHYDRATE 160; 4.5 UG/1; UG/1
2 AEROSOL RESPIRATORY (INHALATION)
Status: DISCONTINUED | OUTPATIENT
Start: 2018-03-30 | End: 2018-03-31

## 2018-03-30 RX ORDER — HYDROXYZINE HYDROCHLORIDE 25 MG/1
50 TABLET, FILM COATED ORAL DAILY
Status: DISCONTINUED | OUTPATIENT
Start: 2018-03-31 | End: 2018-04-02

## 2018-03-30 RX ORDER — ONDANSETRON 2 MG/ML
4 INJECTION INTRAMUSCULAR; INTRAVENOUS EVERY 6 HOURS PRN
Status: DISCONTINUED | OUTPATIENT
Start: 2018-03-30 | End: 2018-04-06 | Stop reason: HOSPADM

## 2018-03-30 RX ADMIN — IPRATROPIUM BROMIDE 0.5 MG: 0.5 SOLUTION RESPIRATORY (INHALATION) at 23:40

## 2018-03-30 NOTE — DI
EXAM:  Single AP view of the chest 

  

HISTORY: Shortness of breath and tube placement. 

  

COMPARISON:  Chest x-ray 02/15/2018 and multiple priors 

  

FINDINGS: Cardiomediastinal silhouette is enlarged. There is a tube overlying the airway with the tip
 2.1 cm above the yosef. There is no pneumothorax.  There is questionable small pleural effusions wi
th pulmonary vascular indistinctness and ground-glass.  The osseous structures demonstrate degenerati
ve disease of the spine and shoulders. 

  

IMPRESSION: 

1.  ET tube with tip 2.1 cm above the yosef. 

2.  Cardiomegaly with pulmonary vascular indistinctness and ground-glass suggestive of edema, but aty
pical infection could also be considered. 

3.  Small bilateral pleural effusions.

## 2018-03-30 NOTE — ED.PDOC
Procedures





- Intubation


Time of Intubation: 17:55


Medications: Yes: Succinylcholine, Other (amidate 20mg )


Type of Tube Used: Endotracheal


Tube Size: 7


Cricoid Pressure Used: No


Tube Gamble Used: Yes


Position of Tube at Lip: 21cm


Number of Attempts: 1


Suction Used: No


Glidescope Used: No


CO2 Detector Used: Yes


Intubation Complications: Present: No complications


Tube Inserted By: Dr Brown/Ronald Tellez CRNA


Tube Placement Verified by X-ray: Yes





Conscious Sedation





- Pre-op Assessment


Weight: 197 lb 1.492 oz


Surgical History: back surg 1991, gallbladder,





- Medical History


Past Medical History: Hypertension, Anemia, High Lipids, CHF, COPD, Anxiety, 

Arthritis, CAD





- Physical Exam


Heart Rate/Rhythm: Tachycardia

## 2018-03-30 NOTE — ED.PDOC
General


ED Provider: 


Dr. ROSENDO TAYLOR





Chief Complaint: Shortness of Air


Stated Complaint: ACUTE  SHORTNESS OF BREATH


Time Seen by Physician: 17:25 (LABORED BREATHING MARKEDLY DIMINSHED BREATH 

SOUNDS  TACHYPNEA )


Mode of Arrival: Ambulance


Information Source: Patient, EMT


Exam Limitations: Clinical condition


Primary Care Provider: 


MINDY SINGER





Nursing and Triage Documentation Reviewed and Agree: Yes


Reviewed sepsis parameters & appropriate labs ordered?: Yes (PT  ON ARIVAL 

LETHARGIC BUT EASILY AROUSABLE)


System Inflammatory Response Syndrome: Not Applicable


Sepsis Protocol: 


For patient's 13 years and over:





Temp is 96.8 and below  and greater


Pulse >90 BPM


Resp >20/minute


Acutely Altered Mental Status





Are patient's symptoms suggestive of a new infection, such as:


   -Pneumonia


   -Skin, Soft Tissue


   -Endocarditis


   -UTI


   -Bone, Joint Infection


   -Implantable Device


   -Acute Abdominal Infection


   -Wound Infection


   -Meningitis


   -Blood Stream Catheter Infection


   -Unknown





System Inflammatory Response Syndrome: Not Applicable





Respiratory Complaint Exam





- Shortness of Air Complaint/Exam


Onset/Duration: TODAY PRIOR TO ARRIVAL


Symptoms Are: Still present


Timing: Constant


Initial Severity: Severe


Current Severity: Severe


Character: Reports: Dyspnea at rest, Dyspnea on exertion, Orthopnea


Aggravating: Reports: Recumbent position


Alleviating: Reports: Bronchodilators, Oxygen (BUT MINOR IMPROVMENT ), Upright 

position


History of Healthcare-Acquired Pneumonia: No


Pulmonary Embolism Risk Factors: Reports: Bedrest


Cardiac Risk Factors: Reports: CAD (COPD , CHF), Elevated lipids, Hypertension


Pseudomonas Risk Factors: Reports: Chronic Lung Disease (COPD )


Tuberculosis Risk Factors: Reports: None


Home Oxygen Use: No


Recent Stress Test: No


Recent Echo/LV Function: No


Respiratory Distress: Severe


Stridor Present: No


Tracheal Deviation: No


Subcutaneous Emphysema: No


Accessory Muscle Use: Yes (ABDOMINAL, INTERCOSTAL)


Retractions: Intercostal


Diminished Breath Sounds: Yes


Prolonged Expiratory Phase: Yes


Unable to Speak Full Sentences: Yes


Fatigue: Yes


Leg Swelling: Yes


Marisabel's Sign Present: No


Grunting Respirations: No


Differential Diagnoses: Asthma, CHF, Pulmonary Edema, COPD Exacerbation, 

Pneumonia, Bronchitis


Quality Indicators for AMI: EKG in 10min.


Quality Indicators for Cardiac Chest Pain: EKG in 10min.


Quality Indicator For Non-Traumatic Chest Pain/Syncope: EKG Performed





Review of Systems





- Review Of Systems


Constitutional: Reports: Malaise, Weakness


Eyes: Reports: No symptoms


Ears, Nose, Mouth, Throat: Reports: No symptoms


Respiratory: Reports: Cough, Short of air, Wheezing


Cardiac: Reports: No symptoms


GI: Reports: No symptoms


: Reports: No symptoms


Musculoskeletal: Reports: Other (EDEMA LOWER LEGS )


Skin: Reports: No symptoms


Neurological: Reports: No symptoms


Endocrine: Reports: No symptoms


Hematologic/Lymphatic: Reports: No symptoms


All Other Systems: Reviewed and Negative





Past Medical History





- Past Medical History


Previously Healthy: No


Endocrine: Reports: Unknown


Cardiovascular: Reports: Hypertension, CHF, A-Fib, Unknown


Respiratory: Reports: COPD


Hematological: Reports: None


Gastrointestinal: Reports: None


Genitourinary: Reports: None


Neuro/Psych: Reports: None


Musculoskeletal: Reports: None


Cancer: Reports: None


Last Menstrual Period: na





- Surgical History


General Surgical History: Reports: Unknown





- Family History


Family History: Reports: Unknown





- Social History


Smoking Status: Former smoker


Hx Substance Use: No


Alcohol Screening: None





- Immunizations


Tetanus Shot up to Date: Yes


Pneumococcal Vaccine up to Date: No





Physical Exam





- Physical Exam


Appearance: Ill-appearing


Ill-appearing: Severe


Pain Distress: Mild


Eyes: ANITA, EOMI, Conjunctiva clear


ENT: Ears normal, Nose normal, Oropharynx normal


Respiratory: Breath sounds diminished, Wheezes (TACHYPNEA, WHEEZING MARKEDLY 

DIMISHED BREATH SOUNDS NOTED  LABORED BREATHING , LETHARGIC )


Cardiovascular: RRR, Pulses normal, No rub, No murmur


GI/: Soft, Nontender, No masses, Bowel sounds normal, No Organomegaly


Musculoskeletal: Normal strength, ROM intact, No edema, No calf tenderness


Skin: Warm, Dry, Normal color


Neurological: Sensation intact, Motor intact, Reflexes intact, Cranial nerves 

intact, Alert, Oriented


Psychiatric: Affect appropriate, Mood appropriate





Procedures





- Intubation


Indication: Present: Respiratory Insufficiency, Altered Mental Status, Airway 

Protection, Other (BECOMING FATIGUED )


Type of Tube Used: Endotracheal


Tube Size: 7.0


Cricoid Pressure Used: Yes


Tube Gamble Used: Yes


Position of Tube at Lip: 21


Number of Attempts: 1


Suction Used: Yes


Glidescope Used: No


CO2 Detector Used: Yes


Lung Sounds Equal Bilaterally: Yes


Intubation Complications: Present: No complications


Tube Inserted By: RAFATI 


Tube Placement Verified by X-ray: Yes


Progress/Xray Impression: POST INTUBATION B/P INCREASED ADRESSED WITH  FENTANYL 

AND PROPOFOL DRIP





Physician Notification





- Case Discussed


Physician Notified: GRAHAM ROWELL


Time of Notification: 18:16 (TRANSFER )





Critical Care Note





- Critical Care Note


Total Time (mins): 1





Course





- Course


Hematology/Chemistry: 


 03/30/18 17:41





Orders, Labs, Meds: 





Lab Review











  03/30/18





  17:41


 


WBC  4.51 L


 


RBC  3.46 L


 


Hgb  8.7 L


 


Hct  27.4 L


 


MCV  79.2 L


 


MCH  25.1 L


 


MCHC  31.8


 


RDW Coeff of Sandra  19.6 H


 


Plt Count  56 L


 


Immature Gran % (Auto)  0.7


 


Neut % (Auto)  58.8


 


Lymph % (Auto)  29.7


 


Mono % (Auto)  10.4 H


 


Eos % (Auto)  0.2


 


Baso % (Auto)  0.2


 


Immature Gran # (Auto)  0.0


 


Neut # (Auto)  2.7


 


Lymph # (Auto)  1.3


 


Mono # (Auto)  0.5


 


Eos # (Auto)  0.0


 


Baso # (Auto)  0.0








Orders











 Category Date Time Status


 


 EKG-(ED ONLY) Stat CARDIO  03/30/18 17:37 Ordered


 


 NEBULIZER TREATMENT Stat CARDIO  03/30/18 17:38 Ordered


 


 ED IV/MEDIPORT/POWERPORT .ONCE EMERGENCY  03/30/18 17:37 Active


 


 BLOOD CULTURE Stat [facility]  03/30/18 17:41 Ordered


 


 CBC W/ AUTO DIFF Stat [facility]  03/30/18 17:41 Completed


 


 COMPREHENSIVE METABOLIC PANEL Stat [facility]  03/30/18 17:41 Received


 


 CREATINE KINASE Stat [facility]  03/30/18 17:41 Received


 


 FLU A/B MOLECULAR Stat [facility]  03/30/18 17:46 Received


 


 PARTIAL THROMBOPLASTIN TIME Stat [facility]  03/30/18 17:41 Received


 


 PROCALCITONIN Stat [facility]  03/30/18 17:41 Received


 


 PT WITH INR Stat [facility]  03/30/18 17:41 Received


 


 TROPONIN I Stat [facility]  03/30/18 17:41 Received


 


 0.9 % Sodium Chloride [Saline Flush] MEDS  03/30/18 17:37 Ordered





 1 syr IVF PRN PRN   


 


 Etomidate [Amidate] MEDS  03/30/18 17:52 Discontinued





 20 mg IVP .STK-MED ONE   


 


 Etomidate [Amidate] MEDS  03/30/18 18:01 Stat





 20 mg IVP ONCE STA   


 


 Ipratropium/Albuterol Neb [Duoneb] MEDS  03/30/18 17:39 Discontinued





 1 vial NEB .STK-MED ONE   


 


 Ipratropium/Albuterol Neb [Duoneb] MEDS  03/30/18 17:38 Discontinued





 1 vial NEB ONCE STA   


 


 Rocuronium Bromide [Zemuron] MEDS  03/30/18 17:42 Discontinued





 10 mg .ROUTE .STK-MED ONE   


 


 Succinylcholine Chloride [Anectine] MEDS  03/30/18 18:01 Stat





 100 mg IVP ONCE STA   


 


 Succinylcholine Chloride [Anectine] MEDS  03/30/18 17:41 Discontinued





 20 mg .ROUTE .STK-MED ONE   


 


 Vecuronium Bromide [Norcuron] MEDS  03/30/18 17:43 Discontinued





 10 mg .ROUTE .STK-MED ONE   


 


 CHEST, 1V AP ONLY Stat RADS  03/30/18 17:37 Ordered








Medications











Generic Name Dose Route Start Last Admin





  Trade Name Freq  PRN Reason Stop Dose Admin


 


Etomidate  20 mg  03/30/18 18:01  





  Amidate  IVP  03/30/18 18:02  





  ONCE STA   


 


Sodium Chloride  1 syr  03/30/18 17:37  





  Saline Flush  IVF   





  PRN PRN   





  To flush IV   














Discontinued Medications














Generic Name Dose Route Start Last Admin





  Trade Name Freq  PRN Reason Stop Dose Admin


 


Albuterol/Ipratropium  1 vial  03/30/18 17:38  





  Duoneb  NEB  03/30/18 17:39  





  ONCE STA   











Vital Signs: 





 











  Temp Pulse Resp BP Pulse Ox


 


 03/30/18 17:25  98.3 F  123 H  32 H  154/82 H  88 L














Departure





- Departure


Time of Disposition: 19:00


Disposition: HOME SELF-CARE


Discharge Problem: 


 Required emergency intubation





Atrial fibrillation


Qualifiers:


 Atrial fibrillation type: unspecified Qualified Code(s): I48.91 - Unspecified 

atrial fibrillation





Respiratory failure


Qualifiers:


 Chronicity: acute Respiratory failure complication: hypercapnia Qualified Code(

s): J96.02 - Acute respiratory failure with hypercapnia





Anemia


Qualifiers:


 Anemia type: unspecified type Qualified Code(s): D64.9 - Anemia, unspecified





Condition: Fair


Pt referred to PMD for follow-up: Yes


IPMP verified?: No


Allergies/Adverse Reactions: 


Allergies





No Known Allergies Allergy (Verified 02/15/18 11:04)


 








Home Medications: 


Ambulatory Orders





Albuterol Sulfate [Proair Hfa] 2 puff IH 3-4XD PRN 10/14/14 


Budesonide/Formoterol Fumarate [Symbicort 160-4.5 Mcg Inhaler] 2 puff IH BID 10/

14/14 


Gabapentin [Gabapentin] 300 mg PO TID 10/14/14 


Hydrocodone/Acetaminophen [Hydrocodon-Acetaminoph 7.5-325] 1 tab PO BID PRN 10/

14/14 


Potassium Chloride [K-Dur] 20 meq PO DAILY 10/14/14 


Furosemide [Lasix Tab] 40 mg PO QDAC #30 tablet 10/20/14 


Tiotropium Bromide [Spiriva] 1 cap IH DAILY 04/30/15 


Atorvastatin Calcium 20 mg PO DAILY 05/19/17 


Tramadol HCl 50 mg PO TID PRN 05/19/17 


Hydroxyzine Pamoate 50 mg PO DAILY 05/20/17 


Amlodipine Besylate [Norvasc] 5 mg PO DAILY #30 tablet 05/24/17 


Losartan Potassium [Cozaar] 100 mg PO DAILY #30 tablet 05/24/17 


Carvedilol [Coreg] 6.25 mg PO BIDWM 02/15/18 


Sucralfate [Carafate] 1 gm PO ACHS 02/15/18

## 2018-03-31 ENCOUNTER — APPOINTMENT (OUTPATIENT)
Dept: CARDIOLOGY | Facility: HOSPITAL | Age: 83
End: 2018-03-31
Attending: FAMILY MEDICINE

## 2018-03-31 ENCOUNTER — APPOINTMENT (OUTPATIENT)
Dept: GENERAL RADIOLOGY | Facility: HOSPITAL | Age: 83
End: 2018-03-31

## 2018-03-31 ENCOUNTER — APPOINTMENT (OUTPATIENT)
Dept: CT IMAGING | Facility: HOSPITAL | Age: 83
End: 2018-03-31

## 2018-03-31 LAB
ALBUMIN SERPL-MCNC: 3.5 G/DL (ref 3.5–5)
ALBUMIN/GLOB SERPL: 1.2 G/DL (ref 1.1–2.5)
ALP SERPL-CCNC: 70 U/L (ref 24–120)
ALT SERPL W P-5'-P-CCNC: 23 U/L (ref 0–54)
ANION GAP SERPL CALCULATED.3IONS-SCNC: 11 MMOL/L (ref 4–13)
ANISOCYTOSIS BLD QL: ABNORMAL
ARTERIAL PATENCY WRIST A: POSITIVE
AST SERPL-CCNC: 69 U/L (ref 7–45)
ATMOSPHERIC PRESS: 757 MMHG
BASE EXCESS BLDA CALC-SCNC: 2.1 MMOL/L (ref 0–2)
BASO STIPL COARSE BLD QL SMEAR: ABNORMAL
BDY SITE: ABNORMAL
BH CV ECHO MEAS - AO MAX PG (FULL): 2.8 MMHG
BH CV ECHO MEAS - AO MAX PG: 7.8 MMHG
BH CV ECHO MEAS - AO MEAN PG (FULL): 2 MMHG
BH CV ECHO MEAS - AO MEAN PG: 4 MMHG
BH CV ECHO MEAS - AO ROOT AREA (BSA CORRECTED): 1.6
BH CV ECHO MEAS - AO ROOT AREA: 7.5 CM^2
BH CV ECHO MEAS - AO ROOT DIAM: 3.1 CM
BH CV ECHO MEAS - AO V2 MAX: 140 CM/SEC
BH CV ECHO MEAS - AO V2 MEAN: 90.5 CM/SEC
BH CV ECHO MEAS - AO V2 VTI: 23.5 CM
BH CV ECHO MEAS - AVA(I,A): 2.5 CM^2
BH CV ECHO MEAS - AVA(I,D): 2.5 CM^2
BH CV ECHO MEAS - AVA(V,A): 2.5 CM^2
BH CV ECHO MEAS - AVA(V,D): 2.5 CM^2
BH CV ECHO MEAS - BSA(HAYCOCK): 2 M^2
BH CV ECHO MEAS - BSA: 1.9 M^2
BH CV ECHO MEAS - BZI_BMI: 33.7 KILOGRAMS/M^2
BH CV ECHO MEAS - BZI_METRIC_HEIGHT: 160 CM
BH CV ECHO MEAS - BZI_METRIC_WEIGHT: 86.2 KG
BH CV ECHO MEAS - CONTRAST EF 4CH: 46.7 ML/M^2
BH CV ECHO MEAS - EDV(CUBED): 216 ML
BH CV ECHO MEAS - EDV(MOD-SP4): 184 ML
BH CV ECHO MEAS - EDV(TEICH): 180 ML
BH CV ECHO MEAS - EF(CUBED): 23 %
BH CV ECHO MEAS - EF(MOD-SP4): 46.7 %
BH CV ECHO MEAS - EF(TEICH): 18.1 %
BH CV ECHO MEAS - ESV(CUBED): 166.4 ML
BH CV ECHO MEAS - ESV(MOD-SP4): 98.1 ML
BH CV ECHO MEAS - ESV(TEICH): 147.4 ML
BH CV ECHO MEAS - FS: 8.3 %
BH CV ECHO MEAS - IVS/LVPW: 1.1
BH CV ECHO MEAS - IVSD: 1.2 CM
BH CV ECHO MEAS - LA DIMENSION: 2.9 CM
BH CV ECHO MEAS - LA/AO: 0.94
BH CV ECHO MEAS - LV DIASTOLIC VOL/BSA (35-75): 97.2 ML/M^2
BH CV ECHO MEAS - LV MASS(C)D: 296.6 GRAMS
BH CV ECHO MEAS - LV MASS(C)DI: 156.8 GRAMS/M^2
BH CV ECHO MEAS - LV MAX PG: 5 MMHG
BH CV ECHO MEAS - LV MEAN PG: 2 MMHG
BH CV ECHO MEAS - LV SYSTOLIC VOL/BSA (12-30): 51.8 ML/M^2
BH CV ECHO MEAS - LV V1 MAX: 112 CM/SEC
BH CV ECHO MEAS - LV V1 MEAN: 70.4 CM/SEC
BH CV ECHO MEAS - LV V1 VTI: 18.6 CM
BH CV ECHO MEAS - LVIDD: 6 CM
BH CV ECHO MEAS - LVIDS: 5.5 CM
BH CV ECHO MEAS - LVLD AP4: 9.1 CM
BH CV ECHO MEAS - LVLS AP4: 7.8 CM
BH CV ECHO MEAS - LVOT AREA (M): 3.1 CM^2
BH CV ECHO MEAS - LVOT AREA: 3.1 CM^2
BH CV ECHO MEAS - LVOT DIAM: 2 CM
BH CV ECHO MEAS - LVPWD: 1.1 CM
BH CV ECHO MEAS - MV A MAX VEL: 114 CM/SEC
BH CV ECHO MEAS - MV DEC SLOPE: 441.5 CM/SEC^2
BH CV ECHO MEAS - MV DEC TIME: 0.13 SEC
BH CV ECHO MEAS - MV E MAX VEL: 92.6 CM/SEC
BH CV ECHO MEAS - MV E/A: 0.81
BH CV ECHO MEAS - MV MAX PG: 6.1 MMHG
BH CV ECHO MEAS - MV MEAN PG: 3 MMHG
BH CV ECHO MEAS - MV P1/2T MAX VEL: 99.5 CM/SEC
BH CV ECHO MEAS - MV P1/2T: 66 MSEC
BH CV ECHO MEAS - MV V2 MAX: 123 CM/SEC
BH CV ECHO MEAS - MV V2 MEAN: 74.5 CM/SEC
BH CV ECHO MEAS - MV V2 VTI: 32.2 CM
BH CV ECHO MEAS - MVA P1/2T LCG: 2.2 CM^2
BH CV ECHO MEAS - MVA(P1/2T): 3.3 CM^2
BH CV ECHO MEAS - MVA(VTI): 1.8 CM^2
BH CV ECHO MEAS - PA MAX PG: 1.8 MMHG
BH CV ECHO MEAS - PA V2 MAX: 66.5 CM/SEC
BH CV ECHO MEAS - RAP SYSTOLE: 5 MMHG
BH CV ECHO MEAS - RVSP: 12.8 MMHG
BH CV ECHO MEAS - SI(AO): 93.7 ML/M^2
BH CV ECHO MEAS - SI(CUBED): 26.2 ML/M^2
BH CV ECHO MEAS - SI(LVOT): 30.9 ML/M^2
BH CV ECHO MEAS - SI(MOD-SP4): 45.4 ML/M^2
BH CV ECHO MEAS - SI(TEICH): 17.2 ML/M^2
BH CV ECHO MEAS - SV(AO): 177.4 ML
BH CV ECHO MEAS - SV(CUBED): 49.6 ML
BH CV ECHO MEAS - SV(LVOT): 58.4 ML
BH CV ECHO MEAS - SV(MOD-SP4): 85.9 ML
BH CV ECHO MEAS - SV(TEICH): 32.6 ML
BH CV ECHO MEAS - TR MAX VEL: 140 CM/SEC
BILIRUB SERPL-MCNC: 1 MG/DL (ref 0.1–1)
BODY TEMPERATURE: 37 C
BUN BLD-MCNC: 23 MG/DL (ref 5–21)
BUN/CREAT SERPL: 13.1 (ref 7–25)
CALCIUM SPEC-SCNC: 8.4 MG/DL (ref 8.4–10.4)
CHLORIDE SERPL-SCNC: 96 MMOL/L (ref 98–110)
CO2 SERPL-SCNC: 33 MMOL/L (ref 24–31)
CREAT BLD-MCNC: 1.76 MG/DL (ref 0.5–1.4)
DEPRECATED RDW RBC AUTO: 55.1 FL (ref 40–54)
E/E' RATIO: 31.5
ELLIPTOCYTES BLD QL SMEAR: ABNORMAL
ERYTHROCYTE [DISTWIDTH] IN BLOOD BY AUTOMATED COUNT: 19.9 % (ref 12–15)
FERRITIN SERPL-MCNC: 287 NG/ML (ref 11.1–264)
FOLATE SERPL-MCNC: 8.39 NG/ML
GFR SERPL CREATININE-BSD FRML MDRD: 28 ML/MIN/1.73
GLOBULIN UR ELPH-MCNC: 3 GM/DL
GLUCOSE BLD-MCNC: 135 MG/DL (ref 70–100)
GLUCOSE BLDC GLUCOMTR-MCNC: 164 MG/DL (ref 70–130)
GLUCOSE BLDC GLUCOMTR-MCNC: 164 MG/DL (ref 70–130)
GLUCOSE BLDC GLUCOMTR-MCNC: 171 MG/DL (ref 70–130)
GLUCOSE BLDC GLUCOMTR-MCNC: 175 MG/DL (ref 70–130)
GLUCOSE BLDC GLUCOMTR-MCNC: 189 MG/DL (ref 70–130)
HBA1C MFR BLD: 4.2 %
HCO3 BLDA-SCNC: 28.4 MMOL/L (ref 20–26)
HCT VFR BLD AUTO: 28.2 % (ref 37–47)
HGB BLD-MCNC: 8.7 G/DL (ref 12–16)
HOROWITZ INDEX BLD+IHG-RTO: 40 %
HYPOCHROMIA BLD QL: ABNORMAL
IRON 24H UR-MRATE: 50 MCG/DL (ref 42–180)
IRON SATN MFR SERPL: 20 % (ref 20–45)
LEFT ATRIUM VOLUME INDEX: 20.9 ML/M2
LEFT ATRIUM VOLUME: 39.5 CM3
LV EF 2D ECHO EST: 47 %
LYMPHOCYTES # BLD MANUAL: 0.87 10*3/MM3 (ref 0.72–4.86)
LYMPHOCYTES NFR BLD MANUAL: 18 % (ref 15–45)
LYMPHOCYTES NFR BLD MANUAL: 2 % (ref 4–12)
Lab: ABNORMAL
MAGNESIUM SERPL-MCNC: 2.3 MG/DL (ref 1.4–2.2)
MAXIMAL PREDICTED HEART RATE: 140 BPM
MCH RBC QN AUTO: 24.6 PG (ref 28–32)
MCHC RBC AUTO-ENTMCNC: 30.9 G/DL (ref 33–36)
MCV RBC AUTO: 79.9 FL (ref 82–98)
MODALITY: ABNORMAL
MONOCYTES # BLD AUTO: 0.1 10*3/MM3 (ref 0.19–1.3)
NEUTROPHILS # BLD AUTO: 3.78 10*3/MM3 (ref 1.87–8.4)
NEUTROPHILS NFR BLD MANUAL: 72 % (ref 39–78)
NEUTS BAND NFR BLD MANUAL: 6 % (ref 0–10)
NRBC BLD MANUAL-RTO: 0 /100 WBC (ref 0–0)
NT-PROBNP SERPL-MCNC: ABNORMAL PG/ML (ref 0–1800)
PCO2 BLDA: 52.7 MM HG (ref 35–45)
PEEP RESPIRATORY: 5 CM[H2O]
PH BLDA: 7.34 PH UNITS (ref 7.35–7.45)
PHOSPHATE SERPL-MCNC: 7.1 MG/DL (ref 2.5–4.5)
PLATELET # BLD AUTO: 56 10*3/MM3 (ref 130–400)
PMV BLD AUTO: 10.4 FL (ref 6–12)
PO2 BLDA: 113 MM HG (ref 83–108)
POIKILOCYTOSIS BLD QL SMEAR: ABNORMAL
POLYCHROMASIA BLD QL SMEAR: ABNORMAL
POTASSIUM BLD-SCNC: 4.2 MMOL/L (ref 3.5–5.3)
POTASSIUM BLD-SCNC: 4.8 MMOL/L (ref 3.5–5.3)
PROT SERPL-MCNC: 6.5 G/DL (ref 6.3–8.7)
PSV: 10 CMH2O
RBC # BLD AUTO: 3.53 10*6/MM3 (ref 4.2–5.4)
RETICS #: 0.24 10*6/MM3 (ref 0.02–0.13)
RETICS/RBC NFR AUTO: 6.84 % (ref 0.6–1.8)
SAO2 % BLDCOA: 99.4 % (ref 94–99)
SET MECH RESP RATE: 14
SMALL PLATELETS BLD QL SMEAR: ABNORMAL
SODIUM BLD-SCNC: 140 MMOL/L (ref 135–145)
STRESS TARGET HR: 119 BPM
TIBC SERPL-MCNC: 256 MCG/DL (ref 225–420)
TROPONIN I SERPL-MCNC: 0.28 NG/ML (ref 0–0.03)
TROPONIN I SERPL-MCNC: 0.33 NG/ML (ref 0–0.03)
TROPONIN I SERPL-MCNC: 0.37 NG/ML (ref 0–0.03)
VARIANT LYMPHS NFR BLD MANUAL: 2 % (ref 0–5)
VENTILATOR MODE: ABNORMAL
VIT B12 BLD-MCNC: 594 PG/ML (ref 239–931)
VT ON VENT VENT: 500 ML
WBC MORPH BLD: NORMAL
WBC NRBC COR # BLD: 4.84 10*3/MM3 (ref 4.8–10.8)

## 2018-03-31 PROCEDURE — 0 IOPAMIDOL PER 1 ML: Performed by: INTERNAL MEDICINE

## 2018-03-31 PROCEDURE — 82803 BLOOD GASES ANY COMBINATION: CPT

## 2018-03-31 PROCEDURE — 82607 VITAMIN B-12: CPT | Performed by: FAMILY MEDICINE

## 2018-03-31 PROCEDURE — 25010000002 PIPERACILLIN SOD-TAZOBACTAM PER 1 G: Performed by: FAMILY MEDICINE

## 2018-03-31 PROCEDURE — 82746 ASSAY OF FOLIC ACID SERUM: CPT | Performed by: FAMILY MEDICINE

## 2018-03-31 PROCEDURE — 25010000002 VANCOMYCIN 10 G RECONSTITUTED SOLUTION: Performed by: FAMILY MEDICINE

## 2018-03-31 PROCEDURE — 85014 HEMATOCRIT: CPT | Performed by: FAMILY MEDICINE

## 2018-03-31 PROCEDURE — 84484 ASSAY OF TROPONIN QUANT: CPT | Performed by: FAMILY MEDICINE

## 2018-03-31 PROCEDURE — 94770: CPT

## 2018-03-31 PROCEDURE — 94799 UNLISTED PULMONARY SVC/PX: CPT

## 2018-03-31 PROCEDURE — 82728 ASSAY OF FERRITIN: CPT | Performed by: FAMILY MEDICINE

## 2018-03-31 PROCEDURE — 83550 IRON BINDING TEST: CPT | Performed by: FAMILY MEDICINE

## 2018-03-31 PROCEDURE — 36600 WITHDRAWAL OF ARTERIAL BLOOD: CPT

## 2018-03-31 PROCEDURE — 93308 TTE F-UP OR LMTD: CPT

## 2018-03-31 PROCEDURE — 85025 COMPLETE CBC W/AUTO DIFF WBC: CPT | Performed by: FAMILY MEDICINE

## 2018-03-31 PROCEDURE — 87147 CULTURE TYPE IMMUNOLOGIC: CPT | Performed by: FAMILY MEDICINE

## 2018-03-31 PROCEDURE — 82962 GLUCOSE BLOOD TEST: CPT

## 2018-03-31 PROCEDURE — 85007 BL SMEAR W/DIFF WBC COUNT: CPT | Performed by: FAMILY MEDICINE

## 2018-03-31 PROCEDURE — 93321 DOPPLER ECHO F-UP/LMTD STD: CPT

## 2018-03-31 PROCEDURE — 71045 X-RAY EXAM CHEST 1 VIEW: CPT

## 2018-03-31 PROCEDURE — 71275 CT ANGIOGRAPHY CHEST: CPT

## 2018-03-31 PROCEDURE — 83735 ASSAY OF MAGNESIUM: CPT | Performed by: FAMILY MEDICINE

## 2018-03-31 PROCEDURE — 82747 ASSAY OF FOLIC ACID RBC: CPT | Performed by: FAMILY MEDICINE

## 2018-03-31 PROCEDURE — 93010 ELECTROCARDIOGRAM REPORT: CPT | Performed by: INTERNAL MEDICINE

## 2018-03-31 PROCEDURE — 94760 N-INVAS EAR/PLS OXIMETRY 1: CPT

## 2018-03-31 PROCEDURE — 25010000002 HEPARIN (PORCINE) PER 1000 UNITS: Performed by: FAMILY MEDICINE

## 2018-03-31 PROCEDURE — 84132 ASSAY OF SERUM POTASSIUM: CPT | Performed by: FAMILY MEDICINE

## 2018-03-31 PROCEDURE — 93321 DOPPLER ECHO F-UP/LMTD STD: CPT | Performed by: INTERNAL MEDICINE

## 2018-03-31 PROCEDURE — 80053 COMPREHEN METABOLIC PANEL: CPT | Performed by: FAMILY MEDICINE

## 2018-03-31 PROCEDURE — 25010000002 PROPOFOL 1000 MG/ML EMULSION: Performed by: FAMILY MEDICINE

## 2018-03-31 PROCEDURE — 25010000002 METHYLPREDNISOLONE PER 125 MG: Performed by: FAMILY MEDICINE

## 2018-03-31 PROCEDURE — 63710000001 INSULIN LISPRO (HUMAN) PER 5 UNITS: Performed by: FAMILY MEDICINE

## 2018-03-31 PROCEDURE — 25010000002 FUROSEMIDE PER 20 MG: Performed by: FAMILY MEDICINE

## 2018-03-31 PROCEDURE — 25010000002 PERFLUTREN 6.52 MG/ML SUSPENSION: Performed by: FAMILY MEDICINE

## 2018-03-31 PROCEDURE — 85045 AUTOMATED RETICULOCYTE COUNT: CPT | Performed by: FAMILY MEDICINE

## 2018-03-31 PROCEDURE — 93325 DOPPLER ECHO COLOR FLOW MAPG: CPT | Performed by: INTERNAL MEDICINE

## 2018-03-31 PROCEDURE — 83540 ASSAY OF IRON: CPT | Performed by: FAMILY MEDICINE

## 2018-03-31 PROCEDURE — 87040 BLOOD CULTURE FOR BACTERIA: CPT | Performed by: FAMILY MEDICINE

## 2018-03-31 PROCEDURE — 83880 ASSAY OF NATRIURETIC PEPTIDE: CPT | Performed by: NURSE PRACTITIONER

## 2018-03-31 PROCEDURE — 84100 ASSAY OF PHOSPHORUS: CPT | Performed by: FAMILY MEDICINE

## 2018-03-31 PROCEDURE — 93005 ELECTROCARDIOGRAM TRACING: CPT | Performed by: FAMILY MEDICINE

## 2018-03-31 PROCEDURE — 93308 TTE F-UP OR LMTD: CPT | Performed by: INTERNAL MEDICINE

## 2018-03-31 PROCEDURE — 87150 DNA/RNA AMPLIFIED PROBE: CPT | Performed by: FAMILY MEDICINE

## 2018-03-31 PROCEDURE — 83036 HEMOGLOBIN GLYCOSYLATED A1C: CPT | Performed by: FAMILY MEDICINE

## 2018-03-31 PROCEDURE — 94003 VENT MGMT INPAT SUBQ DAY: CPT

## 2018-03-31 PROCEDURE — 93325 DOPPLER ECHO COLOR FLOW MAPG: CPT

## 2018-03-31 RX ORDER — FUROSEMIDE 10 MG/ML
40 INJECTION INTRAMUSCULAR; INTRAVENOUS EVERY 12 HOURS SCHEDULED
Status: DISCONTINUED | OUTPATIENT
Start: 2018-03-31 | End: 2018-04-04

## 2018-03-31 RX ORDER — NITROGLYCERIN 0.4 MG/1
0.4 TABLET SUBLINGUAL
COMMUNITY
End: 2018-04-06 | Stop reason: HOSPADM

## 2018-03-31 RX ORDER — LEVOFLOXACIN 5 MG/ML
750 INJECTION, SOLUTION INTRAVENOUS
Status: DISCONTINUED | OUTPATIENT
Start: 2018-03-31 | End: 2018-03-31

## 2018-03-31 RX ORDER — IPRATROPIUM BROMIDE AND ALBUTEROL SULFATE 2.5; .5 MG/3ML; MG/3ML
3 SOLUTION RESPIRATORY (INHALATION)
Status: DISCONTINUED | OUTPATIENT
Start: 2018-03-31 | End: 2018-04-06 | Stop reason: HOSPADM

## 2018-03-31 RX ORDER — POTASSIUM CHLORIDE 600 MG/1
20 CAPSULE, EXTENDED RELEASE ORAL DAILY
COMMUNITY
End: 2018-04-06 | Stop reason: HOSPADM

## 2018-03-31 RX ORDER — FAMOTIDINE 10 MG/ML
20 INJECTION, SOLUTION INTRAVENOUS DAILY
Status: DISCONTINUED | OUTPATIENT
Start: 2018-03-31 | End: 2018-04-05

## 2018-03-31 RX ADMIN — IPRATROPIUM BROMIDE AND ALBUTEROL SULFATE 3 ML: 2.5; .5 SOLUTION RESPIRATORY (INHALATION) at 14:30

## 2018-03-31 RX ADMIN — INSULIN LISPRO 2 UNITS: 100 INJECTION, SOLUTION INTRAVENOUS; SUBCUTANEOUS at 08:29

## 2018-03-31 RX ADMIN — IPRATROPIUM BROMIDE AND ALBUTEROL SULFATE 3 ML: 2.5; .5 SOLUTION RESPIRATORY (INHALATION) at 19:41

## 2018-03-31 RX ADMIN — IPRATROPIUM BROMIDE 0.5 MG: 0.5 SOLUTION RESPIRATORY (INHALATION) at 06:32

## 2018-03-31 RX ADMIN — INSULIN LISPRO 2 UNITS: 100 INJECTION, SOLUTION INTRAVENOUS; SUBCUTANEOUS at 11:58

## 2018-03-31 RX ADMIN — PROPOFOL 25 MCG/KG/MIN: 10 INJECTION, EMULSION INTRAVENOUS at 22:24

## 2018-03-31 RX ADMIN — FAMOTIDINE 20 MG: 10 INJECTION INTRAVENOUS at 11:38

## 2018-03-31 RX ADMIN — TAZOBACTAM SODIUM AND PIPERACILLIN SODIUM 4.5 G: 500; 4 INJECTION, SOLUTION INTRAVENOUS at 07:03

## 2018-03-31 RX ADMIN — INSULIN LISPRO 2 UNITS: 100 INJECTION, SOLUTION INTRAVENOUS; SUBCUTANEOUS at 01:19

## 2018-03-31 RX ADMIN — PERFLUTREN 9.78 MG: 6.52 INJECTION, SUSPENSION INTRAVENOUS at 16:48

## 2018-03-31 RX ADMIN — IOPAMIDOL 100 ML: 755 INJECTION, SOLUTION INTRAVENOUS at 00:24

## 2018-03-31 RX ADMIN — INSULIN LISPRO 2 UNITS: 100 INJECTION, SOLUTION INTRAVENOUS; SUBCUTANEOUS at 18:32

## 2018-03-31 RX ADMIN — FUROSEMIDE 40 MG: 10 INJECTION, SOLUTION INTRAMUSCULAR; INTRAVENOUS at 21:52

## 2018-03-31 RX ADMIN — METHYLPREDNISOLONE SODIUM SUCCINATE 80 MG: 125 INJECTION, POWDER, FOR SOLUTION INTRAMUSCULAR; INTRAVENOUS at 06:31

## 2018-03-31 RX ADMIN — HEPARIN SODIUM 5000 UNITS: 5000 INJECTION, SOLUTION INTRAVENOUS; SUBCUTANEOUS at 06:30

## 2018-03-31 RX ADMIN — METHYLPREDNISOLONE SODIUM SUCCINATE 80 MG: 125 INJECTION, POWDER, FOR SOLUTION INTRAMUSCULAR; INTRAVENOUS at 01:19

## 2018-03-31 RX ADMIN — VANCOMYCIN HYDROCHLORIDE 1500 MG: 10 INJECTION, POWDER, LYOPHILIZED, FOR SOLUTION INTRAVENOUS at 00:39

## 2018-03-31 RX ADMIN — SODIUM CHLORIDE 100 ML/HR: 9 INJECTION, SOLUTION INTRAVENOUS at 00:39

## 2018-03-31 RX ADMIN — HEPARIN SODIUM 5000 UNITS: 5000 INJECTION, SOLUTION INTRAVENOUS; SUBCUTANEOUS at 17:30

## 2018-03-31 RX ADMIN — HEPARIN SODIUM 5000 UNITS: 5000 INJECTION, SOLUTION INTRAVENOUS; SUBCUTANEOUS at 21:52

## 2018-03-31 RX ADMIN — PROPOFOL 5 MCG/KG/MIN: 10 INJECTION, EMULSION INTRAVENOUS at 00:38

## 2018-03-31 RX ADMIN — HEPARIN SODIUM 5000 UNITS: 5000 INJECTION, SOLUTION INTRAVENOUS; SUBCUTANEOUS at 01:20

## 2018-03-31 RX ADMIN — FUROSEMIDE 40 MG: 10 INJECTION, SOLUTION INTRAMUSCULAR; INTRAVENOUS at 09:30

## 2018-03-31 RX ADMIN — IPRATROPIUM BROMIDE AND ALBUTEROL SULFATE 3 ML: 2.5; .5 SOLUTION RESPIRATORY (INHALATION) at 10:32

## 2018-04-01 ENCOUNTER — APPOINTMENT (OUTPATIENT)
Dept: GENERAL RADIOLOGY | Facility: HOSPITAL | Age: 83
End: 2018-04-01

## 2018-04-01 LAB
ANION GAP SERPL CALCULATED.3IONS-SCNC: 10 MMOL/L (ref 4–13)
ARTERIAL PATENCY WRIST A: POSITIVE
ATMOSPHERIC PRESS: 754 MMHG
BACTERIA BLD CULT: ABNORMAL
BACTERIA SPEC AEROBE CULT: NORMAL
BACTERIA SPEC RESP CULT: NORMAL
BACTERIA SPEC RESP CULT: NORMAL
BASE EXCESS BLDA CALC-SCNC: 6.6 MMOL/L (ref 0–2)
BDY SITE: ABNORMAL
BODY TEMPERATURE: 37 C
BUN BLD-MCNC: 35 MG/DL (ref 5–21)
BUN/CREAT SERPL: 21.9 (ref 7–25)
CALCIUM SPEC-SCNC: 7.8 MG/DL (ref 8.4–10.4)
CHLORIDE SERPL-SCNC: 97 MMOL/L (ref 98–110)
CO2 SERPL-SCNC: 33 MMOL/L (ref 24–31)
COHGB MFR BLD: 2.2 % (ref 0–5)
CREAT BLD-MCNC: 1.6 MG/DL (ref 0.5–1.4)
GFR SERPL CREATININE-BSD FRML MDRD: 31 ML/MIN/1.73
GLUCOSE BLD-MCNC: 122 MG/DL (ref 70–100)
GLUCOSE BLDC GLUCOMTR-MCNC: 104 MG/DL (ref 70–130)
GLUCOSE BLDC GLUCOMTR-MCNC: 118 MG/DL (ref 70–130)
GLUCOSE BLDC GLUCOMTR-MCNC: 129 MG/DL (ref 70–130)
GLUCOSE BLDC GLUCOMTR-MCNC: 148 MG/DL (ref 70–130)
GRAM STN SPEC: NORMAL
HCO3 BLDA-SCNC: 31.2 MMOL/L (ref 20–26)
HCT VFR BLD CALC: 25.1 % (ref 38–51)
HGB BLDA-MCNC: 8.2 G/DL (ref 13.5–17.5)
HOROWITZ INDEX BLD+IHG-RTO: 30 %
Lab: ABNORMAL
METHGB BLD QL: 0.6 % (ref 0–3)
MODALITY: ABNORMAL
OXYHGB MFR BLDV: 94.7 % (ref 94–99)
PCO2 BLDA: 44 MM HG (ref 35–45)
PEEP RESPIRATORY: 5 CM[H2O]
PH BLDA: 7.46 PH UNITS (ref 7.35–7.45)
PO2 BLDA: 82.4 MM HG (ref 83–108)
POTASSIUM BLD-SCNC: 3.9 MMOL/L (ref 3.5–5.3)
POTASSIUM BLD-SCNC: 4 MMOL/L (ref 3.5–5.3)
POTASSIUM BLDA-SCNC: 4 MMOL/L (ref 3.5–5.2)
PSV: 15 CMH2O
SAO2 % BLDCOA: 97.4 % (ref 94–99)
SET MECH RESP RATE: 14
SODIUM BLD-SCNC: 140 MMOL/L (ref 135–145)
SODIUM BLDA-SCNC: 140 MMOL/L (ref 136–145)
VENTILATOR MODE: ABNORMAL
VT ON VENT VENT: 500 ML

## 2018-04-01 PROCEDURE — 74018 RADEX ABDOMEN 1 VIEW: CPT

## 2018-04-01 PROCEDURE — 82805 BLOOD GASES W/O2 SATURATION: CPT

## 2018-04-01 PROCEDURE — 94799 UNLISTED PULMONARY SVC/PX: CPT

## 2018-04-01 PROCEDURE — 94770: CPT

## 2018-04-01 PROCEDURE — 36600 WITHDRAWAL OF ARTERIAL BLOOD: CPT

## 2018-04-01 PROCEDURE — 84132 ASSAY OF SERUM POTASSIUM: CPT | Performed by: FAMILY MEDICINE

## 2018-04-01 PROCEDURE — 94003 VENT MGMT INPAT SUBQ DAY: CPT

## 2018-04-01 PROCEDURE — 80048 BASIC METABOLIC PNL TOTAL CA: CPT | Performed by: FAMILY MEDICINE

## 2018-04-01 PROCEDURE — 82375 ASSAY CARBOXYHB QUANT: CPT

## 2018-04-01 PROCEDURE — 83050 HGB METHEMOGLOBIN QUAN: CPT

## 2018-04-01 PROCEDURE — 25010000002 PROPOFOL 1000 MG/ML EMULSION: Performed by: FAMILY MEDICINE

## 2018-04-01 PROCEDURE — 71045 X-RAY EXAM CHEST 1 VIEW: CPT

## 2018-04-01 PROCEDURE — 82962 GLUCOSE BLOOD TEST: CPT

## 2018-04-01 PROCEDURE — 63710000001 INSULIN LISPRO (HUMAN) PER 5 UNITS: Performed by: FAMILY MEDICINE

## 2018-04-01 PROCEDURE — 94760 N-INVAS EAR/PLS OXIMETRY 1: CPT

## 2018-04-01 PROCEDURE — 25010000002 MORPHINE SULFATE (PF) 2 MG/ML SOLUTION: Performed by: FAMILY MEDICINE

## 2018-04-01 PROCEDURE — 25010000002 FUROSEMIDE PER 20 MG: Performed by: FAMILY MEDICINE

## 2018-04-01 PROCEDURE — 25010000002 HEPARIN (PORCINE) PER 1000 UNITS: Performed by: FAMILY MEDICINE

## 2018-04-01 RX ORDER — MORPHINE SULFATE 2 MG/ML
2 INJECTION, SOLUTION INTRAMUSCULAR; INTRAVENOUS EVERY 4 HOURS PRN
Status: DISCONTINUED | OUTPATIENT
Start: 2018-04-01 | End: 2018-04-05

## 2018-04-01 RX ADMIN — PROPOFOL 40 MCG/KG/MIN: 10 INJECTION, EMULSION INTRAVENOUS at 20:15

## 2018-04-01 RX ADMIN — HEPARIN SODIUM 5000 UNITS: 5000 INJECTION, SOLUTION INTRAVENOUS; SUBCUTANEOUS at 06:02

## 2018-04-01 RX ADMIN — ATORVASTATIN CALCIUM 20 MG: 10 TABLET, FILM COATED ORAL at 20:14

## 2018-04-01 RX ADMIN — IPRATROPIUM BROMIDE AND ALBUTEROL SULFATE 3 ML: 2.5; .5 SOLUTION RESPIRATORY (INHALATION) at 06:30

## 2018-04-01 RX ADMIN — IPRATROPIUM BROMIDE AND ALBUTEROL SULFATE 3 ML: 2.5; .5 SOLUTION RESPIRATORY (INHALATION) at 19:26

## 2018-04-01 RX ADMIN — FUROSEMIDE 40 MG: 10 INJECTION, SOLUTION INTRAMUSCULAR; INTRAVENOUS at 09:32

## 2018-04-01 RX ADMIN — HEPARIN SODIUM 5000 UNITS: 5000 INJECTION, SOLUTION INTRAVENOUS; SUBCUTANEOUS at 21:41

## 2018-04-01 RX ADMIN — PROPOFOL 50 MCG/KG/MIN: 10 INJECTION, EMULSION INTRAVENOUS at 11:14

## 2018-04-01 RX ADMIN — HEPARIN SODIUM 5000 UNITS: 5000 INJECTION, SOLUTION INTRAVENOUS; SUBCUTANEOUS at 14:18

## 2018-04-01 RX ADMIN — GABAPENTIN 300 MG: 300 CAPSULE ORAL at 20:14

## 2018-04-01 RX ADMIN — INSULIN LISPRO 2 UNITS: 100 INJECTION, SOLUTION INTRAVENOUS; SUBCUTANEOUS at 00:48

## 2018-04-01 RX ADMIN — FAMOTIDINE 20 MG: 10 INJECTION INTRAVENOUS at 09:32

## 2018-04-01 RX ADMIN — PROPOFOL 35 MCG/KG/MIN: 10 INJECTION, EMULSION INTRAVENOUS at 04:57

## 2018-04-01 RX ADMIN — IPRATROPIUM BROMIDE AND ALBUTEROL SULFATE 3 ML: 2.5; .5 SOLUTION RESPIRATORY (INHALATION) at 14:30

## 2018-04-01 RX ADMIN — FUROSEMIDE 40 MG: 10 INJECTION, SOLUTION INTRAMUSCULAR; INTRAVENOUS at 20:14

## 2018-04-01 RX ADMIN — PROPOFOL 40 MCG/KG/MIN: 10 INJECTION, EMULSION INTRAVENOUS at 15:03

## 2018-04-01 RX ADMIN — IPRATROPIUM BROMIDE AND ALBUTEROL SULFATE 3 ML: 2.5; .5 SOLUTION RESPIRATORY (INHALATION) at 10:39

## 2018-04-01 NOTE — PROGRESS NOTES
PULMONARY AND CRITICAL CARE PROGRESS NOTE - [facility]    Patient: Erin Cox    1935    MR# [id number]    Acct# [id number]  04/01/18   11:24 AM  Referring Provider: Kwasi Sky MD    Chief Complaint: Mechanically ventilated    Interval history: The patient remains sedated and intubated on mechanical ventilator.  Propofol gtt infusing.  Worsening kidney function today with creatinine 1.6.  O2 sat 98%, FiO2 30%.  No other aggravating or allevitating factors.     Meds:    aspirin 81 mg Oral Daily With Breakfast   atorvastatin 20 mg Oral Nightly   famotidine 20 mg Intravenous Daily   furosemide 40 mg Intravenous Q12H   gabapentin 300 mg Oral Nightly   heparin (porcine) 5,000 Units Subcutaneous Q8H   hydrOXYzine 50 mg Oral Daily   insulin lispro 2-7 Units Subcutaneous Q6H   ipratropium-albuterol 3 mL Nebulization 4x Daily - RT       propofol 5-50 mcg/kg/min Last Rate: 50 mcg/kg/min (04/01/18 1114)     Review of Systems:   Cannot obtain due to mechanical ventilation.  The patient notably is critically ill and connected to a ventilator.  As such patient cannot communicate and provide any history whatsoever, including any history of present illness or interval history since arrival or review of systems. The interested reviewer may note this fact, as an attempt has been made at collecting and documenting these portions of the patient history, but this information is unobtainable despite attempted review and therefore cannot be documented at this time.   Ventilator Settings:     Vt (Set, L): 0.5 L  Resp Rate (Set): 14  Pressure Support (cm H2O): 15 cm H20  FiO2 (%): 30 %  PEEP/CPAP (cm H2O): 5 cm H20  Minute Ventilation (L/min) (Obs): 11.6 L/min  Resp Rate (Observed) Vent: 26  I:E Ratio (Set): 1:1.30  I:E Ratio (Obs): 1:1.9  PIP Observed (cm H2O): 20 cm H2O     Physical Exam:  Temp:  [96 °F (35.6 °C)-97.8 °F (36.6 °C)] 96.3 °F (35.7 °C)  Heart Rate:  [63-86] 78  Resp:  [13-25] 17  BP:  ()/(40-84) 120/60  FiO2 (%):  [30 %] 30 %  Intake/Output Summary (Last 24 hours) at 04/01/18 1124  Last data filed at 04/01/18 0732   Gross per 24 hour   Intake           277.26 ml   Output             2825 ml   Net         -2547.74 ml     SpO2 Percentage    04/01/18 1005 04/01/18 1039 04/01/18 1049   SpO2: 97% 98% 100%      Physical Exam   Constitutional: She appears well-developed and well-nourished. She appears ill. She is sedated and intubated.   HENT:   Head: Normocephalic and atraumatic.   Eyes: Conjunctivae and EOM are normal. Pupils are equal, round, and reactive to light. No scleral icterus.   Cardiovascular: Normal rate, regular rhythm and normal heart sounds.  Exam reveals no friction rub.    No murmur heard.  Pulmonary/Chest: Effort normal and breath sounds normal. She is intubated. No respiratory distress. She has no wheezes. She has no rales.   Abdominal: Soft. Bowel sounds are normal. She exhibits no distension.   Musculoskeletal: She exhibits edema.   Neurological:   sedated   Skin: Skin is warm and dry.   Nursing note and vitals reviewed.      Results from last 7 days  Lab Units 03/31/18  0259 03/30/18  2039   WBC 10*3/mm3 4.84 7.04   HEMOGLOBIN g/dL 8.7* 8.6*   PLATELETS 10*3/mm3 56* 67*       Results from last 7 days  Lab Units 04/01/18  0601 04/01/18  0255 03/31/18  1105 03/31/18  0259 03/30/18  2040   SODIUM mmol/L 140  --   --  140 136   SODIUM, ARTERIAL mmol/L  --  140  --   --   --    POTASSIUM mmol/L 3.9  --  4.2 4.8 4.8   BUN mg/dL 35*  --   --  23* 19   CREATININE mg/dL 1.60*  --   --  1.76* 1.38       Results from last 7 days  Lab Units 04/01/18  0255 03/31/18  0200 03/30/18  2110   PH, ARTERIAL pH units 7.458* 7.339* 7.271*   PCO2, ARTERIAL mm Hg 44.0 52.7* 60.3*   PO2 ART mm Hg 82.4* 113.0* 106.0   FIO2 % 30 40 50     Blood Culture   Date Value Ref Range Status   03/31/2018 Abnormal Stain (A)  Preliminary   03/31/2018 Staphylococcus, coagulase negative (A)  Preliminary      Comment:     Probable contaminant     03/31/2018 No growth at 24 hours  Preliminary     Urine Culture   Date Value Ref Range Status   03/30/2018 No growth at 2 days  Final     Respiratory Culture   Date Value Ref Range Status   03/30/2018 Light growth (2+) Normal Respiratory Lenora  Final     Recent films:  Imaging Results (last 24 hours)     Procedure Component Value Units Date/Time    XR Chest 1 View [[id number]] Collected:  04/01/18 1015     Updated:  04/01/18 1019    Narrative:       EXAMINATION: XR CHEST 1 VW-     4/1/2018 3:00 AM CDT     HISTORY: Respiratory failure. Ventilator.     One view chest x-ray compared with yesterday.     The endotracheal tube remains in place with the tip approximately 2 cm  above the isael.     Chronic interstitial lung disease.     There is no pneumothorax or overt heart failure.     No focal consolidation is seen.     Pelvis felt to be developing right basilar infiltrate and pleural fluid  on yesterday's exam is less evident on today's study. No progressive  lung disease.     Summary:  1. Stable chronic change.  This report was finalized on 04/01/2018 10:16 by Dr. Lincoln Griffiths MD.    CT Angiogram Chest With & Without Contrast [[id number]] Collected:  03/31/18 1124     Updated:  03/31/18 1129    Narrative:       EXAMINATION: CT ANGIOGRAM CHEST W WO CONTRAST-      3/31/2018 12:12 AM CDT     HISTORY: Chest pain, acute, PE suspected, intermed prob, positive  D-dimer     In order to have a CT radiation dose as low as reasonably achievable  Automated Exposure Control was utilized for adjustment of the mA and/or  KV according to patient size.     DLP in mGycm= 445.     CT angiography protocol.   CT imaging with bolus IV contrast injection.   Under concurrent supervision axial, sagittal, coronal, and  three-dimensional data sets were constructed.     A preliminary [facility] report was faxed to the Emergency Department  immediately after this study was interpreted at 1:20 AM.      Cardiomegaly.  Aortic calcification with no aneurysm or dissection.     Symmetric pulmonary arteries.  No pulmonary embolism.  Trace amounts of pleural fluid.  Chronic lung change with hyperexpansion.     No pneumonia, pneumothorax, or pleural effusion.     Endotracheal tube present. The tip is 1.1 cm above the isael and should  be pulled back 3-4 cm for better positioning.     Splenomegaly noted.     Summary:  1. Chronic lung changes.  2. No pulmonary embolism.  3. Thyromegaly.  4. Low-lying endotracheal tube.                                   This report was finalized on 03/31/2018 11:26 by Dr. Lincoln Griffiths MD.        Films reviewed personally by me.  My interpretation: ETT intact, some improvement on the right      4/1/18 2D Echo   · Left ventricular systolic function is mildly decreased. Estimated EF = 47%.  · The left ventricular cavity is mildly dilated.  · Left ventricular diastolic dysfunction.  · No evidence of pulmonary hypertension is present.  · Similar to 2/18/17  · The study is technically difficult for diagnosis    Pulmonary Assessment:  1. SEVERE ACUTE RESPIRATORY FAILURE REQUIRING MECHANICAL VENTILATION.  This is a threat to life or pulmonary function, high risk, due to acute on chronic respiratory failure with hypoxemia and hypercarbia.   2. Probable COPD exacerbation (no pfts)  3. Elevated troponin   4. Anemia   5. BELA  6. Elevated AST   7. Elevated d-dimer   8. Hx systolic/diastolic CHF    Recommend:   · D#3 ETT. Continue mechanical ventilation.  No vent changes today.  · Echo reviewed   · Diuresis per attending  · Continue duonebs  · Continue daily CXR/ABGs    Electronically signed by NEYDA Broderick on 4/1/2018 at 11:24 AM    Patient is unable to provide any history.  She does sedated and mechanically ventilated.  Her BNP is markedly elevated.  Left ventricular function is a little decreased and she has diastolic dysfunction on her echo from today.  She is sedated and mechanically  ventilated.  Lungs have diminished breath sounds.  There is not significant auto PEEP.  Plan to continue ventilation is due were doing no changes.  Echo noted and diurese    I have seen and examined patient personally, performing a face-to-face diagnostic evaluation with plan of care reviewed and developed with APRN and nursing staff. I have addended and/or modified the above history of present illness, physical examination, and assessment and plan to reflect my findings and impressions. Essential elements of the care plan were discussed with APRN above.  Agree with findings and assessment/plan as documented above.    Electronically signed by Nam Barrios MD, on 4/1/2018, 4:22 PM    EMR Dragon/Transcription disclaimer: Much of this encounter note is an electronic transcription/translation of spoken language to printed text. The electronic translation of spoken language may permit erroneous, or at times, nonsensical words or phrases to be inadvertently transcribed; although I have reviewed the note for such errors, some may still exist.

## 2018-04-01 NOTE — PLAN OF CARE
Problem: Patient Care Overview  Goal: Plan of Care Review  Outcome: Ongoing (interventions implemented as appropriate)    Goal: Individualization and Mutuality  Outcome: Ongoing (interventions implemented as appropriate)    Goal: Discharge Needs Assessment  Outcome: Ongoing (interventions implemented as appropriate)    Goal: Interprofessional Rounds/Family Conf  Outcome: Ongoing (interventions implemented as appropriate)      Problem: Fall Risk (Adult)  Goal: Absence of Fall  Outcome: Ongoing (interventions implemented as appropriate)      Problem: Skin Injury Risk (Adult)  Goal: Identify Related Risk Factors and Signs and Symptoms  Outcome: Outcome(s) achieved Date Met: 04/01/18    Goal: Skin Health and Integrity  Outcome: Ongoing (interventions implemented as appropriate)      Problem: Restraint, Nonbehavioral (Nonviolent)  Goal: Rationale and Justification  Outcome: Ongoing (interventions implemented as appropriate)    Goal: Nonbehavioral (Nonviolent) Restraint: Absence of Injury/Harm  Outcome: Ongoing (interventions implemented as appropriate)    Goal: Nonbehavioral (Nonviolent) Restraint: Achievement of Discontinuation Criteria  Outcome: Ongoing (interventions implemented as appropriate)    Goal: Nonbehavioral (Nonviolent) Restraint: Preservation of Dignity and Wellbeing  Outcome: Ongoing (interventions implemented as appropriate)

## 2018-04-01 NOTE — PLAN OF CARE
Problem: Patient Care Overview  Goal: Plan of Care Review  Outcome: Ongoing (interventions implemented as appropriate)   03/31/18 1957   Coping/Psychosocial   Plan of Care Reviewed With family   OTHER   Outcome Summary Propofol increased to 20 mcg/kg/min for comfort, pt gagging on ETT. Denies pain. Abx dc'd, IVF dc'd. Lasix given with 1275 mL output. VSS. SIMV 14, FIO2 30%, , Peep 5, PSV 15. 02 sat upper 90s.     Goal: Discharge Needs Assessment  Outcome: Ongoing (interventions implemented as appropriate)    Goal: Interprofessional Rounds/Family Conf  Outcome: Ongoing (interventions implemented as appropriate)      Problem: Fall Risk (Adult)  Goal: Identify Related Risk Factors and Signs and Symptoms  Outcome: Outcome(s) achieved Date Met: 03/31/18    Goal: Absence of Fall  Outcome: Ongoing (interventions implemented as appropriate)      Problem: Skin Injury Risk (Adult)  Goal: Skin Health and Integrity  Outcome: Ongoing (interventions implemented as appropriate)

## 2018-04-01 NOTE — PLAN OF CARE
Problem: Patient Care Overview  Goal: Plan of Care Review   04/01/18 7588   Coping/Psychosocial   Plan of Care Reviewed With patient;family   OTHER   Outcome Summary Patient has been diuresing well with lasix. Decreased edema in legs and feet. Propofol at 40 mcg/kg/min Nodded yes at 16:20 assessment when asked if in pain. Received order for pain medication. Follows commands. Chaz equally. Dobhoff placed for tube feedings, waiting on nutrition to recomment TF.     Goal: Individualization and Mutuality  Outcome: Ongoing (interventions implemented as appropriate)    Goal: Discharge Needs Assessment  Outcome: Ongoing (interventions implemented as appropriate)    Goal: Interprofessional Rounds/Family Conf  Outcome: Ongoing (interventions implemented as appropriate)      Problem: Fall Risk (Adult)  Goal: Absence of Fall  Outcome: Ongoing (interventions implemented as appropriate)      Problem: Skin Injury Risk (Adult)  Goal: Identify Related Risk Factors and Signs and Symptoms  Outcome: Outcome(s) achieved Date Met: 04/01/18    Goal: Skin Health and Integrity  Outcome: Ongoing (interventions implemented as appropriate)      Problem: Restraint, Nonbehavioral (Nonviolent)  Goal: Rationale and Justification  Outcome: Ongoing (interventions implemented as appropriate)    Goal: Nonbehavioral (Nonviolent) Restraint: Absence of Injury/Harm  Outcome: Ongoing (interventions implemented as appropriate)    Goal: Nonbehavioral (Nonviolent) Restraint: Achievement of Discontinuation Criteria  Outcome: Ongoing (interventions implemented as appropriate)    Goal: Nonbehavioral (Nonviolent) Restraint: Preservation of Dignity and Wellbeing  Outcome: Ongoing (interventions implemented as appropriate)

## 2018-04-01 NOTE — PROGRESS NOTES
[facility] Medicine Services  INPATIENT PROGRESS NOTE    Length of Stay: 2  Date of Admission: 3/30/2018  Primary Care Physician: Jeremi Kan MD    Subjective   Chief Complaint: SOA  HPI   Doing better  Blood pressure good today  Diuresing well  Afebrile.    Cr trending down.  Was 1.76 yesterday, now 1.6        Review of Systems   Unable to perform ROS: Intubated   All other systems reviewed and are negative.     All pertinent negatives and positives are as above. All other systems have been reviewed and are negative unless otherwise stated.     Objective    Temp:  [96 °F (35.6 °C)-97.8 °F (36.6 °C)] 96.8 °F (36 °C)  Heart Rate:  [63-92] 79  Resp:  [13-25] 19  BP: ()/(40-84) 117/58  FiO2 (%):  [30 %] 30 %  Physical Exam   Constitutional: She appears well-developed and well-nourished. She is sedated and intubated.   HENT:   Head: Normocephalic and atraumatic.   Right Ear: External ear normal.   Left Ear: External ear normal.   Nose: Nose normal.   Mouth/Throat: Oropharynx is clear and moist.   Eyes: Conjunctivae and EOM are normal.   Neck: Normal range of motion. Neck supple. JVD present.   Cardiovascular: Normal rate, regular rhythm and normal heart sounds.    BLE Edema   Pulmonary/Chest: Effort normal. She is intubated. She has decreased breath sounds. She has rales.   Abdominal: Soft. Bowel sounds are normal. She exhibits no distension. There is no tenderness.   Musculoskeletal: Normal range of motion.   Skin: Skin is warm and dry.         Results Review:  I have reviewed the labs, radiology results, and diagnostic studies.    Laboratory Data:     Results from last 7 days  Lab Units 03/31/18  0259 03/30/18  2039   WBC 10*3/mm3 4.84 7.04   HEMOGLOBIN g/dL 8.7* 8.6*   HEMATOCRIT % 28.2* 28.2*   PLATELETS 10*3/mm3 56* 67*          Results from last 7 days  Lab Units 04/01/18  0601 04/01/18  0255 03/31/18  1105 03/31/18  0259 03/30/18  2040   SODIUM mmol/L 140  --   --   140 136   SODIUM, ARTERIAL mmol/L  --  140  --   --   --    POTASSIUM mmol/L 3.9  --  4.2 4.8 4.8   CHLORIDE mmol/L 97*  --   --  96* 96*   CO2 mmol/L 33.0*  --   --  33.0* 31.0   BUN mg/dL 35*  --   --  23* 19   CREATININE mg/dL 1.60*  --   --  1.76* 1.38   CALCIUM mg/dL 7.8*  --   --  8.4 8.2*   BILIRUBIN mg/dL  --   --   --  1.0 1.3*   ALK PHOS U/L  --   --   --  70 72   ALT (SGPT) U/L  --   --   --  23 22   AST (SGOT) U/L  --   --   --  69* 46*   GLUCOSE mg/dL 122*  --   --  135* 257*       Culture Data:   Blood Culture   Date Value Ref Range Status   03/31/2018 Abnormal Stain (A)  Preliminary   03/31/2018 Staphylococcus, coagulase negative (A)  Preliminary     Comment:     Probable contaminant     03/31/2018 No growth at 24 hours  Preliminary     Urine Culture   Date Value Ref Range Status   03/30/2018 No growth at 2 days  Final     Respiratory Culture   Date Value Ref Range Status   03/30/2018 Light growth (2+) Normal Respiratory Lenora  Final       Radiology Data:   Imaging Results (last 24 hours)     Procedure Component Value Units Date/Time    XR Abdomen KUB [[id number]] Collected:  04/01/18 1147     Updated:  04/01/18 1150    Narrative:       EXAMINATION: XR ABDOMEN KUB-     4/1/2018 11:20 AM CDT     HISTORY: Dobbhoff placement.     Portable radiograph of the upper abdomen and lower chest shows a  Dobbhoff feeding tube extending into the stomach. Position is adequate.     Degenerative thoracolumbar spurring with scoliosis.     Summary:  1. Adequately positioned feeding tube entering the patient's stomach.  This report was finalized on 04/01/2018 11:47 by Dr. Lincoln Griffiths MD.    XR Chest 1 View [[id number]] Collected:  04/01/18 1015     Updated:  04/01/18 1019    Narrative:       EXAMINATION: XR CHEST 1 VW-     4/1/2018 3:00 AM CDT     HISTORY: Respiratory failure. Ventilator.     One view chest x-ray compared with yesterday.     The endotracheal tube remains in place with the tip approximately 2 cm  above  the isael.     Chronic interstitial lung disease.     There is no pneumothorax or overt heart failure.     No focal consolidation is seen.     Pelvis felt to be developing right basilar infiltrate and pleural fluid  on yesterday's exam is less evident on today's study. No progressive  lung disease.     Summary:  1. Stable chronic change.  This report was finalized on 04/01/2018 10:16 by Dr. Lincoln Griffiths MD.          I have reviewed the patient current medications.     Assessment/Plan    1.  Metabolic Encephalopathy secondary to #2  -Treat underlying causes     2.  Acute Hypoxic Respiratory failure  -Intubated, Sedated  -Pulmonology consulted, following  -Stop IVF  -IV Diuresis     3.  Acute On Chronic Systolic CHF  -IV Lasix  -Beta blocker held given decompensation, borderline BP  -ACE held given borderline BP, elevated creatinine     4.  Acute Renal failure  -IV Diuresis  -Consider Nephrology consult in AM if renal function worsens     5.  Elevated troponin  -Likely Type 2 secondary to supply/demand mismatch given respiratory failure, CHF  -Trend, Monitor     6.  Elevated LFT's  -Could be some degree of shock liver given borderline BP here  -Monitor  -Consider U/S, Hepatitis panel     7.  CAD  -ASA  -Statin  -Beta blocker/ACE held given borderline BP     8.  HTN--essential  -BP meds held given borderline BP     9.  COPD  -PRN Nebs     10.  HLD  -Statin     11.  History of tobacco abuse     12.  Aortic Stenosis     13.  Mitral Regurgitation    14.  Paroxysmal atrial fibrillation  -Heparin  -Coreg held due to borderline BP  -Telemetry     15.  GERD  -Pepcid IV     16.  Hyperglycemia  -Likely due to steroids  -Check A1C                 Discharge Planning: Critically ill    Kwasi Sky MD   04/01/18   2:43 PM

## 2018-04-01 NOTE — PLAN OF CARE
Problem: Patient Care Overview  Goal: Plan of Care Review  Outcome: Ongoing (interventions implemented as appropriate)   04/01/18 0417   Coping/Psychosocial   Plan of Care Reviewed With patient   Plan of Care Review   Progress improving   OTHER   Outcome Summary Propofol infusing at 35 mcg/kg/min. Follows commands, denies pain. Urine output 1300. VSS.      Goal: Individualization and Mutuality  Outcome: Ongoing (interventions implemented as appropriate)    Goal: Discharge Needs Assessment  Outcome: Ongoing (interventions implemented as appropriate)    Goal: Interprofessional Rounds/Family Conf  Outcome: Ongoing (interventions implemented as appropriate)      Problem: Fall Risk (Adult)  Goal: Absence of Fall  Outcome: Ongoing (interventions implemented as appropriate)      Problem: Skin Injury Risk (Adult)  Goal: Identify Related Risk Factors and Signs and Symptoms  Outcome: Ongoing (interventions implemented as appropriate)    Goal: Skin Health and Integrity  Outcome: Ongoing (interventions implemented as appropriate)

## 2018-04-02 ENCOUNTER — APPOINTMENT (OUTPATIENT)
Dept: GENERAL RADIOLOGY | Facility: HOSPITAL | Age: 83
End: 2018-04-02

## 2018-04-02 LAB
ABO GROUP BLD: NORMAL
ANION GAP SERPL CALCULATED.3IONS-SCNC: 9 MMOL/L (ref 4–13)
ANION GAP SERPL CALCULATED.3IONS-SCNC: ABNORMAL MMOL/L (ref 4–13)
ARTERIAL PATENCY WRIST A: POSITIVE
ATMOSPHERIC PRESS: 752 MMHG
BASE EXCESS BLDA CALC-SCNC: 10 MMOL/L (ref 0–2)
BDY SITE: ABNORMAL
BLD GP AB SCN SERPL QL: NEGATIVE
BODY TEMPERATURE: 37 C
BUN BLD-MCNC: 35 MG/DL (ref 5–21)
BUN BLD-MCNC: 38 MG/DL (ref 5–21)
BUN/CREAT SERPL: 22.6 (ref 7–25)
BUN/CREAT SERPL: 24.8 (ref 7–25)
CALCIUM SPEC-SCNC: 7.9 MG/DL (ref 8.4–10.4)
CALCIUM SPEC-SCNC: 8.4 MG/DL (ref 8.4–10.4)
CHLORIDE SERPL-SCNC: 95 MMOL/L (ref 98–110)
CHLORIDE SERPL-SCNC: 96 MMOL/L (ref 98–110)
CO2 SERPL-SCNC: 36 MMOL/L (ref 24–31)
CO2 SERPL-SCNC: >40 MMOL/L (ref 24–31)
COHGB MFR BLD: 2.3 % (ref 0–5)
CREAT BLD-MCNC: 1.53 MG/DL (ref 0.5–1.4)
CREAT BLD-MCNC: 1.55 MG/DL (ref 0.5–1.4)
DEPRECATED RDW RBC AUTO: 57.1 FL (ref 40–54)
ERYTHROCYTE [DISTWIDTH] IN BLOOD BY AUTOMATED COUNT: 19.7 % (ref 12–15)
FOLATE BLD-MCNC: 445.2 NG/ML
FOLATE RBC-MCNC: 1643 NG/ML
GFR SERPL CREATININE-BSD FRML MDRD: 32 ML/MIN/1.73
GFR SERPL CREATININE-BSD FRML MDRD: 33 ML/MIN/1.73
GLUCOSE BLD-MCNC: 97 MG/DL (ref 70–100)
GLUCOSE BLD-MCNC: 98 MG/DL (ref 70–100)
GLUCOSE BLDC GLUCOMTR-MCNC: 107 MG/DL (ref 70–130)
GLUCOSE BLDC GLUCOMTR-MCNC: 108 MG/DL (ref 70–130)
GLUCOSE BLDC GLUCOMTR-MCNC: 117 MG/DL (ref 70–130)
GLUCOSE BLDC GLUCOMTR-MCNC: 127 MG/DL (ref 70–130)
HCO3 BLDA-SCNC: 34.9 MMOL/L (ref 20–26)
HCT VFR BLD AUTO: 22.1 % (ref 37–47)
HCT VFR BLD AUTO: 27.1 % (ref 34–46.6)
HCT VFR BLD AUTO: 31.2 % (ref 37–47)
HCT VFR BLD CALC: 21.9 % (ref 38–51)
HGB BLD-MCNC: 6.7 G/DL (ref 12–16)
HGB BLD-MCNC: 9.6 G/DL (ref 12–16)
HGB BLDA-MCNC: 7.1 G/DL (ref 13.5–17.5)
HOROWITZ INDEX BLD+IHG-RTO: 30 %
Lab: ABNORMAL
MCH RBC QN AUTO: 24.3 PG (ref 28–32)
MCHC RBC AUTO-ENTMCNC: 30.3 G/DL (ref 33–36)
MCV RBC AUTO: 80.1 FL (ref 82–98)
METHGB BLD QL: 0.9 % (ref 0–3)
MODALITY: ABNORMAL
NT-PROBNP SERPL-MCNC: 7380 PG/ML (ref 0–1800)
OXYHGB MFR BLDV: 95 % (ref 94–99)
PCO2 BLDA: 50 MM HG (ref 35–45)
PEEP RESPIRATORY: 5 CM[H2O]
PH BLDA: 7.45 PH UNITS (ref 7.35–7.45)
PLATELET # BLD AUTO: 82 10*3/MM3 (ref 130–400)
PMV BLD AUTO: 11.4 FL (ref 6–12)
PO2 BLDA: 87 MM HG (ref 83–108)
POTASSIUM BLD-SCNC: 3.6 MMOL/L (ref 3.5–5.3)
POTASSIUM BLD-SCNC: 4 MMOL/L (ref 3.5–5.3)
POTASSIUM BLDA-SCNC: 3.6 MMOL/L (ref 3.5–5.2)
PSV: 15 CMH2O
RBC # BLD AUTO: 2.76 10*6/MM3 (ref 4.2–5.4)
RH BLD: NEGATIVE
SAO2 % BLDCOA: 98.1 % (ref 94–99)
SET MECH RESP RATE: 14
SODIUM BLD-SCNC: 141 MMOL/L (ref 135–145)
SODIUM BLD-SCNC: 143 MMOL/L (ref 135–145)
SODIUM BLDA-SCNC: 143 MMOL/L (ref 136–145)
T&S EXPIRATION DATE: NORMAL
VENTILATOR MODE: ABNORMAL
VT ON VENT VENT: 500 ML
WBC NRBC COR # BLD: 3.26 10*3/MM3 (ref 4.8–10.8)

## 2018-04-02 PROCEDURE — 82375 ASSAY CARBOXYHB QUANT: CPT

## 2018-04-02 PROCEDURE — P9016 RBC LEUKOCYTES REDUCED: HCPCS

## 2018-04-02 PROCEDURE — 25010000002 FUROSEMIDE PER 20 MG: Performed by: FAMILY MEDICINE

## 2018-04-02 PROCEDURE — 94799 UNLISTED PULMONARY SVC/PX: CPT

## 2018-04-02 PROCEDURE — 85018 HEMOGLOBIN: CPT | Performed by: FAMILY MEDICINE

## 2018-04-02 PROCEDURE — 30233N1 TRANSFUSION OF NONAUTOLOGOUS RED BLOOD CELLS INTO PERIPHERAL VEIN, PERCUTANEOUS APPROACH: ICD-10-PCS | Performed by: FAMILY MEDICINE

## 2018-04-02 PROCEDURE — 86900 BLOOD TYPING SEROLOGIC ABO: CPT

## 2018-04-02 PROCEDURE — 36600 WITHDRAWAL OF ARTERIAL BLOOD: CPT

## 2018-04-02 PROCEDURE — 83050 HGB METHEMOGLOBIN QUAN: CPT

## 2018-04-02 PROCEDURE — 86850 RBC ANTIBODY SCREEN: CPT | Performed by: FAMILY MEDICINE

## 2018-04-02 PROCEDURE — 71045 X-RAY EXAM CHEST 1 VIEW: CPT

## 2018-04-02 PROCEDURE — 86920 COMPATIBILITY TEST SPIN: CPT

## 2018-04-02 PROCEDURE — 80048 BASIC METABOLIC PNL TOTAL CA: CPT | Performed by: FAMILY MEDICINE

## 2018-04-02 PROCEDURE — 94770: CPT

## 2018-04-02 PROCEDURE — 86901 BLOOD TYPING SEROLOGIC RH(D): CPT | Performed by: FAMILY MEDICINE

## 2018-04-02 PROCEDURE — 83880 ASSAY OF NATRIURETIC PEPTIDE: CPT | Performed by: NURSE PRACTITIONER

## 2018-04-02 PROCEDURE — 82962 GLUCOSE BLOOD TEST: CPT

## 2018-04-02 PROCEDURE — 85027 COMPLETE CBC AUTOMATED: CPT | Performed by: FAMILY MEDICINE

## 2018-04-02 PROCEDURE — 25010000002 ENOXAPARIN PER 10 MG: Performed by: FAMILY MEDICINE

## 2018-04-02 PROCEDURE — 94003 VENT MGMT INPAT SUBQ DAY: CPT

## 2018-04-02 PROCEDURE — 36430 TRANSFUSION BLD/BLD COMPNT: CPT

## 2018-04-02 PROCEDURE — 25010000002 HEPARIN (PORCINE) PER 1000 UNITS: Performed by: FAMILY MEDICINE

## 2018-04-02 PROCEDURE — 25010000002 PROPOFOL 1000 MG/ML EMULSION: Performed by: FAMILY MEDICINE

## 2018-04-02 PROCEDURE — 86900 BLOOD TYPING SEROLOGIC ABO: CPT | Performed by: FAMILY MEDICINE

## 2018-04-02 PROCEDURE — 94760 N-INVAS EAR/PLS OXIMETRY 1: CPT

## 2018-04-02 PROCEDURE — 85014 HEMATOCRIT: CPT | Performed by: FAMILY MEDICINE

## 2018-04-02 PROCEDURE — 82805 BLOOD GASES W/O2 SATURATION: CPT

## 2018-04-02 RX ORDER — ASPIRIN 81 MG/1
81 TABLET, CHEWABLE ORAL DAILY
Status: DISCONTINUED | OUTPATIENT
Start: 2018-04-02 | End: 2018-04-06 | Stop reason: HOSPADM

## 2018-04-02 RX ORDER — FUROSEMIDE 10 MG/ML
20 INJECTION INTRAMUSCULAR; INTRAVENOUS ONCE
Status: COMPLETED | OUTPATIENT
Start: 2018-04-02 | End: 2018-04-02

## 2018-04-02 RX ADMIN — IPRATROPIUM BROMIDE AND ALBUTEROL SULFATE 3 ML: 2.5; .5 SOLUTION RESPIRATORY (INHALATION) at 20:04

## 2018-04-02 RX ADMIN — PROPOFOL 40 MCG/KG/MIN: 10 INJECTION, EMULSION INTRAVENOUS at 09:14

## 2018-04-02 RX ADMIN — HEPARIN SODIUM 5000 UNITS: 5000 INJECTION, SOLUTION INTRAVENOUS; SUBCUTANEOUS at 06:07

## 2018-04-02 RX ADMIN — FUROSEMIDE 20 MG: 10 INJECTION, SOLUTION INTRAVENOUS at 10:51

## 2018-04-02 RX ADMIN — PROPOFOL 40 MCG/KG/MIN: 10 INJECTION, EMULSION INTRAVENOUS at 13:44

## 2018-04-02 RX ADMIN — PROPOFOL 40 MCG/KG/MIN: 10 INJECTION, EMULSION INTRAVENOUS at 04:05

## 2018-04-02 RX ADMIN — IPRATROPIUM BROMIDE AND ALBUTEROL SULFATE 3 ML: 2.5; .5 SOLUTION RESPIRATORY (INHALATION) at 06:06

## 2018-04-02 RX ADMIN — IPRATROPIUM BROMIDE AND ALBUTEROL SULFATE 3 ML: 2.5; .5 SOLUTION RESPIRATORY (INHALATION) at 14:53

## 2018-04-02 RX ADMIN — FUROSEMIDE 40 MG: 10 INJECTION, SOLUTION INTRAMUSCULAR; INTRAVENOUS at 08:38

## 2018-04-02 RX ADMIN — PROPOFOL 40 MCG/KG/MIN: 10 INJECTION, EMULSION INTRAVENOUS at 22:22

## 2018-04-02 RX ADMIN — ASPIRIN 81 MG 81 MG: 81 TABLET ORAL at 10:33

## 2018-04-02 RX ADMIN — FAMOTIDINE 20 MG: 10 INJECTION INTRAVENOUS at 08:38

## 2018-04-02 RX ADMIN — ATORVASTATIN CALCIUM 20 MG: 10 TABLET, FILM COATED ORAL at 20:31

## 2018-04-02 RX ADMIN — FUROSEMIDE 40 MG: 10 INJECTION, SOLUTION INTRAMUSCULAR; INTRAVENOUS at 20:31

## 2018-04-02 RX ADMIN — GABAPENTIN 300 MG: 300 CAPSULE ORAL at 20:31

## 2018-04-02 RX ADMIN — ENOXAPARIN SODIUM 30 MG: 30 INJECTION SUBCUTANEOUS at 18:59

## 2018-04-02 RX ADMIN — IPRATROPIUM BROMIDE AND ALBUTEROL SULFATE 3 ML: 2.5; .5 SOLUTION RESPIRATORY (INHALATION) at 10:00

## 2018-04-02 RX ADMIN — PROPOFOL 40 MCG/KG/MIN: 10 INJECTION, EMULSION INTRAVENOUS at 17:48

## 2018-04-02 RX ADMIN — PROPOFOL 40 MCG/KG/MIN: 10 INJECTION, EMULSION INTRAVENOUS at 00:34

## 2018-04-02 NOTE — PLAN OF CARE
Problem: Nutrition, Enteral (Adult)  Goal: Signs and Symptoms of Listed Potential Problems Will be Absent, Minimized or Managed (Nutrition, Enteral)  Outcome: Ongoing (interventions implemented as appropriate)   04/02/18 1111   Goal/Outcome Evaluation   Problems Assessed (Enteral Nutrition) other (see comments)  (TF order written to increase rate)   Problems Present (Enteral Nutrition) other (see comments)  (Will continue to follow for TF tolerance and d/c needs)

## 2018-04-02 NOTE — PLAN OF CARE
Problem: Patient Care Overview  Goal: Plan of Care Review  Outcome: Ongoing (interventions implemented as appropriate)   04/02/18 0508   Coping/Psychosocial   Plan of Care Reviewed With patient;deidra   Plan of Care Review   Progress no change   OTHER   Outcome Summary VSS, does nod head to questions and follow commands at times, tube feedings continued, hemoglobin dropped to 6.7 this morning order to transfuse one unit then check H&H one hour after completion     Goal: Individualization and Mutuality  Outcome: Ongoing (interventions implemented as appropriate)    Goal: Discharge Needs Assessment  Outcome: Ongoing (interventions implemented as appropriate)    Goal: Interprofessional Rounds/Family Conf  Outcome: Ongoing (interventions implemented as appropriate)      Problem: Fall Risk (Adult)  Goal: Absence of Fall  Outcome: Ongoing (interventions implemented as appropriate)      Problem: Skin Injury Risk (Adult)  Goal: Skin Health and Integrity  Outcome: Ongoing (interventions implemented as appropriate)      Problem: Restraint, Nonbehavioral (Nonviolent)  Goal: Rationale and Justification  Outcome: Ongoing (interventions implemented as appropriate)    Goal: Nonbehavioral (Nonviolent) Restraint: Absence of Injury/Harm  Outcome: Ongoing (interventions implemented as appropriate)    Goal: Nonbehavioral (Nonviolent) Restraint: Achievement of Discontinuation Criteria  Outcome: Ongoing (interventions implemented as appropriate)    Goal: Nonbehavioral (Nonviolent) Restraint: Preservation of Dignity and Wellbeing  Outcome: Ongoing (interventions implemented as appropriate)

## 2018-04-02 NOTE — PROGRESS NOTES
PULMONARY AND CRITICAL CARE PROGRESS NOTE - [facility]    Patient: Erin Cox    1935    MR# [id number]    Acct# [id number]  04/02/18   8:38 AM  Referring Provider: Kris Roberts MD    Chief Complaint: Mechanically ventilated    Interval history: The patient remains sedated and intubated on mechanical ventilator.  Propofol gtt infusing.  Currently getting blood transfusion. Contacted by RN for desaturation with repositioning. Changed over to AC with PEEP 7 briefly and now back down to PEEP 5 and 30%. No other aggravating or allevitating factors.     Meds:    aspirin 81 mg Oral Daily With Breakfast   atorvastatin 20 mg Oral Nightly   famotidine 20 mg Intravenous Daily   furosemide 40 mg Intravenous Q12H   gabapentin 300 mg Oral Nightly   heparin (porcine) 5,000 Units Subcutaneous Q8H   hydrOXYzine 50 mg Oral Daily   insulin lispro 2-7 Units Subcutaneous Q6H   ipratropium-albuterol 3 mL Nebulization 4x Daily - RT       propofol 5-50 mcg/kg/min Last Rate: 40 mcg/kg/min (04/02/18 0640)     Review of Systems:     Cannot obtain due to mechanical ventilation.  The patient notably is critically ill and connected to a ventilator.  As such patient cannot communicate and provide any history whatsoever, including any history of present illness or interval history since arrival or review of systems. The interested reviewer may note this fact, as an attempt has been made at collecting and documenting these portions of the patient history, but this information is unobtainable despite attempted review and therefore cannot be documented at this time.     Ventilator Settings:     Vt (Set, L): 0.5 L (Sophie FAYE @ bedside - changed to AC)  Resp Rate (Set): 14 (Sophie FAYE @ bedside - changed to AC)  Pressure Support (cm H2O): 0 cm H20 (Sophie FAYE @ bedside - changed to AC)  FiO2 (%): 30 %  PEEP/CPAP (cm H2O): 5 cm H20  Minute Ventilation (L/min) (Obs): 6.87 L/min (Sophie FAYE @ bedside  - changed to AC)  Resp Rate (Observed) Vent: 15 (Sophie FAYE @ bedside - changed to AC)  I:E Ratio (Set): 1:2.90 (Sophie FAYE @ bedside - changed to AC)  I:E Ratio (Obs): 1:1.9  PIP Observed (cm H2O): 26 cm H2O (Sophie FAYE @ bedside - changed to AC)     Physical Exam:  Temp:  [96.8 °F (36 °C)-98.1 °F (36.7 °C)] 96.8 °F (36 °C)  Heart Rate:  [58-92] 74  Resp:  [14-20] 14  BP: ()/(41-70) 112/45  FiO2 (%):  [30 %-50 %] 30 %    Intake/Output Summary (Last 24 hours) at 04/02/18 0838  Last data filed at 04/02/18 0700   Gross per 24 hour   Intake           874.16 ml   Output             2350 ml   Net         -1475.84 ml     SpO2 Percentage    04/02/18 0816 04/02/18 0819 04/02/18 0821   SpO2: 100% 100% 100%      Physical Exam   Constitutional: She appears well-developed and well-nourished. She appears ill. She is sedated and intubated.   HENT:   Head: Normocephalic and atraumatic.   Eyes: Conjunctivae and EOM are normal. Pupils are equal, round, and reactive to light. No scleral icterus.   Cardiovascular: Normal rate, regular rhythm and normal heart sounds.  Exam reveals no friction rub.    No murmur heard.  Pulmonary/Chest: Effort normal and breath sounds normal. She is intubated. No respiratory distress. She has no wheezes. She has no rales.   Abdominal: Soft. Bowel sounds are normal. She exhibits no distension.   Musculoskeletal: She exhibits edema.   Neurological:   sedated   Skin: Skin is warm and dry.   Nursing note and vitals reviewed.      Results from last 7 days  Lab Units 04/02/18  0315 03/31/18 0259 03/30/18 2039   WBC 10*3/mm3 3.26* 4.84 7.04   HEMOGLOBIN g/dL 6.7* 8.7* 8.6*   PLATELETS 10*3/mm3 82* 56* 67*       Results from last 7 days  Lab Units 04/02/18  0315 04/02/18  0152 04/01/18  1733 04/01/18  0601  03/31/18  0259   SODIUM mmol/L 141  --   --  140  --  140   SODIUM, ARTERIAL mmol/L  --  143  --   --   < >  --    POTASSIUM mmol/L 3.6  --  4.0 3.9  < > 4.8   BUN mg/dL 38*  --    --  35*  --  23*   CREATININE mg/dL 1.53*  --   --  1.60*  --  1.76*   < > = values in this interval not displayed.    Results from last 7 days  Lab Units 04/02/18  0152 04/01/18  0255 03/31/18  0200   PH, ARTERIAL pH units 7.453* 7.458* 7.339*   PCO2, ARTERIAL mm Hg 50.0* 44.0 52.7*   PO2 ART mm Hg 87.0 82.4* 113.0*   FIO2 % 30 30 40     Blood Culture   Date Value Ref Range Status   03/31/2018 Abnormal Stain (A)  Preliminary   03/31/2018 Staphylococcus, coagulase negative (A)  Preliminary     Comment:     Probable contaminant     03/31/2018 No growth at 24 hours  Preliminary     Urine Culture   Date Value Ref Range Status   03/30/2018 No growth at 2 days  Final     Respiratory Culture   Date Value Ref Range Status   03/30/2018 Light growth (2+) Normal Respiratory Lenora  Final     Recent films:  Imaging Results (last 24 hours)     Procedure Component Value Units Date/Time    XR Chest 1 View [[id number]] Collected:  04/02/18 0653     Updated:  04/02/18 0658    Narrative:       EXAMINATION: XR CHEST 1 VW- 4/2/2018 6:53 AM CDT     HISTORY: Ventilated     COMPARISON: 4/1/2018     FINDINGS:  Endotracheal tube is in place with tip above the isael. An enteric tube  is in place with tip below the diaphragm and out of the field-of-view.  Chronic lung changes are seen bilaterally. There is some somewhat  diffuse increased opacity of the right lung, which may be related to  rotation. There is blunting of the costophrenic angles bilaterally. The  heart size is stable. The aorta is tortuous with atherosclerotic  calcifications.       Impression:       Chronic lung changes bilaterally. Diffuse groundglass  opacities on the right are likely positional and related to  superimposition of structures. No definite acute cardiopulmonary process  is appreciated.  This report was finalized on 04/02/2018 06:55 by Dr. Rashid Scott MD.    XR Abdomen KUB [[id number]] Collected:  04/01/18 1147     Updated:  04/01/18 1150    Narrative:        EXAMINATION: XR ABDOMEN KUB-     4/1/2018 11:20 AM CDT     HISTORY: Dobbhoff placement.     Portable radiograph of the upper abdomen and lower chest shows a  Dobbhoff feeding tube extending into the stomach. Position is adequate.     Degenerative thoracolumbar spurring with scoliosis.     Summary:  1. Adequately positioned feeding tube entering the patient's stomach.  This report was finalized on 04/01/2018 11:47 by Dr. Lincoln Griffiths MD.    XR Chest 1 View [[id number]] Collected:  04/01/18 1015     Updated:  04/01/18 1019    Narrative:       EXAMINATION: XR CHEST 1 VW-     4/1/2018 3:00 AM CDT     HISTORY: Respiratory failure. Ventilator.     One view chest x-ray compared with yesterday.     The endotracheal tube remains in place with the tip approximately 2 cm  above the isael.     Chronic interstitial lung disease.     There is no pneumothorax or overt heart failure.     No focal consolidation is seen.     Pelvis felt to be developing right basilar infiltrate and pleural fluid  on yesterday's exam is less evident on today's study. No progressive  lung disease.     Summary:  1. Stable chronic change.  This report was finalized on 04/01/2018 10:16 by Dr. Lincoln Griffiths MD.        Films reviewed personally by me.  My interpretation: ETT intact, some improvement on the right     4/1/18 2D Echo   · Left ventricular systolic function is mildly decreased. Estimated EF = 47%.  · The left ventricular cavity is mildly dilated.  · Left ventricular diastolic dysfunction.  · No evidence of pulmonary hypertension is present.  · Similar to 2/18/17  · The study is technically difficult for diagnosis    Pulmonary Assessment:    1. SEVERE ACUTE RESPIRATORY FAILURE REQUIRING MECHANICAL VENTILATION.  This is a threat to life or pulmonary function, high risk, due to acute on chronic respiratory failure with hypoxemia and hypercarbia.   2. Probable COPD exacerbation (no pfts)  3. Elevated troponin   4. Anemia   5. BELA  6. Elevated AST    7. Elevated d-dimer   8. Hx systolic/diastolic CHF    Recommend:     · D#4 ETT. Continue mechanical ventilation. Changed to AC with brief increase in FIO2 and PEEP after desaturation post repositioning. Now back on FIO2 30% and PEEP 5.  · Echo reviewed   · Diuresis per attending with plans to repeat post blood transfusion this morning  · Continue duonebs  · Continue daily CXR/ABGs  · In light of desaturation event this morning will hold off on breathing trials until tomorrow    Electronically signed by NEYDA Martins on 4/2/2018 at 8:38 AM    Physician substantive contribution:  Pertinent symptoms/interval history include: she remains on vent.  Had an episode right after our rounds with desaturation after turning her to side and back.  Alleviated with time  Respiratory exam shows pertinent findings of:diminished breath sounds, on vent.  Plan includes: suspect above episode due to acute derecruitment.  Continue vent with peep. Not ready to extubate.  I have seen and examined patient personally, performing a face-to-face diagnostic evaluation with plan of care reviewed and developed with APRN and nursing staff. I have addended and/or modified the above history of present illness, physical examination, and assessment and plan to reflect my findings and impressions. Essential elements of the care plan were discussed with APRN above.  Agree with findings and assessment/plan as documented above.    Electronically signed by Nam Barrios MD, on 4/2/2018, 8:53 AM

## 2018-04-02 NOTE — PROGRESS NOTES
Continued Stay Note   [facility]     Patient Name: Erin Cox  MRN: [id number]  Today's Date: 4/2/2018    Admit Date: 3/30/2018          Discharge Plan     Row Name 04/02/18 1409       Plan    Plan unclear    Plan Comments Patient currently in ICU on vent.  Patient's daughter was here this morning and had left.  Unable to reach by phone at this time.  Will attempt to continue to contact family to discuss plan/needs.              Discharge Codes    No documentation.           MIKEY Harrison

## 2018-04-02 NOTE — PROGRESS NOTES
[facility] Medicine Services  INPATIENT PROGRESS NOTE    Length of Stay: 3  Date of Admission: 3/30/2018  Primary Care Physician: Jeremi Kan MD    Subjective   Chief Complaint: Acute respiratory failure/acute on chronic heart failure    HPI   Net urine output -1400.  Yesterday weight is 182..  Today's weight still pending.  Kidney functions improving.  Worsening of anemia, plan for 2 units of blood transfusion.  Chest ray shows chronic change.    Review of Systems   Unable to assess secondary to the patient's intubated and sedated state.     All pertinent negatives and positives are as above. All other systems have been reviewed and are negative unless otherwise stated.     Objective    Temp:  [96.8 °F (36 °C)-98.1 °F (36.7 °C)] 96.8 °F (36 °C)  Heart Rate:  [58-92] 74  Resp:  [14-20] 14  BP: ()/(41-70) 112/45  FiO2 (%):  [30 %-50 %] 30 %    Intake/Output Summary (Last 24 hours) at 04/02/18 0916  Last data filed at 04/02/18 0700   Gross per 24 hour   Intake           874.16 ml   Output             2350 ml   Net         -1475.84 ml     Physical Exam   Constitutional: She appears well-developed.   HENT:   Head: Normocephalic and atraumatic.   Eyes: Conjunctivae are normal. Pupils are equal, round, and reactive to light.   Neck: Neck supple. No JVD present. No thyromegaly present.   Cardiovascular: Normal rate, regular rhythm, normal heart sounds and intact distal pulses.  Exam reveals no gallop and no friction rub.    No murmur heard.  Pulmonary/Chest: No respiratory distress. She has no rales. She exhibits no tenderness.   Diminished breath sound bilateral   Abdominal: Soft. Bowel sounds are normal. She exhibits no distension. There is no tenderness. There is no rebound and no guarding.   Musculoskeletal: She exhibits no edema, tenderness or deformity.   Lymphadenopathy:     She has no cervical adenopathy.   Neurological: She displays normal reflexes. No cranial nerve  deficit. She exhibits abnormal muscle tone. Coordination abnormal.   Skin: Skin is warm and dry. No rash noted.       Results Review:  Lab Results (last 24 hours)     Procedure Component Value Units Date/Time    POC Glucose Once [[id number]]  (Normal) Collected:  04/02/18 0549    Specimen:  Blood Updated:  04/02/18 0613     Glucose 108 mg/dL      Comment: : [id number] Abram Conte ID: [id number]       CBC (No Diff) [[id number]]  (Abnormal) Collected:  04/02/18 0315    Specimen:  Blood Updated:  04/02/18 0400     WBC 3.26 (L) 10*3/mm3      RBC 2.76 (L) 10*6/mm3      Hemoglobin 6.7 (C) g/dL      Hematocrit 22.1 (L) %      MCV 80.1 (L) fL      MCH 24.3 (L) pg      MCHC 30.3 (L) g/dL      RDW 19.7 (H) %      RDW-SD 57.1 (H) fl      MPV 11.4 fL      Platelets 82 (L) 10*3/mm3     BNP [[id number]]  (Abnormal) Collected:  04/02/18 0315    Specimen:  Blood Updated:  04/02/18 0356     proBNP 7,380.0 (H) pg/mL     Basic Metabolic Panel [[id number]]  (Abnormal) Collected:  04/02/18 0315    Specimen:  Blood Updated:  04/02/18 0355     Glucose 98 mg/dL      BUN 38 (H) mg/dL      Creatinine 1.53 (H) mg/dL      Sodium 141 mmol/L      Potassium 3.6 mmol/L      Chloride 96 (L) mmol/L      CO2 36.0 (H) mmol/L      Calcium 7.9 (L) mg/dL      eGFR Non African Amer 33 (L) mL/min/1.73      BUN/Creatinine Ratio 24.8     Anion Gap 9.0 mmol/L     Narrative:       The MDRD GFR formula is only valid for adults with stable renal function between ages 18 and 70.    Blood Culture - Blood, [[id number]]  (Normal) Collected:  03/31/18 0245    Specimen:  Blood from Arm, Right Updated:  04/02/18 0316     Blood Culture No growth at 2 days    Blood Gas, Arterial With Co-Ox [[id number]]  (Abnormal) Collected:  04/02/18 0152    Specimen:  Arterial Blood Updated:  04/02/18 0157     Site Right Radial     Rickey's Test Positive     pH, Arterial 7.453 (H) pH units      pCO2, Arterial 50.0 (H) mm Hg      pO2, Arterial 87.0 mm Hg      HCO3, Arterial 34.9  (H) mmol/L      Base Excess, Arterial 10.0 (H) mmol/L      O2 Saturation, Arterial 98.1 %      Hemoglobin, Blood Gas 7.1 (L) g/dL      Hematocrit, Blood Gas 21.9 (L) %      Oxyhemoglobin 95.0 %      Methemoglobin 0.90 %      Carboxyhemoglobin 2.3 %      Temperature 37.0 C      Sodium, Arterial 143 mmol/L      Potassium, Arterial 3.6 mmol/L      Barometric Pressure for Blood Gas 752 mmHg      Modality Ventilator     FIO2 30 %      Ventilator Mode SIMV     Set Tidal Volume 500     Set Mech Resp Rate 14.0     PEEP 5.0     PSV 15.0 cmH2O      Collected by [id number]    POC Glucose Once [[id number]]  (Normal) Collected:  04/01/18 2339    Specimen:  Blood Updated:  04/02/18 0003     Glucose 107 mg/dL      Comment: : [id number] Abram  KMeter ID: [id number]       Potassium [[id number]]  (Normal) Collected:  04/01/18 1733    Specimen:  Blood Updated:  04/01/18 1851     Potassium 4.0 mmol/L     POC Glucose Once [[id number]]  (Normal) Collected:  04/01/18 1814    Specimen:  Blood Updated:  04/01/18 1826     Glucose 104 mg/dL      Comment: : [id number] Stom VivianMeter ID: [id number]       POC Glucose Once [[id number]]  (Normal) Collected:  04/01/18 1229    Specimen:  Blood Updated:  04/01/18 1240     Glucose 118 mg/dL      Comment: : [id number] Stom VivianMeter ID: [id number]              Cultures:  Blood Culture   Date Value Ref Range Status   03/31/2018 Abnormal Stain (A)  Preliminary   03/31/2018 Staphylococcus, coagulase negative (A)  Preliminary     Comment:     Probable contaminant     03/31/2018 No growth at 2 days  Preliminary     Urine Culture   Date Value Ref Range Status   03/30/2018 No growth at 2 days  Final     Respiratory Culture   Date Value Ref Range Status   03/30/2018 Light growth (2+) Normal Respiratory Lenora  Final       Radiology Data:    Imaging Results (last 24 hours)     Procedure Component Value Units Date/Time    XR Chest 1 View [[id number]] Collected:  04/02/18 0653     Updated:  04/02/18  0658    Narrative:       EXAMINATION: XR CHEST 1 VW- 4/2/2018 6:53 AM CDT     HISTORY: Ventilated     COMPARISON: 4/1/2018     FINDINGS:  Endotracheal tube is in place with tip above the isael. An enteric tube  is in place with tip below the diaphragm and out of the field-of-view.  Chronic lung changes are seen bilaterally. There is some somewhat  diffuse increased opacity of the right lung, which may be related to  rotation. There is blunting of the costophrenic angles bilaterally. The  heart size is stable. The aorta is tortuous with atherosclerotic  calcifications.       Impression:       Chronic lung changes bilaterally. Diffuse groundglass  opacities on the right are likely positional and related to  superimposition of structures. No definite acute cardiopulmonary process  is appreciated.  This report was finalized on 04/02/2018 06:55 by Dr. Rashid Scott MD.    XR Abdomen KUB [[id number]] Collected:  04/01/18 1147     Updated:  04/01/18 1150    Narrative:       EXAMINATION: XR ABDOMEN KUB-     4/1/2018 11:20 AM CDT     HISTORY: Dobbhoff placement.     Portable radiograph of the upper abdomen and lower chest shows a  Dobbhoff feeding tube extending into the stomach. Position is adequate.     Degenerative thoracolumbar spurring with scoliosis.     Summary:  1. Adequately positioned feeding tube entering the patient's stomach.  This report was finalized on 04/01/2018 11:47 by Dr. Lincoln Griffiths MD.    XR Chest 1 View [[id number]] Collected:  04/01/18 1015     Updated:  04/01/18 1019    Narrative:       EXAMINATION: XR CHEST 1 VW-     4/1/2018 3:00 AM CDT     HISTORY: Respiratory failure. Ventilator.     One view chest x-ray compared with yesterday.     The endotracheal tube remains in place with the tip approximately 2 cm  above the isael.     Chronic interstitial lung disease.     There is no pneumothorax or overt heart failure.     No focal consolidation is seen.     Pelvis felt to be developing right basilar  infiltrate and pleural fluid  on yesterday's exam is less evident on today's study. No progressive  lung disease.     Summary:  1. Stable chronic change.  This report was finalized on 04/01/2018 10:16 by Dr. Lincoln Griffiths MD.          Allergies   Allergen Reactions   • Fluarix [Flu Virus Vaccine] Swelling   • Influenza A (H1n1) Monoval Vac    • Pneumococcal Vaccines Swelling   • Codeine Rash       Scheduled meds:     aspirin 81 mg Oral Daily With Breakfast   atorvastatin 20 mg Oral Nightly   famotidine 20 mg Intravenous Daily   furosemide 40 mg Intravenous Q12H   gabapentin 300 mg Oral Nightly   heparin (porcine) 5,000 Units Subcutaneous Q8H   hydrOXYzine 50 mg Oral Daily   insulin lispro 2-7 Units Subcutaneous Q6H   ipratropium-albuterol 3 mL Nebulization 4x Daily - RT       PRN meds:  dextrose  •  dextrose  •  glucagon (human recombinant)  •  ipratropium-albuterol  •  Morphine  •  ondansetron  •  sodium chloride    Assessment/Plan     Active Problems:    Acute respiratory failure      Plan:  Acute respiratory failure with hypoxia and hypercapnia- patient's intubated.  Consult pulmonary.  On propofol.  Duo nebs.    Acute on chronic CHF-BNP 7000 improved from previous 26,000.  On Lasix.  Echocardiogram ejection fraction 47%, diastolic dysfunction, systolic dysfunction.    Encephalopathy from hypoxia of respiratory failure    Acute renal failure-creatinine is improving.    Elevated troponin/non-STEMI likely type II.  Troponins trending down.    Anemia-no acute GI bleed.  Previous hemoglobin 2 days ago 8.7.  Current hemoglobin is 6.7.  Plan to transfuse 2 units of blood.  Hemoccult.    Pancytopenia/neutropenia-will follow    Thrombocytopenia-we will follow    Elevated liver enzyme-CMP in a.m.    Hypertension-stable    COPD- duo nebs    Hyperlipidemia- on Lipitor    Reflux- on Pepcid    Hyperglycemia likely due to steroids.  Hemoglobin A1c 4.2.  Sliding scale    History of tobacco abuse    Nutrition-NG tube  feeding.    Blood culture shows no growth in 2 days, coagulase negative-probable contamination.  Urine culture no growth in 2 days.  Respiratory culture no growth in 2 days.    Discharge Planning:  Unknown    Kris Roberts MD   04/02/18   9:16 AM

## 2018-04-02 NOTE — PLAN OF CARE
Problem: Nutrition, Enteral (Adult)  Goal: Signs and Symptoms of Listed Potential Problems Will be Absent, Minimized or Managed (Nutrition, Enteral)  Outcome: Ongoing (interventions implemented as appropriate)  Wrote order to increase TF rate (as tolerated) to goal rate of 50 ml/hr

## 2018-04-03 ENCOUNTER — APPOINTMENT (OUTPATIENT)
Dept: GENERAL RADIOLOGY | Facility: HOSPITAL | Age: 83
End: 2018-04-03

## 2018-04-03 LAB
ABO + RH BLD: NORMAL
ABO + RH BLD: NORMAL
ALBUMIN SERPL-MCNC: 3.3 G/DL (ref 3.5–5)
ALBUMIN/GLOB SERPL: 1.1 G/DL (ref 1.1–2.5)
ALP SERPL-CCNC: 67 U/L (ref 24–120)
ALT SERPL W P-5'-P-CCNC: 23 U/L (ref 0–54)
ANION GAP SERPL CALCULATED.3IONS-SCNC: ABNORMAL MMOL/L (ref 4–13)
ARTERIAL PATENCY WRIST A: POSITIVE
AST SERPL-CCNC: 22 U/L (ref 7–45)
ATMOSPHERIC PRESS: 744 MMHG
BASE EXCESS BLDA CALC-SCNC: 13.7 MMOL/L (ref 0–2)
BASOPHILS # BLD AUTO: 0.01 10*3/MM3 (ref 0–0.2)
BASOPHILS NFR BLD AUTO: 0.3 % (ref 0–2)
BDY SITE: ABNORMAL
BH BB BLOOD EXPIRATION DATE: NORMAL
BH BB BLOOD EXPIRATION DATE: NORMAL
BH BB BLOOD TYPE BARCODE: 9500
BH BB BLOOD TYPE BARCODE: 9500
BH BB DISPENSE STATUS: NORMAL
BH BB DISPENSE STATUS: NORMAL
BH BB PRODUCT CODE: NORMAL
BH BB PRODUCT CODE: NORMAL
BH BB UNIT NUMBER: NORMAL
BH BB UNIT NUMBER: NORMAL
BILIRUB SERPL-MCNC: 0.8 MG/DL (ref 0.1–1)
BODY TEMPERATURE: 37 C
BUN BLD-MCNC: 33 MG/DL (ref 5–21)
BUN/CREAT SERPL: 22.3 (ref 7–25)
CALCIUM SPEC-SCNC: 8.6 MG/DL (ref 8.4–10.4)
CHLORIDE SERPL-SCNC: 95 MMOL/L (ref 98–110)
CO2 SERPL-SCNC: >40 MMOL/L (ref 24–31)
COHGB MFR BLD: 2.8 % (ref 0–5)
CREAT BLD-MCNC: 1.48 MG/DL (ref 0.5–1.4)
CROSSMATCH INTERPRETATION: NORMAL
CROSSMATCH INTERPRETATION: NORMAL
DEPRECATED RDW RBC AUTO: 53.7 FL (ref 40–54)
EOSINOPHIL # BLD AUTO: 0.02 10*3/MM3 (ref 0–0.7)
EOSINOPHIL NFR BLD AUTO: 0.6 % (ref 0–4)
ERYTHROCYTE [DISTWIDTH] IN BLOOD BY AUTOMATED COUNT: 18.4 % (ref 12–15)
GFR SERPL CREATININE-BSD FRML MDRD: 34 ML/MIN/1.73
GLOBULIN UR ELPH-MCNC: 2.9 GM/DL
GLUCOSE BLD-MCNC: 107 MG/DL (ref 70–100)
GLUCOSE BLDC GLUCOMTR-MCNC: 117 MG/DL (ref 70–130)
GLUCOSE BLDC GLUCOMTR-MCNC: 122 MG/DL (ref 70–130)
GLUCOSE BLDC GLUCOMTR-MCNC: 124 MG/DL (ref 70–130)
GLUCOSE BLDC GLUCOMTR-MCNC: 148 MG/DL (ref 70–130)
GLUCOSE BLDC GLUCOMTR-MCNC: 150 MG/DL (ref 70–130)
HCO3 BLDA-SCNC: 39 MMOL/L (ref 20–26)
HCT VFR BLD AUTO: 31.6 % (ref 37–47)
HCT VFR BLD CALC: 29 % (ref 38–51)
HGB BLD-MCNC: 9.8 G/DL (ref 12–16)
HGB BLDA-MCNC: 9.5 G/DL (ref 13.5–17.5)
HOROWITZ INDEX BLD+IHG-RTO: 30 %
IMM GRANULOCYTES # BLD: 0.02 10*3/MM3 (ref 0–0.03)
IMM GRANULOCYTES NFR BLD: 0.6 % (ref 0–5)
LYMPHOCYTES # BLD AUTO: 1.21 10*3/MM3 (ref 0.72–4.86)
LYMPHOCYTES NFR BLD AUTO: 37 % (ref 15–45)
Lab: ABNORMAL
MCH RBC QN AUTO: 25.6 PG (ref 28–32)
MCHC RBC AUTO-ENTMCNC: 31 G/DL (ref 33–36)
MCV RBC AUTO: 82.5 FL (ref 82–98)
METHGB BLD QL: 0.5 % (ref 0–3)
MODALITY: ABNORMAL
MONOCYTES # BLD AUTO: 0.22 10*3/MM3 (ref 0.19–1.3)
MONOCYTES NFR BLD AUTO: 6.7 % (ref 4–12)
NEUTROPHILS # BLD AUTO: 1.79 10*3/MM3 (ref 1.87–8.4)
NEUTROPHILS NFR BLD AUTO: 54.8 % (ref 39–78)
NRBC BLD MANUAL-RTO: 0 /100 WBC (ref 0–0)
OXYHGB MFR BLDV: 91.7 % (ref 94–99)
PCO2 BLDA: 53 MM HG (ref 35–45)
PEEP RESPIRATORY: 5 CM[H2O]
PH BLDA: 7.47 PH UNITS (ref 7.35–7.45)
PLATELET # BLD AUTO: 82 10*3/MM3 (ref 130–400)
PMV BLD AUTO: 10.6 FL (ref 6–12)
PO2 BLDA: 68 MM HG (ref 83–108)
POTASSIUM BLD-SCNC: 3.6 MMOL/L (ref 3.5–5.3)
POTASSIUM BLDA-SCNC: 3.6 MMOL/L (ref 3.5–5.2)
PROT SERPL-MCNC: 6.2 G/DL (ref 6.3–8.7)
RBC # BLD AUTO: 3.83 10*6/MM3 (ref 4.2–5.4)
SAO2 % BLDCOA: 94.8 % (ref 94–99)
SET MECH RESP RATE: 14
SODIUM BLD-SCNC: 144 MMOL/L (ref 135–145)
SODIUM BLDA-SCNC: 144 MMOL/L (ref 136–145)
UNIT  ABO: NORMAL
UNIT  ABO: NORMAL
UNIT  RH: NORMAL
UNIT  RH: NORMAL
VENTILATOR MODE: AC
VT ON VENT VENT: 500 ML
WBC NRBC COR # BLD: 3.27 10*3/MM3 (ref 4.8–10.8)

## 2018-04-03 PROCEDURE — 25010000002 FUROSEMIDE PER 20 MG: Performed by: FAMILY MEDICINE

## 2018-04-03 PROCEDURE — 94799 UNLISTED PULMONARY SVC/PX: CPT

## 2018-04-03 PROCEDURE — 82375 ASSAY CARBOXYHB QUANT: CPT

## 2018-04-03 PROCEDURE — 36600 WITHDRAWAL OF ARTERIAL BLOOD: CPT

## 2018-04-03 PROCEDURE — 74018 RADEX ABDOMEN 1 VIEW: CPT

## 2018-04-03 PROCEDURE — 25010000002 ENOXAPARIN PER 10 MG: Performed by: FAMILY MEDICINE

## 2018-04-03 PROCEDURE — 82805 BLOOD GASES W/O2 SATURATION: CPT

## 2018-04-03 PROCEDURE — 25010000002 PIPERACILLIN SOD-TAZOBACTAM PER 1 G: Performed by: FAMILY MEDICINE

## 2018-04-03 PROCEDURE — 80053 COMPREHEN METABOLIC PANEL: CPT | Performed by: FAMILY MEDICINE

## 2018-04-03 PROCEDURE — 94760 N-INVAS EAR/PLS OXIMETRY 1: CPT

## 2018-04-03 PROCEDURE — 94003 VENT MGMT INPAT SUBQ DAY: CPT

## 2018-04-03 PROCEDURE — 94770: CPT

## 2018-04-03 PROCEDURE — 83050 HGB METHEMOGLOBIN QUAN: CPT

## 2018-04-03 PROCEDURE — 71045 X-RAY EXAM CHEST 1 VIEW: CPT

## 2018-04-03 PROCEDURE — 63710000001 INSULIN LISPRO (HUMAN) PER 5 UNITS: Performed by: FAMILY MEDICINE

## 2018-04-03 PROCEDURE — 85025 COMPLETE CBC W/AUTO DIFF WBC: CPT | Performed by: FAMILY MEDICINE

## 2018-04-03 PROCEDURE — 82962 GLUCOSE BLOOD TEST: CPT

## 2018-04-03 PROCEDURE — 25010000002 PROPOFOL 1000 MG/ML EMULSION: Performed by: FAMILY MEDICINE

## 2018-04-03 RX ORDER — POTASSIUM CHLORIDE 1.5 G/1.77G
40 POWDER, FOR SOLUTION ORAL AS NEEDED
Status: DISCONTINUED | OUTPATIENT
Start: 2018-04-03 | End: 2018-04-05

## 2018-04-03 RX ORDER — HYDROCODONE BITARTRATE AND ACETAMINOPHEN 5; 325 MG/1; MG/1
1 TABLET ORAL EVERY 6 HOURS PRN
Status: DISCONTINUED | OUTPATIENT
Start: 2018-04-03 | End: 2018-04-05

## 2018-04-03 RX ORDER — POTASSIUM CHLORIDE 29.8 MG/ML
20 INJECTION INTRAVENOUS
Status: DISCONTINUED | OUTPATIENT
Start: 2018-04-03 | End: 2018-04-05

## 2018-04-03 RX ORDER — POTASSIUM CHLORIDE 20MEQ/15ML
20 LIQUID (ML) ORAL DAILY
Status: DISCONTINUED | OUTPATIENT
Start: 2018-04-03 | End: 2018-04-04

## 2018-04-03 RX ORDER — POTASSIUM CHLORIDE 750 MG/1
40 CAPSULE, EXTENDED RELEASE ORAL AS NEEDED
Status: DISCONTINUED | OUTPATIENT
Start: 2018-04-03 | End: 2018-04-05

## 2018-04-03 RX ADMIN — FAMOTIDINE 20 MG: 10 INJECTION INTRAVENOUS at 08:23

## 2018-04-03 RX ADMIN — PROPOFOL 30 MCG/KG/MIN: 10 INJECTION, EMULSION INTRAVENOUS at 21:14

## 2018-04-03 RX ADMIN — IPRATROPIUM BROMIDE AND ALBUTEROL SULFATE 3 ML: 2.5; .5 SOLUTION RESPIRATORY (INHALATION) at 10:38

## 2018-04-03 RX ADMIN — HYDROCODONE BITARTRATE AND ACETAMINOPHEN 1 TABLET: 5; 325 TABLET ORAL at 11:36

## 2018-04-03 RX ADMIN — INSULIN LISPRO 2 UNITS: 100 INJECTION, SOLUTION INTRAVENOUS; SUBCUTANEOUS at 12:13

## 2018-04-03 RX ADMIN — FUROSEMIDE 40 MG: 10 INJECTION, SOLUTION INTRAMUSCULAR; INTRAVENOUS at 08:23

## 2018-04-03 RX ADMIN — TAZOBACTAM SODIUM AND PIPERACILLIN SODIUM 3.38 G: 375; 3 INJECTION, SOLUTION INTRAVENOUS at 10:33

## 2018-04-03 RX ADMIN — PROPOFOL 40 MCG/KG/MIN: 10 INJECTION, EMULSION INTRAVENOUS at 06:59

## 2018-04-03 RX ADMIN — IPRATROPIUM BROMIDE AND ALBUTEROL SULFATE 3 ML: 2.5; .5 SOLUTION RESPIRATORY (INHALATION) at 14:57

## 2018-04-03 RX ADMIN — PROPOFOL 40 MCG/KG/MIN: 10 INJECTION, EMULSION INTRAVENOUS at 02:58

## 2018-04-03 RX ADMIN — ATORVASTATIN CALCIUM 20 MG: 10 TABLET, FILM COATED ORAL at 21:14

## 2018-04-03 RX ADMIN — PROPOFOL 30 MCG/KG/MIN: 10 INJECTION, EMULSION INTRAVENOUS at 11:52

## 2018-04-03 RX ADMIN — ENOXAPARIN SODIUM 30 MG: 30 INJECTION SUBCUTANEOUS at 17:25

## 2018-04-03 RX ADMIN — GABAPENTIN 300 MG: 300 CAPSULE ORAL at 21:14

## 2018-04-03 RX ADMIN — IPRATROPIUM BROMIDE AND ALBUTEROL SULFATE 3 ML: 2.5; .5 SOLUTION RESPIRATORY (INHALATION) at 19:55

## 2018-04-03 RX ADMIN — POTASSIUM CHLORIDE 20 MEQ: 20 SOLUTION ORAL at 10:33

## 2018-04-03 RX ADMIN — IPRATROPIUM BROMIDE AND ALBUTEROL SULFATE 3 ML: 2.5; .5 SOLUTION RESPIRATORY (INHALATION) at 05:47

## 2018-04-03 RX ADMIN — FUROSEMIDE 40 MG: 10 INJECTION, SOLUTION INTRAMUSCULAR; INTRAVENOUS at 21:14

## 2018-04-03 RX ADMIN — TAZOBACTAM SODIUM AND PIPERACILLIN SODIUM 3.38 G: 375; 3 INJECTION, SOLUTION INTRAVENOUS at 16:49

## 2018-04-03 NOTE — NURSING NOTE
While performing midnight assessment, I found the 20G IV in the left forearm to have infiltrated with what seems to be a small amount of propofol. Dr. Murray was notified and the extravasation protocol was initiated.I spoke with the pharmacist who informed me propofol is a class F.  A warm compress was placed on the left forearm per protocol.

## 2018-04-03 NOTE — PLAN OF CARE
Problem: Patient Care Overview  Goal: Plan of Care Review  Outcome: Ongoing (interventions implemented as appropriate)   04/02/18 2543   Coping/Psychosocial   Plan of Care Reviewed With patient;family   Plan of Care Review   Progress improving   OTHER   Outcome Summary 2 units PRBCs transfused. Repeat H/H 9.6/31.2. Tolerating TF, rate almost at goal. Diuresing well with Lasix. Lightly sedated on proporol, following commands and [place].     Goal: Individualization and Mutuality  Outcome: Ongoing (interventions implemented as appropriate)    Goal: Discharge Needs Assessment  Outcome: Ongoing (interventions implemented as appropriate)      Problem: Fall Risk (Adult)  Goal: Absence of Fall  Outcome: Ongoing (interventions implemented as appropriate)      Problem: Skin Injury Risk (Adult)  Goal: Skin Health and Integrity  Outcome: Ongoing (interventions implemented as appropriate)      Problem: Restraint, Nonbehavioral (Nonviolent)  Goal: Rationale and Justification  Outcome: Ongoing (interventions implemented as appropriate)    Goal: Nonbehavioral (Nonviolent) Restraint: Absence of Injury/Harm  Outcome: Ongoing (interventions implemented as appropriate)    Goal: Nonbehavioral (Nonviolent) Restraint: Achievement of Discontinuation Criteria  Outcome: Ongoing (interventions implemented as appropriate)    Goal: Nonbehavioral (Nonviolent) Restraint: Preservation of Dignity and Wellbeing  Outcome: Ongoing (interventions implemented as appropriate)      Problem: Nutrition, Enteral (Adult)  Goal: Signs and Symptoms of Listed Potential Problems Will be Absent, Minimized or Managed (Nutrition, Enteral)  Outcome: Ongoing (interventions implemented as appropriate)

## 2018-04-03 NOTE — PROGRESS NOTES
Discharge Planning Assessment  [facility]     Patient Name: Erin Cox  MRN: [id number]  Today's Date: 4/3/2018    Admit Date: 3/30/2018          Discharge Needs Assessment     Row Name 04/03/18 1357       Living Environment    Lives With alone    Current Living Arrangements home/apartment/condo    Primary Care Provided by self    Provides Primary Care For no one    Family Caregiver if Needed child(zofia), adult    Quality of Family Relationships supportive;helpful;involved       Resource/Environmental Concerns    Resource/Environmental Concerns none    Transportation Concerns car, none       Transition Planning    Patient/Family Anticipated Services at Transition ;home health care    Transportation Anticipated family or friend will provide       Discharge Needs Assessment    Readmission Within the Last 30 Days no previous admission in last 30 days    Concerns to be Addressed care coordination/care conferences;discharge planning    Equipment Currently Used at Home cane, straight;shower chair    Anticipated Changes Related to Illness inability to care for self    Discharge Facility/Level of Care Needs skilled transitional care    Current Discharge Risk chronically ill;lives alone    Discharge Coordination/Progress Spoke with patient's son this am.  Son states they are needing to obtain legal guardianship of patient due to her current condition so they can pay her bills etc.  SW obtained physician's statement and will be providing to patient's son for him to pursue legal guardianship.  Patient resides alone and was receiving HH services from [facility].  It is unclear if patient will return home or need some type of rehab placement upon discharge.            Discharge Plan    No documentation.       Destination     No service coordination in this encounter.      Durable Medical Equipment     No service coordination in this encounter.      Dialysis/Infusion     No service coordination in this encounter.       Home Medical Care     No service coordination in this encounter.      Social Care     No service coordination in this encounter.                Demographic Summary    No documentation.           Functional Status    No documentation.           Psychosocial    No documentation.           Abuse/Neglect    No documentation.           Legal    No documentation.           Substance Abuse    No documentation.           Patient Forms    No documentation.         JOYCE HarrisonW

## 2018-04-03 NOTE — PROGRESS NOTES
[facility] Medicine Services  INPATIENT PROGRESS NOTE    Length of Stay: 4  Date of Admission: 3/30/2018  Primary Care Physician: Jeremi Kan MD    Subjective   Chief Complaint: Acute respiratory failure/acute on chronic heart failure/renal failure    HPI   Net urine output -1.0 k.  Initial weight 4/1 182 pounds, today's weight 178.  Kidney function is improving slowly.  Status post 2 units of blood transfusion yesterday, no sign of acute bleed.  Possible constipation, KUB for now.  Respiratory failure, distress last night, add Zosyn for now.  Patient still remained.  Pancytopenia .    Review of Systems   Unable to assess secondary to the patient's intubated and sedated state.    All pertinent negatives and positives are as above. All other systems have been reviewed and are negative unless otherwise stated.     Objective    Temp:  [95.9 °F (35.5 °C)-98.7 °F (37.1 °C)] 98.1 °F (36.7 °C)  Heart Rate:  [56-83] 67  Resp:  [14-16] 14  BP: ()/(37-88) 108/49  FiO2 (%):  [30 %] 30 %    Intake/Output Summary (Last 24 hours) at 04/03/18 0919  Last data filed at 04/03/18 0810   Gross per 24 hour   Intake          2305.86 ml   Output             3335 ml   Net         -1029.14 ml     Physical Exam  Constitutional: She appears well-developed.   HENT:   Head: Normocephalic and atraumatic.   Eyes: Conjunctivae are normal. Pupils are equal, round, and reactive to light.   Neck: Neck supple. No JVD present. No thyromegaly present.   Cardiovascular: Normal rate, regular rhythm, normal heart sounds and intact distal pulses.  Exam reveals no gallop and no friction rub.    No murmur heard.  Pulmonary/Chest: Patient is currently intubated and on a vent.  She has no rales.Diminished breath sound bilateral.  Wheezing bilateral.  Abdominal: Soft. Bowel sounds are normal. She exhibits no distension. There is no tenderness. There is no rebound and no guarding.   Musculoskeletal: She exhibits no  edema, tenderness or deformity.   Lymphadenopathy:     She has no cervical adenopathy.   Neurological: She displays normal reflexes. No cranial nerve deficit. She exhibits abnormal muscle tone. Coordination abnormal.   Skin: Skin is warm and dry. No rash noted.   Results Review:  Lab Results (last 24 hours)     Procedure Component Value Units Date/Time    POC Glucose Once [[id number]]  (Normal) Collected:  04/03/18 0519    Specimen:  Blood Updated:  04/03/18 0530     Glucose 124 mg/dL      Comment: : [id number] Abram Conte ID: [id number]       CBC & Differential [[id number]] Collected:  04/03/18 0238    Specimen:  Blood Updated:  04/03/18 0422    Narrative:       The following orders were created for panel order CBC & Differential.  Procedure                               Abnormality         Status                     ---------                               -----------         ------                     CBC Auto Differential[[id number]]        Abnormal            Final result                 Please view results for these tests on the individual orders.    CBC Auto Differential [[id number]]  (Abnormal) Collected:  04/03/18 0238    Specimen:  Blood Updated:  04/03/18 0422     WBC 3.27 (L) 10*3/mm3      RBC 3.83 (L) 10*6/mm3      Hemoglobin 9.8 (L) g/dL      Hematocrit 31.6 (L) %      MCV 82.5 fL      MCH 25.6 (L) pg      MCHC 31.0 (L) g/dL      RDW 18.4 (H) %      RDW-SD 53.7 fl      MPV 10.6 fL      Platelets 82 (L) 10*3/mm3      Neutrophil % 54.8 %      Lymphocyte % 37.0 %      Monocyte % 6.7 %      Eosinophil % 0.6 %      Basophil % 0.3 %      Immature Grans % 0.6 %      Neutrophils, Absolute 1.79 (L) 10*3/mm3      Lymphocytes, Absolute 1.21 10*3/mm3      Monocytes, Absolute 0.22 10*3/mm3      Eosinophils, Absolute 0.02 10*3/mm3      Basophils, Absolute 0.01 10*3/mm3      Immature Grans, Absolute 0.02 10*3/mm3      nRBC 0.0 /100 WBC     Comprehensive Metabolic Panel [[id number]]  (Abnormal) Collected:   04/03/18 0238    Specimen:  Blood Updated:  04/03/18 0420     Glucose 107 (H) mg/dL      BUN 33 (H) mg/dL      Creatinine 1.48 (H) mg/dL      Sodium 144 mmol/L      Potassium 3.6 mmol/L      Chloride 95 (L) mmol/L      CO2 >40.0 (C) mmol/L      Calcium 8.6 mg/dL      Total Protein 6.2 (L) g/dL      Albumin 3.30 (L) g/dL      ALT (SGPT) 23 U/L      AST (SGOT) 22 U/L      Alkaline Phosphatase 67 U/L      Total Bilirubin 0.8 mg/dL      eGFR Non African Amer 34 (L) mL/min/1.73      Globulin 2.9 gm/dL      A/G Ratio 1.1 g/dL      BUN/Creatinine Ratio 22.3     Anion Gap -- mmol/L      Comment: Unable to calculate Anion Gap.       Narrative:       The MDRD GFR formula is only valid for adults with stable renal function between ages 18 and 70.    Blood Culture - Blood, [[id number]]  (Normal) Collected:  03/31/18 0245    Specimen:  Blood from Arm, Right Updated:  04/03/18 0316     Blood Culture No growth at 3 days    Blood Gas, Arterial With Co-Ox [[id number]]  (Abnormal) Collected:  04/03/18 0300    Specimen:  Arterial Blood Updated:  04/03/18 0313     Site Right Radial     Rickey's Test Positive     pH, Arterial 7.475 (H) pH units      pCO2, Arterial 53.0 (H) mm Hg      pO2, Arterial 68.0 (L) mm Hg      HCO3, Arterial 39.0 (H) mmol/L      Base Excess, Arterial 13.7 (H) mmol/L      O2 Saturation, Arterial 94.8 %      Hemoglobin, Blood Gas 9.5 (L) g/dL      Hematocrit, Blood Gas 29.0 (L) %      Oxyhemoglobin 91.7 (L) %      Methemoglobin 0.50 %      Carboxyhemoglobin 2.8 %      Temperature 37.0 C      Sodium, Arterial 144 mmol/L      Potassium, Arterial 3.6 mmol/L      Barometric Pressure for Blood Gas 744 mmHg      Modality Ventilator     FIO2 30 %      Ventilator Mode AC     Set Tidal Volume 500     Set Mech Resp Rate 14.0     PEEP 5.0     Collected by [id number]    POC Glucose Once [[id number]]  (Normal) Collected:  04/03/18 0007    Specimen:  Blood Updated:  04/03/18 0018     Glucose 122 mg/dL      Comment: : [id number]  Abram Conte ID: [id number]       POC Glucose Once [[id number]]  (Normal) Collected:  04/02/18 1849    Specimen:  Blood Updated:  04/02/18 1901     Glucose 117 mg/dL      Comment: : [id number] Francisca AmatoMeter ID: [id number]       Basic Metabolic Panel [[id number]]  (Abnormal) Collected:  04/02/18 1535    Specimen:  Blood Updated:  04/02/18 1601     Glucose 97 mg/dL      BUN 35 (H) mg/dL      Creatinine 1.55 (H) mg/dL      Sodium 143 mmol/L      Potassium 4.0 mmol/L      Chloride 95 (L) mmol/L      CO2 >40.0 (C) mmol/L      Calcium 8.4 mg/dL      eGFR Non African Amer 32 (L) mL/min/1.73      BUN/Creatinine Ratio 22.6     Anion Gap -- mmol/L      Comment: Unable to calculate Anion Gap.       Narrative:       The MDRD GFR formula is only valid for adults with stable renal function between ages 18 and 70.    Hemoglobin & Hematocrit, Blood [[id number]]  (Abnormal) Collected:  04/02/18 1535    Specimen:  Blood Updated:  04/02/18 1546     Hemoglobin 9.6 (L) g/dL      Hematocrit 31.2 (L) %     Folate RBC [[id number]]  (Abnormal) Collected:  03/31/18 0300    Specimen:  Blood Updated:  04/02/18 1516     Folate, Hemolysate 445.2 ng/mL      Hematocrit 27.1 (L) %      RBC Folate 1643 ng/mL     Narrative:       Performed at:  [address] [place] [address]  [id number]  : Jann Leal PhD, Phone:  [phone]    POC Glucose Once [[id number]]  (Normal) Collected:  04/02/18 1343    Specimen:  Blood Updated:  04/02/18 1402     Glucose 127 mg/dL      Comment: : [id number] Francisca AmatoMeter ID: [id number]              Cultures:  Blood Culture   Date Value Ref Range Status   03/31/2018 Abnormal Stain (A)  Preliminary   03/31/2018 Staphylococcus, coagulase negative (A)  Preliminary     Comment:     Probable contaminant     03/31/2018 No growth at 3 days  Preliminary     Urine Culture   Date Value Ref Range Status   03/30/2018 No growth at 2 days  Final     Respiratory Culture   Date Value Ref  Range Status   03/30/2018 Light growth (2+) Normal Respiratory Lenora  Final       Radiology Data:    Imaging Results (last 24 hours)     Procedure Component Value Units Date/Time    XR Chest 1 View [[id number]] Collected:  04/03/18 0729     Updated:  04/03/18 0733    Narrative:       EXAMINATION:  XR CHEST 1 VW-  4/3/2018 2:00 AM CDT     HISTORY: On ventilator.     COMPARISON: 4/2/2018.     FINDINGS:  There is mild hypoventilation. Coarse markings appear  relatively stable. There is cardiomegaly. The left lung base is  difficult to evaluate. Patient remains intubated. A feeding tube extends  just into the stomach.       Impression:       1. Hypoventilation. Coarse markings appear relatively stable. No new  infiltrate.  2. Cardiomegaly.        This report was finalized on 04/03/2018 07:30 by Dr. Solis Jimenez MD.          Allergies   Allergen Reactions   • Fluarix [Flu Virus Vaccine] Swelling   • Influenza A (H1n1) Monoval Vac    • Pneumococcal Vaccines Swelling   • Codeine Rash       Scheduled meds:     aspirin 81 mg Oral Daily   atorvastatin 20 mg Oral Nightly   enoxaparin 30 mg Subcutaneous Q24H   famotidine 20 mg Intravenous Daily   furosemide 40 mg Intravenous Q12H   gabapentin 300 mg Oral Nightly   insulin lispro 2-7 Units Subcutaneous Q6H   ipratropium-albuterol 3 mL Nebulization 4x Daily - RT       PRN meds:  dextrose  •  dextrose  •  glucagon (human recombinant)  •  ipratropium-albuterol  •  Morphine  •  ondansetron  •  sodium chloride    Assessment/Plan     Active Problems:    Acute respiratory failure      Plan:  Acute respiratory failure with hypoxia and hypercapnia- patient's intubated.  Consult pulmonary.  On propofol.  Duo nebs. Start zosyn.      Acute on chronic CHF-BNP 7000 improved from previous 26,000.  On Lasix, add potassium.  Echocardiogram ejection fraction 47%, diastolic dysfunction, systolic dysfunction.     Encephalopathy from hypoxia of respiratory failure     Acute renal failure-creatinine  is improving.     Elevated troponin/non-STEMI likely type II.  Troponins trending down.     Anemia- hb 9.8. No acute GI bleed.  S/P 2 units of blood transfusion 4/2/18.  Hemoccult.     Pancytopenia/neutropenia- stable.     Thrombocytopenia- Stable.     Possible constipation-KUB     Hypotension-stable     COPD- duo nebs     Hyperlipidemia- on Lipitor     Reflux- on Pepcid     Hyperglycemia likely due to steroids.  Hemoglobin A1c 4.2.  Sliding scale     History of tobacco abuse     Nutrition-NG tube feeding.     Blood culture shows no growth in 3 days, coagulase negative-probable contamination.  Urine culture no growth in 2 days.  Respiratory culture normal respiratory Lenora.     Discharge Planning:  Unknown.  Prognosis guarded.    Kris Roberts MD   04/03/18   9:19 AM

## 2018-04-03 NOTE — PROGRESS NOTES
PULMONARY AND CRITICAL CARE PROGRESS NOTE - [facility]    Patient: Erin Cox    1935    MR# [id number]    Acct# [id number]  04/03/18   10:00 AM  Referring Provider: Kris Roberts MD    Chief Complaint: Mechanically ventilated    Interval history: The patient remains sedated on vent.  Has a stuffed animal chick.  No new problems reported per nursing.  Pt unable to provide additional history.  No fevers, chills, sweats     Meds:    aspirin 81 mg Oral Daily   atorvastatin 20 mg Oral Nightly   enoxaparin 30 mg Subcutaneous Q24H   famotidine 20 mg Intravenous Daily   furosemide 40 mg Intravenous Q12H   gabapentin 300 mg Oral Nightly   insulin lispro 2-7 Units Subcutaneous Q6H   ipratropium-albuterol 3 mL Nebulization 4x Daily - RT   piperacillin-tazobactam 3.375 g Intravenous Once   piperacillin-tazobactam 3.375 g Intravenous Q8H   potassium chloride 20 mEq Oral Daily       propofol 5-50 mcg/kg/min Last Rate: 40 mcg/kg/min (04/03/18 0659)     Review of Systems:     Cannot obtain due to mechanical ventilation.  The patient notably is critically ill and connected to a ventilator.  As such patient cannot communicate and provide any history whatsoever, including any history of present illness or interval history since arrival or review of systems. The interested reviewer may note this fact, as an attempt has been made at collecting and documenting these portions of the patient history, but this information is unobtainable despite attempted review and therefore cannot be documented at this time.     Ventilator Settings:     Vt (Set, L): 0.5 L  Resp Rate (Set): 14  Pressure Support (cm H2O): 0 cm H20  FiO2 (%): 30 %  PEEP/CPAP (cm H2O): 5 cm H20  Minute Ventilation (L/min) (Obs): 17.8 L/min  Resp Rate (Observed) Vent: 15  I:E Ratio (Set): 1:2.90  I:E Ratio (Obs): 1:1.9  PIP Observed (cm H2O): 44 cm H2O     Physical Exam:  Temp:  [95.9 °F (35.5 °C)-98.7 °F (37.1 °C)] 98.1 °F (36.7 °C)  Heart Rate:   [56-83] 67  Resp:  [14-16] 14  BP: ()/(37-88) 108/49  FiO2 (%):  [30 %] 30 %    Intake/Output Summary (Last 24 hours) at 04/03/18 1000  Last data filed at 04/03/18 0810   Gross per 24 hour   Intake          2305.86 ml   Output             3335 ml   Net         -1029.14 ml     SpO2 Percentage    04/03/18 0700 04/03/18 0805 04/03/18 0905   SpO2: 93% 95% 94%      Physical Exam   Constitutional: She appears well-developed and well-nourished. She appears ill. She is sedated and intubated.   HENT:   Head: Normocephalic and atraumatic.   Nose: Nose normal.   Eyes: Conjunctivae and EOM are normal. Pupils are equal, round, and reactive to light. No scleral icterus.   Cardiovascular: Normal rate, regular rhythm and normal heart sounds.  Exam reveals no friction rub.    No murmur heard.  Pulmonary/Chest: Effort normal and breath sounds normal. She is intubated. No respiratory distress. She has no wheezes. She has no rales.   Abdominal: Soft. Bowel sounds are normal. She exhibits no distension.   Musculoskeletal: She exhibits edema.   Neurological: She exhibits normal muscle tone.   sedated   Skin: Skin is warm and dry.   Nursing note and vitals reviewed.      Results from last 7 days  Lab Units 04/03/18  0238 04/02/18  1535 04/02/18  0315 03/31/18  0259   WBC 10*3/mm3 3.27*  --  3.26* 4.84   HEMOGLOBIN g/dL 9.8* 9.6* 6.7* 8.7*   PLATELETS 10*3/mm3 82*  --  82* 56*       Results from last 7 days  Lab Units 04/03/18  0300 04/03/18  0238 04/02/18  1535 04/02/18  0315   SODIUM mmol/L  --  144 143 141   SODIUM, ARTERIAL mmol/L 144  --   --   --    POTASSIUM mmol/L  --  3.6 4.0 3.6   BUN mg/dL  --  33* 35* 38*   CREATININE mg/dL  --  1.48* 1.55* 1.53*       Results from last 7 days  Lab Units 04/03/18  0300 04/02/18  0152 04/01/18  0255   PH, ARTERIAL pH units 7.475* 7.453* 7.458*   PCO2, ARTERIAL mm Hg 53.0* 50.0* 44.0   PO2 ART mm Hg 68.0* 87.0 82.4*   FIO2 % 30 30 30     Blood Culture   Date Value Ref Range Status    03/31/2018 Abnormal Stain (A)  Preliminary   03/31/2018 Staphylococcus, coagulase negative (A)  Preliminary     Comment:     Probable contaminant     03/31/2018 No growth at 24 hours  Preliminary     Urine Culture   Date Value Ref Range Status   03/30/2018 No growth at 2 days  Final     Respiratory Culture   Date Value Ref Range Status   03/30/2018 Light growth (2+) Normal Respiratory Lenora  Final     Recent films:  Imaging Results (last 24 hours)     Procedure Component Value Units Date/Time    XR Chest 1 View [[id number]] Collected:  04/03/18 0729     Updated:  04/03/18 0733    Narrative:       EXAMINATION:  XR CHEST 1 VW-  4/3/2018 2:00 AM CDT     HISTORY: On ventilator.     COMPARISON: 4/2/2018.     FINDINGS:  There is mild hypoventilation. Coarse markings appear  relatively stable. There is cardiomegaly. The left lung base is  difficult to evaluate. Patient remains intubated. A feeding tube extends  just into the stomach.       Impression:       1. Hypoventilation. Coarse markings appear relatively stable. No new  infiltrate.  2. Cardiomegaly.        This report was finalized on 04/03/2018 07:30 by Dr. Solis Jimenez MD.        Films reviewed personally by me.  My interpretation: ETT in good position, cardiomegaly hypoventilation, LLL atelect.    Pulmonary Assessment:    1. SEVERE ACUTE RESPIRATORY FAILURE REQUIRING MECHANICAL VENTILATION.  This is a threat to life or pulmonary function, high risk, due to acute on chronic respiratory failure with hypoxemia and hypercarbia.   2. Probable COPD exacerbation stable  3. Elevated troponin   4. Anemia   5. BELA  6. Elevated AST   7. Elevated d-dimer   8. Hx systolic/diastolic CHF    Recommend:     · Begin spontaneous breathing trials    · Echo reviewed aain; reduced ef, monitor for cardiac decompensation with weaning trial.  · Blood culture false positive, I dont think it warrants treatment.  · Continue duonebs  · Continue daily CXR/ABGs    Electronically signed by  Nam Barrios MD on 4/3/2018 at 10:00 AM

## 2018-04-03 NOTE — PLAN OF CARE
Problem: Patient Care Overview  Goal: Plan of Care Review  Outcome: Ongoing (interventions implemented as appropriate)   04/03/18 0559   Coping/Psychosocial   Plan of Care Reviewed With patient   Plan of Care Review   Progress no change   OTHER   Outcome Summary pt H/H stabilized, VSS, propofol at 40, follows commands, tf at goal of 50, urine adequate, continue to monitor     Goal: Individualization and Mutuality  Outcome: Ongoing (interventions implemented as appropriate)    Goal: Discharge Needs Assessment  Outcome: Ongoing (interventions implemented as appropriate)    Goal: Interprofessional Rounds/Family Conf  Outcome: Ongoing (interventions implemented as appropriate)      Problem: Fall Risk (Adult)  Goal: Absence of Fall  Outcome: Ongoing (interventions implemented as appropriate)      Problem: Skin Injury Risk (Adult)  Goal: Skin Health and Integrity  Outcome: Ongoing (interventions implemented as appropriate)      Problem: Restraint, Nonbehavioral (Nonviolent)  Goal: Rationale and Justification  Outcome: Ongoing (interventions implemented as appropriate)    Goal: Nonbehavioral (Nonviolent) Restraint: Absence of Injury/Harm  Outcome: Ongoing (interventions implemented as appropriate)    Goal: Nonbehavioral (Nonviolent) Restraint: Achievement of Discontinuation Criteria  Outcome: Ongoing (interventions implemented as appropriate)    Goal: Nonbehavioral (Nonviolent) Restraint: Preservation of Dignity and Wellbeing  Outcome: Ongoing (interventions implemented as appropriate)      Problem: Nutrition, Enteral (Adult)  Goal: Signs and Symptoms of Listed Potential Problems Will be Absent, Minimized or Managed (Nutrition, Enteral)  Outcome: Ongoing (interventions implemented as appropriate)    Goal: Signs and Symptoms of Listed Potential Problems Will be Absent, Minimized or Managed (Nutrition, Enteral)  Outcome: Ongoing (interventions implemented as appropriate)

## 2018-04-04 ENCOUNTER — APPOINTMENT (OUTPATIENT)
Dept: GENERAL RADIOLOGY | Facility: HOSPITAL | Age: 83
End: 2018-04-04

## 2018-04-04 PROBLEM — D61.818 PANCYTOPENIA (HCC): Status: ACTIVE | Noted: 2018-04-04

## 2018-04-04 LAB
ALBUMIN SERPL-MCNC: 3.5 G/DL (ref 3.5–5)
ALBUMIN/GLOB SERPL: 1.2 G/DL (ref 1.1–2.5)
ALP SERPL-CCNC: 69 U/L (ref 24–120)
ALT SERPL W P-5'-P-CCNC: 21 U/L (ref 0–54)
ANION GAP SERPL CALCULATED.3IONS-SCNC: ABNORMAL MMOL/L (ref 4–13)
ANISOCYTOSIS BLD QL: ABNORMAL
ARTERIAL PATENCY WRIST A: POSITIVE
AST SERPL-CCNC: 27 U/L (ref 7–45)
ATMOSPHERIC PRESS: 752 MMHG
BASE EXCESS BLDA CALC-SCNC: 12.8 MMOL/L (ref 0–2)
BASO STIPL COARSE BLD QL SMEAR: ABNORMAL
BDY SITE: ABNORMAL
BILIRUB SERPL-MCNC: 0.8 MG/DL (ref 0.1–1)
BODY TEMPERATURE: 37 C
BUN BLD-MCNC: 37 MG/DL (ref 5–21)
BUN/CREAT SERPL: 25.3 (ref 7–25)
CALCIUM SPEC-SCNC: 8.9 MG/DL (ref 8.4–10.4)
CHLORIDE SERPL-SCNC: 94 MMOL/L (ref 98–110)
CO2 SERPL-SCNC: >40 MMOL/L (ref 24–31)
COHGB MFR BLD: 2.6 % (ref 0–5)
CREAT BLD-MCNC: 1.46 MG/DL (ref 0.5–1.4)
DEPRECATED RDW RBC AUTO: 54.8 FL (ref 40–54)
ELLIPTOCYTES BLD QL SMEAR: ABNORMAL
EOSINOPHIL # BLD MANUAL: 0.08 10*3/MM3 (ref 0–0.7)
EOSINOPHIL NFR BLD MANUAL: 2 % (ref 0–4)
ERYTHROCYTE [DISTWIDTH] IN BLOOD BY AUTOMATED COUNT: 17.9 % (ref 12–15)
GFR SERPL CREATININE-BSD FRML MDRD: 34 ML/MIN/1.73
GLOBULIN UR ELPH-MCNC: 2.9 GM/DL
GLUCOSE BLD-MCNC: 124 MG/DL (ref 70–100)
GLUCOSE BLDC GLUCOMTR-MCNC: 136 MG/DL (ref 70–130)
HCO3 BLDA-SCNC: 38.2 MMOL/L (ref 20–26)
HCT VFR BLD AUTO: 33.1 % (ref 37–47)
HCT VFR BLD CALC: 31 % (ref 38–51)
HGB BLD-MCNC: 9.9 G/DL (ref 12–16)
HGB BLDA-MCNC: 10.1 G/DL (ref 13.5–17.5)
HOROWITZ INDEX BLD+IHG-RTO: 30 %
LYMPHOCYTES # BLD MANUAL: 0.94 10*3/MM3 (ref 0.72–4.86)
LYMPHOCYTES NFR BLD MANUAL: 25 % (ref 15–45)
LYMPHOCYTES NFR BLD MANUAL: 6 % (ref 4–12)
Lab: ABNORMAL
MCH RBC QN AUTO: 25.2 PG (ref 28–32)
MCHC RBC AUTO-ENTMCNC: 29.9 G/DL (ref 33–36)
MCV RBC AUTO: 84.2 FL (ref 82–98)
METHGB BLD QL: 0.5 % (ref 0–3)
MODALITY: ABNORMAL
MONOCYTES # BLD AUTO: 0.23 10*3/MM3 (ref 0.19–1.3)
NEUTROPHILS # BLD AUTO: 2.34 10*3/MM3 (ref 1.87–8.4)
NEUTROPHILS NFR BLD MANUAL: 58 % (ref 39–78)
NEUTS BAND NFR BLD MANUAL: 4 % (ref 0–10)
NRBC SPEC MANUAL: 1 /100 WBC (ref 0–0)
NT-PROBNP SERPL-MCNC: 3590 PG/ML (ref 0–1800)
OXYHGB MFR BLDV: 93.7 % (ref 94–99)
PCO2 BLDA: 52.3 MM HG (ref 35–45)
PEEP RESPIRATORY: 5 CM[H2O]
PH BLDA: 7.47 PH UNITS (ref 7.35–7.45)
PLATELET # BLD AUTO: 87 10*3/MM3 (ref 130–400)
PMV BLD AUTO: 10.1 FL (ref 6–12)
PO2 BLDA: 76.4 MM HG (ref 83–108)
POIKILOCYTOSIS BLD QL SMEAR: ABNORMAL
POLYCHROMASIA BLD QL SMEAR: ABNORMAL
POTASSIUM BLD-SCNC: 4.1 MMOL/L (ref 3.5–5.3)
POTASSIUM BLDA-SCNC: 4 MMOL/L (ref 3.5–5.2)
PROT SERPL-MCNC: 6.4 G/DL (ref 6.3–8.7)
RBC # BLD AUTO: 3.93 10*6/MM3 (ref 4.2–5.4)
SAO2 % BLDCOA: 96.7 % (ref 94–99)
SET MECH RESP RATE: 12
SMALL PLATELETS BLD QL SMEAR: ABNORMAL
SMUDGE CELLS BLD QL SMEAR: ABNORMAL
SODIUM BLD-SCNC: 144 MMOL/L (ref 135–145)
SODIUM BLDA-SCNC: 144 MMOL/L (ref 136–145)
VARIANT LYMPHS NFR BLD MANUAL: 5 % (ref 0–5)
VENTILATOR MODE: AC
VT ON VENT VENT: 500 ML
WBC NRBC COR # BLD: 3.77 10*3/MM3 (ref 4.8–10.8)

## 2018-04-04 PROCEDURE — 25010000002 FUROSEMIDE PER 20 MG: Performed by: FAMILY MEDICINE

## 2018-04-04 PROCEDURE — 71045 X-RAY EXAM CHEST 1 VIEW: CPT

## 2018-04-04 PROCEDURE — 83880 ASSAY OF NATRIURETIC PEPTIDE: CPT | Performed by: FAMILY MEDICINE

## 2018-04-04 PROCEDURE — 94799 UNLISTED PULMONARY SVC/PX: CPT

## 2018-04-04 PROCEDURE — 85025 COMPLETE CBC W/AUTO DIFF WBC: CPT | Performed by: FAMILY MEDICINE

## 2018-04-04 PROCEDURE — 82375 ASSAY CARBOXYHB QUANT: CPT

## 2018-04-04 PROCEDURE — 94770: CPT

## 2018-04-04 PROCEDURE — G8979 MOBILITY GOAL STATUS: HCPCS

## 2018-04-04 PROCEDURE — G8997 SWALLOW GOAL STATUS: HCPCS

## 2018-04-04 PROCEDURE — 25010000002 PROPOFOL 1000 MG/ML EMULSION: Performed by: FAMILY MEDICINE

## 2018-04-04 PROCEDURE — 25010000002 PIPERACILLIN SOD-TAZOBACTAM PER 1 G: Performed by: FAMILY MEDICINE

## 2018-04-04 PROCEDURE — 92610 EVALUATE SWALLOWING FUNCTION: CPT

## 2018-04-04 PROCEDURE — 97530 THERAPEUTIC ACTIVITIES: CPT

## 2018-04-04 PROCEDURE — 80053 COMPREHEN METABOLIC PANEL: CPT | Performed by: FAMILY MEDICINE

## 2018-04-04 PROCEDURE — 82962 GLUCOSE BLOOD TEST: CPT

## 2018-04-04 PROCEDURE — 36600 WITHDRAWAL OF ARTERIAL BLOOD: CPT

## 2018-04-04 PROCEDURE — 83050 HGB METHEMOGLOBIN QUAN: CPT

## 2018-04-04 PROCEDURE — G8978 MOBILITY CURRENT STATUS: HCPCS

## 2018-04-04 PROCEDURE — 25010000002 ENOXAPARIN PER 10 MG: Performed by: FAMILY MEDICINE

## 2018-04-04 PROCEDURE — 82805 BLOOD GASES W/O2 SATURATION: CPT

## 2018-04-04 PROCEDURE — 97162 PT EVAL MOD COMPLEX 30 MIN: CPT

## 2018-04-04 PROCEDURE — G8996 SWALLOW CURRENT STATUS: HCPCS

## 2018-04-04 PROCEDURE — 85007 BL SMEAR W/DIFF WBC COUNT: CPT | Performed by: FAMILY MEDICINE

## 2018-04-04 RX ORDER — BUMETANIDE 1 MG/1
1 TABLET ORAL DAILY
Status: DISCONTINUED | OUTPATIENT
Start: 2018-04-04 | End: 2018-04-05

## 2018-04-04 RX ORDER — FUROSEMIDE 10 MG/ML
20 INJECTION INTRAMUSCULAR; INTRAVENOUS EVERY 12 HOURS SCHEDULED
Status: DISCONTINUED | OUTPATIENT
Start: 2018-04-04 | End: 2018-04-04

## 2018-04-04 RX ADMIN — ENOXAPARIN SODIUM 30 MG: 30 INJECTION SUBCUTANEOUS at 18:01

## 2018-04-04 RX ADMIN — PROPOFOL 30 MCG/KG/MIN: 10 INJECTION, EMULSION INTRAVENOUS at 04:21

## 2018-04-04 RX ADMIN — IPRATROPIUM BROMIDE AND ALBUTEROL SULFATE 3 ML: 2.5; .5 SOLUTION RESPIRATORY (INHALATION) at 10:30

## 2018-04-04 RX ADMIN — FAMOTIDINE 20 MG: 10 INJECTION INTRAVENOUS at 08:07

## 2018-04-04 RX ADMIN — IPRATROPIUM BROMIDE AND ALBUTEROL SULFATE 3 ML: 2.5; .5 SOLUTION RESPIRATORY (INHALATION) at 20:10

## 2018-04-04 RX ADMIN — POTASSIUM CHLORIDE 20 MEQ: 20 SOLUTION ORAL at 08:07

## 2018-04-04 RX ADMIN — TAZOBACTAM SODIUM AND PIPERACILLIN SODIUM 3.38 G: 375; 3 INJECTION, SOLUTION INTRAVENOUS at 00:37

## 2018-04-04 RX ADMIN — ASPIRIN 81 MG 81 MG: 81 TABLET ORAL at 08:07

## 2018-04-04 RX ADMIN — ATORVASTATIN CALCIUM 20 MG: 10 TABLET, FILM COATED ORAL at 21:03

## 2018-04-04 RX ADMIN — HYDROCODONE BITARTRATE AND ACETAMINOPHEN 1 TABLET: 5; 325 TABLET ORAL at 21:03

## 2018-04-04 RX ADMIN — IPRATROPIUM BROMIDE AND ALBUTEROL SULFATE 3 ML: 2.5; .5 SOLUTION RESPIRATORY (INHALATION) at 14:35

## 2018-04-04 RX ADMIN — TAZOBACTAM SODIUM AND PIPERACILLIN SODIUM 3.38 G: 375; 3 INJECTION, SOLUTION INTRAVENOUS at 08:07

## 2018-04-04 RX ADMIN — IPRATROPIUM BROMIDE AND ALBUTEROL SULFATE 3 ML: 2.5; .5 SOLUTION RESPIRATORY (INHALATION) at 06:22

## 2018-04-04 RX ADMIN — GABAPENTIN 300 MG: 300 CAPSULE ORAL at 21:03

## 2018-04-04 RX ADMIN — FUROSEMIDE 40 MG: 10 INJECTION, SOLUTION INTRAMUSCULAR; INTRAVENOUS at 08:07

## 2018-04-04 NOTE — PROGRESS NOTES
[facility]  INPATIENT PROGRESS NOTE    Length of Stay: 5  Date of Admission: 3/30/2018  Primary Care Physician: Jeremi Kan MD    Subjective   Chief Complaint: Acute respiratory failure/acute on chronic heart failure/renal failure    HPI   BNP is much improved.  Patient has been extubated today.  Patient becoming more euvolemic, DC Lasix and potassium.  Patient denies any chest pain or shortness of breath.  History of atrial fib, currently in sinus rhythm.  Once patient passed speech, plan to DC Lovenox and put patient back on Eliquis.    Review of Systems   Constitutional: Positive for activity change, appetite change and fatigue. Negative for chills and fever.   HENT: Negative for hearing loss, nosebleeds, tinnitus and trouble swallowing.    Eyes: Negative for visual disturbance.   Respiratory: Negative for cough, chest tightness, shortness of breath and wheezing.    Cardiovascular: Negative for chest pain, palpitations and leg swelling.   Gastrointestinal: Negative for abdominal distention, abdominal pain, blood in stool, constipation, diarrhea, nausea and vomiting.   Endocrine: Negative for cold intolerance, heat intolerance, polydipsia, polyphagia and polyuria.   Genitourinary: Negative for decreased urine volume, difficulty urinating, dysuria, flank pain, frequency and hematuria.   Musculoskeletal: Positive for arthralgias, gait problem and myalgias. Negative for joint swelling.   Skin: Negative for rash.   Allergic/Immunologic: Negative for immunocompromised state.   Neurological: Positive for weakness. Negative for dizziness, syncope, light-headedness and headaches.   Hematological: Negative for adenopathy. Does not bruise/bleed easily.   Psychiatric/Behavioral: Positive for confusion. Negative for sleep disturbance. The patient is not nervous/anxious.           All pertinent negatives and positives are as above. All other systems have been reviewed  and are negative unless otherwise stated.     Objective    Temp:  [98 °F (36.7 °C)-98.1 °F (36.7 °C)] 98 °F (36.7 °C)  Heart Rate:  [54-96] 58  Resp:  [12-18] 14  BP: ()/(41-79) 112/50  FiO2 (%):  [30 %] 30 %    Intake/Output Summary (Last 24 hours) at 04/04/18 0853  Last data filed at 04/04/18 0421   Gross per 24 hour   Intake          2609.01 ml   Output             1775 ml   Net           834.01 ml     Physical Exam   Constitutional: She appears well-developed.   HENT:   Head: Normocephalic and atraumatic.   Eyes: Conjunctivae are normal. Pupils are equal, round, and reactive to light.   Neck: Neck supple. No JVD present. No thyromegaly present.   Cardiovascular: Normal rate, regular rhythm, normal heart sounds and intact distal pulses.  Exam reveals no gallop and no friction rub.    No murmur heard.  Pulmonary/Chest: Effort normal. No respiratory distress. She has wheezes. She has no rales. She exhibits no tenderness.   Diminished breath sound bilateral   Abdominal: Soft. Bowel sounds are normal. She exhibits no distension. There is no tenderness. There is no rebound and no guarding.   Musculoskeletal: She exhibits no edema, tenderness or deformity.   Lymphadenopathy:     She has no cervical adenopathy.   Neurological: She is alert. She displays normal reflexes. No cranial nerve deficit. She exhibits abnormal muscle tone. Coordination abnormal.   Skin: Skin is warm and dry. No rash noted.       Results Review:  Lab Results (last 24 hours)     Procedure Component Value Units Date/Time    POC Glucose Once [[id number]]  (Abnormal) Collected:  04/04/18 0513    Specimen:  Blood Updated:  04/04/18 0524     Glucose 136 (H) mg/dL      Comment: : [id number] Abram Conte ID: [id number]       CBC & Differential [[id number]] Collected:  04/04/18 0234    Specimen:  Blood Updated:  04/04/18 0403    Narrative:       The following orders were created for panel order CBC & Differential.  Procedure                                Abnormality         Status                     ---------                               -----------         ------                     Manual Differential[[id number]]                                                         CBC Auto Differential[[id number]]        Abnormal            Final result                 Please view results for these tests on the individual orders.    CBC Auto Differential [[id number]]  (Abnormal) Collected:  04/04/18 0234    Specimen:  Blood Updated:  04/04/18 0403     WBC 3.77 (L) 10*3/mm3      RBC 3.93 (L) 10*6/mm3      Hemoglobin 9.9 (L) g/dL      Hematocrit 33.1 (L) %      MCV 84.2 fL      MCH 25.2 (L) pg      MCHC 29.9 (L) g/dL      RDW 17.9 (H) %      RDW-SD 54.8 (H) fl      MPV 10.1 fL      Platelets 87 (L) 10*3/mm3     Manual Differential [[id number]]  (Abnormal) Collected:  04/04/18 0234    Specimen:  Blood Updated:  04/04/18 0403     Neutrophil % 58.0 %      Lymphocyte % 25.0 %      Monocyte % 6.0 %      Eosinophil % 2.0 %      Bands %  4.0 %      Atypical Lymphocyte % 5.0 %      Neutrophils Absolute 2.34 10*3/mm3      Lymphocytes Absolute 0.94 10*3/mm3      Monocytes Absolute 0.23 10*3/mm3      Eosinophils Absolute 0.08 10*3/mm3      nRBC 1.0 (H) /100 WBC      Anisocytosis Slight/1+     Basophilic Stippling Slight/1+     Elliptocytes Slight/1+     Poikilocytes Slight/1+     Polychromasia Slight/1+     Smudge Cells Slight/1+     Platelet Estimate Decreased    BNP [[id number]]  (Abnormal) Collected:  04/04/18 0234    Specimen:  Blood Updated:  04/04/18 0355     proBNP 3,590.0 (H) pg/mL     Comprehensive Metabolic Panel [[id number]]  (Abnormal) Collected:  04/04/18 0234    Specimen:  Blood Updated:  04/04/18 0349     Glucose 124 (H) mg/dL      BUN 37 (H) mg/dL      Creatinine 1.46 (H) mg/dL      Sodium 144 mmol/L      Potassium 4.1 mmol/L      Chloride 94 (L) mmol/L      CO2 >40.0 (C) mmol/L      Calcium 8.9 mg/dL      Total Protein 6.4 g/dL      Albumin 3.50 g/dL      ALT  (SGPT) 21 U/L      AST (SGOT) 27 U/L      Alkaline Phosphatase 69 U/L      Total Bilirubin 0.8 mg/dL      eGFR Non African Amer 34 (L) mL/min/1.73      Globulin 2.9 gm/dL      A/G Ratio 1.2 g/dL      BUN/Creatinine Ratio 25.3 (H)     Anion Gap -- mmol/L      Comment: Unable to calculate Anion Gap.       Narrative:       The MDRD GFR formula is only valid for adults with stable renal function between ages 18 and 70.    Blood Culture - Blood, [[id number]]  (Normal) Collected:  03/31/18 0245    Specimen:  Blood from Arm, Right Updated:  04/04/18 0316     Blood Culture No growth at 4 days    Blood Gas, Arterial With Co-Ox [[id number]]  (Abnormal) Collected:  04/04/18 0215    Specimen:  Arterial Blood Updated:  04/04/18 0219     Site Right Radial     Rickey's Test Positive     pH, Arterial 7.471 (H) pH units      pCO2, Arterial 52.3 (H) mm Hg      pO2, Arterial 76.4 (L) mm Hg      HCO3, Arterial 38.2 (H) mmol/L      Base Excess, Arterial 12.8 (H) mmol/L      O2 Saturation, Arterial 96.7 %      Hemoglobin, Blood Gas 10.1 (L) g/dL      Hematocrit, Blood Gas 31.0 (L) %      Oxyhemoglobin 93.7 (L) %      Methemoglobin 0.50 %      Carboxyhemoglobin 2.6 %      Temperature 37.0 C      Sodium, Arterial 144 mmol/L      Potassium, Arterial 4.0 mmol/L      Barometric Pressure for Blood Gas 752 mmHg      Modality Ventilator     FIO2 30 %      Ventilator Mode AC     Set Tidal Volume 500     Set Mech Resp Rate 12.0     PEEP 5.0     Collected by [id number]    POC Glucose Once [[id number]]  (Normal) Collected:  04/03/18 2324    Specimen:  Blood Updated:  04/03/18 2336     Glucose 117 mg/dL      Comment: : [id number] Abram Conte ID: [id number]       POC Glucose Once [[id number]]  (Abnormal) Collected:  04/03/18 1709    Specimen:  Blood Updated:  04/03/18 1735     Glucose 148 (H) mg/dL      Comment: : [id number] Rupa VivianMeter ID: [id number]       POC Glucose Once [[id number]]  (Abnormal) Collected:  04/03/18 1209    Specimen:   Blood Updated:  04/03/18 1221     Glucose 150 (H) mg/dL      Comment: : [id number] Stom VivianMeter ID: [id number]              Cultures:  Blood Culture   Date Value Ref Range Status   03/31/2018 Abnormal Stain (A)  Preliminary   03/31/2018 Staphylococcus, coagulase negative (A)  Preliminary     Comment:     Probable contaminant     03/31/2018 No growth at 4 days  Preliminary     Urine Culture   Date Value Ref Range Status   03/30/2018 No growth at 2 days  Final     Respiratory Culture   Date Value Ref Range Status   03/30/2018 Light growth (2+) Normal Respiratory Lenora  Final       Radiology Data:    Imaging Results (last 24 hours)     Procedure Component Value Units Date/Time    XR Chest 1 View [[id number]] Collected:  04/04/18 0714     Updated:  04/04/18 0718    Narrative:       EXAMINATION:   XR CHEST 1 VW-  4/4/2018 7:14 AM CDT     HISTORY: Ventilator     Frontal upright radiograph of the chest 4/4/2018 3:00 AM CDT     COMPARISON: April 3, 2018.     FINDINGS:   Coarse interstitial parenchymal markings are noted. Endotracheal tube  and feeding tube are satisfactorily position.. Cardiac silhouettes  moderately enlarged unchanged..      The osseous structures and surrounding soft tissues demonstrate no acute  abnormality.       Impression:       1. Cardiomegaly no evidence pulmonary vascular congestion. The chest is  stable compared to April 3, 2018.        This report was finalized on 04/04/2018 07:15 by Dr. Bruce Choudhary MD.    XR Abdomen KUB [[id number]] Collected:  04/03/18 1059     Updated:  04/03/18 1103    Narrative:       EXAMINATION:  XR ABDOMEN KUB-  4/3/2018 10:34 AM CDT     HISTORY: ABDOMINAL PAIN     COMPARISON: No comparison study.     TECHNIQUE: Supine abdomen.      FINDINGS:   There is splenomegaly. The spleen measures about 23 cm in  length. The bowel gas pattern is normal. There are degenerative changes  of the spine.       Impression:       1. The bowel gas pattern is within normal  limits.  2. Splenomegaly.        This report was finalized on 04/03/2018 11:00 by Dr. Solis Jimenez MD.          Allergies   Allergen Reactions   • Fluarix [Flu Virus Vaccine] Swelling   • Influenza A (H1n1) Monoval Vac    • Pneumococcal Vaccines Swelling   • Codeine Rash       Scheduled meds:     aspirin 81 mg Oral Daily   atorvastatin 20 mg Oral Nightly   enoxaparin 30 mg Subcutaneous Q24H   famotidine 20 mg Intravenous Daily   furosemide 40 mg Intravenous Q12H   gabapentin 300 mg Oral Nightly   insulin lispro 2-7 Units Subcutaneous Q6H   ipratropium-albuterol 3 mL Nebulization 4x Daily - RT   piperacillin-tazobactam 3.375 g Intravenous Q8H   potassium chloride 20 mEq Oral Daily       PRN meds:  dextrose  •  dextrose  •  glucagon (human recombinant)  •  HYDROcodone-acetaminophen  •  ipratropium-albuterol  •  Morphine  •  ondansetron  •  potassium chloride **OR** potassium chloride **OR** potassium chloride  •  sodium chloride    Assessment/Plan     Active Problems:    Acute respiratory failure      Plan:  Acute respiratory failure with hypoxia and hypercapnia- patient's intubated.  Consult pulmonary.  On propofol.  Duo nebs.  Patient was put on Zosyn due to respiratory failure and pancytopenia.  DC Zosyn recommendation from pulmonary due to neg culture.      Acute on chronic CHF-BNP 3500 improved from previous 26,000.  DC Lasix, DC potassium. Start po bumex.  Echocardiogram ejection fraction 47%, diastolic dysfunction, systolic dysfunction.     Encephalopathy from hypoxia of respiratory failure. Improving.     Hx of atria fib. Currently in sinus rhythm.  Thank plans once patient's pass speech, DC Lovenox and put patient back on Eliquis.     Acute renal failure- Stable.      Elevated troponin/non-STEMI likely type II.  Troponins trend down.     Anemia- hb 9.8. No acute GI bleed.  S/P 2 units of blood transfusion 4/2/18.  Hemoccult.     Pancytopenia/neutropenia- stable.     Thrombocytopenia- Stable.      Possible  constipation-KUB     Hypotension-stable     COPD- duo nebs     Hyperlipidemia- on Lipitor     Reflux- on Pepcid     Hyperglycemia.  Hemoglobin A1c 4.2.  DC Sliding scale     History of tobacco abuse     Nutrition-NG tube feeding.    Deconditioning-PT and OT consult.     Blood culture shows no growth in 4 days, coagulase negative-probable contamination.  Urine culture no growth in 2 days.  Respiratory culture normal respiratory Lenora.     Discharge Plannin-4 days.     Kris Roberts MD   18   8:53 AM

## 2018-04-04 NOTE — PLAN OF CARE
"Problem: Patient Care Overview  Goal: Plan of Care Review  Outcome: Ongoing (interventions implemented as appropriate)   04/04/18 1033   Coping/Psychosocial   Plan of Care Reviewed With patient;family   OTHER   Outcome Summary PT boo completed. She was alert and eager to work with PT so that she can get stronger and \"go home.\" She required min assist to get OOB and to the bedside chair. She demos generalized weakness since being on the vent. PT will work to progress her functional mobility, strength and endurance with activity. Anticipate d/c home with HH vs TCU, pending progress.          "

## 2018-04-04 NOTE — PLAN OF CARE
Problem: Patient Care Overview  Goal: Plan of Care Review  Outcome: Ongoing (interventions implemented as appropriate)   04/04/18 5679   Coping/Psychosocial   Plan of Care Reviewed With patient   Plan of Care Review   Progress no change   OTHER   Outcome Summary Clinical Swallow Evaluation completed. Pt sitting upright in chair. Pt tolerated honey, nectar thick, and thin liquids with no over s/s of aspiration. Pt tolerated puree with no overt s/s of aspirations. Pt exhibited difficulties with the regular. SLP noted poor rotary chew, prolonged mastication and mild residue in the oral cavity during trials of regular food. Decreased laryngeal elevation during swallows. During the evaluation pt exhibited multiple swallows. SLP recommending thin liquids and puree. SLP will continue to follow and treat.        Problem: Fall Risk (Adult)  Goal: Absence of Fall  Outcome: Ongoing (interventions implemented as appropriate)      Problem: Skin Injury Risk (Adult)  Goal: Skin Health and Integrity  Outcome: Ongoing (interventions implemented as appropriate)      Problem: Restraint, Nonbehavioral (Nonviolent)  Goal: Rationale and Justification  Outcome: Ongoing (interventions implemented as appropriate)    Goal: Nonbehavioral (Nonviolent) Restraint: Absence of Injury/Harm  Outcome: Ongoing (interventions implemented as appropriate)    Goal: Nonbehavioral (Nonviolent) Restraint: Achievement of Discontinuation Criteria  Outcome: Ongoing (interventions implemented as appropriate)    Goal: Nonbehavioral (Nonviolent) Restraint: Preservation of Dignity and Wellbeing  Outcome: Ongoing (interventions implemented as appropriate)

## 2018-04-04 NOTE — THERAPY EVALUATION
Acute Care - Speech Language Pathology   Swallow Initial Evaluation [facility]     Patient Name: Erin Cox  : 1935  MRN: [id number]  Today's Date: 2018  Onset of Illness/Injury or Date of Surgery: 18     Referring Physician: Dr. Roberts      Admit Date: 3/30/2018  Clinical Swallow Evaluation completed. Pt sitting upright in chair. Pt tolerated honey, nectar thick, and thin liquids with no over s/s of aspiration. Pt tolerated puree with no overt s/s of aspirations. Pt exhibited difficulties with the regular. SLP noted poor rotary chew, prolonged mastication and mild residue in the oral cavity during trials of regular food. Decreased laryngeal elevation during swallows. During the evaluation pt exhibited multiple swallows. SLP recommending thin liquids and puree. Medicine whole with thin liquids. SLP will continue to follow and treat.     Antelmo Hidalgo, Speech Therapy Student 2018 2:05 PM  Visit Dx:     ICD-10-CM ICD-9-CM   1. Impaired functional mobility and endurance Z74.09 V49.89   2. Oropharyngeal dysphagia R13.12 787.22     Patient Active Problem List   Diagnosis   • Mitral regurgitation   • Aortic stenosis   • Acute on chronic systolic congestive heart failure   • Acute respiratory failure with hypoxia   • Respiratory distress   • Right lower lobe pneumonia   • Respiratory failure   • Acute respiratory failure   • Pancytopenia     Past Medical History:   Diagnosis Date   • Acute pulmonary edema    • Arteriosclerotic coronary artery disease    • Bradycardia    • CAD (coronary artery disease)    • CHF (congestive heart failure)    • COPD (chronic obstructive pulmonary disease)    • Dyslipidemia    • Hypertension    • Old non-ST elevation myocardial infarction (NSTEMI)    • Renal disorder    • Stented coronary artery      Past Surgical History:   Procedure Laterality Date   • BACK SURGERY     • CHOLECYSTECTOMY     • COLON SURGERY      Resection   • EYE SURGERY Bilateral     cataract           SWALLOW EVALUATION (last 72 hours)      SLP Adult Swallow Evaluation     Row Name 04/04/18 8088                   Rehab Evaluation    Document Type (P)  evaluation  -DS        Subjective Information (P)  no complaints  -DS        Patient Observations (P)  cooperative  -DS        Patient Effort (P)  adequate  -DS           General Information    Patient Profile Reviewed (P)  yes  -DS        Pertinent History Of Current Problem (P)  History of respiratory failure; pt excubated today  -DS        Current Method of Nutrition (P)  NPO  -DS        Precautions/Limitations, Vision (P)  WFL  -DS        Precautions/Limitations, Hearing (P)  WFL  -DS        Prior Level of Function-Communication (P)  WFL  -DS        Prior Level of Function-Swallowing (P)  no diet consistency restrictions  -DS        Plans/Goals Discussed with (P)  patient  -DS        Barriers to Rehab (P)  cognitive status  -DS        Patient's Goals for Discharge (P)  return to all previous roles/activities  -DS        Family Goals for Discharge (P)  patient able to return to all previous activities/roles  -DS           Pain Assessment    Additional Documentation (P)  Pain Scale: FACES Pre/Post-Treatment (Group)  -DS           Pain Scale: FACES Pre/Post-Treatment    Pain: FACES Scale, Pretreatment (P)  0-->no hurt  -DS        Pain: FACES Scale, Post-Treatment (P)  0-->no hurt  -DS           Oral Motor and Function    Dentition Assessment (P)  edentulous, does not have dentures  -DS        Secretion Management (P)  WNL/WFL  -DS        Mucosal Quality (P)  dry  -DS        Volitional Swallow (P)  WFL  -DS        Volitional Cough (P)  WFL  -DS           Oral Musculature and Cranial Nerve Assessment    Oral Motor General Assessment (P)  WFL  -DS        Vocal Impairment, Detail. Cranial Nerve X (Vagus) (P)  vocal quality abnormality (see comments)   vocal quality hoarse  -DS        Oral Motor, Comment (P)  Pt is edentulous that were not avalible during the  evaluation. Pt wears the dentures at home.   -DS           General Eating/Swallowing Observations    Respiratory Support Currently in Use (P)  nasal cannula  -DS        Eating/Swallowing Skills (P)  self-fed;fed by SLP  -DS        Positioning During Eating (P)  upright in chair  -DS        Utensils Used (P)  spoon;cup;straw  -DS        Consistencies Trialed (P)  regular textures;pureed;nectar/syrup-thick liquids;honey-thick liquids;thin liquids  -DS           Clinical Swallow Eval    Oral Prep Phase (P)  impaired  -DS        Oral Transit (P)  WFL  -DS        Oral Residue (P)  impaired  -DS        Pharyngeal Phase (P)  no overt signs/symptoms of pharyngeal impairment  -DS        Esophageal Phase (P)  unremarkable  -DS        Clinical Swallow Evaluation Summary (P)  Clinical Swallow Evaluation completed. Pt sitting upright in chair. Pt tolerated honey, nectar thick, and thin liquids with no over s/s of aspiration. Pt tolerated puree with no overt s/s of aspirations. Pt exhibited difficulties with the regular. SLP noted poor rotary chew, prolonged mastication and mild residue in the oral cavity during trials of regular food. Decreased laryngeal elevation during swallows. During the evaluation pt exhibited multiple swallows. SLP recommending thin liquids and puree. SLP will continue to follow and treat.   -DS           Oral Prep Concerns    Oral Prep Concerns (P)  prolonged mastication;poor rotary chew  -DS        Prolonged Mastication (P)  regular consistencies  -DS        Poor Rotary Chew (P)  regular consistencies  -DS        Oral Prep Concerns, Comment (P)  Pt had mild residue, poor rotary chew and prolonged mastication with the trials of regular.  -DS           Oral Residue Concerns    Oral Residue Concerns (P)  diffuse residue throughout oral cavity  -DS        Diffuse Residue Throughout Oral Cavity (P)  regular consistencies  -DS        Oral Residue Concerns, Comment (P)  Pt had mild residue in the oral cavity  after trials of regular.  -DS           Clinical Impression    SLP Swallowing Diagnosis (P)  oral dysfunction  -DS        Functional Impact (P)  risk of aspiration/pneumonia;risk of malnutrition;risk of dehydration  -DS        Rehab Potential/Prognosis, Swallowing (P)  adequate, monitor progress closely  -DS        Criteria for Skilled Therapeutic Interventions Met (P)  demonstrates skilled criteria  -DS           Recommendations    Therapy Frequency (SLP) (P)  at least;3 days per week  -DS        Predicted Duration Therapy Intervention (Days) (P)  until discharge  -DS        SLP Diet Recommendation (P)  puree;thin liquids  -DS        Recommended Precautions and Strategies (P)  upright posture during/after eating;small bites of food and sips of liquid  -DS        Monitor for Signs of Aspiration (P)  notify SLP if any concerns  -DS        Anticipated Dischage Disposition (P)  unknown  -DS           Swallow Goals (SLP)    Oral Nutrition/Hydration Goal Selection (SLP) (P)  oral nutrition/hydration, SLP goal 1  -DS        Lingual Strengthening Goal Selection (SLP) (P)  lingual strengthening, SLP goal 1  -DS        Pharyngeal Strengthening Exercise Goal Selection (SLP) (P)  pharyngeal strengthening exercise, SLP goal 1  -DS        Additional Documentation (P)  lingual strengthening goal selection (SLP);pharyngeal strengthening exercise goal selection (SLP)  -DS           Oral Nutrition/Hydration Goal 1 (SLP)    Oral Nutrition/Hydration Goal 1, SLP (P)  LTG: Pt will be able to consume LRD without any overt s/s of aspiration.   -DS        Time Frame (Oral Nutrition/Hydration Goal 1, SLP) (P)  by discharge  -DS        Barriers (Oral Nutrition/Hydration Goal 1, SLP) (P)  N/A  -DS        Progress/Outcomes (Oral Nutrition/Hydration Goal 1, SLP) (P)  goal ongoing  -DS           Lingual Strengthening Goal 1 (SLP)    Activity (Lingual Strengthening Goal 1, SLP) (P)  increase lingual tone/sensation/control/coordination/movement   -DS        Increase Lingual Tone/Sensation/Control/Coordination/Movement (P)  lingual movement exercises  -DS        [place]/Accuracy (Lingual Strengthening Goal 1, SLP) (P)  independently (over 90% accuracy)  -DS        Time Frame (Lingual Strengthening Goal 1, SLP) (P)  short term goal (STG);by discharge  -DS        Barriers (Lingual Strengthening Goal 1, SLP) (P)  N/A  -DS        Progress/Outcomes (Lingual Strengthening Goal 1, SLP) (P)  goal ongoing  -DS           Pharyngeal Strengthening Exercise Goal 1 (SLP)    Activity (Pharyngeal Strengthening Goal 1, SLP) (P)  increase squeeze/positive pressure generation  -DS        Increase Squeeze/Positive Pressure Generation (P)  hard effortful swallow;mesfin  -DS        [place]/Accuracy (Pharyngeal Strengthening Goal 1, SLP) (P)  independently (over 90% accuracy)  -DS        Time Frame (Pharyngeal Strengthening Goal 1, SLP) (P)  short term goal (STG);by discharge  -DS        Barriers (Pharyngeal Strengthening Goal 1, SLP) (P)  N/A  -DS        Progress/Outcomes (Pharyngeal Strengthening Goal 1, SLP) (P)  goal ongoing  -DS           Dysphagia Treatment Objectives and Progress    Dysphagia Treatment Objectives (P)  --  -DS           Improve oral skills    To Improve Oral Skills, patient will: (P)  --  -DS          User Key  (r) = Recorded By, (t) = Taken By, (c) = Cosigned By    Initials Name Effective Dates    CLIFTON Hidalgo, Speech Therapy Student 03/02/18 -         EDUCATION  The patient has been educated in the following areas:   Dysphagia (Swallowing Impairment).    SLP Recommendation and Plan  SLP Swallowing Diagnosis: (P) oral dysfunction  SLP Diet Recommendation: (P) puree, thin liquids  Recommended Precautions and Strategies: (P) upright posture during/after eating, small bites of food and sips of liquid     Monitor for Signs of Aspiration: (P) notify SLP if any concerns     Criteria for Skilled Therapeutic Interventions Met: (P) demonstrates  skilled criteria  Anticipated Dischage Disposition: (P) unknown  Rehab Potential/Prognosis, Swallowing: (P) adequate, monitor progress closely  Therapy Frequency (SLP): (P) at least, 3 days per week  Predicted Duration Therapy Intervention (Days): (P) until discharge       Plan of Care Reviewed With: (P) patient  Plan of Care Review  Plan of Care Reviewed With: (P) patient  Progress: (P) no change  Outcome Summary: (P) Clinical Swallow Evaluation completed. Pt sitting upright in chair. Pt tolerated honey, nectar thick, and thin liquids with no over s/s of aspiration. Pt tolerated puree with no overt s/s of aspirations. Pt exhibited difficulties with the regular. SLP noted poor rotary chew, prolonged mastication and mild residue in the oral cavity during trials of regular food. Decreased laryngeal elevation during swallows. During the evaluation pt exhibited multiple swallows. SLP recommending thin liquids and puree. SLP will continue to follow and treat.           SLP GOALS     Row Name 04/04/18 1250             Oral Nutrition/Hydration Goal 1 (SLP)    Oral Nutrition/Hydration Goal 1, SLP (P)  LTG: Pt will be able to consume LRD without any overt s/s of aspiration.   -DS      Time Frame (Oral Nutrition/Hydration Goal 1, SLP) (P)  by discharge  -DS      Barriers (Oral Nutrition/Hydration Goal 1, SLP) (P)  N/A  -DS      Progress/Outcomes (Oral Nutrition/Hydration Goal 1, SLP) (P)  goal ongoing  -DS         Lingual Strengthening Goal 1 (SLP)    Activity (Lingual Strengthening Goal 1, SLP) (P)  increase lingual tone/sensation/control/coordination/movement  -DS      Increase Lingual Tone/Sensation/Control/Coordination/Movement (P)  lingual movement exercises  -DS      [place]/Accuracy (Lingual Strengthening Goal 1, SLP) (P)  independently (over 90% accuracy)  -DS      Time Frame (Lingual Strengthening Goal 1, SLP) (P)  short term goal (STG);by discharge  -DS      Barriers (Lingual Strengthening Goal 1, SLP) (P)  N/A   -DS      Progress/Outcomes (Lingual Strengthening Goal 1, SLP) (P)  goal ongoing  -DS         Pharyngeal Strengthening Exercise Goal 1 (SLP)    Activity (Pharyngeal Strengthening Goal 1, SLP) (P)  increase squeeze/positive pressure generation  -DS      Increase Squeeze/Positive Pressure Generation (P)  hard effortful swallow;mesfin  -DS      [place]/Accuracy (Pharyngeal Strengthening Goal 1, SLP) (P)  independently (over 90% accuracy)  -DS      Time Frame (Pharyngeal Strengthening Goal 1, SLP) (P)  short term goal (STG);by discharge  -DS      Barriers (Pharyngeal Strengthening Goal 1, SLP) (P)  N/A  -DS      Progress/Outcomes (Pharyngeal Strengthening Goal 1, SLP) (P)  goal ongoing  -DS        User Key  (r) = Recorded By, (t) = Taken By, (c) = Cosigned By    Initials Name Provider Type    DS Antelmo Hidalgo, Speech Therapy Student Speech Therapy Student             SLP Outcome Measures (last 72 hours)      SLP Outcome Measures     Row Name 04/04/18 1250             SLP Outcome Measures    Outcome Measure Used? (P)  Adult NOMS  -DS         FCM Scores    FCM Chosen (P)  Swallowing  -DS      Swallowing FCM Score (P)  4  -DS        User Key  (r) = Recorded By, (t) = Taken By, (c) = Cosigned By    Initials Name Effective Dates    CLIFTON Hidalgo, Speech Therapy Student 03/02/18 -            Time Calculation:         Time Calculation- SLP     Row Name 04/04/18 1400             Time Calculation- SLP    SLP Start Time (P)  1250  -DS      SLP Stop Time (P)  1355  -DS      SLP Time Calculation (min) (P)  65 min  -DS      SLP Received On (P)  04/04/18  -DS      SLP Goal Re-Cert Due Date (P)  04/14/18  -DS        User Key  (r) = Recorded By, (t) = Taken By, (c) = Cosigned By    Initials Name Provider Type    CLIFTON Hidalgo, Speech Therapy Student Speech Therapy Student          Therapy Charges for Today     Code Description Service Date Service Provider Modifiers Qty    [id number]  ST EVAL ORAL PHARYNG SWALLOW 4  4/4/2018 Antelmo Hidalgo, Speech Therapy Student GN 1          SLP G-Codes  SLP NOMS Used?: (P) Yes  Functional Limitations: (P) Swallowing  Swallow Current Status (): (P) At least 40 percent but less than 60 percent impaired, limited or restricted  Swallow Goal Status (): (P) At least 40 percent but less than 60 percent impaired, limited or restricted    Antelmo Hidalgo, Speech Therapy Student  4/4/2018

## 2018-04-04 NOTE — THERAPY EVALUATION
Acute Care - Physical Therapy Initial Evaluation  [facility]     Patient Name: Erin Cox  : 1935  MRN: [id number]  Today's Date: 2018   Onset of Illness/Injury or Date of Surgery: 18  Date of Referral to PT: 18  Referring Physician: Dr. Roberts      Admit Date: 3/30/2018    Visit Dx:     ICD-10-CM ICD-9-CM   1. Impaired functional mobility and endurance Z74.09 V49.89     Patient Active Problem List   Diagnosis   • Mitral regurgitation   • Aortic stenosis   • Acute on chronic systolic congestive heart failure   • Acute respiratory failure with hypoxia   • Respiratory distress   • Right lower lobe pneumonia   • Respiratory failure   • Acute respiratory failure   • Pancytopenia     Past Medical History:   Diagnosis Date   • Acute pulmonary edema    • Arteriosclerotic coronary artery disease    • Bradycardia    • CAD (coronary artery disease)    • CHF (congestive heart failure)    • COPD (chronic obstructive pulmonary disease)    • Dyslipidemia    • Hypertension    • Old non-ST elevation myocardial infarction (NSTEMI)    • Renal disorder    • Stented coronary artery      Past Surgical History:   Procedure Laterality Date   • BACK SURGERY     • CHOLECYSTECTOMY     • COLON SURGERY      Resection   • EYE SURGERY Bilateral     cataract        PT ASSESSMENT (last 72 hours)      Physical Therapy Evaluation     Row Name 18 1033 18 0800       PT Evaluation Time/Intention    Subjective Information no complaints  -DANGELO  --    Document Type evaluation  -DANGELO  --    Mode of Treatment physical therapy  -DANGELO  --    Comment  -- Now extubated, PT will await order to increase actiivty as appropriate per MD MAZARIEGOS    Row Name 18 1033          General Information    Patient Profile Reviewed? yes  -DANEGLO     Onset of Illness/Injury or Date of Surgery 18  -DANGELO     Referring Physician Dr. Roberts  -DANGELO     Patient Observations alert;cooperative;agree to therapy  -DANGELO     Patient/Family Observations Family  in room.  -DANGELO     General Observations of Patient Fowlers in bed, alert.  -DANGELO     Prior Level of Function independent:;all household mobility;dressing;bathing;home management;dependent:;driving  -DANGELO     Equipment Currently Used at Home cane, straight  -DANGELO     Pertinent History of Current Functional Problem CC: t/f from outside facility for acute resp. failure s/p intubation. Dx: acute hypoxic resp. failure; intubated. COPD exacerbation. metabolic encephalopathy, acute on chronic systolic CHF. 4/4 H&H: 9.9/33.1. WBC: 3.77.   -DANGELO     Existing Precautions/Restrictions fall  -DANGELO     Risks Reviewed patient:;LOB;nausea/vomiting;dizziness;increased discomfort;change in vital signs;lines disloged  -DANGELO     Benefits Reviewed patient:;improve function;increase independence;increase strength;increase balance;decrease pain;increase knowledge;improve skin integrity  -DANGELO     Barriers to Rehab medically complex  -DANGELO     Row Name 04/04/18 1033          Relationship/Environment    Lives With alone  -DANGELO     Family Caregiver if Needed child(zofia), adult   son and daughter check on daily.  -DANGELO     Row Name 04/04/18 1033          Resource/Environmental Concerns    Current Living Arrangements home/apartment/condo  -DANGELO     Resource/Environmental Concerns home accessibility  -DANGELO     Row Name 04/04/18 1033          Cognitive Assessment/Interventions    Additional Documentation Cognitive Assessment/Intervention (Group)  -DANGELO     Row Name 04/04/18 1033          Cognitive Assessment/Intervention- PT/OT    Orientation Status (Cognition) oriented to;person;place;time  -DANGELO     Follows Commands (Cognition) follows multi-step commands;over 90% accuracy;physical/tactile prompts required  -DANGELO     Personal Safety Interventions fall prevention program maintained;gait belt;muscle strengthening facilitated;nonskid shoes/slippers when out of bed  -DANGELO     Row Name 04/04/18 1033          Safety Issues, Functional Mobility    Impairments Affecting Function  (Mobility) balance;endurance/activity tolerance;strength  -[facility] Name 04/04/18 1033          Bed Mobility Assessment/Treatment    Bed Mobility Assessment/Treatment supine-sit  -DANGELO     Supine-Sit [place] (Bed Mobility) verbal cues;minimum assist (75% patient effort)  -DANGELO     Assistive Device (Bed Mobility) head of bed elevated  -[facility] Name 04/04/18 1033          Transfer Assessment/Treatment    Transfer Assessment/Treatment sit-stand transfer;stand-sit transfer;bed-chair transfer  -DANGELO     Bed-Chair [place] (Transfers) verbal cues;minimum assist (75% patient effort)   Few steps from EOB to bedside chair  -DANGELO     Sit-Stand [place] (Transfers) verbal cues;minimum assist (75% patient effort)  -DANGELO     Stand-Sit [place] (Transfers) verbal cues;minimum assist (75% patient effort)  -[facility] Name 04/04/18 1033          General ROM    GENERAL ROM COMMENTS B UE/LE AROM WFL except L shoulder AROM impaired ~ 25%  -DANGELO     Row Name 04/04/18 1033          General Assessment (Manual Muscle Testing)    Comment, General Manual Muscle Testing (MMT) Assessment B UE/LE 4-/5  -DANGELO     Row Name 04/04/18 1033          Motor Assessment/Intervention    Additional Documentation Balance (Group)  -DANGELO     Row Name 04/04/18 1033          Balance    Balance static sitting balance;static standing balance  -DANGELO     Row Name 04/04/18 1033          Static Sitting Balance    Level of [place] (Unsupported Sitting, Static Balance) standby assist  -DANGELO     Sitting Position (Unsupported Sitting, Static Balance) sitting on edge of bed  -DANGELO     Row Name 04/04/18 1033          Static Standing Balance    Level of [place] (Supported Standing, Static Balance) minimal assist, 75% patient effort  -DANGELO     Assistive Device Utilized (Supported Standing, Static Balance) --   L UE support  -DANGELO     Row Name 04/04/18 1033          Sensory Assessment/Intervention    Sensory General Assessment no sensation deficits identified   per pt   -DANGELO     Row Name 04/04/18 1033          Vision Assessment/Intervention    Visual Impairment/Limitations WFL   per pt  -DANGELO     Row Name 04/04/18 1033          Pain Assessment    Additional Documentation Pain Scale: Numbers Pre/Post-Treatment (Group)  -DANEGLO     Row Name 04/04/18 1033          Pain Scale: Numbers Pre/Post-Treatment    Pain Scale: Numbers, Pretreatment 0/10 - no pain  -DANGELO     Row Name 04/04/18 1033          Health Promotion    Additional Documentation Coping (Group);Plan of Care Review (Group)  -DANGELO     Row Name 04/04/18 1033          Coping    Observed Emotional State accepting;cooperative  -DANGELO     Row Name 04/04/18 1033          Plan of Care Review    Plan of Care Reviewed With patient;family  -DANGELO     Row Name 04/04/18 1033          Physical Therapy Clinical Impression    Date of Referral to PT 04/04/18  -DANGELO     PT Diagnosis (PT Clinical Impression) impaired mobility, weakness  -DANGELO     Functional Level at Time of Evaluation (PT Clinical Impression) Min assist to t/f to bedside chair  -DANGELO     Patient/Family Goals Statement (PT Clinical Impression) go home  -DANGELO     Criteria for Skilled Interventions Met (PT Clinical Impression) yes;treatment indicated  -DANGELO     Impairments Found (describe specific impairments) aerobic capacity/endurance;gait, locomotion, and balance  -DANGELO     Rehab Potential (PT Clinical Summary) good, to achieve stated therapy goals  -DANGELO     Predicted Duration of Therapy (PT) until d/c  -DANGELO     Care Plan Review (PT) evaluation/treatment results reviewed;risks/benefits reviewed;current/potential barriers reviewed;patient/other agree to care plan  -DANGELO     Care Plan Review, Other Participant (PT Clinical Impression) family  -DANGELO     Row Name 04/04/18 1033          Vital Signs    Pre Systolic BP Rehab 127  -DANGELO     Pre Treatment Diastolic BP 61  -DANGELO     Post Systolic BP Rehab 131  -DANGELO     Post Treatment Diastolic BP 62  -DANGELO     Pretreatment Heart Rate (beats/min) 66  -DANGELO     Posttreatment Heart  Rate (beats/min) 81  -DANGELO     Pre SpO2 (%) 98  -DANGELO     O2 Delivery Pre Treatment supplemental O2   2-3 L/NC  -DANGELO     Post SpO2 (%) 99  -DANGELO     O2 Delivery Post Treatment supplemental O2  -DANGELO     Pre Patient Position Supine  -DANGELO     Intra Patient Position Standing  -DANGELO     Post Patient Position Sitting  -DANGELO     Row Name 04/04/18 1033          Physical Therapy Goals    Bed Mobility Goal Selection (PT) bed mobility, PT goal 1  -DANGELO     Transfer Goal Selection (PT) transfer, PT goal 1  -DANGELO     Gait Training Goal Selection (PT) gait training, PT goal 1  -DANGELO     Row Name 04/04/18 1033          Bed Mobility Goal 1 (PT)    Activity/Assistive Device (Bed Mobility Goal 1, PT) sit to supine/supine to sit;scooting  -DANGELO     [place] Level/Cues Needed (Bed Mobility Goal 1, PT) independent  -DANGELO     Time Frame (Bed Mobility Goal 1, PT) long term goal (LTG);10 days  -DANGELO     Progress/Outcomes (Bed Mobility Goal 1, PT) goal ongoing  -DANGELO     Row Name 04/04/18 1033          Transfer Goal 1 (PT)    Activity/Assistive Device (Transfer Goal 1, PT) sit-to-stand/stand-to-sit;bed-to-chair/chair-to-bed;walker, rolling;cane, straight  -DANGELO     [place] Level/Cues Needed (Transfer Goal 1, PT) independent  -DANGELO     Time Frame (Transfer Goal 1, PT) long term goal (LTG);10 days  -DANGELO     Progress/Outcome (Transfer Goal 1, PT) goal ongoing  -DANGELO     Row Name 04/04/18 1033          Gait Training Goal 1 (PT)    Activity/Assistive Device (Gait Training Goal 1, PT) gait (walking locomotion);assistive device use;improve balance and speed;increase endurance/gait distance;cane, straight;walker, rolling  -DANGELO     [place] Level (Gait Training Goal 1, PT) independent  -DANGELO     Distance (Gait Goal 1, PT) 150  -DANGELO     Time Frame (Gait Training Goal 1, PT) long term goal (LTG);10 days  -DANGELO     Progress/Outcome (Gait Training Goal 1, PT) goal ongoing  -DANGELO     Row Name 04/04/18 1033          Positioning and Restraints    Pre-Treatment Position in bed  -DANGELO      "Post Treatment Position chair  -DANGELO     In Chair notified nsg;sitting;call light within reach;encouraged to call for assist;with family/caregiver;patient within staff view  -DANGELO       User Key  (r) = Recorded By, (t) = Taken By, (c) = Cosigned By    Initials Name Provider Type    DANGELO Apple, PT DPT Physical Therapist          Physical Therapy Education     Title: PT OT SLP Therapies (Active)     Topic: Physical Therapy (Active)     Point: Mobility training (Done)    Learning Progress Summary     Learner Status Readiness Method Response Comment Documented by    Patient Done Acceptance E VU,NR Educated pt/family on progression of PT POC and benefits of activity DANGELO 04/04/18 1033    Family Done Acceptance E VU,NR Educated pt/family on progression of PT POC and benefits of activity DANGELO 04/04/18 1033                      User Key     Initials Effective Dates Name Provider Type Discipline    DANGELO 08/02/16 -  Jurgen Apple PT DPT Physical Therapist PT                PT Recommendation and Plan  Anticipated Discharge Disposition (PT): home with home health care (vs TCU)  Planned Therapy Interventions (PT Eval): bed mobility training, transfer training, gait training, balance training, home exercise program, patient/family education, postural re-education, strengthening  Therapy Frequency (PT Clinical Impression): daily  Outcome Summary/Treatment Plan (PT)  Anticipated Discharge Disposition (PT): home with home health care (vs TCU)  Plan of Care Reviewed With: patient, family  Outcome Summary: PT eval completed. She was alert and eager to work with PT so that she can get stronger and \"go home.\" She required min assist to get OOB and to the bedside chair. She demos generalized weakness since being on the vent. PT will work to progress her functional mobility, strength and endurance with activity. Anticipate d/c home with HH vs TCU, pending progress.           Outcome Measures     Row Name 04/04/18 1033             How " much help from another person do you currently need...    Turning from your back to your side while in flat bed without using bedrails? 3  -DANGELO      Moving from lying on back to sitting on the side of a flat bed without bedrails? 3  -DANGELO      Moving to and from a bed to a chair (including a wheelchair)? 3  -DANGELO      Standing up from a chair using your arms (e.g., wheelchair, bedside chair)? 3  -DANGELO      Climbing 3-5 steps with a railing? 2  -DANGELO      To walk in hospital room? 3  -DANGELO      AM-PAC 6 Clicks Score 17  -DANGELO         Functional Assessment    Outcome Measure Options AM-PAC 6 Clicks Basic Mobility (PT)  -DANGELO        User Key  (r) = Recorded By, (t) = Taken By, (c) = Cosigned By    Initials Name Provider Type    DANGELO Apple, PT DPT Physical Therapist           Time Calculation:         PT Charges     Row Name 04/04/18 1158             Time Calculation    Start Time 1033  -DANGELO      Stop Time 1143  -DANGELO      Time Calculation (min) 70 min  -DANGELO      PT Received On 04/04/18  -DANGELO      PT Goal Re-Cert Due Date 04/14/18  -DANGELO         Time Calculation- PT    Total Timed Code Minutes- PT 10 minute(s)  -DANGELO        User Key  (r) = Recorded By, (t) = Taken By, (c) = Cosigned By    Initials Name Provider Type    DANGELO Apple, PT DPT Physical Therapist          Therapy Charges for Today     Code Description Service Date Service Provider Modifiers Qty    20009648584 HC PT MOBILITY CURRENT 4/4/2018 Jurgen Apple, PT DPT GP, CK 1    08967832334 HC PT MOBILITY PROJECTED 4/4/2018 Jurgen Apple, PT DPT GP, CI 1    [id number] HC PT EVAL MOD COMPLEXITY 4 4/4/2018 Jurgen Apple, PT DPT GP 1    [id number] HC PT THERAPEUTIC ACT EA 15 MIN 4/4/2018 Jurgen Apple, PT DPT GP 1          PT G-Codes  Outcome Measure Options: AM-PAC 6 Clicks Basic Mobility (PT)  Score: 17  Functional Limitation: Mobility: Walking and moving around  Mobility: Walking and Moving Around Current Status (): At least 40 percent but less than 60  percent impaired, limited or restricted  Mobility: Walking and Moving Around Goal Status (): At least 1 percent but less than 20 percent impaired, limited or restricted      Jurgen Appel, PT DPT  4/4/2018

## 2018-04-04 NOTE — PLAN OF CARE
Problem: Patient Care Overview  Goal: Plan of Care Review  Outcome: Ongoing (interventions implemented as appropriate)   04/04/18 1101   Coping/Psychosocial   Plan of Care Reviewed With other (see comments)  (nusing)   Plan of Care Review   Progress no change   OTHER   Outcome Summary Extubated this am, NPO. NG pulled per pt this am. Await SLP evaluation to detemine if oral diet can be tolerated. If unable to advance po diet, may benefit from enteral ntn. cont to follow

## 2018-04-04 NOTE — PLAN OF CARE
Problem: Patient Care Overview  Goal: Plan of Care Review  Outcome: Ongoing (interventions implemented as appropriate)   04/04/18 2520   OTHER   Outcome Summary Now extubated, PT will await order to increase actiivty as appropriate per MD.

## 2018-04-04 NOTE — PLAN OF CARE
Problem: Patient Care Overview  Goal: Plan of Care Review  Outcome: Ongoing (interventions implemented as appropriate)   04/04/18 5956   Coping/Psychosocial   Plan of Care Reviewed With patient   Plan of Care Review   Progress no change   OTHER   Outcome Summary Clinical Swallow Evaluation completed. Pt sitting upright in chair. Pt tolerated honey, nectar thick, and thin liquids with no over s/s of aspiration. Pt tolerated puree with no overt s/s of aspirations. Pt exhibited difficulties with the regular. SLP noted poor rotary chew, prolonged mastication and mild residue in the oral cavity during trials of regular food. Decreased laryngeal elevation during swallows. During the evaluation pt exhibited multiple swallows. SLP recommending thin liquids and puree. Medicine whole with thin liquids. SLP will continue to follow and treat.     Antelmo Hidalgo, Speech Therapy Student 4/4/2018 1:59 PM

## 2018-04-04 NOTE — PROGRESS NOTES
PULMONARY AND CRITICAL CARE PROGRESS NOTE - [facility]    Patient: Erin Cox    1935    MR# [id number]    Acct# [id number]  04/04/18   7:31 AM  Referring Provider: Kris Roberts MD    Chief Complaint: Mechanically ventilated    Interval history: The patient remains sedated on vent. No family present. No issues overnight. Per the staff she is very purposeful when sedation is lightened. CXR improved. ABGs are stable. She appears euvolemic. No fevers, chills, sweats. No other aggravating, alleviating factors or associated symptoms.    Meds:    aspirin 81 mg Oral Daily   atorvastatin 20 mg Oral Nightly   enoxaparin 30 mg Subcutaneous Q24H   famotidine 20 mg Intravenous Daily   furosemide 40 mg Intravenous Q12H   gabapentin 300 mg Oral Nightly   insulin lispro 2-7 Units Subcutaneous Q6H   ipratropium-albuterol 3 mL Nebulization 4x Daily - RT   piperacillin-tazobactam 3.375 g Intravenous Q8H   potassium chloride 20 mEq Oral Daily       propofol 5-50 mcg/kg/min Last Rate: 30 mcg/kg/min (04/04/18 0421)     Review of Systems:     Cannot obtain due to mechanical ventilation.  The patient notably is critically ill and connected to a ventilator.  As such patient cannot communicate and provide any history whatsoever, including any history of present illness or interval history since arrival or review of systems. The interested reviewer may note this fact, as an attempt has been made at collecting and documenting these portions of the patient history, but this information is unobtainable despite attempted review and therefore cannot be documented at this time.     Ventilator Settings:     Vt (Set, L): 0.5 L  Resp Rate (Set): 12  Pressure Support (cm H2O): 0 cm H20  FiO2 (%): 30 %  PEEP/CPAP (cm H2O): 5 cm H20  Minute Ventilation (L/min) (Obs): 6 L/min  Resp Rate (Observed) Vent: 17  I:E Ratio (Set): 1:3.20  I:E Ratio (Obs): 1:1.9  PIP Observed (cm H2O): 27 cm H2O  RSBI: 126  Physical Exam:  Temp:  [98.1  °F (36.7 °C)] 98.1 °F (36.7 °C)  Heart Rate:  [54-96] 58  Resp:  [12-18] 14  BP: ()/(41-79) 112/50  FiO2 (%):  [30 %] 30 %    Intake/Output Summary (Last 24 hours) at 04/04/18 0731  Last data filed at 04/04/18 0421   Gross per 24 hour   Intake          2971.44 ml   Output             1885 ml   Net          1086.44 ml     SpO2 Percentage    04/04/18 0629 04/04/18 0630 04/04/18 0645   SpO2: 100% 100% 100%      Physical Exam   Constitutional: She appears well-developed and well-nourished. She appears ill. She is sedated and intubated.   HENT:   Head: Normocephalic and atraumatic.   Nose: Nose normal.   Eyes: Conjunctivae and EOM are normal. Pupils are equal, round, and reactive to light. No scleral icterus.   Cardiovascular: Normal rate, regular rhythm and normal heart sounds.  Exam reveals no friction rub.    No murmur heard.  Pulmonary/Chest: Effort normal and breath sounds normal. She is intubated. No respiratory distress. She has no wheezes. She has no rales.   Abdominal: Soft. Bowel sounds are normal. She exhibits no distension.   Musculoskeletal: She exhibits no edema.   Neurological: She exhibits normal muscle tone.   sedated   Skin: Skin is warm and dry.   Nursing note and vitals reviewed.      Results from last 7 days  Lab Units 04/04/18  0234 04/03/18  0238 04/02/18  1535 04/02/18  0315   WBC 10*3/mm3 3.77* 3.27*  --  3.26*   HEMOGLOBIN g/dL 9.9* 9.8* 9.6* 6.7*   PLATELETS 10*3/mm3 87* 82*  --  82*       Results from last 7 days  Lab Units 04/04/18  0234 04/04/18  0215 04/03/18  0300 04/03/18  0238 04/02/18  1535   SODIUM mmol/L 144  --   --  144 143   SODIUM, ARTERIAL mmol/L  --  144 144  --   --    POTASSIUM mmol/L 4.1  --   --  3.6 4.0   BUN mg/dL 37*  --   --  33* 35*   CREATININE mg/dL 1.46*  --   --  1.48* 1.55*       Results from last 7 days  Lab Units 04/04/18  0215 04/03/18  0300 04/02/18  0152   PH, ARTERIAL pH units 7.471* 7.475* 7.453*   PCO2, ARTERIAL mm Hg 52.3* 53.0* 50.0*   PO2 ART mm Hg  76.4* 68.0* 87.0   FIO2 % 30 30 30     Blood Culture   Date Value Ref Range Status   03/31/2018 Abnormal Stain (A)  Preliminary   03/31/2018 Staphylococcus, coagulase negative (A)  Preliminary     Comment:     Probable contaminant     03/31/2018 No growth at 24 hours  Preliminary     Urine Culture   Date Value Ref Range Status   03/30/2018 No growth at 2 days  Final     Respiratory Culture   Date Value Ref Range Status   03/30/2018 Light growth (2+) Normal Respiratory Lenora  Final     Recent films:  Imaging Results (last 24 hours)     Procedure Component Value Units Date/Time    XR Chest 1 View [[id number]] Collected:  04/04/18 0714     Updated:  04/04/18 0718    Narrative:       EXAMINATION:   XR CHEST 1 VW-  4/4/2018 7:14 AM CDT     HISTORY: Ventilator     Frontal upright radiograph of the chest 4/4/2018 3:00 AM CDT     COMPARISON: April 3, 2018.     FINDINGS:   Coarse interstitial parenchymal markings are noted. Endotracheal tube  and feeding tube are satisfactorily position.. Cardiac silhouettes  moderately enlarged unchanged..      The osseous structures and surrounding soft tissues demonstrate no acute  abnormality.       Impression:       1. Cardiomegaly no evidence pulmonary vascular congestion. The chest is  stable compared to April 3, 2018.        This report was finalized on 04/04/2018 07:15 by Dr. Bruce Choudhary MD.    XR Abdomen KUB [[id number]] Collected:  04/03/18 1059     Updated:  04/03/18 1103    Narrative:       EXAMINATION:  XR ABDOMEN KUB-  4/3/2018 10:34 AM CDT     HISTORY: ABDOMINAL PAIN     COMPARISON: No comparison study.     TECHNIQUE: Supine abdomen.      FINDINGS:   There is splenomegaly. The spleen measures about 23 cm in  length. The bowel gas pattern is normal. There are degenerative changes  of the spine.       Impression:       1. The bowel gas pattern is within normal limits.  2. Splenomegaly.        This report was finalized on 04/03/2018 11:00 by Dr. Solis Jimenez MD.    XR Chest 1  View [[id number]] Collected:  04/03/18 0729     Updated:  04/03/18 0733    Narrative:       EXAMINATION:  XR CHEST 1 VW-  4/3/2018 2:00 AM CDT     HISTORY: On ventilator.     COMPARISON: 4/2/2018.     FINDINGS:  There is mild hypoventilation. Coarse markings appear  relatively stable. There is cardiomegaly. The left lung base is  difficult to evaluate. Patient remains intubated. A feeding tube extends  just into the stomach.       Impression:       1. Hypoventilation. Coarse markings appear relatively stable. No new  infiltrate.  2. Cardiomegaly.        This report was finalized on 04/03/2018 07:30 by Dr. Solis Jimenez MD.        Films reviewed personally by me.  My interpretation: ETT in good position, cardiomegaly hypoventilation    Pulmonary Assessment:    1. SEVERE ACUTE RESPIRATORY FAILURE REQUIRING MECHANICAL VENTILATION.  This is a threat to life or pulmonary function, high risk, due to acute on chronic respiratory failure with hypoxemia and hypercarbia.  Improving.  2. Probable COPD exacerbation stable  3. Elevated troponin  stable  4. Anemia   5. BELA stable  6. Elevated AST   7. Elevated d-dimer uncertain significance  8. Hx systolic/diastolic CHF    Recommend:     · Begin spontaneous breathing trials this morning  · Does not need zosyn from a pulmonary standpoint  · Echo reviewed. Reduced EF, Monitor for cardiac decompensation with weaning trial.  · Blood culture false positive, I dont think it warrants treatment.  · Continue duonebs  · Continue daily CXR/ABGs while on the vent  · Appears to be euvolemic    Electronically signed by NEYDA Martins on 4/4/2018 at 7:31 AM    Physician substantive contribution:  Pertinent symptoms/interval history include: awake on vent after holding sedation  Respiratory exam shows pertinent findings of:diminished breath sounds, improved.  Plan includes: spontaneous breathing trial, doing well with brief transition to spontaneous breathing on rounds.  I have seen and  examined patient personally, performing a face-to-face diagnostic evaluation with plan of care reviewed and developed with APRN and nursing staff. I have addended and/or modified the above history of present illness, physical examination, and assessment and plan to reflect my findings and impressions. Essential elements of the care plan were discussed with APRN above.  Agree with findings and assessment/plan as documented above.    Electronically signed by Nam Barrios MD, on 4/4/2018, 8:42 AM

## 2018-04-05 ENCOUNTER — APPOINTMENT (OUTPATIENT)
Dept: GENERAL RADIOLOGY | Facility: HOSPITAL | Age: 83
End: 2018-04-05

## 2018-04-05 LAB
ALBUMIN SERPL-MCNC: 3.6 G/DL (ref 3.5–5)
ALBUMIN/GLOB SERPL: 1.3 G/DL (ref 1.1–2.5)
ALP SERPL-CCNC: 66 U/L (ref 24–120)
ALT SERPL W P-5'-P-CCNC: 22 U/L (ref 0–54)
ANION GAP SERPL CALCULATED.3IONS-SCNC: ABNORMAL MMOL/L (ref 4–13)
ANISOCYTOSIS BLD QL: ABNORMAL
AST SERPL-CCNC: 25 U/L (ref 7–45)
BACTERIA SPEC AEROBE CULT: ABNORMAL
BACTERIA SPEC AEROBE CULT: ABNORMAL
BACTERIA SPEC AEROBE CULT: NORMAL
BILIRUB SERPL-MCNC: 0.7 MG/DL (ref 0.1–1)
BUN BLD-MCNC: 43 MG/DL (ref 5–21)
BUN/CREAT SERPL: 25.6 (ref 7–25)
CALCIUM SPEC-SCNC: 8.7 MG/DL (ref 8.4–10.4)
CHLORIDE SERPL-SCNC: 93 MMOL/L (ref 98–110)
CO2 SERPL-SCNC: >40 MMOL/L (ref 24–31)
CREAT BLD-MCNC: 1.68 MG/DL (ref 0.5–1.4)
DEPRECATED RDW RBC AUTO: 54.4 FL (ref 40–54)
ELLIPTOCYTES BLD QL SMEAR: ABNORMAL
ERYTHROCYTE [DISTWIDTH] IN BLOOD BY AUTOMATED COUNT: 17.8 % (ref 12–15)
GFR SERPL CREATININE-BSD FRML MDRD: 29 ML/MIN/1.73
GLOBULIN UR ELPH-MCNC: 2.8 GM/DL
GLUCOSE BLD-MCNC: 98 MG/DL (ref 70–100)
GRAM STN SPEC: ABNORMAL
HCT VFR BLD AUTO: 36.3 % (ref 37–47)
HGB BLD-MCNC: 10.7 G/DL (ref 12–16)
HYPOCHROMIA BLD QL: ABNORMAL
ISOLATED FROM: ABNORMAL
LYMPHOCYTES # BLD MANUAL: 1.19 10*3/MM3 (ref 0.72–4.86)
LYMPHOCYTES NFR BLD MANUAL: 26 % (ref 15–45)
LYMPHOCYTES NFR BLD MANUAL: 3 % (ref 4–12)
MCH RBC QN AUTO: 24.9 PG (ref 28–32)
MCHC RBC AUTO-ENTMCNC: 29.5 G/DL (ref 33–36)
MCV RBC AUTO: 84.4 FL (ref 82–98)
MONOCYTES # BLD AUTO: 0.14 10*3/MM3 (ref 0.19–1.3)
NEUTROPHILS # BLD AUTO: 2.89 10*3/MM3 (ref 1.87–8.4)
NEUTROPHILS NFR BLD MANUAL: 62 % (ref 39–78)
NEUTS BAND NFR BLD MANUAL: 1 % (ref 0–10)
PLATELET # BLD AUTO: 100 10*3/MM3 (ref 130–400)
PMV BLD AUTO: 9.6 FL (ref 6–12)
POLYCHROMASIA BLD QL SMEAR: ABNORMAL
POTASSIUM BLD-SCNC: 4.8 MMOL/L (ref 3.5–5.3)
PROT SERPL-MCNC: 6.4 G/DL (ref 6.3–8.7)
RBC # BLD AUTO: 4.3 10*6/MM3 (ref 4.2–5.4)
SMALL PLATELETS BLD QL SMEAR: ABNORMAL
SMUDGE CELLS BLD QL SMEAR: ABNORMAL
SODIUM BLD-SCNC: 144 MMOL/L (ref 135–145)
VARIANT LYMPHS NFR BLD MANUAL: 8 % (ref 0–5)
WBC NRBC COR # BLD: 4.58 10*3/MM3 (ref 4.8–10.8)

## 2018-04-05 PROCEDURE — 80053 COMPREHEN METABOLIC PANEL: CPT | Performed by: FAMILY MEDICINE

## 2018-04-05 PROCEDURE — G9176 SPEECH LANG D/C STATUS: HCPCS

## 2018-04-05 PROCEDURE — 94760 N-INVAS EAR/PLS OXIMETRY 1: CPT

## 2018-04-05 PROCEDURE — 97116 GAIT TRAINING THERAPY: CPT

## 2018-04-05 PROCEDURE — 92523 SPEECH SOUND LANG COMPREHEN: CPT

## 2018-04-05 PROCEDURE — G9175 SPEECH LANG GOAL STATUS: HCPCS

## 2018-04-05 PROCEDURE — 85007 BL SMEAR W/DIFF WBC COUNT: CPT | Performed by: FAMILY MEDICINE

## 2018-04-05 PROCEDURE — 97110 THERAPEUTIC EXERCISES: CPT

## 2018-04-05 PROCEDURE — 94799 UNLISTED PULMONARY SVC/PX: CPT

## 2018-04-05 PROCEDURE — 85025 COMPLETE CBC W/AUTO DIFF WBC: CPT | Performed by: FAMILY MEDICINE

## 2018-04-05 PROCEDURE — 97166 OT EVAL MOD COMPLEX 45 MIN: CPT | Performed by: OCCUPATIONAL THERAPIST

## 2018-04-05 PROCEDURE — G9174 SPEECH LANG CURRENT STATUS: HCPCS

## 2018-04-05 PROCEDURE — G8987 SELF CARE CURRENT STATUS: HCPCS | Performed by: OCCUPATIONAL THERAPIST

## 2018-04-05 PROCEDURE — G8988 SELF CARE GOAL STATUS: HCPCS | Performed by: OCCUPATIONAL THERAPIST

## 2018-04-05 RX ORDER — TRAMADOL HYDROCHLORIDE 50 MG/1
50 TABLET ORAL EVERY 6 HOURS PRN
Status: DISCONTINUED | OUTPATIENT
Start: 2018-04-05 | End: 2018-04-06 | Stop reason: HOSPADM

## 2018-04-05 RX ORDER — BUMETANIDE 0.5 MG/1
0.5 TABLET ORAL DAILY
Status: DISCONTINUED | OUTPATIENT
Start: 2018-04-06 | End: 2018-04-06 | Stop reason: HOSPADM

## 2018-04-05 RX ADMIN — GABAPENTIN 300 MG: 300 CAPSULE ORAL at 20:06

## 2018-04-05 RX ADMIN — IPRATROPIUM BROMIDE AND ALBUTEROL SULFATE 3 ML: 2.5; .5 SOLUTION RESPIRATORY (INHALATION) at 15:19

## 2018-04-05 RX ADMIN — BUMETANIDE 1 MG: 1 TABLET ORAL at 08:54

## 2018-04-05 RX ADMIN — IPRATROPIUM BROMIDE AND ALBUTEROL SULFATE 3 ML: 2.5; .5 SOLUTION RESPIRATORY (INHALATION) at 19:29

## 2018-04-05 RX ADMIN — APIXABAN 2.5 MG: 2.5 TABLET, FILM COATED ORAL at 08:54

## 2018-04-05 RX ADMIN — ASPIRIN 81 MG 81 MG: 81 TABLET ORAL at 08:54

## 2018-04-05 RX ADMIN — ATORVASTATIN CALCIUM 20 MG: 10 TABLET, FILM COATED ORAL at 20:06

## 2018-04-05 RX ADMIN — IPRATROPIUM BROMIDE AND ALBUTEROL SULFATE 3 ML: 2.5; .5 SOLUTION RESPIRATORY (INHALATION) at 07:10

## 2018-04-05 RX ADMIN — APIXABAN 2.5 MG: 2.5 TABLET, FILM COATED ORAL at 20:06

## 2018-04-05 RX ADMIN — IPRATROPIUM BROMIDE AND ALBUTEROL SULFATE 3 ML: 2.5; .5 SOLUTION RESPIRATORY (INHALATION) at 10:43

## 2018-04-05 RX ADMIN — TRAMADOL HYDROCHLORIDE 50 MG: 50 TABLET, COATED ORAL at 18:49

## 2018-04-05 NOTE — PLAN OF CARE
Problem: Patient Care Overview  Goal: Plan of Care Review  Outcome: Ongoing (interventions implemented as appropriate)   04/05/18 3961   Coping/Psychosocial   Plan of Care Reviewed With patient   Plan of Care Review   Progress improving   OTHER   Outcome Summary Pt. has good bed mobility. Assist of 1 to stand and ambulate 15'. Actively performs LE ex's. Will bring pt. a RWX for next treatment and she should be able to walk farther.

## 2018-04-05 NOTE — PROGRESS NOTES
Continued Stay Note   [facility]     Patient Name: Erin Cox  MRN: [id number]  Today's Date: 4/5/2018    Admit Date: 3/30/2018          Discharge Plan     Row Name 04/05/18 1101       Plan    Plan Referral to [facility]    Patient/Family in Agreement with Plan yes    Plan Comments SW met with patient and daughter this am to discuss discharge plan.  It was agreed a referral would be made to [facility] for rehab.  All options were reviewed with patient and she feels this is the best option for her at this time.  Referred faxed, awaiting response.              Discharge Codes    No documentation.           MIKEY Harrison

## 2018-04-05 NOTE — THERAPY EVALUATION
Acute Care - Speech Language Pathology Initial Evaluation  [facility]     Patient Name: Erin Cox  : 1935  MRN: [id number]  Today's Date: 2018  Onset of Illness/Injury or Date of Surgery: 18     Referring Physician: Dr. Roberts      Admit Date: 3/30/2018   Speech Langauge Evaluation completed. SLP noted Improved vocal quality. Pt voice is strong, with clear speech. SSLP noted slight decrease in voice clarity at the end of the session when the pt began to fatigue. Pt completed auditory comprehension. Pt accuratley answered yes/no questions., followed 1 and 2-step directions, and answered wh- questions with 100% accuracy. Pt accuractly completed automatic verbal sequencing of numbers, days of the week and months of the year. Pt was able to accuractly complete phrases and sentences without any errors. Pt was able to formulate simple and complex sentences without error. Pt completed orientation tasks. Pt is oriented to person, place and time. Pt stated that is was Friday the ; however once told it was Thursday she was able to correct the date and respond to the question accuratly at the end of the session. Pt completed immediate recall task, pt accuractly recalled the simple set of numbers. Pt exhibited difficulty with the more comlex set of recall numbers, often just switching the order of two numbers in the sequence. Pt completed concrete divergent naming tasks with 100% accuracy. Pt exhibited difficulty with the abstract divergent naming task, only naming one item. However; the pt was able to accuractly complete concrete and abstract convergent naming tasks. Pt accuratly completed reasoning tasks with 100% accuracy. Pt completed analogy task with 100% accuracy. Pt was able to accuratly complete word promblem without difficulties. Pt was able to provide solutions to functional problem solving tasks. At the moment pt has returned to baseline. Speech therapy is not warrented at this time for speech  and dwayne.     Antelmo Hidalgo, Speech Therapy Student 4/5/2018 10:56 AM  Visit Dx:    ICD-10-CM ICD-9-CM   1. Impaired functional mobility and endurance Z74.09 V49.89   2. Oropharyngeal dysphagia R13.12 787.22   3. Impaired cognition R41.89 294.9     Patient Active Problem List   Diagnosis   • Mitral regurgitation   • Aortic stenosis   • Acute on chronic systolic congestive heart failure   • Acute respiratory failure with hypoxia   • Respiratory distress   • Right lower lobe pneumonia   • Respiratory failure   • Acute respiratory failure   • Pancytopenia     Past Medical History:   Diagnosis Date   • Acute pulmonary edema    • Arteriosclerotic coronary artery disease    • Bradycardia    • CAD (coronary artery disease)    • CHF (congestive heart failure)    • COPD (chronic obstructive pulmonary disease)    • Dyslipidemia    • Hypertension    • Old non-ST elevation myocardial infarction (NSTEMI)    • Renal disorder    • Stented coronary artery      Past Surgical History:   Procedure Laterality Date   • BACK SURGERY     • CHOLECYSTECTOMY     • COLON SURGERY      Resection   • EYE SURGERY Bilateral     cataract          SLP EVALUATION (last 72 hours)      SLP SLC Evaluation     Row Name 04/05/18 0952                   Communication Assessment/Intervention    Document Type (P)  evaluation  -DS        Subjective Information (P)  complains of;pain  -DS        Patient Observations (P)  cooperative  -DS        Patient/Family Observations (P)  Daughter in the room.  -DS        Patient Effort (P)  good  -DS           General Information    Patient Profile Reviewed (P)  yes  -DS        Pertinent History Of Current Problem (P)  Hx: excubated, respiratory failure  -DS        Precautions/Limitations, Vision (P)  WFL  -DS        Precautions/Limitations, Hearing (P)  WFL;for purposes of eval  -DS        Prior Level of Function-Communication (P)  WFL  -DS        Plans/Goals Discussed with (P)  patient;family  -DS        Barriers  to Rehab (P)  none identified  -DS        Patient's Goals for Discharge (P)  return to all previous roles/activities  -DS        Family Goals for Discharge (P)  patient able to return to previous activities/roles  -DS           Pain Assessment    Additional Documentation (P)  Pain Scale: FACES Pre/Post-Treatment (Group)  -DS           Pain Scale: Numbers Pre/Post-Treatment    Pain Scale: Numbers, Pretreatment (P)  4/10  -DS        Pain Scale: Numbers, Post-Treatment (P)  4/10  -DS           Comprehension Assessment/Intervention    Comprehension Assessment/Intervention (P)  Auditory Comprehension  -DS           Auditory Comprehension Assessment/Intervention    Auditory Comprehension (Communication) (P)  WNL  -DS        Able to Identify Objects/Pictures (Communication) (P)  WFL;body part;familiar objects  -DS        Answers Questions (Communication) (P)  WFL;yes/no;wh questions;personal;simple;concrete  -DS        Able to Follow Commands (Communication) (P)  WFL;1-step;2-step  -DS        Narrative Discourse (P)  WFL;conversational level  -DS        Auditory Comprehension Communication, Comment (P)  Speech Langauge Evaluation completed. Pt completed  auditory comprehension. Pt malik answered yes/no questions., followed 1 and 2-step directions, and answered wh- questions.   -DS           Expression Assessment/Intervention    Expression Assessment/Intervention (P)  verbal expression  -DS           Verbal Expression Assessment/Intervention    Verbal Expression (P)  WFL  -DS        Automatic Speech (Communication) (P)  WFL;response to greeting;counting 1-20;days of week;months of year  -DS        Repetition (P)  WFL;words  -DS        Phrase Completion (P)  WFL;automatic/predictable;unpredictable  -DS        Responsive Naming (P)  WFL;simple;complex  -DS        Confrontational Naming (P)  WFL;high frequency  -DS        Spontaneous/Functional Words (P)  WFL;simple  -DS        Sentence Formulation (P)  WFL;simple;complex   -DS        Conversational Discourse/Fluency (P)  WFL  -DS        Verbal Expression, Comment (P)  Speech langauge evaluation completed. pt accuractly completed automatic verbal sequencing of numbers, days of the week and months of the year. Pt was able to accuractly complete phrases and sentences without any errors. Pt was able to formulate simple and complex sentences without error.   -DS           Oral Motor Structure and Function    Oral Motor Structure and Function (P)  WFL  -DS        Dentition Assessment (P)  edentulous, does not have dentures  -DS        Mucosal Quality (P)  moist, healthy  -DS           Oral Musculature and Cranial Nerve Assessment    Oral Motor General Assessment (P)  WFL  -DS        Oral Motor, Comment (P)  SLP noted Improved vocal quality.  -DS           Motor Speech Assessment/Intervention    Motor Speech Function (P)  WFL  -DS        Initiation of Phonation (Communication) (P)  WFL  -DS        Automatic Speech (Communication) (P)  WFL;response to greeting;counting 1-20;days of week;months of year  -DS        Verbal Repetition (Communication) (P)  WFL  -DS        Phase Completion (P)  WFL;automatic/predictable;unpredictable  -DS        Conversational Speech (Communication) (P)  WFL;simple;moderate complexity  -DS           Cursory Voice Assessment/Intervention    Quality and Resonance (Voice) (P)  WFL  -DS        Voice, Comment (P)  SLP noted Improved vocal quality. Pt voice is strong, with clear speech.  SSLP noted slight decrease in voice clarity at the end of the session when the pt began to fatigue.  -DS           Cognitive Assessment Intervention- SLP    Cognitive Function (Cognition) (P)  WFL  -DS        Orientation Status (Cognition) (P)  WFL;awareness of basic personal information;person;place;time  -DS        Memory (Cognitive) (P)  WFL;simple;functional;immediate  -DS        Attention (Cognitive) (P)  WFL;sustained  -DS        Thought Organization (Cognitive) (P)  WFL;concrete  divergent;concrete convergent;abstract convergent  -DS        Reasoning (Cognitive) (P)  WFL;simple;absurdities;analogies  -DS        Problem Solving (Cognitive) (P)  WFL;simple;temporal  -DS        Executive Function (Cognition) (P)  WFL  -DS        Pragmatics (Communication) (P)  WFL  -DS        Right Hemisphere Function (P)  WFL  -DS        Cognition, Comment (P)  Pt completed orientation tasks. Pt is oriented to person place and time. Pt stated that is was Friday the 6th; however once told it was Thursday she was able to correct the date and respond to the question accuratly at the end of the session. Pt completed immediate recall task, pt accuractly recalled the simple set of numbers. Pt exhibited difficulty with the more comlex set of recall numbers, often just switching the order of two numbers in the sequence. Pt completed concrete divergent naming tasks with 100% accuracy. Pt exhibited difficulty with the abstract divergent naming task, only naming one item. However; the pt was able to accuractly complete concrete and abstract convergent naming tasks. Pt accuratly completed reasoning tasks with 100% accuracy. Pt completed analogy task with 100% accuracy. Pt was able to accuratly complete word promblem without difficulties. Pt was able to provide solutions to functional problem solving tasks.   -DS           SLP Clinical Impressions    Rehab Potential/Prognosis (P)  good  -DS        Criteria for Skilled Therapy Interventions Met (P)  no problems identified which require skilled intervention  -DS        Functional Impact (P)  no impact on function  -DS           Recommendations    Therapy Frequency (SLP) (P)  evaluation only  -DS          User Key  (r) = Recorded By, (t) = Taken By, (c) = Cosigned By    Initials Name Effective Dates    CLIFTON Hidalgo, Speech Therapy Student 03/02/18 -                EDUCATION  The patient has been educated in the following areas:     Cognitive Impairment.    SLP  Recommendation and Plan         Rehab Potential/Prognosis: (P) good    Criteria for Skilled Therapeutic Interventions Met: demonstrates skilled criteria  Criteria for Skilled Therapy Interventions Met: (P) no problems identified which require skilled intervention    Anticipated Dischage Disposition: unknown         Therapy Frequency (SLP): (P) evaluation only    Predicted Duration Therapy Intervention (Days): until discharge          Plan of Care Review  Plan of Care Reviewed With: patient  Plan of Care Reviewed With: patient  Progress: no change  Outcome Summary: (P) Speech Langauge Evaluation completed. SLP noted Improved vocal quality. Pt voice is strong, with clear speech.  SSLP noted slight decrease in voice clarity at the end of the session when the pt began to fatigue. Pt completed  auditory comprehension. Pt accuratley answered yes/no questions., followed 1 and 2-step directions, and answered wh- questions with 100% accuracy. Pt accuractly completed automatic verbal sequencing of numbers, days of the week and months of the year. Pt was able to accuractly complete phrases and sentences without any errors. Pt was able to formulate simple and complex sentences without error.  Pt completed orientation tasks. Pt is oriented to person, place and time. Pt stated that is was Friday the 6th; however once told it was Thursday she was able to correct the date and respond to the question accuratly at the end of the session. Pt completed immediate recall task, pt accuractly recalled the simple set of numbers. Pt exhibited difficulty with the more comlex set of recall numbers, often just switching the order of two numbers in the sequence. Pt completed concrete divergent naming tasks with 100% accuracy. Pt exhibited difficulty with the abstract divergent naming task, only naming one item. However; the pt was able to accuractly complete concrete and abstract convergent naming tasks. Pt accuratly completed reasoning tasks  with 100% accuracy. Pt completed analogy task with 100% accuracy. Pt was able to accuratly complete word promblem without difficulties. Pt was able to provide solutions to functional problem solving tasks. At the moment pt has returned to baseline. Speech therapy  is not warrented at this time for  speech and langauge.                SLP GOALS     Row Name 04/04/18 1250             Oral Nutrition/Hydration Goal 1 (SLP)    Oral Nutrition/Hydration Goal 1, SLP LTG: Pt will be able to consume LRD without any overt s/s of aspiration.   -CS (r) DS (t) CS (c)      Time Frame (Oral Nutrition/Hydration Goal 1, SLP) by discharge  -CS (r) DS (t) CS (c)      Barriers (Oral Nutrition/Hydration Goal 1, SLP) N/A  -CS (r) DS (t) CS (c)      Progress/Outcomes (Oral Nutrition/Hydration Goal 1, SLP) goal ongoing  -CS (r) DS (t) CS (c)         Lingual Strengthening Goal 1 (SLP)    Activity (Lingual Strengthening Goal 1, SLP) increase lingual tone/sensation/control/coordination/movement  -CS (r) DS (t) CS (c)      Increase Lingual Tone/Sensation/Control/Coordination/Movement lingual movement exercises  -CS (r) DS (t) CS (c)      [place]/Accuracy (Lingual Strengthening Goal 1, SLP) independently (over 90% accuracy)  -CS (r) DS (t) CS (c)      Time Frame (Lingual Strengthening Goal 1, SLP) short term goal (STG);by discharge  -CS (r) DS (t) CS (c)      Barriers (Lingual Strengthening Goal 1, SLP) N/A  -CS (r) DS (t) CS (c)      Progress/Outcomes (Lingual Strengthening Goal 1, SLP) goal ongoing  -CS (r) DS (t) CS (c)         Pharyngeal Strengthening Exercise Goal 1 (SLP)    Activity (Pharyngeal Strengthening Goal 1, SLP) increase squeeze/positive pressure generation  -CS (r) DS (t) CS (c)      Increase Squeeze/Positive Pressure Generation hard effortful swallow;mesfin  -CS (r) DS (t) CS (c)      [place]/Accuracy (Pharyngeal Strengthening Goal 1, SLP) independently (over 90% accuracy)  -CS (r) DS (t) CS (c)      Time Frame  (Pharyngeal Strengthening Goal 1, SLP) short term goal (STG);by discharge  -CS (r) DS (t) CS (c)      Barriers (Pharyngeal Strengthening Goal 1, SLP) N/A  -CS (r) DS (t) CS (c)      Progress/Outcomes (Pharyngeal Strengthening Goal 1, SLP) goal ongoing  -CS (r) DS (t) CS (c)        User Key  (r) = Recorded By, (t) = Taken By, (c) = Cosigned By    Initials Name Provider Type    CS Camden Powell MS CCC-SLP Speech and Language Pathologist    DS Antelmo Hidalgo, Speech Therapy Student Speech Therapy Student                 SLP Outcome Measures (last 72 hours)      SLP Outcome Measures     Row Name 04/05/18 0952 04/04/18 1250          SLP Outcome Measures    Outcome Measure Used? (P)  Adult NOMS  -DS Adult NOMS  -CS (r) DS (t) CS (c)        FCM Scores    FCM Chosen (P)  Problem Solving  -DS Swallowing  -CS (r) DS (t) CS (c)     Swallowing FCM Score  -- 4  -CS (r) DS (t) CS (c)     Problem Solving FCM Score (P)  7  -DS  --       User Key  (r) = Recorded By, (t) = Taken By, (c) = Cosigned By    Initials Name Effective Dates    ALPHONSO Powell MS CCC-SLP 04/03/18 -     DS Antelmo Hidalgo, Speech Therapy Student 03/02/18 -               Time Calculation:           Time Calculation- SLP     Row Name 04/05/18 1054             Time Calculation- SLP    SLP Start Time (P)  0952  -DS      SLP Stop Time (P)  1035  -DS      SLP Time Calculation (min) (P)  43 min  -DS      SLP Received On (P)  04/05/18  -DS        User Key  (r) = Recorded By, (t) = Taken By, (c) = Cosigned By    Initials Name Provider Type    DS Antelmo Hidalgo, Speech Therapy Student Speech Therapy Student          Therapy Charges for Today     Code Description Service Date Service Provider Modifiers Qty    59233584750 HC ST EVAL ORAL PHARYNG SWALLOW 4 4/4/2018 Antelmo Hidalgo Speech Therapy Student GN 1    52596626046 HC ST EVAL SPEECH AND PROD W LANG  3 4/5/2018 Antelmo Hidalgo Speech Therapy Student GN 1             ADULT NOMS (last 72 hours)      Adult NOMS      Row Name 04/05/18 0952 04/04/18 1250                FCM Scores    FCM Chosen (P)  Problem Solving  -DS Swallowing  -CS (r) DS (t) CS (c)       Swallowing FCM Score  -- 4  -CS (r) DS (t) CS (c)       Problem Solving FCM Score (P)  7  -DS  --         User Key  (r) = Recorded By, (t) = Taken By, (c) = Cosigned By    Initials Name Effective Dates    CS Camden Powell MS CCC-SLP 04/03/18 -     CLIFTON Hidalgo, Speech Therapy Student 03/02/18 -         SLP G-Codes  SLP NOMS Used?: (P) Yes  Functional Limitations: (P) Other Speech Language Pathology (Problem Solving)  Swallow Current Status (): At least 40 percent but less than 60 percent impaired, limited or restricted  Swallow Goal Status (): At least 40 percent but less than 60 percent impaired, limited or restricted  Other Speech-Language Pathology Functional Limitation Current Status (): (P) 0 percent impaired, limited or restricted  Other Speech-Language Pathology Functional Limitation Goal Status (): (P) 0 percent impaired, limited or restricted  Other Speech-Language Pathology Functional Limitation Discharge Status (): (P) 0 percent impaired, limited or restricted      Antelmo Hidalgo, Speech Therapy Student  4/5/2018

## 2018-04-05 NOTE — PLAN OF CARE
Problem: Patient Care Overview  Goal: Plan of Care Review  Outcome: Ongoing (interventions implemented as appropriate)   04/05/18 1701   Coping/Psychosocial   Plan of Care Reviewed With patient   Plan of Care Review   Progress no change   OTHER   Outcome Summary OT eval completed. Pt was min A for bed mobility. Pt was supervision for sit to stand t/f. Pt reports increased fatigue. LB adls deferred d/t fatigue, expected level of performance min-mod A. skilled OT recommended to address adls, functional mobility and activity tolerance. Recommended d/c SNF v. swing bed for additional therapy.

## 2018-04-05 NOTE — PLAN OF CARE
Problem: Patient Care Overview  Goal: Plan of Care Review   04/05/18 1047   OTHER   Outcome Summary Speech Langauge Evaluation completed. SLP noted Improved vocal quality. Pt voice is strong, with clear speech. SSLP noted slight decrease in voice clarity at the end of the session when the pt began to fatigue. Pt completed auditory comprehension. Pt accuratley answered yes/no questions., followed 1 and 2-step directions, and answered wh- questions with 100% accuracy. Pt accuractly completed automatic verbal sequencing of numbers, days of the week and months of the year. Pt was able to accuractly complete phrases and sentences without any errors. Pt was able to formulate simple and complex sentences without error. Pt completed orientation tasks. Pt is oriented to person, place and time. Pt stated that is was Friday the 6th; however once told it was Thursday she was able to correct the date and respond to the question accuratly at the end of the session. Pt completed immediate recall task, pt accuractly recalled the simple set of numbers. Pt exhibited difficulty with the more comlex set of recall numbers, often just switching the order of two numbers in the sequence. Pt completed concrete divergent naming tasks with 100% accuracy. Pt exhibited difficulty with the abstract divergent naming task, only naming one item. However; the pt was able to accuractly complete concrete and abstract convergent naming tasks. Pt accuratly completed reasoning tasks with 100% accuracy. Pt completed analogy task with 100% accuracy. Pt was able to accuratly complete word promblem without difficulties. Pt was able to provide solutions to functional problem solving tasks. At the moment pt has returned to baseline. Speech therapy is not warrented at this time for speech and langauge.

## 2018-04-05 NOTE — THERAPY EVALUATION
Acute Care - Occupational Therapy Initial Evaluation  [facility]     Patient Name: Erin Cox  : 1935  MRN: [id number]  Today's Date: 2018  Onset of Illness/Injury or Date of Surgery: 18  Date of Referral to OT: 18  Referring Physician: Dr. Roberts    Admit Date: 3/30/2018       ICD-10-CM ICD-9-CM   1. Impaired functional mobility and endurance Z74.09 V49.89   2. Oropharyngeal dysphagia R13.12 787.22   3. Impaired cognition R41.89 294.9   4. Decreased activities of daily living (ADL) Z78.9 V49.89     Patient Active Problem List   Diagnosis   • Mitral regurgitation   • Aortic stenosis   • Acute on chronic systolic congestive heart failure   • Acute respiratory failure with hypoxia   • Respiratory distress   • Right lower lobe pneumonia   • Respiratory failure   • Acute respiratory failure   • Pancytopenia     Past Medical History:   Diagnosis Date   • Acute pulmonary edema    • Arteriosclerotic coronary artery disease    • Bradycardia    • CAD (coronary artery disease)    • CHF (congestive heart failure)    • COPD (chronic obstructive pulmonary disease)    • Dyslipidemia    • Hypertension    • Old non-ST elevation myocardial infarction (NSTEMI)    • Renal disorder    • Stented coronary artery      Past Surgical History:   Procedure Laterality Date   • BACK SURGERY     • CHOLECYSTECTOMY     • COLON SURGERY      Resection   • EYE SURGERY Bilateral     cataract          OT ASSESSMENT FLOWSHEET (last 72 hours)      Occupational Therapy Evaluation     Row Name 18 1540                   OT Evaluation Time/Intention    Subjective Information complains of;pain;fatigue  -MM        Document Type evaluation   See MAR  -MM        Mode of Treatment occupational therapy  -MM        Patient Effort good  -MM           General Information    Patient Profile Reviewed? yes  -MM        Onset of Illness/Injury or Date of Surgery 18  -MM        Referring Physician Dr. Roberts  -MM        Patient  Observations alert;cooperative;agree to therapy  -MM        Patient/Family Observations family present initially  -MM        General Observations of Patient side lying R side in bed, O2/NC  -MM        Prior Level of Function independent:;all household mobility;community mobility;transfer;bed mobility;ADL's;feeding;grooming;dressing;bathing  -MM        Equipment Currently Used at Home cane, straight;oxygen;walker, rolling;rollator;shower chair  -MM        Pertinent History of Current Functional Problem CC: t/f from outside facility for acute resp. failure s/p intubation. Dx: acute hypoxic resp. failure; intubated. COPD exacerbation. metabolic encephalopathy, acute on chronic systolic CHF, fx coccyx 4/4 H&H: 9.9/33.1. WBC: 3.77.   -MM        Existing Precautions/Restrictions fall;oxygen therapy device and L/min   fractured coccyx  -MM        Risks Reviewed patient:;LOB;nausea/vomiting;dizziness;increased discomfort;increased drainage;change in vital signs;lines disloged  -MM        Benefits Reviewed patient:;improve function;increase independence;increase strength;increase balance;decrease pain;decrease risk of DVT;improve skin integrity;increase knowledge  -MM        Barriers to Rehab medically complex;physical barrier  -MM           Relationship/Environment    Lives With alone  -MM        Family Caregiver if Needed child(zofia), adult  -MM           Resource/Environmental Concerns    Current Living Arrangements home/apartment/condo  -MM           Cognitive Assessment/Interventions    Additional Documentation Cognitive Assessment/Intervention (Group)  -MM           Cognitive Assessment/Intervention- PT/OT    Orientation Status (Cognition) oriented x 4  -MM        Follows Commands (Cognition) follows multi-step commands;over 90% accuracy;physical/tactile prompts required  -MM        Personal Safety Interventions fall prevention program maintained;nonskid shoes/slippers when out of bed;muscle strengthening  facilitated;supervised activity  -MM           Safety Issues, Functional Mobility    Impairments Affecting Function (Mobility) balance;endurance/activity tolerance;strength  -MM           Bed Mobility Assessment/Treatment    Bed Mobility Assessment/Treatment supine-sit;sit-supine  -MM        Supine-Sit [place] (Bed Mobility) minimum assist (75% patient effort)  -MM        Sit-Supine [place] (Bed Mobility) not tested   sitting EOB  -MM        Assistive Device (Bed Mobility) head of bed elevated;bed rails  -MM           Transfer Assessment/Treatment    Transfer Assessment/Treatment sit-stand transfer;stand-sit transfer;bed-chair transfer  -MM        Sit-Stand [place] (Transfers) supervision  -MM        Stand-Sit [place] (Transfers) supervision  -MM           ADL Assessment/Intervention    BADL Assessment/Intervention lower body dressing  -MM           Lower Body Dressing Assessment/Training    Comment (Lower Body Dressing) not assessed d/t fatigue. expected level of performance min - modA  -MM           General ROM    GENERAL ROM COMMENTS BUE/BLE AROM WFL except L shoulder AROM impaired 25-50%  -MM           General Assessment (Manual Muscle Testing)    Comment, General Manual Muscle Testing (MMT) Assessment grossly BUE/BLE 4-/5  -MM           Motor Assessment/Interventions    Additional Documentation Fine Motor Testing & Training (Group);Gross Motor Coordination (Group)  -MM           Gross Motor Coordination    Gross Motor Impairments finger to nose  -MM        Gross Motor Skill, Impairments Detail WNL BUE  -MM           Fine Motor Testing & Training    Comment, Fine Motor Coordination opposition mild impairement BUE  -MM           Sensory Assessment/Intervention    Sensory General Assessment no sensation deficits identified  -MM           Vision Assessment/Intervention    Visual Impairment/Limitations WFL   per pt  -MM           Positioning and Restraints    Pre-Treatment Position in bed  -MM         Post Treatment Position bed  -MM        In Bed sitting EOB;call light within reach;encouraged to call for assist;notified nsg;side rails up x2  -MM           Pain Assessment    Additional Documentation Pain Scale: Numbers Pre/Post-Treatment (Group)  -MM           Pain Scale: Numbers Pre/Post-Treatment    Pain Scale: Numbers, Pretreatment 6/10  -MM        Pain Scale: Numbers, Post-Treatment 6/10  -MM        Pain Location --   BLE  -MM        Pain Intervention(s) Medication (See MAR);Repositioned  -MM           Plan of Care Review    Plan of Care Reviewed With patient  -MM           Clinical Impression (OT)    Date of Referral to OT 04/04/18  -MM        OT Diagnosis decreased adl  -MM        Functional Level at Time of Evaluation (OT Eval) adequate  -MM        Patient/Family Goals Statement (OT Eval) [place] swing bed  -MM        Criteria for Skilled Therapeutic Interventions Met (OT Eval) yes;treatment indicated  -MM        Rehab Potential (OT Eval) good, to achieve stated therapy goals  -MM        Therapy Frequency (OT Eval) 3 times/wk  -MM        Predicted Duration of Therapy Intervention (OT Eval) until d/c  -MM        Care Plan Review (OT) evaluation/treatment results reviewed;care plan/treatment goals reviewed;risks/benefits reviewed;current/potential barriers reviewed;patient/other agree to care plan  -MM        Anticipated Discharge Disposition (OT) hospital-based swing bed;skilled nursing facility (SNF)   for additional therapy  -MM           Planned OT Interventions    Planned Therapy Interventions (OT Eval) activity tolerance training;adaptive equipment training;BADL retraining;functional balance retraining;occupation/activity based interventions;patient/caregiver education/training;ROM/therapeutic exercise;strengthening exercise;transfer/mobility retraining  -MM           OT Goals    Bed Mobility Goal Selection (OT) bed mobility, OT goal 1  -MM        Dressing Goal Selection (OT) dressing, OT goal 1   -MM        Toileting Goal Selection (OT) toileting, OT goal 1  -MM           Bed Mobility Goal 1 (OT)    Activity/Assistive Device (Bed Mobility Goal 1, OT) bridging;bed mobility activities, all  -MM        [place] Level/Cues Needed (Bed Mobility Goal 1, OT) independent  -MM        Time Frame (Bed Mobility Goal 1, OT) 10 days  -MM        Progress/Outcomes (Bed Mobility Goal 1, OT) goal ongoing  -MM           Dressing Goal 1 (OT)    Activity/Assistive Device (Dressing Goal 1, OT) dressing skills, all  -MM        [place]/Cues Needed (Dressing Goal 1, OT) conditional independence;set-up required   AE PRN  -MM        Time Frame (Dressing Goal 1, OT) 10 days  -MM        Progress/Outcome (Dressing Goal 1, OT) goal ongoing  -MM           Toileting Goal 1 (OT)    Activity/Device (Toileting Goal 1, OT) toileting skills, all  -MM        [place] Level/Cues Needed (Toileting Goal 1, OT) independent  -MM        Time Frame (Toileting Goal 1, OT) 10 days  -MM        Progress/Outcome (Toileting Goal 1, OT) goal ongoing  -MM           Living Environment    Home Accessibility --   walk in shower  -MM          User Key  (r) = Recorded By, (t) = Taken By, (c) = Cosigned By    Initials Name Effective Dates    MM KRISH Powell 04/03/18 -            Occupational Therapy Education     Title: PT OT SLP Therapies (Active)     Topic: Occupational Therapy (Active)     Point: ADL training (Active)     Description: Instruct learner(s) on proper safety adaptation and remediation techniques during self care or transfers.   Instruct in proper use of assistive devices.   Learning Progress Summary     Learner Status Readiness Method Response Comment Documented by    Patient Active Acceptance E NR OT role, benefits and POC  04/05/18 1701                      User Key     Initials Effective Dates Name Provider Type Discipline     04/03/18 -  SEBLE Powell/L Occupational Therapist OT                  OT  Recommendation and Plan  Outcome Summary/Treatment Plan (OT)  Anticipated Discharge Disposition (OT): hospital-based swing bed, skilled nursing facility (SNF) (for additional therapy)  Planned Therapy Interventions (OT Eval): activity tolerance training, adaptive equipment training, BADL retraining, functional balance retraining, occupation/activity based interventions, patient/caregiver education/training, ROM/therapeutic exercise, strengthening exercise, transfer/mobility retraining  Therapy Frequency (OT Eval): 3 times/wk  Plan of Care Review  Plan of Care Reviewed With: patient  Plan of Care Reviewed With: patient  Outcome Summary: OT eval completed. Pt was min A for bed mobility. Pt was supervision for sit to stand t/f. Pt reports increased fatigue. LB adls deferred d/t fatigue, expected level of performance min-mod A. skilled OT recommended to address adls, functional mobility and activity tolerance. Recommended d/c SNF v. swing bed for additional therapy.          Outcome Measures     Row Name 04/05/18 1700 04/05/18 0900 04/04/18 1033       How much help from another person do you currently need...    Turning from your back to your side while in flat bed without using bedrails?  -- 4  -THERESE 3  -DANGELO    Moving from lying on back to sitting on the side of a flat bed without bedrails?  -- 4  -THERESE 3  -DANGELO    Moving to and from a bed to a chair (including a wheelchair)?  -- 3  -THERESE 3  -DANGELO    Standing up from a chair using your arms (e.g., wheelchair, bedside chair)?  -- 3  -THERESE 3  -DANGELO    Climbing 3-5 steps with a railing?  -- 2  -THERESE 2  -DANGELO    To walk in hospital room?  -- 3  -THERESE 3  -DANGELO    AM-PAC 6 Clicks Score  -- 19  -THERESE 17  -DANGELO       How much help from another is currently needed...    Putting on and taking off regular lower body clothing? 2  -MM  --  --    Bathing (including washing, rinsing, and drying) 2  -MM  --  --    Toileting (which includes using toilet bed pan or urinal) 2  -MM  --  --    Putting on and taking  off regular upper body clothing 3  -MM  --  --    Taking care of personal grooming (such as brushing teeth) 3  -MM  --  --    Eating meals 3  -MM  --  --    Score 15  -MM  --  --       Functional Assessment    Outcome Measure Options AM-PAC 6 Clicks Daily Activity (OT)  -MM  -- AM-PAC 6 Clicks Basic Mobility (PT)  -DANGELO      User Key  (r) = Recorded By, (t) = Taken By, (c) = Cosigned By    Initials Name Provider Type    DANGELO Apple, PT DPT Physical Therapist    THERESE Trejo, PTA Physical Therapy Assistant    MM Yusuf Lindsay OTR/L Occupational Therapist          Time Calculation:   OT Start Time: 1540  OT Stop Time: 1605  OT Time Calculation (min): 25 min    Therapy Charges for Today     Code Description Service Date Service Provider Modifiers Qty    [id number]  OT SELFCARE CURRENT 4/5/2018 Yusuf Lindsay OTR/L GO, CK 1    [id number]  OT SELFCARE PROJECTED 4/5/2018 Yusuf Lindsay OTR/L GO, CI 1    [id number]  OT EVAL MOD COMPLEXITY 2 4/5/2018 Yusuf Lindsay OTR/L GO, KX 1          OT G-codes  OT Professional Judgement Used?: Yes  OT Functional Scales Options: AM-PAC 6 Clicks Daily Activity (OT)  Score: 15  Functional Limitation: Self care  Self Care Current Status (): At least 40 percent but less than 60 percent impaired, limited or restricted  Self Care Goal Status (): At least 1 percent but less than 20 percent impaired, limited or restricted    SEBLE Alexandra/VU  4/5/2018

## 2018-04-05 NOTE — PLAN OF CARE
Problem: Patient Care Overview  Goal: Plan of Care Review  Outcome: Ongoing (interventions implemented as appropriate)   04/05/18 5418   Coping/Psychosocial   Plan of Care Reviewed With patient   Plan of Care Review   Progress improving   OTHER   Outcome Summary Pt. has good bed mobility. Requires assist of 1 to tand and ambulate 80' with RWX. Very eak. Knees buckle. Low endurance. Will benefit from strengthening and increased activity.

## 2018-04-05 NOTE — PLAN OF CARE
Problem: Patient Care Overview  Goal: Plan of Care Review  Outcome: Ongoing (interventions implemented as appropriate)   04/05/18 4232   Coping/Psychosocial   Plan of Care Reviewed With patient   OTHER   Outcome Summary No changes over night, patient recieved one pain pill and slept from 930 on through out the night . Repositioned every 2 hours, had no difficulty swallowing pills. Patient is hopeful to transfer to a room on the floor tonight.       Problem: Fall Risk (Adult)  Goal: Absence of Fall  Outcome: Ongoing (interventions implemented as appropriate)      Problem: Skin Injury Risk (Adult)  Goal: Skin Health and Integrity  Outcome: Ongoing (interventions implemented as appropriate)

## 2018-04-05 NOTE — DISCHARGE PLACEMENT REQUEST
"Erin Domingo (83 y.o. Female)     Date of Birth Social Security Number Address Home Phone MRN    1935  [id number] [address] [phone] [id number]    [employer]eterian Marital Status          None        Admission Date Admission Type Admitting Provider Attending Provider Department, Room/Bed    3/30/18 Urgent Kris Roberts MD Truong, Khai C, MD [facility] INTENSIVE CARE, I001/1    Discharge Date Discharge Disposition Discharge Destination                       Attending Provider:  Kris Roberts MD    Allergies:  Fluarix [Flu Virus Vaccine], Influenza A (H1n1) Monoval Vac, Pneumococcal Vaccines, Codeine    Isolation:  None   Infection:  None   Code Status:  FULL    Ht:  160 cm (63\")   Wt:  80.5 kg (177 lb 6.4 oz)    Admission Cmt:  None   Principal Problem:  None                Active Insurance as of 3/30/2018     Primary Coverage     Payor Plan Insurance Group Employer/Plan Group    MEDICARE MEDICARE A & B      Payor Plan Address Payor Plan Phone Number Effective From Effective To    [address] [phone] 11/1/1992     [place] [address]       Subscriber Name Subscriber Birth Date Member ID       ERIN DOMINGO 1935 [id number]           Secondary Coverage     Payor Plan Insurance Group Employer/Plan Group    [facility] MEDICAID      Payor Plan Address Payor Plan Phone Number Effective From Effective To    [address] [phone] 3/1/2018     [place] [address]       Subscriber Name Subscriber Birth Date Member ID       ERIN DOMINGO 1935 [id number]                 Emergency Contacts      (Rel.) Home Phone Work Phone Mobile Phone    Sincere Domingo (Son) [phone]               History & Physical      Yaa Mcbride MD at 3/30/2018 10:58 PM              [facility] Medicine Services  HISTORY AND PHYSICAL    Date of Admission: 3/30/2018  Primary Care Physician: " Jeremi Kan MD    Subjective     Chief Complaint: Acute respiratory failure    History of Present Illness       82-year-old female was transferred here from [facility] for acute respiratory failure status post intubation.  Apparently patient is a COPD frequent flyer at the hospital.  Patient was admitted to our facility few months ago for COPD exacerbation.  Patient went to [facility] today for severe shortness of breath.  Patient was intubated immediately.  Patient was transferred to ICU in our facility for further evaluation.        Review of Systems     Otherwise complete ROS reviewed and negative except as mentioned in the HPI.    Past Medical History:   Past Medical History:   Diagnosis Date   • Acute pulmonary edema    • Arteriosclerotic coronary artery disease    • Bradycardia    • CAD (coronary artery disease)    • CHF (congestive heart failure)    • COPD (chronic obstructive pulmonary disease)    • Dyslipidemia    • Hypertension    • Old non-ST elevation myocardial infarction (NSTEMI)    • Renal disorder    • Stented coronary artery      Past Surgical History:  Past Surgical History:   Procedure Laterality Date   • BACK SURGERY     • CHOLECYSTECTOMY     • COLON SURGERY      Resection   • EYE SURGERY Bilateral     cataract     Social History:  reports that she has quit smoking. She does not have any smokeless tobacco history on file. She reports that she does not drink alcohol or use drugs.    Family History: family history includes Coronary artery disease in her father; Heart disease in her other.       Allergies:  Allergies   Allergen Reactions   • Fluarix [Flu Virus Vaccine] Swelling   • Influenza A (H1n1) Monoval Vac    • Pneumococcal Vaccines Swelling   • Codeine Rash     Medications:  Prior to Admission medications    Medication Sig Start Date End Date Taking? Authorizing Provider   albuterol (PROVENTIL HFA;VENTOLIN HFA) 108 (90 Base) MCG/ACT inhaler Inhale 2 puffs Every 6  (Six) Hours As Needed for Wheezing.    Historical Provider, MD   apixaban (ELIQUIS) 2.5 MG tablet tablet Take 1 tablet by mouth Every 12 (Twelve) Hours. 2/21/18   Kulwinder Loya MD   aspirin 81 MG EC tablet Take 81 mg by mouth Every Morning Before Breakfast. Taken with breakfast    NEYDA Acevedo   atorvastatin (LIPITOR) 20 MG tablet Take 20 mg by mouth Every Night.    Historical Provider, MD   budesonide-formoterol (SYMBICORT) 160-4.5 MCG/ACT inhaler Inhale 2 puffs 2 (Two) Times a Day.    Historical Provider, MD   carvedilol (COREG) 6.25 MG tablet Take 6.25 mg by mouth 2 (Two) Times a Day With Meals.    Historical Provider, MD   furosemide (LASIX) 40 MG tablet Take 0.5 tablets by mouth Daily. 2/21/18   Kulwinder Loya MD   gabapentin (NEURONTIN) 300 MG capsule Take 1 capsule by mouth Every Night. 2/21/18   Kulwinder Loya MD   HYDROcodone-acetaminophen (NORCO) 7.5-325 MG per tablet Take 1 tablet by mouth 2 (Two) Times a Day As Needed for mild pain (1-3) or moderate pain (4-6) (for pain). 2/6/17   Historical Provider, MD   hydrOXYzine (ATARAX) 50 MG tablet Take 50 mg by mouth Daily.    Historical Provider, MD   lisinopril (PRINIVIL,ZESTRIL) 10 MG tablet Take 10 mg by mouth 2 (Two) Times a Day. 8/8/16   Historical Provider, MD   polyethylene glycol (MIRALAX) pack packet Take 17 g by mouth 2 (Two) Times a Day. 2/22/17   NEYDA Emery   predniSONE (DELTASONE) 20 MG tablet 20 mg daily x 3 days then 10 mg daily x 3 days 2/21/18   Kulwinder Loya MD   sucralfate (CARAFATE) 1 g tablet Take 1 g by mouth 4 (Four) Times a Day.    Historical Provider, MD   tiotropium (SPIRIVA) 18 MCG per inhalation capsule Place 1 capsule into inhaler and inhale Daily.    Historical Provider, MD   traMADol (ULTRAM) 50 MG tablet Take 50 mg by mouth Every 8 (Eight) Hours As Needed for Moderate Pain .    Historical Provider, MD     Objective     Vital Signs: /78   Pulse 105   SpO2 95%         Physical Exam   Constitutional:   Intubated and sedated   HENT:   Head: Normocephalic.   Eyes: Pupils are equal, round, and reactive to light.   Cardiovascular: Normal rate, regular rhythm and normal heart sounds.    Pulmonary/Chest: Effort normal and breath sounds normal.   Abdominal: Soft. Bowel sounds are normal.   Neurological:   Intubated and sedated   Psychiatric:   Intubated and sedated             Results Reviewed:  Lab Results (last 24 hours)     Procedure Component Value Units Date/Time    CBC & Differential [[id number]] Collected:  03/30/18 2039    Specimen:  Blood Updated:  03/30/18 2207    Narrative:       The following orders were created for panel order CBC & Differential.  Procedure                               Abnormality         Status                     ---------                               -----------         ------                     Manual Differential[[id number]]                                                         Scan Slide[[id number]]                                       Final result               CBC Auto Differential[[id number]]        Abnormal            Final result                 Please view results for these tests on the individual orders.    CBC Auto Differential [[id number]]  (Abnormal) Collected:  03/30/18 2039    Specimen:  Blood Updated:  03/30/18 2207     WBC 7.04 10*3/mm3      RBC 3.54 (L) 10*6/mm3      Hemoglobin 8.6 (L) g/dL      Hematocrit 28.2 (L) %      MCV 79.7 (L) fL      MCH 24.3 (L) pg      MCHC 30.5 (L) g/dL      RDW 19.5 (H) %      RDW-SD 54.0 fl      MPV 10.4 fL      Platelets 67 (L) 10*3/mm3      Neutrophil % 65.0 %      Lymphocyte % 25.6 %      Monocyte % 8.1 %      Eosinophil % 0.1 %      Basophil % 0.3 %      Immature Grans % 0.9 %      Neutrophils, Absolute 4.58 10*3/mm3      Lymphocytes, Absolute 1.80 10*3/mm3      Monocytes, Absolute 0.57 10*3/mm3      Eosinophils, Absolute 0.01 10*3/mm3      Basophils, Absolute 0.02 10*3/mm3      Immature Grans,  Absolute 0.06 (H) 10*3/mm3      nRBC 0.0 /100 WBC     Scan Slide [[id number]] Collected:  03/30/18 2039    Specimen:  Blood Updated:  03/30/18 2207     Anisocytosis Slight/1+     Elliptocytes Slight/1+     Hypochromia Slight/1+     Poikilocytes Slight/1+     Polychromasia Slight/1+     WBC Morphology Normal     Platelet Estimate Decreased    Urinalysis, Microscopic Only - Urine, Clean Catch [[id number]]  (Abnormal) Collected:  03/30/18 2039    Specimen:  Urine from Urine, Catheter Updated:  03/30/18 2135     RBC, UA 0-2 (A) /HPF      WBC, UA 3-5 (A) /HPF      Bacteria, UA None Seen /HPF      Squamous Epithelial Cells, UA 7-12 (A) /HPF      Yeast, UA Moderate/2+ Budding Yeast /HPF      Hyaline Casts, UA 3-6 /LPF      Methodology Manual Light Microscopy    D-dimer, Quantitative [[id number]]  (Abnormal) Collected:  03/30/18 2039    Specimen:  Blood Updated:  03/30/18 2128     D-Dimer, Quantitative >20.00 (H) mg/L (FEU)     Narrative:       Reference Range is 0-0.50 mg/L FEU. However, results <0.50 mg/L FEU tends to rule out DVT or PE. Results >0.50 mg/L FEU are not useful in predicting absence or presence of DVT or PE.    Blood Gas, Arterial [[id number]]  (Abnormal) Collected:  03/30/18 2110    Specimen:  Arterial Blood Updated:  03/30/18 2122     Site Right Radial     Rickey's Test Positive     pH, Arterial 7.271 (L) pH units      pCO2, Arterial 60.3 (H) mm Hg      pO2, Arterial 106.0 mm Hg      HCO3, Arterial 27.8 (H) mmol/L      Base Excess, Arterial 0.3 mmol/L      O2 Saturation, Arterial 98.1 %      Temperature 37.0 C      Barometric Pressure for Blood Gas 757 mmHg      Modality Ventilator     FIO2 50 %      Ventilator Mode SIMV     Set Tidal Volume 500     Set Mech Resp Rate 14.0     PEEP 5.0     PSV 10.0 cmH2O      Collected by [id number]    Procalcitonin [[id number]]  (Abnormal) Collected:  03/30/18 2040    Specimen:  Blood Updated:  03/30/18 2120     Procalcitonin 2.43 (H) ng/mL     Narrative:       SIRS, sepsis, severe  sepsis, and septic shock are categorized according to the criteria of the consensus conference of the American College of Chest Physicians/Society of Critical Care Medicine.    PCT < 0.5 ng/mL     Systemic infection (sepsis) is not likely.    PCT >0.5 and < 2.0 ng/mL Systemic infection (sepsis) is possible, but other conditions are known to elevate PCT as well.    PCT > 2.0 ng/mL     Systemic infection (sepsis) is likely, unless other causes are known.      PCT > 10.0 ng/mL    Important systemic inflammatory response, almost exclusively due to severe bacterial sepsis or septic shock.    PCT values of < 0.5 ng/mL do not exclude an infection, because localized infections (without systemic signs) may be associated with such low concentrations, or a systemic infection in its initial stages (<6 hours).  Increased PCT can occur without infection.  PCT concentrations between 0.5 and 2.0 ng/mL should be interpreted taking into account the patients history.  It is recommended to retest PCT within 6-24 hours if any concentrations < 2.0 ng/mL are obtained.    Urinalysis With / Culture If Indicated - Urine, Clean Catch [[id number]]  (Abnormal) Collected:  03/30/18 2039    Specimen:  Urine from Urine, Catheter Updated:  03/30/18 2110     Color, UA Yellow     Appearance, UA Clear     pH, UA 6.5     Specific Gravity, UA 1.011     Glucose, UA Negative     Ketones, UA Negative     Bilirubin, UA Negative     Blood, UA Negative     Protein, UA 30 mg/dL (1+) (A)     Leuk Esterase, UA Negative     Nitrite, UA Negative     Urobilinogen, UA 1.0 E.U./dL    Troponin [[id number]]  (Abnormal) Collected:  03/30/18 2040    Specimen:  Blood Updated:  03/30/18 2109     Troponin I 0.100 (H) ng/mL     Lactic Acid, Plasma [[id number]]  (Normal) Collected:  03/30/18 2039    Specimen:  Blood Updated:  03/30/18 2100     Lactate 1.9 mmol/L     Comprehensive Metabolic Panel [[id number]]  (Abnormal) Collected:  03/30/18 2040    Specimen:  Blood Updated:   03/30/18 2100     Glucose 257 (H) mg/dL      BUN 19 mg/dL      Creatinine 1.38 mg/dL      Sodium 136 mmol/L      Potassium 4.8 mmol/L      Chloride 96 (L) mmol/L      CO2 31.0 mmol/L      Calcium 8.2 (L) mg/dL      Total Protein 6.2 (L) g/dL      Albumin 3.40 (L) g/dL      ALT (SGPT) 22 U/L      AST (SGOT) 46 (H) U/L      Alkaline Phosphatase 72 U/L      Total Bilirubin 1.3 (H) mg/dL      eGFR Non African Amer 37 (L) mL/min/1.73      Globulin 2.8 gm/dL      A/G Ratio 1.2 g/dL      BUN/Creatinine Ratio 13.8     Anion Gap 9.0 mmol/L     Narrative:       The MDRD GFR formula is only valid for adults with stable renal function between ages 18 and 70.    Magnesium [[id number]]  (Normal) Collected:  03/30/18 2040    Specimen:  Blood Updated:  03/30/18 2059     Magnesium 2.2 mg/dL     Phosphorus [[id number]]  (Abnormal) Collected:  03/30/18 2040    Specimen:  Blood Updated:  03/30/18 2059     Phosphorus 5.8 (H) mg/dL     Respiratory Culture - Sputum, ET Suction [[id number]] Collected:  03/30/18 2041    Specimen:  Sputum from ET Suction Updated:  03/30/18 2044    Urine Culture - Urine, Urine, Clean Catch [[id number]] Collected:  03/30/18 2039    Specimen:  Urine from Urine, Catheter Updated:  03/30/18 2044        Imaging Results (last 24 hours)     Procedure Component Value Units Date/Time    XR Chest 1 View [[id number]] Collected:  03/30/18 2053     Updated:  03/30/18 2057    Narrative:       EXAMINATION: XR CHEST 1 VW- 3/30/2018 8:53 PM CDT     HISTORY: Ventilated     COMPARISON: 2/18/2018     FINDINGS:  An endotracheal tube is in place with tip above the isael. The heart is  enlarged. The aorta is tortuous with atherosclerotic calcifications.  Chronic changes are seen at the lung bases with scarring and  atelectasis. There is chronic blunting of the costophrenic angles. Trace  pleural fluid cannot be excluded on the left. There are bilateral  perihilar opacities with some central bronchial wall thickening. The  pulmonary  vasculature is stable.       Impression:       Bilateral perihilar opacities may reflect edema or a  developing infectious process, correlate clinically. Chronic lung  changes at the lung bases.  This report was finalized on 03/30/2018 20:54 by Dr. Rashid Scott MD.        I have personally reviewed and interpreted the radiology studies and ECG obtained at time of admission.     Assessment / Plan     Assessment:     1.  Acute respiratory failure  2.  COPD exacerbation  3.  Abnormal ABG  4.  Hyperglycemia  5.  Elevated troponin  6.  Elevated pro-calcitonin  7.  Elevated d-dimer   8.  Anemia    Plan:      -Admit to the unit  -Continue cardiac monitoring  -Continuous pulse ox  -Keep intubated  -Keep sedated  -Follow-up pulmonology consult  -Initial ABG noted  -Management as needed  -Follow-up repeat ABG  -Continue IV steroid  -Continue IV antibiotic  -Follow-up subsequent troponin level  -Strict glycemic control  -Follow-up echocardiogram  -Follow-up CTA  -Continue IV fluid  -Lactic acid noted to be negative  -Monitor H&H  -Follow-up anemia workup  -GI prophylaxis  -DVT prophylaxis        Code Status: Full code     I discussed the patients findings and my recommendations with the patient's RN    Estimated length of stay 2-3 days    Yaa Mcbride MD   03/30/18   10:58 PM              Electronically signed by Yaa Mcbride MD at 3/30/2018 11:13 PM       Hospital Medications (active)       Dose Frequency Start End    apixaban (ELIQUIS) tablet 2.5 mg 2.5 mg Every 12 Hours Scheduled 4/5/2018     Sig - Route: Take 1 tablet by mouth Every 12 (Twelve) Hours. - Oral    aspirin chewable tablet 81 mg 81 mg Daily 4/2/2018     Sig - Route: Chew 1 tablet Daily. - Oral    atorvastatin (LIPITOR) tablet 20 mg 20 mg Nightly 3/30/2018     Sig - Route: Take 2 tablets by mouth Every Night. - Oral    bumetanide (BUMEX) tablet 0.5 mg 0.5 mg Daily 4/6/2018     Sig - Route: Take 1 tablet by mouth Daily. - Oral    gabapentin  "(NEURONTIN) capsule 300 mg 300 mg Nightly 3/30/2018     Sig - Route: Take 1 capsule by mouth Every Night. - Oral    HYDROcodone-acetaminophen (NORCO) 5-325 MG per tablet 1 tablet 1 tablet Every 6 Hours PRN 4/3/2018 4/13/2018    Sig - Route: Take 1 tablet by mouth Every 6 (Six) Hours As Needed for Moderate Pain  or Severe Pain . - Oral    ipratropium-albuterol (DUO-NEB) nebulizer solution 3 mL 3 mL Every 4 Hours PRN 3/30/2018     Sig - Route: Take 3 mL by nebulization Every 4 (Four) Hours As Needed for Wheezing or Shortness of Air. - Nebulization    ipratropium-albuterol (DUO-NEB) nebulizer solution 3 mL 3 mL 4 Times Daily - RT 3/31/2018     Sig - Route: Take 3 mL by nebulization 4 (Four) Times a Day. - Nebulization    Morphine sulfate (PF) injection 2 mg 2 mg Every 4 Hours PRN 4/1/2018 4/11/2018    Sig - Route: Infuse 1 mL into a venous catheter Every 4 (Four) Hours As Needed for Mild Pain , Moderate Pain  or Severe Pain . - Intravenous    ondansetron (ZOFRAN) injection 4 mg 4 mg Every 6 Hours PRN 3/30/2018     Sig - Route: Infuse 2 mL into a venous catheter Every 6 (Six) Hours As Needed for Nausea or Vomiting. - Intravenous    potassium chloride (KLOR-CON) packet 40 mEq 40 mEq As Needed 4/3/2018     Sig - Route: Take 40 mEq by mouth As Needed (potassium replacement, see admin instructions). - Oral    Cosign for Ordering: Accepted by Kris Roberts MD on 4/4/2018  7:10 AM    Linked Group 1:  \"Or\" Linked Group Details        potassium chloride (MICRO-K) CR capsule 40 mEq 40 mEq As Needed 4/3/2018     Sig - Route: Take 4 capsules by mouth As Needed (potassium replacement.  see admin instructions). - Oral    Cosign for Ordering: Accepted by Kris Roberts MD on 4/4/2018  7:10 AM    Linked Group 1:  \"Or\" Linked Group Details        potassium chloride 20 mEq in 50 mL IVPB 20 mEq Every 1 Hour PRN 4/3/2018     Sig - Route: Infuse 50 mL into a venous catheter Every 1 (One) Hour As Needed (potassium protocol CENTRAL IV - see " "admin instructions). - Intravenous    Cosign for Ordering: Accepted by Kris Roberts MD on 4/4/2018  7:10 AM    Linked Group 1:  \"Or\" Linked Group Details        sodium chloride 0.9 % flush 1-10 mL 1-10 mL As Needed 3/30/2018     Sig - Route: Infuse 1-10 mL into a venous catheter As Needed for Line Care. - Intravenous    bumetanide (BUMEX) tablet 1 mg (Discontinued) 1 mg Daily 4/4/2018 4/5/2018    Sig - Route: Take 1 tablet by mouth Daily. - Oral    enoxaparin (LOVENOX) syringe 30 mg (Discontinued) 30 mg Every 24 Hours 4/2/2018 4/4/2018    Sig - Route: Inject 0.3 mL under the skin Daily. - Subcutaneous    famotidine (PEPCID) injection 20 mg (Discontinued) 20 mg Daily 3/31/2018 4/5/2018    Sig - Route: Infuse 2 mL into a venous catheter Daily. - Intravenous    propofol (DIPRIVAN) infusion 10 mg/mL 100 mL (Discontinued) 5-50 mcg/kg/min × 86.5 kg Titrated 3/31/2018 4/5/2018    Sig - Route: Infuse 432.5-4,325 mcg/min into a venous catheter Dose Adjusted By Provider As Needed. - Intravenous             Physician Progress Notes (most recent note)      Kris Roberts MD at 4/5/2018  9:34 AM              [facility] Medicine Services  INPATIENT PROGRESS NOTE    Length of Stay: 6  Date of Admission: 3/30/2018  Primary Care Physician: Jeremi Kan MD    Subjective   Chief Complaint: Acute respiratory failure/acute on chronic heart failure/renal failure    HPI   Patient's eating better.  Patient still remained weak.  Patient denies any shortness breath or chest pain.  Plan to transfer the floor today.    Review of Systems   Constitutional: Positive for activity change, appetite change and fatigue. Negative for chills and fever.   HENT: Negative for hearing loss, nosebleeds, tinnitus and trouble swallowing.    Eyes: Negative for visual disturbance.   Respiratory: Negative for cough, chest tightness, shortness of breath and wheezing.    Cardiovascular: Negative for chest pain, palpitations " and leg swelling.   Gastrointestinal: Negative for abdominal distention, abdominal pain, blood in stool, constipation, diarrhea, nausea and vomiting.   Endocrine: Negative for cold intolerance, heat intolerance, polydipsia, polyphagia and polyuria.   Genitourinary: Negative for decreased urine volume, difficulty urinating, dysuria, flank pain, frequency and hematuria.   Musculoskeletal: Positive for arthralgias, gait problem and myalgias. Negative for joint swelling.   Skin: Negative for rash.   Allergic/Immunologic: Negative for immunocompromised state.   Neurological: Positive for weakness. Negative for dizziness, syncope, light-headedness and headaches.   Hematological: Negative for adenopathy. Does not bruise/bleed easily.   Psychiatric/Behavioral: Positive for confusion. Negative for sleep disturbance. The patient is not nervous/anxious.      All pertinent negatives and positives are as above. All other systems have been reviewed and are negative unless otherwise stated.     Objective    Temp:  [97 °F (36.1 °C)-98.3 °F (36.8 °C)] 97 °F (36.1 °C)  Heart Rate:  [57-99] 57  Resp:  [16-18] 16  BP: ()/(32-98) 124/50    Intake/Output Summary (Last 24 hours) at 04/05/18 0934  Last data filed at 04/05/18 0800   Gross per 24 hour   Intake              880 ml   Output              950 ml   Net              -70 ml     Physical Exam  Constitutional: She appears well-developed.   HENT:   Head: Normocephalic and atraumatic.   Eyes: Conjunctivae are normal. Pupils are equal, round, and reactive to light.   Neck: Neck supple. No JVD present. No thyromegaly present.   Cardiovascular: Normal rate, regular rhythm, normal heart sounds and intact distal pulses.  Exam reveals no gallop and no friction rub.    No murmur heard.  Pulmonary/Chest: Effort normal. No respiratory distress. She has wheezes. She has no rales. She exhibits no tenderness.   Diminished breath sound bilateral   Abdominal: Soft. Bowel sounds are normal. She  exhibits no distension. There is no tenderness. There is no rebound and no guarding.   Musculoskeletal: She exhibits no edema, tenderness or deformity.   Lymphadenopathy:     She has no cervical adenopathy.   Neurological: She is alert. She displays normal reflexes. No cranial nerve deficit. She exhibits abnormal muscle tone. Coordination abnormal.   Skin: Skin is warm and dry. No rash noted.      Results Review:  Lab Results (last 24 hours)     Procedure Component Value Units Date/Time    Blood Culture - Blood, [[id number]]  (Abnormal) Collected:  03/31/18 0300    Specimen:  Blood from Arm, Left Updated:  04/05/18 0727     Blood Culture Abnormal Stain (A)      Staphylococcus, coagulase negative (A)     Comment: Probable contaminant          Isolated from Aerobic Bottle     Gram Stain Result Gram positive cocci    Narrative:       No growth anaerobic bottle.    CBC & Differential [[id number]] Collected:  04/05/18 0215    Specimen:  Blood Updated:  04/05/18 0356    Narrative:       The following orders were created for panel order CBC & Differential.  Procedure                               Abnormality         Status                     ---------                               -----------         ------                     Manual Differential[[id number]]                                                         CBC Auto Differential[[id number]]        Abnormal            Final result                 Please view results for these tests on the individual orders.    CBC Auto Differential [[id number]]  (Abnormal) Collected:  04/05/18 0215    Specimen:  Blood Updated:  04/05/18 0356     WBC 4.58 (L) 10*3/mm3      RBC 4.30 10*6/mm3      Hemoglobin 10.7 (L) g/dL      Hematocrit 36.3 (L) %      MCV 84.4 fL      MCH 24.9 (L) pg      MCHC 29.5 (L) g/dL      RDW 17.8 (H) %      RDW-SD 54.4 (H) fl      MPV 9.6 fL      Platelets 100 (L) 10*3/mm3     Manual Differential [[id number]]  (Abnormal) Collected:  04/05/18 0215    Specimen:  Blood  Updated:  04/05/18 0356     Neutrophil % 62.0 %      Lymphocyte % 26.0 %      Monocyte % 3.0 (L) %      Bands %  1.0 %      Atypical Lymphocyte % 8.0 (H) %      Neutrophils Absolute 2.89 10*3/mm3      Lymphocytes Absolute 1.19 10*3/mm3      Monocytes Absolute 0.14 (L) 10*3/mm3      Anisocytosis Slight/1+     Elliptocytes Slight/1+     Hypochromia Slight/1+     Polychromasia Slight/1+     Smudge Cells Slight/1+     Platelet Estimate Decreased    Blood Culture - Blood, [[id number]]  (Normal) Collected:  03/31/18 0245    Specimen:  Blood from Arm, Right Updated:  04/05/18 0316     Blood Culture No growth at 5 days    Comprehensive Metabolic Panel [[id number]]  (Abnormal) Collected:  04/05/18 0215    Specimen:  Blood Updated:  04/05/18 0306     Glucose 98 mg/dL      BUN 43 (H) mg/dL      Creatinine 1.68 (H) mg/dL      Sodium 144 mmol/L      Potassium 4.8 mmol/L      Chloride 93 (L) mmol/L      CO2 >40.0 (C) mmol/L      Calcium 8.7 mg/dL      Total Protein 6.4 g/dL      Albumin 3.60 g/dL      ALT (SGPT) 22 U/L      AST (SGOT) 25 U/L      Alkaline Phosphatase 66 U/L      Total Bilirubin 0.7 mg/dL      eGFR Non African Amer 29 (L) mL/min/1.73      Globulin 2.8 gm/dL      A/G Ratio 1.3 g/dL      BUN/Creatinine Ratio 25.6 (H)     Anion Gap -- mmol/L      Comment: Unable to calculate Anion Gap.       Narrative:       The MDRD GFR formula is only valid for adults with stable renal function between ages 18 and 70.           Cultures:  Blood Culture   Date Value Ref Range Status   03/31/2018 Abnormal Stain (A)  Final   03/31/2018 Staphylococcus, coagulase negative (A)  Final     Comment:     Probable contaminant     03/31/2018 No growth at 5 days  Final     Urine Culture   Date Value Ref Range Status   03/30/2018 No growth at 2 days  Final     Respiratory Culture   Date Value Ref Range Status   03/30/2018 Light growth (2+) Normal Respiratory Lenora  Final       Radiology Data:    Imaging Results (last 24 hours)     ** No results  found for the last 24 hours. **          Allergies   Allergen Reactions   • Fluarix [Flu Virus Vaccine] Swelling   • Influenza A (H1n1) Monoval Vac    • Pneumococcal Vaccines Swelling   • Codeine Rash       Scheduled meds:     apixaban 2.5 mg Oral Q12H   aspirin 81 mg Oral Daily   atorvastatin 20 mg Oral Nightly   bumetanide 1 mg Oral Daily   gabapentin 300 mg Oral Nightly   ipratropium-albuterol 3 mL Nebulization 4x Daily - RT       PRN meds:  HYDROcodone-acetaminophen  •  ipratropium-albuterol  •  Morphine  •  ondansetron  •  potassium chloride **OR** potassium chloride **OR** potassium chloride  •  sodium chloride    Assessment/Plan     Active Problems:    Acute on chronic systolic congestive heart failure    Acute respiratory failure    Pancytopenia      Plan:  Acute respiratory failure with hypoxia and hypercapnia- Extubated 4/3/18.  Pulmonary signed off.  Duo nebs.       Acute on chronic CHF-BNP 3500 improved from previous 26,000. Decrease bumex.  Echocardiogram ejection fraction 47%, diastolic dysfunction, systolic dysfunction.     Encephalopathy from hypoxia of respiratory failure. Improving.      Hx of atria fib. Currently in sinus rhythm.  DC Lovenox and put patient back on Eliquis.     Acute renal failure- Cr worsen. Decrease bumex.      Elevated troponin/non-STEMI likely type II.  Troponins trend down.     Anemia- hb 9.8. No acute GI bleed.  S/P 2 units of blood transfusion 4/2/18.       Pancytopenia/neutropenia- stable.     Thrombocytopenia- Stable.      Possible constipation-KUB     Hypotension-stable     COPD- duo nebs     Hyperlipidemia- on Lipitor     Reflux- on Pepcid     Hyperglycemia.  Hemoglobin A1c 4.2.  DC Sliding scale     History of tobacco abuse     Nutrition-  Puréed diet per nutritionist     Deconditioning-PT and OT consult.     Blood culture shows no growth in 5 days, coagulase negative-probable contamination.  Urine culture no growth in 2 days.  Respiratory culture normal respiratory  Lenora.     Discharge Plannin-2 days.  Consult case management for rehabilitation placement, patient preferred  [place].  Chest with the medical floor.    Kris Roberts MD   18   9:34 AM                    Electronically signed by Kris Roberts MD at 2018 10:02 AM          Consult Notes (most recent note)      Nam Barrios MD at 3/31/2018  9:24 AM      Consult Orders:    1. Inpatient Pulmonology Consult [[id number]] ordered by Yaa Mcbride MD at 18 2313                    PULMONARY AND CRITICAL CARE CONSULT - [facility]    Erin Cox   MR# [id number]  Acct# [id number]  3/31/2018   9:25 AM    Referring Provider: Kwasi Sky MD    Chief Complaint: Mechanically ventilated    HPI: We are consulted by Kwasi Sky MD to see this 82 y.o. year old female born on 1935.   Information obtained from EMR as patient is unresponsive, intubated, and no family is at bedside.  Patient was a transfer from [facility] for further evaluation and treatment of acute on chronic respiratory failure with hypoxemia and hypercarbia.  Respiratory hx: COPD, chronic respiratory failure with hypoxemia (home O2), and former smoker.  Respiratory meds: albuterol HFA/symbicort/spiriva.  Upon assessment the patient is sedated on mechanical ventilator.  Propofol infusing.  ABGs reviewed and ventilator settings adjusted.  CXR today with developing right basilar infiltrate.  CTA is pending.  No other aggravating or allevitating factors.          Past Medical History  Past Medical History:   Diagnosis Date   • Acute pulmonary edema    • Arteriosclerotic coronary artery disease    • Bradycardia    • CAD (coronary artery disease)    • CHF (congestive heart failure)    • COPD (chronic obstructive pulmonary disease)    • Dyslipidemia    • Hypertension    • Old non-ST elevation myocardial infarction (NSTEMI)    • Renal disorder    • Stented coronary artery      Past Surgical  History:   Procedure Laterality Date   • BACK SURGERY     • CHOLECYSTECTOMY     • COLON SURGERY      Resection   • EYE SURGERY Bilateral     cataract     Allergies   Allergen Reactions   • Fluarix [Flu Virus Vaccine] Swelling   • Influenza A (H1n1) Monoval Vac    • Pneumococcal Vaccines Swelling   • Codeine Rash     Medications    aspirin 81 mg Oral Daily With Breakfast   atorvastatin 20 mg Oral Nightly   furosemide 40 mg Intravenous Q12H   gabapentin 300 mg Oral Nightly   heparin (porcine) 5,000 Units Subcutaneous Q8H   hydrOXYzine 50 mg Oral Daily   insulin lispro 2-7 Units Subcutaneous 4x Daily With Meals & Nightly   ipratropium-albuterol 3 mL Nebulization 4x Daily - RT       propofol 5-50 mcg/kg/min Last Rate: 10 mcg/kg/min (03/31/18 0241)     Social History   reports that she has quit smoking. She does not have any smokeless tobacco history on file. She reports that she does not drink alcohol or use drugs.  Family History  family history includes Coronary artery disease in her father; Heart disease in her other.  Review of Systems:  Cannot obtain due to mechanical ventilation.  The patient notably is critically ill and connected to a ventilator.  As such patient cannot communicate and provide any history whatsoever, including any history of present illness or interval history since arrival or review of systems. The interested reviewer may note this fact, as an attempt has been made at collecting and documenting these portions of the patient history, but this information is unobtainable despite attempted review and therefore cannot be documented at this time.   Physical Exam:  Temp:  [96.5 °F (35.8 °C)-97.5 °F (36.4 °C)] 96.5 °F (35.8 °C)  Heart Rate:  [] 73  Resp:  [15] 15  BP: (103-132)/(60-83) 116/73  FiO2 (%):  [30 %-100 %] 30 %  Intake/Output Summary (Last 24 hours) at 03/31/18 0925  Last data filed at 03/31/18 0900   Gross per 24 hour   Intake            769.9 ml   Output              850 ml   Net             -80.1 ml     1    03/30/18  2200   Weight: 86.5 kg (190 lb 12.8 oz)     SpO2 Percentage    03/31/18 0516 03/31/18 0632 03/31/18 0640   SpO2: 95% 100% 100%     Physical Exam   Constitutional: She appears well-developed and well-nourished. She appears ill (chronic). No distress. She is sedated and intubated.   HENT:   Head: Normocephalic and atraumatic.   Eyes: Conjunctivae and EOM are normal. Pupils are equal, round, and reactive to light. No scleral icterus.   Cardiovascular: Normal rate and regular rhythm.    No murmur heard.  Pulmonary/Chest: Effort normal. She is intubated. No respiratory distress. She has decreased breath sounds.   Abdominal: Soft. Bowel sounds are normal. She exhibits no distension. There is no tenderness.   Musculoskeletal: She exhibits edema (trace).   Neurological:   sedated   Skin: Skin is warm and dry.   Psychiatric:   sedated   Nursing note and vitals reviewed.    Ventilator Settings:     Vt (Set, L): 0.5 L  Resp Rate (Set): 14  Pressure Support (cm H2O): 10 cm H20  FiO2 (%): 30 %  PEEP/CPAP (cm H2O): 5 cm H20  Minute Ventilation (L/min) (Obs): 10.2 L/min  Resp Rate (Observed) Vent: 24  I:E Ratio (Set): 1:1.30  I:E Ratio (Obs): 1:1.4  PIP Observed (cm H2O): 22 cm H2O  Plateau Pressure (cm H2O): 19 cm H2O       Results from last 7 days  Lab Units 03/31/18  0259 03/30/18 2039   WBC 10*3/mm3 4.84 7.04   HEMOGLOBIN g/dL 8.7* 8.6*   PLATELETS 10*3/mm3 56* 67*       Results from last 7 days  Lab Units 03/31/18  0259 03/30/18 2040   SODIUM mmol/L 140 136   POTASSIUM mmol/L 4.8 4.8   CO2 mmol/L 33.0* 31.0   BUN mg/dL 23* 19   CREATININE mg/dL 1.76* 1.38   MAGNESIUM mg/dL 2.3* 2.2   PHOSPHORUS mg/dL 7.1* 5.8*   GLUCOSE mg/dL 135* 257*       Results from last 7 days  Lab Units 03/31/18  0200 03/30/18  2110   PH, ARTERIAL pH units 7.339* 7.271*   PCO2, ARTERIAL mm Hg 52.7* 60.3*   PO2 ART mm Hg 113.0* 106.0   FIO2 % 40 50     Urine Culture   Date Value Ref Range Status   03/30/2018 No  growth at 24 hours  Preliminary     Lab Results   Component Value Date    PROBNP 3,250.0 (H) 02/17/2017     Recent radiology:   Imaging Results (last 72 hours)     Procedure Component Value Units Date/Time    XR Chest 1 View [[id number]] Collected:  03/31/18 0856     Updated:  03/31/18 0900    Narrative:       EXAMINATION: XR CHEST 1 VW-     3/31/2018 7:38 AM CDT     HISTORY: f/u COPD.  Respiratory distress. Ventilator.     One view chest x-ray compared with yesterday.     Mildly low-lying endotracheal tube with the tip 2.2 cm above the isael.  Magnified heart size.  Moderate aortic arch calcification.     Chronic interstitial lung disease with increasing patchy infiltrate and  trace pleural fluid at the right lower lobe.     There is no pneumothorax.     Moderate degenerative changes seen in both shoulder joints.     Summary:  1. Endotracheal tube remains in place.  2. Developing right basilar infiltrate and trace pleural fluid  This report was finalized on 03/31/2018 08:57 by Dr. Lincoln Griffiths MD.    CT Angiogram Chest With & Without Contrast [[id number]] Updated:  03/31/18 0041    XR Chest 1 View [[id number]] Collected:  03/30/18 2053     Updated:  03/30/18 2057    Narrative:       EXAMINATION: XR CHEST 1 VW- 3/30/2018 8:53 PM CDT     HISTORY: Ventilated     COMPARISON: 2/18/2018     FINDINGS:  An endotracheal tube is in place with tip above the isael. The heart is  enlarged. The aorta is tortuous with atherosclerotic calcifications.  Chronic changes are seen at the lung bases with scarring and  atelectasis. There is chronic blunting of the costophrenic angles. Trace  pleural fluid cannot be excluded on the left. There are bilateral  perihilar opacities with some central bronchial wall thickening. The  pulmonary vasculature is stable.       Impression:       Bilateral perihilar opacities may reflect edema or a  developing infectious process, correlate clinically. Chronic lung  changes at the lung bases.  This  report was finalized on 2018 20:54 by Dr. Rashid Scott MD.        My radiograph interpretation/independent review of imaging: ETT intact, increasing opacities on the right   Other test results (not lab or imaging): none   Independent review of ekg: 3/31/18, sinus arrhythmia with 1st degree AV block, rate 79, QTc 497  Patient Active Problem List   Diagnosis   • Mitral regurgitation   • Aortic stenosis   • Acute on chronic systolic congestive heart failure   • Acute respiratory failure with hypoxia   • Respiratory distress   • Right lower lobe pneumonia   • Respiratory failure   • Acute respiratory failure     Pulmonary Assessment:  SEVERE ACUTE RESPIRATORY FAILURE REQUIRING MECHANICAL VENTILATION  This is a threat to life or pulmonary function, high risk, due to acute on chronic respiratory failure with hypoxemia and hypercarbia.     New problem (to me), with additional workup planned: none   New problem (to me), no additional workup planned: none  Other problems either stable, failing to improve or worsenin. Probable COPD exacerbation (no pfts)  2. Elevated troponin   3. Anemia   4. BELA  5. Elevated AST   6. Elevated d-dimer   7. Hx systolic/diastolic CHF    Recommend/plan:   · D#2 ETT.  Continue mechanical ventilation.  PS increased to 15 and RR increased to 16.   · DC symbicort  · Add duonebs  · 2d echo pending  · CTA pending  · BNP today    · Respiratory cx/gram stain  · Daily CXR/ABG while on vent    · Add stress ulcer prophylaxis     Electronically signed by NEYDA Broderick on 3/31/2018 at 9:25 AM    Physician substantive contribution:  Pertinent symptoms/interval history include: pt on vent with apparent copd exacerbation. Cannot provide hx  Respiratory exam shows pertinent findings of:diminished breath sounds, wheezing, no vent dyssynchorony.  Spontaneous breaths <300 mll.  Plan includes: vent adjusted, increase ps to 15.  Not ready for spontaneous breathing trials .  Unfortunately my  "outpatient record is unavailable due to computer issues beyond my control.  I have seen and examined patient personally, performing a face-to-face diagnostic evaluation with plan of care reviewed and developed with APRN and nursing staff. I have addended and/or modified the above history of present illness, physical examination, and assessment and plan to reflect my findings and impressions. Essential elements of the care plan were discussed with APRN above.  Agree with findings and assessment/plan as documented above.    Electronically signed by Nam Barrios MD, on 3/31/2018, 1:28 PM           Electronically signed by Nam Barrios MD at 3/31/2018  1:32 PM          Nutrition Notes (most recent note)      Latisha De La Cruz at 4/4/2018 11:02 AM          Problem: Patient Care Overview  Goal: Plan of Care Review  Outcome: Ongoing (interventions implemented as appropriate)   04/04/18 1101   Coping/Psychosocial   Plan of Care Reviewed With other (see comments)  (nusing)   Plan of Care Review   Progress no change   OTHER   Outcome Summary Extubated this am, NPO. NG pulled per pt this am. Await SLP evaluation to detemine if oral diet can be tolerated. If unable to advance po diet, may benefit from enteral ntn. cont to follow           Electronically signed by Latisha De La Cruz at 4/4/2018 11:02 AM          Physical Therapy Notes (last 24 hours) (Notes from 4/4/2018 10:59 AM through 4/5/2018 10:59 AM)      Jurgen Apple, PT DPT at 4/4/2018 11:58 AM  Version 1 of 1         Problem: Patient Care Overview  Goal: Plan of Care Review  Outcome: Ongoing (interventions implemented as appropriate)   04/04/18 1033   Coping/Psychosocial   Plan of Care Reviewed With patient;family   OTHER   Outcome Summary PT lenoraal completed. She was alert and eager to work with PT so that she can get stronger and \"go home.\" She required min assist to get OOB and to the bedside chair. She demos generalized weakness since being on the vent. PT " will work to progress her functional mobility, strength and endurance with activity. Anticipate d/c home with HH vs TCU, pending progress.            Electronically signed by Jurgen Apple, PT DPT at 2018 11:58 AM     Jurgen Apple, PT DPT at 2018 12:00 PM  Version 1 of 1         Acute Care - Physical Therapy Initial Evaluation   [place]     Patient Name: Erin Cox  : 1935  MRN: [id number]  Today's Date: 2018   Onset of Illness/Injury or Date of Surgery: 18  Date of Referral to PT: 18  Referring Physician: Dr. Roberts      Admit Date: 3/30/2018    Visit Dx:     ICD-10-CM ICD-9-CM   1. Impaired functional mobility and endurance Z74.09 V49.89     Patient Active Problem List   Diagnosis   • Mitral regurgitation   • Aortic stenosis   • Acute on chronic systolic congestive heart failure   • Acute respiratory failure with hypoxia   • Respiratory distress   • Right lower lobe pneumonia   • Respiratory failure   • Acute respiratory failure   • Pancytopenia     Past Medical History:   Diagnosis Date   • Acute pulmonary edema    • Arteriosclerotic coronary artery disease    • Bradycardia    • CAD (coronary artery disease)    • CHF (congestive heart failure)    • COPD (chronic obstructive pulmonary disease)    • Dyslipidemia    • Hypertension    • Old non-ST elevation myocardial infarction (NSTEMI)    • Renal disorder    • Stented coronary artery      Past Surgical History:   Procedure Laterality Date   • BACK SURGERY     • CHOLECYSTECTOMY     • COLON SURGERY      Resection   • EYE SURGERY Bilateral     cataract        PT ASSESSMENT (last 72 hours)      Physical Therapy Evaluation     Row Name 18 1033 18 0800       PT Evaluation Time/Intention    Subjective Information no complaints  -DANGELO  --    Document Type evaluation  -DANGELO  --    Mode of Treatment physical therapy  -DANGELO  --    Comment  -- Now extubated, PT will await order to increase actiivty as appropriate per MD MAZARIEGOS     Row Name 04/04/18 1033          General Information    Patient Profile Reviewed? yes  -DANGELO     Onset of Illness/Injury or Date of Surgery 03/30/18  -DANGELO     Referring Physician Dr. Roberts  -DANGELO     Patient Observations alert;cooperative;agree to therapy  -DANGELO     Patient/Family Observations Family in room.  -DANGELO     General Observations of Patient Fowlers in bed, alert.  -DANGELO     Prior Level of Function independent:;all household mobility;dressing;bathing;home management;dependent:;driving  -DANGELO     Equipment Currently Used at Home cane, straight  -DANGELO     Pertinent History of Current Functional Problem CC: t/f from outside facility for acute resp. failure s/p intubation. Dx: acute hypoxic resp. failure; intubated. COPD exacerbation. metabolic encephalopathy, acute on chronic systolic CHF. 4/4 H&H: 9.9/33.1. WBC: 3.77.   -DANGELO     Existing Precautions/Restrictions fall  -DANGELO     Risks Reviewed patient:;LOB;nausea/vomiting;dizziness;increased discomfort;change in vital signs;lines disloged  -DANGELO     Benefits Reviewed patient:;improve function;increase independence;increase strength;increase balance;decrease pain;increase knowledge;improve skin integrity  -DANGELO     Barriers to Rehab medically complex  -DANGELO     Row Name 04/04/18 1033          Relationship/Environment    Lives With alone  -DANGELO     Family Caregiver if Needed child(zofia), adult   son and daughter check on daily.  -DANGELO     Row Name 04/04/18 1033          Resource/Environmental Concerns    Current Living Arrangements home/apartment/condo  -DANGELO     Resource/Environmental Concerns home accessibility  -DANGELO     Row Name 04/04/18 1033          Cognitive Assessment/Interventions    Additional Documentation Cognitive Assessment/Intervention (Group)  -DANGELO     Row Name 04/04/18 1033          Cognitive Assessment/Intervention- PT/OT    Orientation Status (Cognition) oriented to;person;place;time  -DANGELO     Follows Commands (Cognition) follows multi-step commands;over 90%  accuracy;physical/tactile prompts required  -DANGELO     Personal Safety Interventions fall prevention program maintained;gait belt;muscle strengthening facilitated;nonskid shoes/slippers when out of bed  -DANGELO     Row Name 04/04/18 1033          Safety Issues, Functional Mobility    Impairments Affecting Function (Mobility) balance;endurance/activity tolerance;strength  -DANGELO     Row Name 04/04/18 1033          Bed Mobility Assessment/Treatment    Bed Mobility Assessment/Treatment supine-sit  -DANGELO     Supine-Sit [place] (Bed Mobility) verbal cues;minimum assist (75% patient effort)  -DANGELO     Assistive Device (Bed Mobility) head of bed elevated  -DANGELO     Row Name 04/04/18 1033          Transfer Assessment/Treatment    Transfer Assessment/Treatment sit-stand transfer;stand-sit transfer;bed-chair transfer  -DANGELO     Bed-Chair [place] (Transfers) verbal cues;minimum assist (75% patient effort)   Few steps from EOB to bedside chair  -DANGELO     Sit-Stand [place] (Transfers) verbal cues;minimum assist (75% patient effort)  -DANGELO     Stand-Sit [place] (Transfers) verbal cues;minimum assist (75% patient effort)  -DANGELO     Row Name 04/04/18 1033          General ROM    GENERAL ROM COMMENTS B UE/LE AROM WFL except L shoulder AROM impaired ~ 25%  -DANGELO     Row Name 04/04/18 1033          General Assessment (Manual Muscle Testing)    Comment, General Manual Muscle Testing (MMT) Assessment B UE/LE 4-/5  -DANGELO     Row Name 04/04/18 1033          Motor Assessment/Intervention    Additional Documentation Balance (Group)  -DANGELO     Row Name 04/04/18 1033          Balance    Balance static sitting balance;static standing balance  -DANGELO     Row Name 04/04/18 1033          Static Sitting Balance    Level of [place] (Unsupported Sitting, Static Balance) standby assist  -DANGELO     Sitting Position (Unsupported Sitting, Static Balance) sitting on edge of bed  -DANGELO     Row Name 04/04/18 1033          Static Standing Balance    Level of  [place] (Supported Standing, Static Balance) minimal assist, 75% patient effort  -DANGELO     Assistive Device Utilized (Supported Standing, Static Balance) --   L UE support  -DANGELO     Row Name 04/04/18 1033          Sensory Assessment/Intervention    Sensory General Assessment no sensation deficits identified   per pt  -DANGELO     Row Name 04/04/18 1033          Vision Assessment/Intervention    Visual Impairment/Limitations WFL   per pt  -DANGELO     Row Name 04/04/18 1033          Pain Assessment    Additional Documentation Pain Scale: Numbers Pre/Post-Treatment (Group)  -DANGELO     Row Name 04/04/18 1033          Pain Scale: Numbers Pre/Post-Treatment    Pain Scale: Numbers, Pretreatment 0/10 - no pain  -DANGELO     Row Name 04/04/18 1033          Health Promotion    Additional Documentation Coping (Group);Plan of Care Review (Group)  -DANGELO     Row Name 04/04/18 1033          Coping    Observed Emotional State accepting;cooperative  -DANGELO     Row Name 04/04/18 1033          Plan of Care Review    Plan of Care Reviewed With patient;family  -DANGELO     Row Name 04/04/18 1033          Physical Therapy Clinical Impression    Date of Referral to PT 04/04/18  -DANGELO     PT Diagnosis (PT Clinical Impression) impaired mobility, weakness  -DANGELO     Functional Level at Time of Evaluation (PT Clinical Impression) Min assist to t/f to bedside chair  -DANGELO     Patient/Family Goals Statement (PT Clinical Impression) go home  -DANGELO     Criteria for Skilled Interventions Met (PT Clinical Impression) yes;treatment indicated  -DANGELO     Impairments Found (describe specific impairments) aerobic capacity/endurance;gait, locomotion, and balance  -DANGELO     Rehab Potential (PT Clinical Summary) good, to achieve stated therapy goals  -DANGELO     Predicted Duration of Therapy (PT) until d/c  -DANGELO     Care Plan Review (PT) evaluation/treatment results reviewed;risks/benefits reviewed;current/potential barriers reviewed;patient/other agree to care plan  -DANGELO     Care Plan Review,  Other Participant (PT Clinical Impression) family  -DANGELO     Row Name 04/04/18 1033          Vital Signs    Pre Systolic BP Rehab 127  -DANGELO     Pre Treatment Diastolic BP 61  -DANGELO     Post Systolic BP Rehab 131  -DANGELO     Post Treatment Diastolic BP 62  -DANGELO     Pretreatment Heart Rate (beats/min) 66  -DANGELO     Posttreatment Heart Rate (beats/min) 81  -DANGELO     Pre SpO2 (%) 98  -DANGELO     O2 Delivery Pre Treatment supplemental O2   2-3 L/NC  -DANGELO     Post SpO2 (%) 99  -DANGELO     O2 Delivery Post Treatment supplemental O2  -DANGELO     Pre Patient Position Supine  -DANGELO     Intra Patient Position Standing  -DANGELO     Post Patient Position Sitting  -DANGELO     Row Name 04/04/18 1033          Physical Therapy Goals    Bed Mobility Goal Selection (PT) bed mobility, PT goal 1  -DANGELO     Transfer Goal Selection (PT) transfer, PT goal 1  -DANGELO     Gait Training Goal Selection (PT) gait training, PT goal 1  -DANGELO     Row Name 04/04/18 1033          Bed Mobility Goal 1 (PT)    Activity/Assistive Device (Bed Mobility Goal 1, PT) sit to supine/supine to sit;scooting  -DANGELO     [place] Level/Cues Needed (Bed Mobility Goal 1, PT) independent  -DANGELO     Time Frame (Bed Mobility Goal 1, PT) long term goal (LTG);10 days  -DANGELO     Progress/Outcomes (Bed Mobility Goal 1, PT) goal ongoing  -DANGELO     Row Name 04/04/18 1033          Transfer Goal 1 (PT)    Activity/Assistive Device (Transfer Goal 1, PT) sit-to-stand/stand-to-sit;bed-to-chair/chair-to-bed;walker, rolling;cane, straight  -DANGELO     [place] Level/Cues Needed (Transfer Goal 1, PT) independent  -DANGELO     Time Frame (Transfer Goal 1, PT) long term goal (LTG);10 days  -DANGELO     Progress/Outcome (Transfer Goal 1, PT) goal ongoing  -DANGELO     Row Name 04/04/18 1033          Gait Training Goal 1 (PT)    Activity/Assistive Device (Gait Training Goal 1, PT) gait (walking locomotion);assistive device use;improve balance and speed;increase endurance/gait distance;cane, straight;walker, rolling  -DANGELO     [place] Level (Gait  "Training Goal 1, PT) independent  -DANGELO     Distance (Gait Goal 1, PT) 150  -DANGELO     Time Frame (Gait Training Goal 1, PT) long term goal (LTG);10 days  -DANGELO     Progress/Outcome (Gait Training Goal 1, PT) goal ongoing  -DANGELO     Row Name 04/04/18 1033          Positioning and Restraints    Pre-Treatment Position in bed  -DANGELO     Post Treatment Position chair  -DANGELO     In Chair notified nsg;sitting;call light within reach;encouraged to call for assist;with family/caregiver;patient within staff view  -DANGELO       User Key  (r) = Recorded By, (t) = Taken By, (c) = Cosigned By    Initials Name Provider Type    DANGELO Apple, PT DPT Physical Therapist          Physical Therapy Education     Title: PT OT SLP Therapies (Active)     Topic: Physical Therapy (Active)     Point: Mobility training (Done)    Learning Progress Summary     Learner Status Readiness Method Response Comment Documented by    Patient Done Acceptance E VU,NR Educated pt/family on progression of PT POC and benefits of activity DANGELO 04/04/18 1033    Family Done Acceptance E VU,NR Educated pt/family on progression of PT POC and benefits of activity DANGELO 04/04/18 1033                      User Key     Initials Effective Dates Name Provider Type Discipline    DANGELO 08/02/16 -  Jurgen Apple, PT DPT Physical Therapist PT                PT Recommendation and Plan  Anticipated Discharge Disposition (PT): home with home health care (vs TCU)  Planned Therapy Interventions (PT Eval): bed mobility training, transfer training, gait training, balance training, home exercise program, patient/family education, postural re-education, strengthening  Therapy Frequency (PT Clinical Impression): daily  Outcome Summary/Treatment Plan (PT)  Anticipated Discharge Disposition (PT): home with home health care (vs TCU)  Plan of Care Reviewed With: patient, family  Outcome Summary: PT eval completed. She was alert and eager to work with PT so that she can get stronger and \"go home.\" She " required min assist to get OOB and to the bedside chair. She demos generalized weakness since being on the vent. PT will work to progress her functional mobility, strength and endurance with activity. Anticipate d/c home with HH vs TCU, pending progress.           Outcome Measures     Row Name 04/04/18 1033             How much help from another person do you currently need...    Turning from your back to your side while in flat bed without using bedrails? 3  -DANGELO      Moving from lying on back to sitting on the side of a flat bed without bedrails? 3  -DANGELO      Moving to and from a bed to a chair (including a wheelchair)? 3  -DANGELO      Standing up from a chair using your arms (e.g., wheelchair, bedside chair)? 3  -DANGELO      Climbing 3-5 steps with a railing? 2  -DANGELO      To walk in hospital room? 3  -DANGELO      AM-PAC 6 Clicks Score 17  -DANGELO         Functional Assessment    Outcome Measure Options AM-PAC 6 Clicks Basic Mobility (PT)  -DANGELO        User Key  (r) = Recorded By, (t) = Taken By, (c) = Cosigned By    Initials Name Provider Type    DANGELO Apple, PT DPT Physical Therapist           Time Calculation:         PT Charges     Row Name 04/04/18 1158             Time Calculation    Start Time 1033  -DANGELO      Stop Time 1143  -DANGELO      Time Calculation (min) 70 min  -DANGELO      PT Received On 04/04/18  -DANGELO      PT Goal Re-Cert Due Date 04/14/18  -DANGELO         Time Calculation- PT    Total Timed Code Minutes- PT 10 minute(s)  -DANGELO        User Key  (r) = Recorded By, (t) = Taken By, (c) = Cosigned By    Initials Name Provider Type    DANGELO Apple, PT DPT Physical Therapist          Therapy Charges for Today     Code Description Service Date Service Provider Modifiers Qty    91105632187 HC PT MOBILITY CURRENT 4/4/2018 Jurgen Apple, PT DPT GP, CK 1    54052001342 HC PT MOBILITY PROJECTED 4/4/2018 Jurgen Apple, PT DPT GP, CI 1    24545886563 HC PT EVAL MOD COMPLEXITY 4 4/4/2018 Jurgen Apple, PT DPT GP 1     [id number]  PT THERAPEUTIC ACT EA 15 MIN 4/4/2018 Jurgen Apple, PT DPT GP 1          PT G-Codes  Outcome Measure Options: AM-PAC 6 Clicks Basic Mobility (PT)  Score: 17  Functional Limitation: Mobility: Walking and moving around  Mobility: Walking and Moving Around Current Status (): At least 40 percent but less than 60 percent impaired, limited or restricted  Mobility: Walking and Moving Around Goal Status (): At least 1 percent but less than 20 percent impaired, limited or restricted      Jurgen Apple, PT DPT  4/4/2018         Electronically signed by Jurgen Apple PT DPT at 4/4/2018 12:02 PM     Claudette Trejo PTA at 4/5/2018  9:18 AM  Version 1 of 1         Problem: Patient Care Overview  Goal: Plan of Care Review  Outcome: Ongoing (interventions implemented as appropriate)   04/05/18 0917   Coping/Psychosocial   Plan of Care Reviewed With patient   Plan of Care Review   Progress improving   OTHER   Outcome Summary Pt. has good bed mobility. Assist of 1 to stand and ambulate 15'. Actively performs LE ex's. Will bring pt. a RWX for next treatment and she should be able to walk farther.           Electronically signed by Claudette Trejo PTA at 4/5/2018  9:18 AM       Occupational Therapy Notes (most recent note)     No notes of this type exist for this encounter.        Speech Language Pathology Notes (most recent note)     No notes of this type exist for this encounter.

## 2018-04-05 NOTE — NURSING NOTE
Report given to ANAYELI Sampson. Patient will be transferred to [facility].

## 2018-04-05 NOTE — PLAN OF CARE
"Problem: Patient Care Overview  Goal: Plan of Care Review  Outcome: Ongoing (interventions implemented as appropriate)   18 [id number]   Coping/Psychosocial   Plan of Care Reviewed With patient   Plan of Care Review   Progress no change   OTHER   Outcome Summary Pt transferred to floor this shift. stated she \"got short of air\" during transfer. no other complaints. vss. will continue to monitor       Problem: Fall Risk (Adult)  Goal: Absence of Fall  Outcome: Ongoing (interventions implemented as appropriate)      Problem: Skin Injury Risk (Adult)  Goal: Skin Health and Integrity  Outcome: Ongoing (interventions implemented as appropriate)      Problem: Respiratory Distress Syndrome (,NICU)  Goal: Signs and Symptoms of Listed Potential Problems Will be Absent, Minimized or Managed (Respiratory Distress Syndrome)  Outcome: Ongoing (interventions implemented as appropriate)        "

## 2018-04-05 NOTE — PROGRESS NOTES
PULMONARY AND CRITICAL CARE PROGRESS NOTE - [facility]    Patient: Erin Cox    1935    MR# [id number]    Acct# [id number]  04/05/18   7:15 AM  Referring Provider: Kris Roberts MD    Chief Complaint: Mechanically ventilated    Interval history: The patient did great post extubation yesterday. Per the staff she ate supper last night without issue. She was able to mobilize well yesterday as well. She is not SOA. She does not feel weak. She wants to go to the floor today and home as soon as possible. No family present. No issues overnight. No fevers, chills, sweats. No other aggravating, alleviating factors or associated symptoms.    Meds:    apixaban 2.5 mg Oral Q12H   aspirin 81 mg Oral Daily   atorvastatin 20 mg Oral Nightly   bumetanide 1 mg Oral Daily   famotidine 20 mg Intravenous Daily   gabapentin 300 mg Oral Nightly   ipratropium-albuterol 3 mL Nebulization 4x Daily - RT       propofol 5-50 mcg/kg/min Last Rate: Stopped (04/04/18 0710)     Review of Systems:     Patient reports that she has no complaints this morning. No congestion. No SOA. No weakness. No pain.      Ventilator Settings:     Vt (Set, L): 0.5 L  Resp Rate (Set): 12  Pressure Support (cm H2O): 0 cm H20  FiO2 (%): 30 %  PEEP/CPAP (cm H2O): 5 cm H20  Minute Ventilation (L/min) (Obs): 6 L/min  Resp Rate (Observed) Vent: 21  I:E Ratio (Set): 1:3.20  I:E Ratio (Obs): 1:1.9  PIP Observed (cm H2O): 27 cm H2O  RSBI: 50  Physical Exam:  Temp:  [97.6 °F (36.4 °C)-98.3 °F (36.8 °C)] 98.2 °F (36.8 °C)  Heart Rate:  [61-97] 82  Resp:  [16-21] 16  BP: ()/(32-98) 137/55    Intake/Output Summary (Last 24 hours) at 04/05/18 0715  Last data filed at 04/05/18 0400   Gross per 24 hour   Intake              668 ml   Output             1100 ml   Net             -432 ml     SpO2 Percentage    04/05/18 0435 04/05/18 0445 04/05/18 0505   SpO2: 94% 90% 94%      Physical Exam   Constitutional: She is oriented to person, place, and time.  She appears well-developed and well-nourished. She is active. Nasal cannula in place.   HENT:   Head: Normocephalic and atraumatic.   Right Ear: External ear normal.   Left Ear: External ear normal.   Eyes: Conjunctivae and EOM are normal. Pupils are equal, round, and reactive to light. No scleral icterus.   Neck: Normal range of motion. Neck supple. No JVD present.   Cardiovascular: Normal rate, regular rhythm and normal heart sounds.  Exam reveals no friction rub.    No murmur heard.  Pulmonary/Chest: Effort normal and breath sounds normal. No respiratory distress. She has no wheezes. She has no rales.   Abdominal: Soft. Bowel sounds are normal. She exhibits no distension.   Musculoskeletal: Normal range of motion. She exhibits no edema or deformity.   Neurological: She is alert and oriented to person, place, and time. She exhibits normal muscle tone. Coordination normal.   Skin: Skin is warm and dry.   Psychiatric: She has a normal mood and affect. Her behavior is normal. Thought content normal.   Nursing note and vitals reviewed.      Results from last 7 days  Lab Units 04/05/18 0215 04/04/18 0234 04/03/18  0238   WBC 10*3/mm3 4.58* 3.77* 3.27*   HEMOGLOBIN g/dL 10.7* 9.9* 9.8*   PLATELETS 10*3/mm3 100* 87* 82*       Results from last 7 days  Lab Units 04/05/18 0215 04/04/18 0234 04/04/18 0215 04/03/18  0238   SODIUM mmol/L 144 144  --   --  144   SODIUM, ARTERIAL mmol/L  --   --  144  < >  --    POTASSIUM mmol/L 4.8 4.1  --   --  3.6   BUN mg/dL 43* 37*  --   --  33*   CREATININE mg/dL 1.68* 1.46*  --   --  1.48*   < > = values in this interval not displayed.    Results from last 7 days  Lab Units 04/04/18 0215 04/03/18  0300 04/02/18  0152   PH, ARTERIAL pH units 7.471* 7.475* 7.453*   PCO2, ARTERIAL mm Hg 52.3* 53.0* 50.0*   PO2 ART mm Hg 76.4* 68.0* 87.0   FIO2 % 30 30 30     Blood Culture   Date Value Ref Range Status   03/31/2018 Abnormal Stain (A)  Preliminary   03/31/2018 Staphylococcus, coagulase  negative (A)  Preliminary     Comment:     Probable contaminant     03/31/2018 No growth at 24 hours  Preliminary     Urine Culture   Date Value Ref Range Status   03/30/2018 No growth at 2 days  Final     Respiratory Culture   Date Value Ref Range Status   03/30/2018 Light growth (2+) Normal Respiratory Lenora  Final     Recent films:  Imaging Results (last 24 hours)     Procedure Component Value Units Date/Time    XR Chest 1 View [[id number]] Collected:  04/04/18 0714     Updated:  04/04/18 0718    Narrative:       EXAMINATION:   XR CHEST 1 VW-  4/4/2018 7:14 AM CDT     HISTORY: Ventilator     Frontal upright radiograph of the chest 4/4/2018 3:00 AM CDT     COMPARISON: April 3, 2018.     FINDINGS:   Coarse interstitial parenchymal markings are noted. Endotracheal tube  and feeding tube are satisfactorily position.. Cardiac silhouettes  moderately enlarged unchanged..      The osseous structures and surrounding soft tissues demonstrate no acute  abnormality.       Impression:       1. Cardiomegaly no evidence pulmonary vascular congestion. The chest is  stable compared to April 3, 2018.        This report was finalized on 04/04/2018 07:15 by Dr. Bruce Choudhary MD.        Films reviewed personally by me.  My interpretation: none today     Pulmonary Assessment:    1. SEVERE ACUTE RESPIRATORY FAILURE REQUIRING MECHANICAL VENTILATION.  This is a threat to life or pulmonary function, high risk, due to acute on chronic respiratory failure with hypoxemia and hypercarbia.  Improving.  2. Probable COPD exacerbation stable  3. Elevated troponin  stable  4. Anemia   5. BELA stable  6. Elevated AST   7. Elevated d-dimer uncertain significance  8. Hx systolic/diastolic CHF    Recommend:     · Doing great post extubation  · Wean O2 if able  · Ok to the floor  · Primary goal at this point is eating and mobilization  · Decrease neb frequency as tolerated  · Ok to floor.   · Signing off. Please call back if needed  further    Electronically signed by NEYDA Martins on 4/5/2018 at 7:15 AM

## 2018-04-05 NOTE — PROGRESS NOTES
PULMONARY AND CRITICAL CARE PROGRESS NOTE - [facility]    Patient: Erin Cox  1935   MR# [id number]   Acct# [id number]  04/05/18   7:59 AM  Referring Provider: Kris Roberts MD    Chief Complaint: respiratory failure   Interval history: She is off vent, feels better, wants to go home.  Moderate continuous dyspnea in chest is improved for 12 hours.  No other aggravating, alleviating factors or associated symptoms.   Meds:    apixaban 2.5 mg Oral Q12H   aspirin 81 mg Oral Daily   atorvastatin 20 mg Oral Nightly   bumetanide 1 mg Oral Daily   gabapentin 300 mg Oral Nightly   ipratropium-albuterol 3 mL Nebulization 4x Daily - RT        Review of Systems:   Review of Systems   Constitutional: Negative for chills and fever.   Cardiovascular: Negative for chest pain.   Gastrointestinal: Negative for nausea and vomiting.     Physical Exam:  SpO2 Percentage    04/05/18 0710 04/05/18 0721 04/05/18 0735   SpO2: 98% 100% 98%     Temp:  [97.6 °F (36.4 °C)-98.3 °F (36.8 °C)] 98.2 °F (36.8 °C)  Heart Rate:  [57-99] 57  Resp:  [16-21] 16  BP: ()/(32-98) 124/50  Intake/Output Summary (Last 24 hours) at 04/05/18 0759  Last data filed at 04/05/18 0400   Gross per 24 hour   Intake              668 ml   Output             1100 ml   Net             -432 ml     Physical Exam   Constitutional: She appears well-developed. No distress.   Eyes: EOM are normal. No scleral icterus.   Neck: No JVD present. No tracheal deviation present.   Cardiovascular: Normal rate and regular rhythm.    Pulmonary/Chest: No respiratory distress. She has decreased breath sounds. She has no wheezes. She has rales.   Abdominal: Soft. There is no tenderness. There is no guarding.   Musculoskeletal: She exhibits no edema.   Skin: Skin is warm and dry. She is not diaphoretic.   Psychiatric: She has a normal mood and affect. Her behavior is normal.     Laboratory Data:    Results from last 7 days  Lab Units 04/05/18  0215 04/04/18  0234  "04/03/18  0238   WBC 10*3/mm3 4.58* 3.77* 3.27*   HEMOGLOBIN g/dL 10.7* 9.9* 9.8*   PLATELETS 10*3/mm3 100* 87* 82*       Results from last 7 days  Lab Units 04/05/18  0215 04/04/18  0234 04/04/18 0215 04/03/18  0238   SODIUM mmol/L 144 144  --   --  144   SODIUM, ARTERIAL mmol/L  --   --  144  < >  --    POTASSIUM mmol/L 4.8 4.1  --   --  3.6   BUN mg/dL 43* 37*  --   --  33*   CREATININE mg/dL 1.68* 1.46*  --   --  1.48*   < > = values in this interval not displayed.    Results from last 7 days  Lab Units 04/04/18  0215 04/03/18  0300 04/02/18  0152   PH, ARTERIAL pH units 7.471* 7.475* 7.453*   PCO2, ARTERIAL mm Hg 52.3* 53.0* 50.0*   PO2 ART mm Hg 76.4* 68.0* 87.0   FIO2 % 30 30 30     Blood Culture   Date Value Ref Range Status   03/31/2018 Abnormal Stain (A)  Final   03/31/2018 Staphylococcus, coagulase negative (A)  Final     Comment:     Probable contaminant     03/31/2018 No growth at 5 days  Final     Urine Culture   Date Value Ref Range Status   03/30/2018 No growth at 2 days  Final     Respiratory Culture   Date Value Ref Range Status   03/30/2018 Light growth (2+) Normal Respiratory Lenora  Final       Pulmonary Assessment:  1. Acute respiratory failure, improved  2. Pulmonary infiltrates stable  3. Copd stable  4. Metabolic alkalosis persistent    Recommend:   · Ok to floor  · Increase activity  · Diamox x 2 doses might help, but EPIC indicates it is contraindicated for patient information reasons.  Allergies reviewed; not allergic.  Will not give due to \"patient information\"  · Follow up in office.  Will sign off, available    Electronically signed by Nam Barrios MD, 04/05/18, 7:59 AM      "

## 2018-04-05 NOTE — PROGRESS NOTES
[facility] Medicine Services  INPATIENT PROGRESS NOTE    Length of Stay: 6  Date of Admission: 3/30/2018  Primary Care Physician: Jeremi Kan MD    Subjective   Chief Complaint: Acute respiratory failure/acute on chronic heart failure/renal failure    HPI   Patient's eating better.  Patient still remained weak.  Patient denies any shortness breath or chest pain.  Plan to transfer the floor today.    Review of Systems   Constitutional: Positive for activity change, appetite change and fatigue. Negative for chills and fever.   HENT: Negative for hearing loss, nosebleeds, tinnitus and trouble swallowing.    Eyes: Negative for visual disturbance.   Respiratory: Negative for cough, chest tightness, shortness of breath and wheezing.    Cardiovascular: Negative for chest pain, palpitations and leg swelling.   Gastrointestinal: Negative for abdominal distention, abdominal pain, blood in stool, constipation, diarrhea, nausea and vomiting.   Endocrine: Negative for cold intolerance, heat intolerance, polydipsia, polyphagia and polyuria.   Genitourinary: Negative for decreased urine volume, difficulty urinating, dysuria, flank pain, frequency and hematuria.   Musculoskeletal: Positive for arthralgias, gait problem and myalgias. Negative for joint swelling.   Skin: Negative for rash.   Allergic/Immunologic: Negative for immunocompromised state.   Neurological: Positive for weakness. Negative for dizziness, syncope, light-headedness and headaches.   Hematological: Negative for adenopathy. Does not bruise/bleed easily.   Psychiatric/Behavioral: Positive for confusion. Negative for sleep disturbance. The patient is not nervous/anxious.      All pertinent negatives and positives are as above. All other systems have been reviewed and are negative unless otherwise stated.     Objective    Temp:  [97 °F (36.1 °C)-98.3 °F (36.8 °C)] 97 °F (36.1 °C)  Heart Rate:  [57-99] 57  Resp:  [16-18]  16  BP: ()/(32-98) 124/50    Intake/Output Summary (Last 24 hours) at 04/05/18 0934  Last data filed at 04/05/18 0800   Gross per 24 hour   Intake              880 ml   Output              950 ml   Net              -70 ml     Physical Exam  Constitutional: She appears well-developed.   HENT:   Head: Normocephalic and atraumatic.   Eyes: Conjunctivae are normal. Pupils are equal, round, and reactive to light.   Neck: Neck supple. No JVD present. No thyromegaly present.   Cardiovascular: Normal rate, regular rhythm, normal heart sounds and intact distal pulses.  Exam reveals no gallop and no friction rub.    No murmur heard.  Pulmonary/Chest: Effort normal. No respiratory distress. She has wheezes. She has no rales. She exhibits no tenderness.   Diminished breath sound bilateral   Abdominal: Soft. Bowel sounds are normal. She exhibits no distension. There is no tenderness. There is no rebound and no guarding.   Musculoskeletal: She exhibits no edema, tenderness or deformity.   Lymphadenopathy:     She has no cervical adenopathy.   Neurological: She is alert. She displays normal reflexes. No cranial nerve deficit. She exhibits abnormal muscle tone. Coordination abnormal.   Skin: Skin is warm and dry. No rash noted.      Results Review:  Lab Results (last 24 hours)     Procedure Component Value Units Date/Time    Blood Culture - Blood, [[id number]]  (Abnormal) Collected:  03/31/18 0300    Specimen:  Blood from Arm, Left Updated:  04/05/18 0727     Blood Culture Abnormal Stain (A)      Staphylococcus, coagulase negative (A)     Comment: Probable contaminant          Isolated from Aerobic Bottle     Gram Stain Result Gram positive cocci    Narrative:       No growth anaerobic bottle.    CBC & Differential [[id number]] Collected:  04/05/18 0215    Specimen:  Blood Updated:  04/05/18 0356    Narrative:       The following orders were created for panel order CBC & Differential.  Procedure                                Abnormality         Status                     ---------                               -----------         ------                     Manual Differential[[id number]]                                                         CBC Auto Differential[[id number]]        Abnormal            Final result                 Please view results for these tests on the individual orders.    CBC Auto Differential [[id number]]  (Abnormal) Collected:  04/05/18 0215    Specimen:  Blood Updated:  04/05/18 0356     WBC 4.58 (L) 10*3/mm3      RBC 4.30 10*6/mm3      Hemoglobin 10.7 (L) g/dL      Hematocrit 36.3 (L) %      MCV 84.4 fL      MCH 24.9 (L) pg      MCHC 29.5 (L) g/dL      RDW 17.8 (H) %      RDW-SD 54.4 (H) fl      MPV 9.6 fL      Platelets 100 (L) 10*3/mm3     Manual Differential [[id number]]  (Abnormal) Collected:  04/05/18 0215    Specimen:  Blood Updated:  04/05/18 0356     Neutrophil % 62.0 %      Lymphocyte % 26.0 %      Monocyte % 3.0 (L) %      Bands %  1.0 %      Atypical Lymphocyte % 8.0 (H) %      Neutrophils Absolute 2.89 10*3/mm3      Lymphocytes Absolute 1.19 10*3/mm3      Monocytes Absolute 0.14 (L) 10*3/mm3      Anisocytosis Slight/1+     Elliptocytes Slight/1+     Hypochromia Slight/1+     Polychromasia Slight/1+     Smudge Cells Slight/1+     Platelet Estimate Decreased    Blood Culture - Blood, [[id number]]  (Normal) Collected:  03/31/18 0245    Specimen:  Blood from Arm, Right Updated:  04/05/18 0316     Blood Culture No growth at 5 days    Comprehensive Metabolic Panel [[id number]]  (Abnormal) Collected:  04/05/18 0215    Specimen:  Blood Updated:  04/05/18 0306     Glucose 98 mg/dL      BUN 43 (H) mg/dL      Creatinine 1.68 (H) mg/dL      Sodium 144 mmol/L      Potassium 4.8 mmol/L      Chloride 93 (L) mmol/L      CO2 >40.0 (C) mmol/L      Calcium 8.7 mg/dL      Total Protein 6.4 g/dL      Albumin 3.60 g/dL      ALT (SGPT) 22 U/L      AST (SGOT) 25 U/L      Alkaline Phosphatase 66 U/L      Total Bilirubin 0.7  mg/dL      eGFR Non African Amer 29 (L) mL/min/1.73      Globulin 2.8 gm/dL      A/G Ratio 1.3 g/dL      BUN/Creatinine Ratio 25.6 (H)     Anion Gap -- mmol/L      Comment: Unable to calculate Anion Gap.       Narrative:       The MDRD GFR formula is only valid for adults with stable renal function between ages 18 and 70.           Cultures:  Blood Culture   Date Value Ref Range Status   03/31/2018 Abnormal Stain (A)  Final   03/31/2018 Staphylococcus, coagulase negative (A)  Final     Comment:     Probable contaminant     03/31/2018 No growth at 5 days  Final     Urine Culture   Date Value Ref Range Status   03/30/2018 No growth at 2 days  Final     Respiratory Culture   Date Value Ref Range Status   03/30/2018 Light growth (2+) Normal Respiratory Lenora  Final       Radiology Data:    Imaging Results (last 24 hours)     ** No results found for the last 24 hours. **          Allergies   Allergen Reactions   • Fluarix [Flu Virus Vaccine] Swelling   • Influenza A (H1n1) Monoval Vac    • Pneumococcal Vaccines Swelling   • Codeine Rash       Scheduled meds:     apixaban 2.5 mg Oral Q12H   aspirin 81 mg Oral Daily   atorvastatin 20 mg Oral Nightly   bumetanide 1 mg Oral Daily   gabapentin 300 mg Oral Nightly   ipratropium-albuterol 3 mL Nebulization 4x Daily - RT       PRN meds:  HYDROcodone-acetaminophen  •  ipratropium-albuterol  •  Morphine  •  ondansetron  •  potassium chloride **OR** potassium chloride **OR** potassium chloride  •  sodium chloride    Assessment/Plan     Active Problems:    Acute on chronic systolic congestive heart failure    Acute respiratory failure    Pancytopenia      Plan:  Acute respiratory failure with hypoxia and hypercapnia- Extubated 4/3/18.  Pulmonary signed off.  Duo nebs.       Acute on chronic CHF-BNP 3500 improved from previous 26,000. Decrease bumex.  Echocardiogram ejection fraction 47%, diastolic dysfunction, systolic dysfunction.     Encephalopathy from hypoxia of respiratory  failure. Improving.      Hx of atria fib. Currently in sinus rhythm.  DC Lovenox and put patient back on Eliquis.     Acute renal failure- Cr worsen. Decrease bumex.      Elevated troponin/non-STEMI likely type II.  Troponins trend down.     Anemia- hb 9.8. No acute GI bleed.  S/P 2 units of blood transfusion 18.       Pancytopenia/neutropenia- stable.     Thrombocytopenia- Stable.      Possible constipation-KUB     Hypotension-stable     COPD- duo nebs     Hyperlipidemia- on Lipitor     Reflux- on Pepcid     Hyperglycemia.  Hemoglobin A1c 4.2.  DC Sliding scale     History of tobacco abuse     Nutrition-  Puréed diet per nutritionist     Deconditioning-PT and OT consult.     Blood culture shows no growth in 5 days, coagulase negative-probable contamination.  Urine culture no growth in 2 days.  Respiratory culture normal respiratory Lenora.     Discharge Plannin-2 days.  Consult case management for rehabilitation placement, patient preferred  [facility].  Chest with the medical floor.    Kris Roberts MD   18   9:34 AM

## 2018-04-06 ENCOUNTER — HOSPITAL ENCOUNTER (INPATIENT)
Dept: HOSPITAL 58 - MEDSURG B | Age: 83
LOS: 13 days | Discharge: HOME | DRG: 189 | End: 2018-04-19
Attending: INTERNAL MEDICINE | Admitting: INTERNAL MEDICINE

## 2018-04-06 VITALS
WEIGHT: 184.5 LBS | RESPIRATION RATE: 18 BRPM | DIASTOLIC BLOOD PRESSURE: 89 MMHG | OXYGEN SATURATION: 97 % | HEIGHT: 63 IN | BODY MASS INDEX: 32.69 KG/M2 | SYSTOLIC BLOOD PRESSURE: 147 MMHG | HEART RATE: 73 BPM | TEMPERATURE: 97.3 F

## 2018-04-06 VITALS — BODY MASS INDEX: 32.2 KG/M2

## 2018-04-06 DIAGNOSIS — Z79.899: ICD-10-CM

## 2018-04-06 DIAGNOSIS — D46.9: ICD-10-CM

## 2018-04-06 DIAGNOSIS — I50.9: ICD-10-CM

## 2018-04-06 DIAGNOSIS — R53.81: ICD-10-CM

## 2018-04-06 DIAGNOSIS — Z98.890: ICD-10-CM

## 2018-04-06 DIAGNOSIS — R19.5: ICD-10-CM

## 2018-04-06 DIAGNOSIS — Z98.1: ICD-10-CM

## 2018-04-06 DIAGNOSIS — K21.9: ICD-10-CM

## 2018-04-06 DIAGNOSIS — N18.2: ICD-10-CM

## 2018-04-06 DIAGNOSIS — I21.4: ICD-10-CM

## 2018-04-06 DIAGNOSIS — J44.9: ICD-10-CM

## 2018-04-06 DIAGNOSIS — Z99.81: ICD-10-CM

## 2018-04-06 DIAGNOSIS — I48.91: ICD-10-CM

## 2018-04-06 DIAGNOSIS — I25.10: ICD-10-CM

## 2018-04-06 DIAGNOSIS — I25.2: ICD-10-CM

## 2018-04-06 DIAGNOSIS — Z79.01: ICD-10-CM

## 2018-04-06 DIAGNOSIS — E87.5: ICD-10-CM

## 2018-04-06 DIAGNOSIS — M19.90: ICD-10-CM

## 2018-04-06 DIAGNOSIS — Z95.5: ICD-10-CM

## 2018-04-06 DIAGNOSIS — J96.10: Primary | ICD-10-CM

## 2018-04-06 DIAGNOSIS — I95.9: ICD-10-CM

## 2018-04-06 DIAGNOSIS — E78.5: ICD-10-CM

## 2018-04-06 DIAGNOSIS — E86.0: ICD-10-CM

## 2018-04-06 LAB
ALBUMIN SERPL-MCNC: 3.3 G/DL (ref 3.5–5)
ALBUMIN/GLOB SERPL: 1.3 G/DL (ref 1.1–2.5)
ALP SERPL-CCNC: 59 U/L (ref 24–120)
ALT SERPL W P-5'-P-CCNC: 22 U/L (ref 0–54)
ANION GAP SERPL CALCULATED.3IONS-SCNC: ABNORMAL MMOL/L (ref 4–13)
AST SERPL-CCNC: 29 U/L (ref 7–45)
BASOPHILS # BLD AUTO: 0.02 10*3/MM3 (ref 0–0.2)
BASOPHILS NFR BLD AUTO: 0.4 % (ref 0–2)
BILIRUB SERPL-MCNC: 0.5 MG/DL (ref 0.1–1)
BUN BLD-MCNC: 44 MG/DL (ref 5–21)
BUN/CREAT SERPL: 29.7 (ref 7–25)
CALCIUM SPEC-SCNC: 8.6 MG/DL (ref 8.4–10.4)
CHLORIDE SERPL-SCNC: 93 MMOL/L (ref 98–110)
CO2 SERPL-SCNC: >40 MMOL/L (ref 24–31)
CREAT BLD-MCNC: 1.48 MG/DL (ref 0.5–1.4)
DEPRECATED RDW RBC AUTO: 54.4 FL (ref 40–54)
EOSINOPHIL # BLD AUTO: 0.08 10*3/MM3 (ref 0–0.7)
EOSINOPHIL NFR BLD AUTO: 1.5 % (ref 0–4)
ERYTHROCYTE [DISTWIDTH] IN BLOOD BY AUTOMATED COUNT: 17.7 % (ref 12–15)
GFR SERPL CREATININE-BSD FRML MDRD: 34 ML/MIN/1.73
GLOBULIN UR ELPH-MCNC: 2.6 GM/DL
GLUCOSE BLD-MCNC: 103 MG/DL (ref 70–100)
GLUCOSE BLDC GLUCOMTR-MCNC: 133 MG/DL (ref 70–130)
GLUCOSE BLDC GLUCOMTR-MCNC: 156 MG/DL (ref 70–130)
HCT VFR BLD AUTO: 33 % (ref 37–47)
HGB BLD-MCNC: 10.1 G/DL (ref 12–16)
IMM GRANULOCYTES # BLD: 0.05 10*3/MM3 (ref 0–0.03)
IMM GRANULOCYTES NFR BLD: 1 % (ref 0–5)
LYMPHOCYTES # BLD AUTO: 1.91 10*3/MM3 (ref 0.72–4.86)
LYMPHOCYTES NFR BLD AUTO: 36.9 % (ref 15–45)
MCH RBC QN AUTO: 25.8 PG (ref 28–32)
MCHC RBC AUTO-ENTMCNC: 30.6 G/DL (ref 33–36)
MCV RBC AUTO: 84.2 FL (ref 82–98)
MONOCYTES # BLD AUTO: 0.46 10*3/MM3 (ref 0.19–1.3)
MONOCYTES NFR BLD AUTO: 8.9 % (ref 4–12)
NEUTROPHILS # BLD AUTO: 2.65 10*3/MM3 (ref 1.87–8.4)
NEUTROPHILS NFR BLD AUTO: 51.3 % (ref 39–78)
NRBC BLD MANUAL-RTO: 0 /100 WBC (ref 0–0)
PLATELET # BLD AUTO: 103 10*3/MM3 (ref 130–400)
PMV BLD AUTO: 10.4 FL (ref 6–12)
POTASSIUM BLD-SCNC: 4.3 MMOL/L (ref 3.5–5.3)
PROT SERPL-MCNC: 5.9 G/DL (ref 6.3–8.7)
RBC # BLD AUTO: 3.92 10*6/MM3 (ref 4.2–5.4)
SODIUM BLD-SCNC: 140 MMOL/L (ref 135–145)
WBC NRBC COR # BLD: 5.17 10*3/MM3 (ref 4.8–10.8)

## 2018-04-06 PROCEDURE — 85025 COMPLETE CBC W/AUTO DIFF WBC: CPT

## 2018-04-06 PROCEDURE — 97535 SELF CARE MNGMENT TRAINING: CPT

## 2018-04-06 PROCEDURE — 86880 COOMBS TEST DIRECT: CPT

## 2018-04-06 PROCEDURE — 36430 TRANSFUSION BLD/BLD COMPNT: CPT

## 2018-04-06 PROCEDURE — 93010 ELECTROCARDIOGRAM REPORT: CPT

## 2018-04-06 PROCEDURE — 82607 VITAMIN B-12: CPT

## 2018-04-06 PROCEDURE — 82803 BLOOD GASES ANY COMBINATION: CPT

## 2018-04-06 PROCEDURE — 85045 AUTOMATED RETICULOCYTE COUNT: CPT

## 2018-04-06 PROCEDURE — 87081 CULTURE SCREEN ONLY: CPT

## 2018-04-06 PROCEDURE — 83550 IRON BINDING TEST: CPT

## 2018-04-06 PROCEDURE — 82728 ASSAY OF FERRITIN: CPT

## 2018-04-06 PROCEDURE — 82746 ASSAY OF FOLIC ACID SERUM: CPT

## 2018-04-06 PROCEDURE — 94640 AIRWAY INHALATION TREATMENT: CPT

## 2018-04-06 PROCEDURE — 83540 ASSAY OF IRON: CPT

## 2018-04-06 PROCEDURE — 94799 UNLISTED PULMONARY SVC/PX: CPT

## 2018-04-06 PROCEDURE — 87070 CULTURE OTHR SPECIMN AEROBIC: CPT

## 2018-04-06 PROCEDURE — 80053 COMPREHEN METABOLIC PANEL: CPT

## 2018-04-06 PROCEDURE — 80053 COMPREHEN METABOLIC PANEL: CPT | Performed by: FAMILY MEDICINE

## 2018-04-06 PROCEDURE — 86850 RBC ANTIBODY SCREEN: CPT

## 2018-04-06 PROCEDURE — 84466 ASSAY OF TRANSFERRIN: CPT

## 2018-04-06 PROCEDURE — 82272 OCCULT BLD FECES 1-3 TESTS: CPT

## 2018-04-06 PROCEDURE — 86900 BLOOD TYPING SEROLOGIC ABO: CPT

## 2018-04-06 PROCEDURE — 85025 COMPLETE CBC W/AUTO DIFF WBC: CPT | Performed by: FAMILY MEDICINE

## 2018-04-06 PROCEDURE — 97110 THERAPEUTIC EXERCISES: CPT

## 2018-04-06 PROCEDURE — 97116 GAIT TRAINING THERAPY: CPT

## 2018-04-06 PROCEDURE — 82962 GLUCOSE BLOOD TEST: CPT

## 2018-04-06 PROCEDURE — 93005 ELECTROCARDIOGRAM TRACING: CPT

## 2018-04-06 PROCEDURE — 36415 COLL VENOUS BLD VENIPUNCTURE: CPT

## 2018-04-06 PROCEDURE — 86922 COMPATIBILITY TEST ANTIGLOB: CPT

## 2018-04-06 PROCEDURE — 97802 MEDICAL NUTRITION INDIV IN: CPT

## 2018-04-06 PROCEDURE — 92526 ORAL FUNCTION THERAPY: CPT

## 2018-04-06 RX ORDER — BUMETANIDE 0.5 MG/1
0.5 TABLET ORAL DAILY
Start: 2018-04-07

## 2018-04-06 RX ORDER — IPRATROPIUM BROMIDE AND ALBUTEROL SULFATE 2.5; .5 MG/3ML; MG/3ML
3 SOLUTION RESPIRATORY (INHALATION)
Qty: 360 ML
Start: 2018-04-06

## 2018-04-06 RX ORDER — FAMOTIDINE 20 MG/1
20 TABLET, FILM COATED ORAL 2 TIMES DAILY PRN
Start: 2018-04-06

## 2018-04-06 RX ADMIN — GABAPENTIN SCH MG: 300 CAPSULE ORAL at 20:21

## 2018-04-06 RX ADMIN — ATORVASTATIN CALCIUM SCH MG: 20 TABLET, FILM COATED ORAL at 20:21

## 2018-04-06 RX ADMIN — TRAMADOL HYDROCHLORIDE 50 MG: 50 TABLET, COATED ORAL at 08:45

## 2018-04-06 RX ADMIN — IPRATROPIUM BROMIDE AND ALBUTEROL SULFATE SCH VIAL: .5; 3 SOLUTION RESPIRATORY (INHALATION) at 14:58

## 2018-04-06 RX ADMIN — BUMETANIDE 0.5 MG: 0.5 TABLET ORAL at 08:41

## 2018-04-06 RX ADMIN — LISINOPRIL SCH MG: 10 TABLET ORAL at 20:21

## 2018-04-06 RX ADMIN — TRAMADOL HYDROCHLORIDE PRN MG: 50 TABLET, FILM COATED ORAL at 20:20

## 2018-04-06 RX ADMIN — TRAMADOL HYDROCHLORIDE 50 MG: 50 TABLET, COATED ORAL at 01:19

## 2018-04-06 RX ADMIN — IPRATROPIUM BROMIDE AND ALBUTEROL SULFATE SCH VIAL: .5; 3 SOLUTION RESPIRATORY (INHALATION) at 22:19

## 2018-04-06 RX ADMIN — POLYETHYLENE GLYCOL 3350 SCH GM: 17 POWDER, FOR SOLUTION ORAL at 20:19

## 2018-04-06 RX ADMIN — BUDESONIDE AND FORMOTEROL FUMARATE DIHYDRATE SCH PUFF: 160; 4.5 AEROSOL RESPIRATORY (INHALATION) at 20:21

## 2018-04-06 RX ADMIN — CARVEDILOL SCH MG: 6.25 TABLET, FILM COATED ORAL at 18:23

## 2018-04-06 RX ADMIN — IPRATROPIUM BROMIDE AND ALBUTEROL SULFATE 3 ML: 2.5; .5 SOLUTION RESPIRATORY (INHALATION) at 11:17

## 2018-04-06 RX ADMIN — APIXABAN SCH MG: 5 TABLET, FILM COATED ORAL at 20:21

## 2018-04-06 RX ADMIN — ASPIRIN 81 MG 81 MG: 81 TABLET ORAL at 08:41

## 2018-04-06 RX ADMIN — APIXABAN 2.5 MG: 2.5 TABLET, FILM COATED ORAL at 08:41

## 2018-04-06 NOTE — DISCHARGE PLACEMENT REQUEST
"Yina Warner [phone]  Erin Cox (83 y.o. Female)     Date of Birth Social Security Number Address Home Phone MRN    1935  [id number] [place] [id number] [phone] [id number]    [employer] Marital Status          None        Admission Date Admission Type Admitting Provider Attending Provider Department, Room/Bed    3/30/18 Urgent Kris Roberts MD Truong, Khai C, MD [facility] 4C, 492/1    Discharge Date Discharge Disposition Discharge Destination         Skilled Nursing Facility ([place] - External)              Attending Provider:  Kris Roberts MD    Allergies:  Fluarix [Flu Virus Vaccine], Influenza A (H1n1) Monoval Vac, Pneumococcal Vaccines, Codeine    Isolation:  None   Infection:  None   Code Status:  FULL    Ht:  160 cm (63\")   Wt:  83.7 kg (184 lb 8 oz)    Admission Cmt:  None   Principal Problem:  None                Active Insurance as of 3/30/2018     Primary Coverage     Payor Plan Insurance Group Employer/Plan Group    MEDICARE MEDICARE A & B      Payor Plan Address Payor Plan Phone Number Effective From Effective To    [address] [phone] 11/1/1992     [place] [address]       Subscriber Name Subscriber Birth Date Member ID       ERIN COX 1935 [id number]           Secondary Coverage     Payor Plan Insurance Group Employer/Plan Group    [place]      Payor Plan Address Payor Plan Phone Number Effective From Effective To    [address] [phone] 3/1/2018     [place] [address]       Subscriber Name Subscriber Birth Date Member ID       ERIN COX 1935 [id number]                 Emergency Contacts      (Rel.) Home Phone Work Phone Mobile Phone    Sincere Cox (Son) [phone]               Discharge Summary      Kris Roberts MD at 4/6/2018 10:51 AM              [facility] Medicine Services  DISCHARGE SUMMARY       Date of " Admission: 3/30/2018  Date of Discharge:  4/6/2018  Primary Care Physician: Jeremi Kan MD    Presenting Problem/History of Present Illness:  Acute respiratory failure [J96.00]     Final Discharge Diagnoses:  Hospital Problem List     Acute on chronic systolic congestive heart failure    Acute respiratory failure    Pancytopenia          Consults: Pulmonary    Procedures Performed: Intubation    Pertinent Test Results:  IMPRESSION: Chest x-ray  1. Cardiomegaly no evidence pulmonary vascular congestion. The chest is  stable compared to April 3, 2018.    IMPRESSION: KUB  1. The bowel gas pattern is within normal limits.  2. Splenomegaly.    Summary: CTA angiogram  1. Chronic lung changes.  2. No pulmonary embolism.  3. Thyromegaly.  4. Low-lying endotracheal tube.     Interpretation Summary echocardiogram    · Left ventricular systolic function is mildly decreased. Estimated EF = 47%.  · The left ventricular cavity is mildly dilated.  · Left ventricular diastolic dysfunction.  · No evidence of pulmonary hypertension is present.  · Similar to 2/18/17  · The study is technically difficult for diagnosis          Chief Complaint on Day of Discharge: weakness    History of Present Illness on Day of Discharge:   82-year-old female was transferred here from [facility] for acute respiratory failure status post intubation.  Apparently patient is a COPD frequent flyer at the hospital.  Patient was admitted to our facility few months ago for COPD exacerbation.  Patient went to [facility] today for severe shortness of breath.  Patient was intubated immediately.  Patient was transferred to ICU in our facility for further evaluation.     Hospital Course:  The patient is a 83 y.o. female who presented to [facility] with respiratory failure with hypoxia and hypercapnia, CHF exacerbation and pancytopenia.  Patient was intubated.  Echo, cardiac M shows diastolic and systolic dysfunction.   "Patient was initially started an IV Lasix with strict in and out and daily weight.  Pulmonary was consulted for vent management.  Patient also had acute kidney injury, creatinine initially was 1.76.  Today creatinine is 1.4.  After diuresing.  Patient's for couple days, patient then was weaned off the ventilator.  Patient was extubated on 4/3/18.    Acute on chronic CHF-initial BNP was 26,000, repeated BMP is 3.5K.  Patient is a Bumex.    Encephalopathy from hypoxia has resolved.     Hx of atria fib. Currently in sinus rhythm.   Patient is put back on medication of Eliquis.     Elevated troponin/non-STEMI likely type II.  From respiratory failure.     Anemia- hb is stable for the last 4 days. No acute GI bleed.  S/P 2 units of blood transfusion on 4/2/18.       Pancytopenia/neutropenia/thrombocytopenia- stable.     Hyperlipidemia, patient's continue.     Deconditioning, cont with PT and OT.      Vital signs stable, afebrile.  Patient would need chronic management of medication and physical therapy.  Patient be transferred to [facility] swing bed for further management.    Condition on Discharge: stable    Physical Exam on Discharge:  /67 (BP Location: Right arm, Patient Position: Sitting)   Pulse 77   Temp 98 °F (36.7 °C) (Temporal Artery )   Resp 18   Ht 160 cm (63\")   Wt 83.7 kg (184 lb 8 oz)   SpO2 97%   BMI 32.68 kg/m²    Physical Exam  Constitutional: She appears well-developed.   HENT:   Head: Normocephalic and atraumatic.   Eyes: Conjunctivae are normal. Pupils are equal, round, and reactive to light.   Neck: Neck supple. No JVD present. No thyromegaly present.   Cardiovascular: Normal rate, regular rhythm, normal heart sounds and intact distal pulses.  Exam reveals no gallop and no friction rub.    No murmur heard.  Pulmonary/Chest: Effort normal. No respiratory distress. She has wheezes. She has no rales. She exhibits no tenderness.   Diminished breath sound bilateral   Abdominal: Soft. Bowel " sounds are normal. She exhibits no distension. There is no tenderness. There is no rebound and no guarding.   Musculoskeletal: She exhibits no edema, tenderness or deformity.   Lymphadenopathy:     She has no cervical adenopathy.   Neurological: She is alert. She displays normal reflexes. No cranial nerve deficit. She exhibits abnormal muscle tone. Coordination abnormal.   Skin: Skin is warm and dry. No rash noted.     Discharge Disposition:  Skilled Nursing Facility ([place] - External)    Discharge Medications:   Erin Cox   Home Medication Instructions RIC:[id number]    Printed on:04/06/18 2320   Medication Information                      apixaban (ELIQUIS) 2.5 MG tablet tablet  Take 1 tablet by mouth Every 12 (Twelve) Hours.             aspirin 81 MG EC tablet  Take 81 mg by mouth Every Morning Before Breakfast. Taken with breakfast             atorvastatin (LIPITOR) 20 MG tablet  Take 20 mg by mouth Every Night.             budesonide-formoterol (SYMBICORT) 160-4.5 MCG/ACT inhaler  Inhale 2 puffs 2 (Two) Times a Day.             bumetanide (BUMEX) 0.5 MG tablet  Take 1 tablet by mouth Daily.             carvedilol (COREG) 6.25 MG tablet  Take 6.25 mg by mouth 2 (Two) Times a Day With Meals.             famotidine (PEPCID) 20 MG tablet  Take 1 tablet by mouth 2 (Two) Times a Day As Needed for Heartburn.             gabapentin (NEURONTIN) 300 MG capsule  Take 1 capsule by mouth Every Night.             ipratropium-albuterol (DUO-NEB) 0.5-2.5 mg/mL nebulizer  Take 3 mL by nebulization 4 (Four) Times a Day.             lisinopril (PRINIVIL,ZESTRIL) 10 MG tablet  Take 10 mg by mouth 2 (Two) Times a Day.             polyethylene glycol (MIRALAX) pack packet  Take 17 g by mouth 2 (Two) Times a Day.             tiotropium (SPIRIVA) 18 MCG per inhalation capsule  Place 1 capsule into inhaler and inhale Daily.             traMADol (ULTRAM) 50 MG tablet  Take 50 mg by mouth Every 8 (Eight) Hours As Needed for Moderate  Pain .                 Discharge Diet:   Diet Instructions     Advance Diet As Tolerated             Activity at Discharge:   Activity Instructions     Activity as Tolerated             Discharge Care Plan/Instructions: Continue PT and OT.  Patient to continue oxygen.    Follow-up Appointments:   Follow-up with swing bed physician as soon as possible.    Kris Roberts MD  04/06/18  10:51 AM    Time: Greater than 30 minutes    Electronically signed by Kris Roberts MD at 4/6/2018 11:07 AM

## 2018-04-06 NOTE — THERAPY TREATMENT NOTE
Acute Care - Speech Language Pathology   Swallow Treatment Note [facility]     Patient Name: Erin Cox  : 1935  MRN: [id number]  Today's Date: 2018  Onset of Illness/Injury or Date of Surgery: 18     Referring Physician: Dr. Roberts      Admit Date: 3/30/2018  Swallowing therapy completed. Pt sitting upright on side of bed eating breakfast, diet of puree and thin. Pt without dentures. Pt  consumed meal without any overt s/s of aspiration. Pt reported pain and fatigue. Pt consumed applesause without any overt s/s of aspiration. RN gave medicine to pt during the therapy session. Pt took medicine whole with thin liquid and did not exhibit any difficulties. Pt completed lingual exercises (lateralization, protrusion, and range of motion) 10/10 for 4 sets with 90% accuracy, given modeling and cues from the SSLP. Pt performed the Tonya 5/5 for 2 sets at 60% accuracy with maximum modeling and verbal cues. Pt exhibited difficulty holding tongue in correct position between lips during exercise. Pt completed effortful swallow 3/3 for 2 sets at 80% accuracy with maximum modeling and verbal cues. Recommending continuing current diet at this time. SLP will continue to follow and treat.   Antelmo Hidalgo, Speech Therapy Student 2018 9:11 AM  Visit Dx:      ICD-10-CM ICD-9-CM   1. Impaired functional mobility and endurance Z74.09 V49.89   2. Oropharyngeal dysphagia R13.12 787.22   3. Impaired cognition R41.89 294.9   4. Decreased activities of daily living (ADL) Z78.9 V49.89     Patient Active Problem List   Diagnosis   • Mitral regurgitation   • Aortic stenosis   • Acute on chronic systolic congestive heart failure   • Acute respiratory failure with hypoxia   • Respiratory distress   • Right lower lobe pneumonia   • Respiratory failure   • Acute respiratory failure   • Pancytopenia       Therapy Treatment    Therapy Treatment / Health Promotion    Treatment Time/Intention  Discipline: (P) speech language  pathologist (04/06/18 0831 : Antelmo Hidalgo Speech Therapy Student)  Document Type: (P) therapy note (daily note) (04/06/18 0831 : Antelmo Hidalgo Speech Therapy Student)  Care Plan Review: (P) care plan/treatment goals reviewed (04/06/18 0831 : Antelmo Hidalgo Speech Therapy Student)  Patient Effort: (P) good (04/06/18 0831 : Antelmo Hidalgo Speech Therapy Student)  Plan of Care Review  Plan of Care Reviewed With: (P) patient (04/06/18 0901 : Antelmo Hidalgo Speech Therapy Student)    Vitals/Pain/Safety  Pain Assessment  Additional Documentation: (P) Pain Scale: FACES Pre/Post-Treatment (Group) (04/06/18 0831 : Antelmo Hidalgo Speech Therapy Student)  Pain Scale: FACES Pre/Post-Treatment  Pain: FACES Scale, Pretreatment: (P) 4-->hurts little more (04/06/18 0831 : Antelmo Hidalgo Speech Therapy Student)  Pain: FACES Scale, Post-Treatment: (P) 4-->hurts little more (04/06/18 0831 : Antelmo Hidalgo Speech Therapy Student)    Cognition, Communication, Swallow  Recommendations  Anticipated Dischage Disposition: (P) unknown (04/06/18 0831 : Antelmo Hidalgo Speech Therapy Student)    Outcome Summary  Outcome Summary/Treatment Plan (SLP)  Daily Summary of Progress (SLP): (P) progress toward functional goals is good (04/06/18 0831 : Antelmo Hidalgo Speech Therapy Student)  Barriers to Overall Progress (SLP): (P) n/a (04/06/18 0831 : Antelmo Hidalgo Speech Therapy Student)  Plan for Continued Treatment (SLP): (P) continue to follow (04/06/18 0831 : Antelmo Hidalgo Speech Therapy Student)  Anticipated Dischage Disposition: (P) unknown (04/06/18 0831 : Antelmo Hidalgo Speech Therapy Student)            SLP GOALS     Row Name 04/06/18 0831 04/04/18 1250          Oral Nutrition/Hydration Goal 1 (SLP)    Oral Nutrition/Hydration Goal 1, SLP (P)  LTG: Pt will be able to consume LRD without any overt s/s of aspiration.   -DS LTG: Pt will be able to consume LRD without any overt s/s of aspiration.   -CS (r) DS (t) CS  (c)     Time Frame (Oral Nutrition/Hydration Goal 1, SLP) (P)  by discharge  -DS by discharge  -CS (r) DS (t) CS (c)     Barriers (Oral Nutrition/Hydration Goal 1, SLP) (P)  N/A  -DS N/A  -CS (r) DS (t) CS (c)     Progress/Outcomes (Oral Nutrition/Hydration Goal 1, SLP) (P)  continuing progress toward goal  -DS goal ongoing  -CS (r) DS (t) CS (c)        Lingual Strengthening Goal 1 (SLP)    Activity (Lingual Strengthening Goal 1, SLP) (P)  increase lingual tone/sensation/control/coordination/movement  -DS increase lingual tone/sensation/control/coordination/movement  -CS (r) DS (t) CS (c)     Increase Lingual Tone/Sensation/Control/Coordination/Movement (P)  lingual movement exercises  -DS lingual movement exercises  -CS (r) DS (t) CS (c)     [place]/Accuracy (Lingual Strengthening Goal 1, SLP) (P)  independently (over 90% accuracy)  -DS independently (over 90% accuracy)  -CS (r) DS (t) CS (c)     Time Frame (Lingual Strengthening Goal 1, SLP) (P)  short term goal (STG);by discharge  -DS short term goal (STG);by discharge  -CS (r) DS (t) CS (c)     Barriers (Lingual Strengthening Goal 1, SLP) (P)  N/A  -DS N/A  -CS (r) DS (t) CS (c)     Progress/Outcomes (Lingual Strengthening Goal 1, SLP) (P)  continuing progress toward goal  -DS goal ongoing  -CS (r) DS (t) CS (c)        Pharyngeal Strengthening Exercise Goal 1 (SLP)    Activity (Pharyngeal Strengthening Goal 1, SLP) (P)  increase squeeze/positive pressure generation  -DS increase squeeze/positive pressure generation  -CS (r) DS (t) CS (c)     Increase Squeeze/Positive Pressure Generation (P)  hard effortful swallow;mesfin  -DS hard effortful swallow;mesfin  -CS (r) DS (t) CS (c)     [place]/Accuracy (Pharyngeal Strengthening Goal 1, SLP) (P)  independently (over 90% accuracy)  -DS independently (over 90% accuracy)  -CS (r) DS (t) CS (c)     Time Frame (Pharyngeal Strengthening Goal 1, SLP) (P)  short term goal (STG);by discharge  -DS short term goal  (STG);by discharge  -CS (r) DS (t) CS (c)     Barriers (Pharyngeal Strengthening Goal 1, SLP) (P)  N/A  -DS N/A  -CS (r) DS (t) CS (c)     Progress/Outcomes (Pharyngeal Strengthening Goal 1, SLP) (P)  continuing progress toward goal  -DS goal ongoing  -CS (r) DS (t) CS (c)       User Key  (r) = Recorded By, (t) = Taken By, (c) = Cosigned By    Initials Name Provider Type    CS Camden Powell MS CCC-SLP Speech and Language Pathologist    CLIFTON Hidalgo, Speech Therapy Student Speech Therapy Student          EDUCATION  The patient has been educated in the following areas:   Dysphagia (Swallowing Impairment).    SLP Recommendation and Plan                       Anticipated Dischage Disposition: (P) unknown                Plan of Care Reviewed With: (P) patient  Plan of Care Review  Plan of Care Reviewed With: (P) patient  Daily Summary of Progress (SLP): (P) progress toward functional goals is good  Plan for Continued Treatment (SLP): (P) continue to follow  Progress: (P) improving  Outcome Summary: (P) Swallowing therapy completed. Pt sitting upright on side of bed eating breakfast, diet of puree and thin. Pt without dentures. Pt  consumed meal without any overt s/s of aspiration. Pt reported pain and fatigue. Pt consumed applesause without any overt s/s of aspiration. RN gave medicine to pt during the therapy session. Pt took medicine whole with thin liquid and did not exhibit any difficulties. Pt completed lingual exercises (lateralization, protrusion, and range of motion) 10/10 for 4 sets with 90% accuracy, given modeling and cues from the SSLP. Pt performed the Tonya 5/5 for 2 sets at 60% accuracy with maximum modeling and verbal cues. Pt exhibited difficulty holding tongue in correct position between lips during exercise. Pt completed effortful swallow 3/3 for 2 sets at 80% accuracy with maximum modeling and verbal cues. Recommending continuing current diet at this time. SLP will continue to follow and treat.         SLP Outcome Measures (last 72 hours)      SLP Outcome Measures     Row Name 04/05/18 0952 04/04/18 1250          SLP Outcome Measures    Outcome Measure Used? Adult NOMS  -CS (r) DS (t) CS (c) Adult NOMS  -CS (r) DS (t) CS (c)        FCM Scores    FCM Chosen Problem Solving  -CS (r) DS (t) CS (c) Swallowing  -CS (r) DS (t) CS (c)     Swallowing FCM Score  -- 4  -CS (r) DS (t) CS (c)     Problem Solving FCM Score 7  -CS (r) DS (t) CS (c)  --       User Key  (r) = Recorded By, (t) = Taken By, (c) = Cosigned By    Initials Name Effective Dates    CS Camden Powell MS CCC-SLP 04/03/18 -     DS Antelmo Hidalgo, Speech Therapy Student 03/02/18 -              Time Calculation:         Time Calculation- SLP     Row Name 04/06/18 0909             Time Calculation- SLP    SLP Start Time (P)  0831  -DS      SLP Stop Time (P)  0857  -DS      SLP Time Calculation (min) (P)  26 min  -DS      SLP Received On (P)  04/06/18  -DS        User Key  (r) = Recorded By, (t) = Taken By, (c) = Cosigned By    Initials Name Provider Type    DS Antelmo Hidalgo, Speech Therapy Student Speech Therapy Student          Therapy Charges for Today     Code Description Service Date Service Provider Modifiers Qty    81584701027 HC ST EVAL SPEECH AND PROD W LANG  3 4/5/2018 Antelmo Hidalgo, Speech Therapy Student GN 1    [id number] HC ST TREATMENT SWALLOW 2 4/6/2018 Antelmo Hidalgo, Speech Therapy Student GN, KX 1          SLP G-Codes  SLP NOMS Used?: Yes  Functional Limitations: Other Speech Language Pathology (Problem Solving)  Swallow Current Status (): At least 40 percent but less than 60 percent impaired, limited or restricted  Swallow Goal Status (): At least 40 percent but less than 60 percent impaired, limited or restricted  Other Speech-Language Pathology Functional Limitation Current Status (): 0 percent impaired, limited or restricted  Other Speech-Language Pathology Functional Limitation Goal Status (): 0 percent  impaired, limited or restricted  Other Speech-Language Pathology Functional Limitation Discharge Status (): 0 percent impaired, limited or restricted      Antelmo Hidalgo, Speech Therapy Student  4/6/2018

## 2018-04-06 NOTE — PLAN OF CARE
Problem: Patient Care Overview  Goal: Plan of Care Review  Outcome: Ongoing (interventions implemented as appropriate)   04/06/18 0901   Coping/Psychosocial   Plan of Care Reviewed With patient   Plan of Care Review   Progress improving   OTHER   Outcome Summary Swallowing therapy completed. Pt sitting upright on side of bed eating breakfast, diet of puree and thin. Pt without dentures. Pt consumed meal without any overt s/s of aspiration. Pt reported pain and fatigue. Pt consumed applesause without any overt s/s of aspiration. RN gave medicine to pt during the therapy session. Pt took medicine whole with thin liquid and did not exhibit any difficulties. Pt completed lingual exercises (lateralization, protrusion, and range of motion) 10/10 for 4 sets with 90% accuracy, given modeling and cues from the SSLP. Pt performed the Tonya 5/5 for 2 sets at 60% accuracy with maximum modeling and verbal cues. Pt exhibited difficulty holding tongue in correct position between lips during exercise. Pt completed effortful swallow 3/3 for 2 sets at 80% accuracy with maximum modeling and verbal cues. Recommending continuing current diet at this time. SLP will continue to follow and treat.

## 2018-04-06 NOTE — DISCHARGE SUMMARY
[facility] Medicine Services  DISCHARGE SUMMARY       Date of Admission: 3/30/2018  Date of Discharge:  4/6/2018  Primary Care Physician: Jeremi Kan MD    Presenting Problem/History of Present Illness:  Acute respiratory failure [J96.00]     Final Discharge Diagnoses:  Hospital Problem List     Acute on chronic systolic congestive heart failure    Acute respiratory failure    Pancytopenia          Consults: Pulmonary    Procedures Performed: Intubation    Pertinent Test Results:  IMPRESSION: Chest x-ray  1. Cardiomegaly no evidence pulmonary vascular congestion. The chest is  stable compared to April 3, 2018.    IMPRESSION: KUB  1. The bowel gas pattern is within normal limits.  2. Splenomegaly.    Summary: CTA angiogram  1. Chronic lung changes.  2. No pulmonary embolism.  3. Thyromegaly.  4. Low-lying endotracheal tube.     Interpretation Summary echocardiogram    · Left ventricular systolic function is mildly decreased. Estimated EF = 47%.  · The left ventricular cavity is mildly dilated.  · Left ventricular diastolic dysfunction.  · No evidence of pulmonary hypertension is present.  · Similar to 2/18/17  · The study is technically difficult for diagnosis          Chief Complaint on Day of Discharge: weakness    History of Present Illness on Day of Discharge:   82-year-old female was transferred here from [facility] for acute respiratory failure status post intubation.  Apparently patient is a COPD frequent flyer at the hospital.  Patient was admitted to our facility few months ago for COPD exacerbation.  Patient went to [facility] today for severe shortness of breath.  Patient was intubated immediately.  Patient was transferred to ICU in our facility for further evaluation.     Hospital Course:  The patient is a 83 y.o. female who presented to [facility] with respiratory failure with hypoxia and hypercapnia, CHF exacerbation and  "pancytopenia.  Patient was intubated.  Echo, cardiac M shows diastolic and systolic dysfunction.  Patient was initially started an IV Lasix with strict in and out and daily weight.  Pulmonary was consulted for vent management.  Patient also had acute kidney injury, creatinine initially was 1.76.  Today creatinine is 1.4.  After diuresing.  Patient's for couple days, patient then was weaned off the ventilator.  Patient was extubated on 4/3/18.    Acute on chronic CHF-initial BNP was 26,000, repeated BMP is 3.5K.  Patient is a Bumex.    Encephalopathy from hypoxia has resolved.     Hx of atria fib. Currently in sinus rhythm.   Patient is put back on medication of Eliquis.     Elevated troponin/non-STEMI likely type II.  From respiratory failure.     Anemia- hb is stable for the last 4 days. No acute GI bleed.  S/P 2 units of blood transfusion on 4/2/18.       Pancytopenia/neutropenia/thrombocytopenia- stable.     Hyperlipidemia, patient's continue.     Deconditioning, cont with PT and OT.      Vital signs stable, afebrile.  Patient would need chronic management of medication and physical therapy.  Patient be transferred to [facility] bed for further management.    Condition on Discharge: stable    Physical Exam on Discharge:  /67 (BP Location: Right arm, Patient Position: Sitting)   Pulse 77   Temp 98 °F (36.7 °C) (Temporal Artery )   Resp 18   Ht 160 cm (63\")   Wt 83.7 kg (184 lb 8 oz)   SpO2 97%   BMI 32.68 kg/m²   Physical Exam  Constitutional: She appears well-developed.   HENT:   Head: Normocephalic and atraumatic.   Eyes: Conjunctivae are normal. Pupils are equal, round, and reactive to light.   Neck: Neck supple. No JVD present. No thyromegaly present.   Cardiovascular: Normal rate, regular rhythm, normal heart sounds and intact distal pulses.  Exam reveals no gallop and no friction rub.    No murmur heard.  Pulmonary/Chest: Effort normal. No respiratory distress. She has wheezes. She has no " rales. She exhibits no tenderness.   Diminished breath sound bilateral   Abdominal: Soft. Bowel sounds are normal. She exhibits no distension. There is no tenderness. There is no rebound and no guarding.   Musculoskeletal: She exhibits no edema, tenderness or deformity.   Lymphadenopathy:     She has no cervical adenopathy.   Neurological: She is alert. She displays normal reflexes. No cranial nerve deficit. She exhibits abnormal muscle tone. Coordination abnormal.   Skin: Skin is warm and dry. No rash noted.     Discharge Disposition:  Skilled Nursing Facility ([place] - External)    Discharge Medications:   Erin Cox LUZ ELENA   Home Medication Instructions RIC:[id number]    Printed on:04/06/18 3883   Medication Information                      apixaban (ELIQUIS) 2.5 MG tablet tablet  Take 1 tablet by mouth Every 12 (Twelve) Hours.             aspirin 81 MG EC tablet  Take 81 mg by mouth Every Morning Before Breakfast. Taken with breakfast             atorvastatin (LIPITOR) 20 MG tablet  Take 20 mg by mouth Every Night.             budesonide-formoterol (SYMBICORT) 160-4.5 MCG/ACT inhaler  Inhale 2 puffs 2 (Two) Times a Day.             bumetanide (BUMEX) 0.5 MG tablet  Take 1 tablet by mouth Daily.             carvedilol (COREG) 6.25 MG tablet  Take 6.25 mg by mouth 2 (Two) Times a Day With Meals.             famotidine (PEPCID) 20 MG tablet  Take 1 tablet by mouth 2 (Two) Times a Day As Needed for Heartburn.             gabapentin (NEURONTIN) 300 MG capsule  Take 1 capsule by mouth Every Night.             ipratropium-albuterol (DUO-NEB) 0.5-2.5 mg/mL nebulizer  Take 3 mL by nebulization 4 (Four) Times a Day.             lisinopril (PRINIVIL,ZESTRIL) 10 MG tablet  Take 10 mg by mouth 2 (Two) Times a Day.             polyethylene glycol (MIRALAX) pack packet  Take 17 g by mouth 2 (Two) Times a Day.             tiotropium (SPIRIVA) 18 MCG per inhalation capsule  Place 1 capsule into inhaler and inhale Daily.              traMADol (ULTRAM) 50 MG tablet  Take 50 mg by mouth Every 8 (Eight) Hours As Needed for Moderate Pain .                 Discharge Diet:   Diet Instructions     Advance Diet As Tolerated             Activity at Discharge:   Activity Instructions     Activity as Tolerated             Discharge Care Plan/Instructions: Continue PT and OT.  Patient to continue oxygen.    Follow-up Appointments:   Follow-up with swing bed physician as soon as possible.    Kris Roberts MD  04/06/18  10:51 AM    Time: Greater than 30 minutes

## 2018-04-06 NOTE — DI
EXAM:  Chest two view, frontal and lateral views. 

  

HISTORY:  Respiratory failure. 

  

COMPARISON:  03/30/2018. 

  

FINDINGS:  Heart is enlarged but atherosclerotic calcifications present but there is no vascular micah
estion.  There is minimal blunting of the right costophrenic angle.  Curvilinear opacities seen in meena
th lung bases.  Otherwise, the lungs are clear.  No pneumothorax identified.  Degenerative changes of
 the spine shoulder noted. 

  

-------------------------- 

IMPRESSION: 

Small right pleural effusion and mild bibasilar atelectasis.

## 2018-04-06 NOTE — RS.OTINEVL
Subjective





- Patient information


Date of Evaluation: 04/06/18


Date of Arrival on Unit: 04/06/18


Admitted From:: Facility Transfer


Usual Living Arrangement: Alone


Living Arrangement Comments: Lives at home.


Medical History Comments:: MI, nauseated from motion sickness, CHF, COPD, Lunch 

changes, low lying endotracheal tube, Old MI, Colon surgery, 02 on 2.5 Liters 

at home.


Surgical History Comments:: stent placement,


Subjective Information/ Patient Comments:: "I think I broke my tailbone in 

February." Pt does not remember what happened to her.





- Level of function


Prior to this admission, the patient could do the following:: Independent 

Selfcare, Partially Dependent Ambulation


Abilities prior to this admission: 





Pt reports she had to have someone walk with her. She used a cane at home and a 

walker at [facility]. Pt reports she has a shower chair at home and was 

able to do that for herself. Pt reports she has family help her with meals and 

shopping. She has lived in government housing for 23 years. Pt reports her 

daughter makes meals and freezes them for her. Pt has meal on wheels.


Current Level of Function: Partially Dependent


Current Equipment Used at Home: Cane, shower chair





Pain Assessment





- Pain


Pain Score: 0


Pain Location Body Site:  (Tailbone pain)


Pain Aggravating Factors: Sitting





Interventions





- Objective


Patient Orientation: Person, Place, Time, Situation


Current Interventions: IV's, Oxygen


Observation: Pt is short of air with activity.





Interventions





- ROM


Right Upper Extremity AROM: WFL's


Left Upper Extremity AROM: WFL's





- Strength


Right Upper Extremity Strength: Mild Weakness


Left Upper Extremity Strength: Mild Weakness





- Sensation


Right Upper Extremity Sensation: Intact/Normal


Left Upper Extremity Sensation: Intact/Normal





Balance





- Sitting Balance


Static Sitting Balance: Fair


Dynamic Sitting Balance: Fair





- Standing Balance


Static Standing Balance: Poor


Dynamic Standing Balance: Poor





ADL Skills





- Self Feeding


Self Feeding: Independent





- Grooming


Grooming: Independent





- Bathing


Bathing UE: Min Assist


Bathing LE: Min Assist





- Dressing


Dressing UE: CGA


Dressing LE: CGA





- Toilet Management


Toileting Management: Min Assist





Functional Mobility





- Bed Mobility


Rolling R/L: Min Assist


Scooting: Min Assist


Supine to Sit: Min Assist


Sit to Supine: Min Assist





- Transfers


Sit to Stand: Min Assist


Stand to Sit: Min Assist


Stand Pivot Transfers: Min Assist





- Ambulation


Weight Bearing Status: FWB


Assistive Device Used: Rolling Walker


Assistance needed with Ambulation: Min Assist





- Safety Awareness


Safety Awareness: Good


BARTHEL INDEX SCORE: 9





Additional Treatment Performed





- Time with patient


Total treatment time: 24





Activities


Patient Interests:: Reading Books/Magazines, Watching Television, Visiting/

Socializing





Patient Education


Patient Education: Education of diagnosis, Education of Plan of Care


Teaching Recipient: Patient


Teaching Methods: Discussion





Assessment


Problem List:: Decreased level of function, Requires training/education, 

Decreased safety/Risk of falls, Weakness


Rehab Potential: Good


Further Therapy Indicated?: Yes


Evaluation Complexity: HISTORY: Medium, EXAM OF BODY SYSTEMS: Medium, CLINICAL 

DECISION MAKING: Medium





Short Term Goals





- Goals


GOAL 1: Pt to tolerate 10 minutes of standing activity with rests PRN.


Goal to be met by: 04/11/18


GOAL 2: Pt to increase self care management to CGA.


Goal to be met by: 04/11/18


GOAL 3: Pt to increase BUE strength to 4/5.


Goal to be met by: 04/11/18





Long Term Goals


GOAL 1: Pt to tolerate 15 minutes of standing activity with rests PRN.


Goal to be met by: 04/20/18


GOAL 2: Pt to increase self care management to Mod-I.


Goal to be met by: 04/20/18


GOAL 3: Pt to increase BUE strength to 4+/5.


Goal to be met by: 04/20/18





Plan


Plan of Care: Therapeutic EX, Neuromuscular Re-Educ, Therapeutic Activity, Self-

Care/Home Management


Frequency of Treatment: 1-2 X day, as tolerated


Duration of Treatment: 2 Weeks


Anticipated Discharge Destination: Home


Has the Physician been added for Co-signature?: Yes

## 2018-04-06 NOTE — PROGRESS NOTES
Continued Stay Note  [facility]     Patient Name: Erin Cox  MRN: [id number]  Today's Date: 4/6/2018    Admit Date: 3/30/2018          Discharge Plan     Row Name 04/06/18 1105       Plan    Plan [facility]    Patient/Family in Agreement with Plan yes    Final Discharge Disposition Code 61 - hospital-based swing bed    Final Note Pt is being discharged to [facility] today, informed Marilu Chavez there of d/c status [phone].  Pt will be admitted at SNF level care.  Family in room going to get pt's portable O2 tank at home and transporting per private car.      Row Name 04/06/18 0955       Plan    Plan [facility]    Patient/Family in Agreement with Plan yes    Plan Comments [facility] [phone].  Will be sending update therapy notes per their request, Marilu Chavez. Will follow.               Discharge Codes    No documentation.       Expected Discharge Date and Time     Expected Discharge Date Expected Discharge Time    Apr 6, 2018             ORLANDO Zamora

## 2018-04-06 NOTE — PROGRESS NOTES
Continued Stay Note   [place]     Patient Name: Erin Cox  MRN: [id number]  Today's Date: 4/6/2018    Admit Date: 3/30/2018          Discharge Plan     Row Name 04/06/18 0955       Plan    Plan [facility]    Patient/Family in Agreement with Plan yes    Plan Comments Pt has bed offer from [facility] and will go there at d/c [phone].  Will be sending updated therapy notes per their request, Marilu Chavez. Will follow.               Discharge Codes    No documentation.           ORLANDO Zamora

## 2018-04-06 NOTE — PLAN OF CARE
Problem: Patient Care Overview  Goal: Plan of Care Review  Outcome: Ongoing (interventions implemented as appropriate)   18 0299   Coping/Psychosocial   Plan of Care Reviewed With patient   Plan of Care Review   Progress no change   OTHER   Outcome Summary VSS, pt rested well through evening. c/o pain relieved with prn med. SA on tele, will continue to monitor     Goal: Individualization and Mutuality  Outcome: Ongoing (interventions implemented as appropriate)      Problem: Fall Risk (Adult)  Goal: Absence of Fall  Outcome: Ongoing (interventions implemented as appropriate)      Problem: Skin Injury Risk (Adult)  Goal: Skin Health and Integrity  Outcome: Ongoing (interventions implemented as appropriate)      Problem: Respiratory Distress Syndrome (,NICU)  Goal: Signs and Symptoms of Listed Potential Problems Will be Absent, Minimized or Managed (Respiratory Distress Syndrome)  Outcome: Ongoing (interventions implemented as appropriate)

## 2018-04-06 NOTE — DISCHARGE PLACEMENT REQUEST
"Yina Warner [phone]  Erin Cox (83 y.o. Female)     Date of Birth Social Security Number Address Home Phone MRN    1935  [id number] [place] [id number] [phone] [id number]    Zoroastrian Marital Status          None        Admission Date Admission Type Admitting Provider Attending Provider Department, Room/Bed    3/30/18 Urgent Kris Roberts MD Truong, Khai C, MD [facility] 4C, 492/1    Discharge Date Discharge Disposition Discharge Destination                       Attending Provider:  Kris Roberts MD    Allergies:  Fluarix [Flu Virus Vaccine], Influenza A (H1n1) Monoval Vac, Pneumococcal Vaccines, Codeine    Isolation:  None   Infection:  None   Code Status:  FULL    Ht:  160 cm (63\")   Wt:  83.7 kg (184 lb 8 oz)    Admission Cmt:  None   Principal Problem:  None                Active Insurance as of 3/30/2018     Primary Coverage     Payor Plan Insurance Group Employer/Plan Group    MEDICARE MEDICARE A & B      Payor Plan Address Payor Plan Phone Number Effective From Effective To    [address] [phone] 11/1/1992     [place] [address]       Subscriber Name Subscriber Birth Date Member ID       ERIN COX 1935 [id number]           Secondary Coverage     Payor Plan Insurance Group Employer/Plan Group    [place]      Payor Plan Address Payor Plan Phone Number Effective From Effective To    [address] [phone] 3/1/2018     [place] [address]       Subscriber Name Subscriber Birth Date Member ID       ERIN COX 1935 [id number]                 Emergency Contacts      (Rel.) Home Phone Work Phone Mobile Phone    Sincere Cox (Son) [phone]               Physical Therapy Notes (last 24 hours) (Notes from 4/5/2018  9:58 AM through 4/6/2018  9:58 AM)      Claudette Trejo, PTA at 4/5/2018  2:58 PM  Version 1 of 1         Problem: Patient Care Overview  Goal: " Plan of Care Review  Outcome: Ongoing (interventions implemented as appropriate)   18 0687   Coping/Psychosocial   Plan of Care Reviewed With patient   Plan of Care Review   Progress improving   OTHER   Outcome Summary Pt. has good bed mobility. Requires assist of 1 to tand and ambulate 80' with RWX. Very eak. Knees buckle. Low endurance. Will benefit from strengthening and increased activity.           Electronically signed by Claudette Trejo PTA at 2018  2:58 PM          Occupational Therapy Notes (last 24 hours) (Notes from 2018  9:58 AM through 2018  9:58 AM)      Yusuf Lindsay OTR/L at 2018  5:04 PM          Acute Care - Occupational Therapy Initial Evaluation   [place]     Patient Name: Erin Cox  : 1935  MRN: [id number]  Today's Date: 2018  Onset of Illness/Injury or Date of Surgery: 18  Date of Referral to OT: 18  Referring Physician: Dr. Roberts    Admit Date: 3/30/2018       ICD-10-CM ICD-9-CM   1. Impaired functional mobility and endurance Z74.09 V49.89   2. Oropharyngeal dysphagia R13.12 787.22   3. Impaired cognition R41.89 294.9   4. Decreased activities of daily living (ADL) Z78.9 V49.89     Patient Active Problem List   Diagnosis   • Mitral regurgitation   • Aortic stenosis   • Acute on chronic systolic congestive heart failure   • Acute respiratory failure with hypoxia   • Respiratory distress   • Right lower lobe pneumonia   • Respiratory failure   • Acute respiratory failure   • Pancytopenia     Past Medical History:   Diagnosis Date   • Acute pulmonary edema    • Arteriosclerotic coronary artery disease    • Bradycardia    • CAD (coronary artery disease)    • CHF (congestive heart failure)    • COPD (chronic obstructive pulmonary disease)    • Dyslipidemia    • Hypertension    • Old non-ST elevation myocardial infarction (NSTEMI)    • Renal disorder    • Stented coronary artery      Past Surgical History:   Procedure Laterality Date   • BACK  SURGERY     • CHOLECYSTECTOMY     • COLON SURGERY      Resection   • EYE SURGERY Bilateral     cataract          OT ASSESSMENT FLOWSHEET (last 72 hours)      Occupational Therapy Evaluation     Row Name 04/05/18 1540                   OT Evaluation Time/Intention    Subjective Information complains of;pain;fatigue  -MM        Document Type evaluation   See MAR  -MM        Mode of Treatment occupational therapy  -MM        Patient Effort good  -MM           General Information    Patient Profile Reviewed? yes  -MM        Onset of Illness/Injury or Date of Surgery 03/30/18  -MM        Referring Physician Dr. Roberts  -MM        Patient Observations alert;cooperative;agree to therapy  -MM        Patient/Family Observations family present initially  -MM        General Observations of Patient side lying R side in bed, O2/NC  -MM        Prior Level of Function independent:;all household mobility;community mobility;transfer;bed mobility;ADL's;feeding;grooming;dressing;bathing  -MM        Equipment Currently Used at Home cane, straight;oxygen;walker, rolling;rollator;shower chair  -MM        Pertinent History of Current Functional Problem CC: t/f from outside facility for acute resp. failure s/p intubation. Dx: acute hypoxic resp. failure; intubated. COPD exacerbation. metabolic encephalopathy, acute on chronic systolic CHF, fx coccyx 4/4 H&H: 9.9/33.1. WBC: 3.77.   -MM        Existing Precautions/Restrictions fall;oxygen therapy device and L/min   fractured coccyx  -MM        Risks Reviewed patient:;LOB;nausea/vomiting;dizziness;increased discomfort;increased drainage;change in vital signs;lines disloged  -MM        Benefits Reviewed patient:;improve function;increase independence;increase strength;increase balance;decrease pain;decrease risk of DVT;improve skin integrity;increase knowledge  -MM        Barriers to Rehab medically complex;physical barrier  -MM           Relationship/Environment    Lives With alone  -MM         Family Caregiver if Needed child(zofia), adult  -MM           Resource/Environmental Concerns    Current Living Arrangements home/apartment/condo  -MM           Cognitive Assessment/Interventions    Additional Documentation Cognitive Assessment/Intervention (Group)  -MM           Cognitive Assessment/Intervention- PT/OT    Orientation Status (Cognition) oriented x 4  -MM        Follows Commands (Cognition) follows multi-step commands;over 90% accuracy;physical/tactile prompts required  -MM        Personal Safety Interventions fall prevention program maintained;nonskid shoes/slippers when out of bed;muscle strengthening facilitated;supervised activity  -MM           Safety Issues, Functional Mobility    Impairments Affecting Function (Mobility) balance;endurance/activity tolerance;strength  -MM           Bed Mobility Assessment/Treatment    Bed Mobility Assessment/Treatment supine-sit;sit-supine  -MM        Supine-Sit [place] (Bed Mobility) minimum assist (75% patient effort)  -MM        Sit-Supine [place] (Bed Mobility) not tested   sitting EOB  -MM        Assistive Device (Bed Mobility) head of bed elevated;bed rails  -MM           Transfer Assessment/Treatment    Transfer Assessment/Treatment sit-stand transfer;stand-sit transfer;bed-chair transfer  -MM        Sit-Stand [place] (Transfers) supervision  -MM        Stand-Sit [place] (Transfers) supervision  -MM           ADL Assessment/Intervention    BADL Assessment/Intervention lower body dressing  -MM           Lower Body Dressing Assessment/Training    Comment (Lower Body Dressing) not assessed d/t fatigue. expected level of performance min - modA  -MM           General ROM    GENERAL ROM COMMENTS BUE/BLE AROM WFL except L shoulder AROM impaired 25-50%  -MM           General Assessment (Manual Muscle Testing)    Comment, General Manual Muscle Testing (MMT) Assessment grossly BUE/BLE 4-/5  -MM           Motor Assessment/Interventions     Additional Documentation Fine Motor Testing & Training (Group);Gross Motor Coordination (Group)  -MM           Gross Motor Coordination    Gross Motor Impairments finger to nose  -MM        Gross Motor Skill, Impairments Detail WNL BUE  -MM           Fine Motor Testing & Training    Comment, Fine Motor Coordination opposition mild impairement BUE  -MM           Sensory Assessment/Intervention    Sensory General Assessment no sensation deficits identified  -MM           Vision Assessment/Intervention    Visual Impairment/Limitations WFL   per pt  -MM           Positioning and Restraints    Pre-Treatment Position in bed  -MM        Post Treatment Position bed  -MM        In Bed sitting EOB;call light within reach;encouraged to call for assist;notified nsg;side rails up x2  -MM           Pain Assessment    Additional Documentation Pain Scale: Numbers Pre/Post-Treatment (Group)  -MM           Pain Scale: Numbers Pre/Post-Treatment    Pain Scale: Numbers, Pretreatment 6/10  -MM        Pain Scale: Numbers, Post-Treatment 6/10  -MM        Pain Location --   BLE  -MM        Pain Intervention(s) Medication (See MAR);Repositioned  -MM           Plan of Care Review    Plan of Care Reviewed With patient  -MM           Clinical Impression (OT)    Date of Referral to OT 04/04/18  -MM        OT Diagnosis decreased adl  -MM        Functional Level at Time of Evaluation (OT Eval) adequate  -MM        Patient/Family Goals Statement (OT Eval) [place] swing bed  -MM        Criteria for Skilled Therapeutic Interventions Met (OT Eval) yes;treatment indicated  -MM        Rehab Potential (OT Eval) good, to achieve stated therapy goals  -MM        Therapy Frequency (OT Eval) 3 times/wk  -MM        Predicted Duration of Therapy Intervention (OT Eval) until d/c  -MM        Care Plan Review (OT) evaluation/treatment results reviewed;care plan/treatment goals reviewed;risks/benefits reviewed;current/potential barriers reviewed;patient/other  agree to care plan  -MM        Anticipated Discharge Disposition (OT) hospital-based swing bed;skilled nursing facility (SNF)   for additional therapy  -MM           Planned OT Interventions    Planned Therapy Interventions (OT Eval) activity tolerance training;adaptive equipment training;BADL retraining;functional balance retraining;occupation/activity based interventions;patient/caregiver education/training;ROM/therapeutic exercise;strengthening exercise;transfer/mobility retraining  -MM           OT Goals    Bed Mobility Goal Selection (OT) bed mobility, OT goal 1  -MM        Dressing Goal Selection (OT) dressing, OT goal 1  -MM        Toileting Goal Selection (OT) toileting, OT goal 1  -MM           Bed Mobility Goal 1 (OT)    Activity/Assistive Device (Bed Mobility Goal 1, OT) bridging;bed mobility activities, all  -MM        [place] Level/Cues Needed (Bed Mobility Goal 1, OT) independent  -MM        Time Frame (Bed Mobility Goal 1, OT) 10 days  -MM        Progress/Outcomes (Bed Mobility Goal 1, OT) goal ongoing  -MM           Dressing Goal 1 (OT)    Activity/Assistive Device (Dressing Goal 1, OT) dressing skills, all  -MM        [place]/Cues Needed (Dressing Goal 1, OT) conditional independence;set-up required   AE PRN  -MM        Time Frame (Dressing Goal 1, OT) 10 days  -MM        Progress/Outcome (Dressing Goal 1, OT) goal ongoing  -MM           Toileting Goal 1 (OT)    Activity/Device (Toileting Goal 1, OT) toileting skills, all  -MM        [place] Level/Cues Needed (Toileting Goal 1, OT) independent  -MM        Time Frame (Toileting Goal 1, OT) 10 days  -MM        Progress/Outcome (Toileting Goal 1, OT) goal ongoing  -MM           Living Environment    Home Accessibility --   walk in shower  -MM          User Key  (r) = Recorded By, (t) = Taken By, (c) = Cosigned By    Initials Name Effective Dates    MM SEBLE Powell/VU 04/03/18 -            Occupational Therapy Education     Title:  PT OT SLP Therapies (Active)     Topic: Occupational Therapy (Active)     Point: ADL training (Active)     Description: Instruct learner(s) on proper safety adaptation and remediation techniques during self care or transfers.   Instruct in proper use of assistive devices.   Learning Progress Summary     Learner Status Readiness Method Response Comment Documented by    Patient Active Acceptance E NR OT role, benefits and POC  04/05/18 1701                      User Key     Initials Effective Dates Name Provider Type Discipline     04/03/18 -  Yusuf Lindsay, OTR/L Occupational Therapist OT                  OT Recommendation and Plan  Outcome Summary/Treatment Plan (OT)  Anticipated Discharge Disposition (OT): hospital-based swing bed, skilled nursing facility (SNF) (for additional therapy)  Planned Therapy Interventions (OT Eval): activity tolerance training, adaptive equipment training, BADL retraining, functional balance retraining, occupation/activity based interventions, patient/caregiver education/training, ROM/therapeutic exercise, strengthening exercise, transfer/mobility retraining  Therapy Frequency (OT Eval): 3 times/wk  Plan of Care Review  Plan of Care Reviewed With: patient  Plan of Care Reviewed With: patient  Outcome Summary: OT eval completed. Pt was min A for bed mobility. Pt was supervision for sit to stand t/f. Pt reports increased fatigue. LB adls deferred d/t fatigue, expected level of performance min-mod A. skilled OT recommended to address adls, functional mobility and activity tolerance. Recommended d/c SNF v. swing bed for additional therapy.          Outcome Measures     Row Name 04/05/18 1700 04/05/18 0900 04/04/18 1033       How much help from another person do you currently need...    Turning from your back to your side while in flat bed without using bedrails?  -- 4  -THERESE 3  -DANGELO    Moving from lying on back to sitting on the side of a flat bed without bedrails?  -- 4  -THERESE 3  -DANGELO     Moving to and from a bed to a chair (including a wheelchair)?  -- 3  -THERESE 3  -DANGELO    Standing up from a chair using your arms (e.g., wheelchair, bedside chair)?  -- 3  -THERESE 3  -DANGELO    Climbing 3-5 steps with a railing?  -- 2  -THERESE 2  -DANGELO    To walk in hospital room?  -- 3  -THERESE 3  -DANGELO    AM-PAC 6 Clicks Score  -- 19  -THERESE 17  -DANGELO       How much help from another is currently needed...    Putting on and taking off regular lower body clothing? 2  -MM  --  --    Bathing (including washing, rinsing, and drying) 2  -MM  --  --    Toileting (which includes using toilet bed pan or urinal) 2  -MM  --  --    Putting on and taking off regular upper body clothing 3  -MM  --  --    Taking care of personal grooming (such as brushing teeth) 3  -MM  --  --    Eating meals 3  -MM  --  --    Score 15  -MM  --  --       Functional Assessment    Outcome Measure Options AM-PAC 6 Clicks Daily Activity (OT)  -MM  -- AM-PAC 6 Clicks Basic Mobility (PT)  -DANGELO      User Key  (r) = Recorded By, (t) = Taken By, (c) = Cosigned By    Initials Name Provider Type    DANGELO Apple, PT DPT Physical Therapist    THERESE Trejo, PTA Physical Therapy Assistant    MM Yusuf Lindsay OTR/L Occupational Therapist          Time Calculation:   OT Start Time: 1540  OT Stop Time: 1605  OT Time Calculation (min): 25 min    Therapy Charges for Today     Code Description Service Date Service Provider Modifiers Qty    04462058770  OT SELFCARE CURRENT 4/5/2018 Yusuf Lindsay OTR/L RADHA, CK 1    [id number]  OT SELFCARE PROJECTED 4/5/2018 Yusuf Lindsay OTR/L GO, CI 1    [id number]  OT EVAL MOD COMPLEXITY 2 4/5/2018 SEBLE Powell/L GO, KX 1          OT G-codes  OT Professional Judgement Used?: Yes  OT Functional Scales Options: AM-PAC 6 Clicks Daily Activity (OT)  Score: 15  Functional Limitation: Self care  Self Care Current Status (): At least 40 percent but less than 60 percent impaired, limited or restricted  Self Care Goal Status  (): At least 1 percent but less than 20 percent impaired, limited or restricted    SEBLE Alexandra/L  4/5/2018    Electronically signed by SEBLE Powell/L at 4/5/2018  5:04 PM     SEBLE Powell/L at 4/5/2018  5:03 PM          Problem: Patient Care Overview  Goal: Plan of Care Review  Outcome: Ongoing (interventions implemented as appropriate)   04/05/18 1701   Coping/Psychosocial   Plan of Care Reviewed With patient   Plan of Care Review   Progress no change   OTHER   Outcome Summary OT eval completed. Pt was min A for bed mobility. Pt was supervision for sit to stand t/f. Pt reports increased fatigue. LB adls deferred d/t fatigue, expected level of performance min-mod A. skilled OT recommended to address adls, functional mobility and activity tolerance. Recommended d/c SNF v. swing bed for additional therapy.           Electronically signed by SEBLE Powell/L at 4/5/2018  5:03 PM

## 2018-04-06 NOTE — RS.PTINEVL
Subjective





- Patient information


Date of Evaluation: 04/06/18


Date of Arrival on Unit: 04/06/18


Admitted From:: Facility Transfer


Diagnosis: acute resp failure, acute CHF, pneumonia


Usual Living Arrangement: Alone


Living Arrangement Comments: Lives at home.


Home Environment: Apartment, Level/No stairs


Medical History: COPD, CHF, Arthritis


Medical History Comments:: CAD, aortic stenosis, mitral regurgitation, 

pancytopenia, kidney injury, non STEMI


LATEX ALLERGY?: No


Surgical History: Lumbar Spine, Cholecystectomy


Surgical History Comments:: cardiac stent, colon resection


Medications: see chart


Subjective Information/ Patient Comments:: pt states she has been using rwx 

with therapy at [facility].





- Level of function


Prior to this admission, the patient could do the following:: Independent ADL's

, Independent Ambulation


Abilities prior to this admission: 





amb with cane, daughter in law assisted with meals.


Current Level of Function: Partially Dependent


Current Equipment Used at Home: cane, rwx, shower chair





Interventions





- Objective


Patient Orientation: Person, Place, Time


Current Interventions: Oxygen





Range of Motion





- ROM


Right Upper Extremity AROM: WFL's


Left Upper Extremity AROM: WFL's


Right Lower Extremity AROM: WFL's


Left Lower Extremity AROM: WFL's





Muscle Strength





- Muscle Strength


Right Upper Extremity Strength: Mild Weakness (grossly 4-/5)


Left Upper Extremity Strength: Mild Weakness (grossly 4-/5)


Right Lower Extremity Strength: Mild Weakness (hip flex 4-/5, knee flex/ext 4/5

, ankle DF/PF 4/5)


Left Lower Extremity Strength: Mild Weakness (hip flex 4-/5, knee flex/ext 4/5, 

ankle DF/PF 4/5)





Sensation





- Sensation


Right Upper Extremity Sensation: Intact/Normal


Left Upper Extremity Sensation: Intact/Normal


Right Lower Extremity Sensation: Intact/Normal


Left Lower Extremity Sensation: Intact/Normal





Palpation


Palpation Findings: None/Normal





Balance





- Sitting Balance and Reactions


Static Sitting Balance: Good


Dynamic Sitting Balance: Fair


Sitting Equilibrium Reactions: Delayed Left, Delayed Right


Sitting Protective Reactions: Delayed Left, Delayed Right





- Standing Balance and Reactions


Static Standing Balance: Poor


Dynamic Standing Balance: Poor


Standing Equilibrium Reactions: Delayed Left, Delayed Right


Standing Protective Reactions: Delayed Left, Delayed Right





- Comments


Balance Assessment Comments: tinetti score 14/28





Functional Mobility





- Bed Mobility


Rolling R/L: Min Assist


Supine to Sit: Min Assist





- Transfers


Sit to Stand: Min Assist


Stand to Sit: Min Assist


Comments:: sit to/from stand from low toilet with min x 2





- Safety Awareness


Safety Awareness: Fair


BARTHEL INDEX SCORE: 9





Ambulation





- Ambulation


Assistive Device Used: Rolling Walker


Orthotic/Prosthetic Device: No


Distance: 15ft x 2


Assistance needed with Ambulation: Min Assist


Gait Deviations: Forward posture, Short stride, Deviates from path


Ambulation Comments: pt amb with O2, pt with flexed posture, decreased step 

length, and deviates from path occasionally


Factors Affecting Ambulation: Decreased Balance, Breathing/O2 Saturation, 

Weakness, Decreased Safety, Limited Endurance





Treatment time





- Time with patient


Total treatment time: 28





Patient Education





- Education


Patient Education: Activity Modification, Education of Plan of Care


Teaching Recipient: Patient


Teaching Methods: Discussion (discussion with patient regarding POC as well as 

safety with gait )





Assessment





- Assessment


Problem List:: Decreased level of function, Requires training/education, 

Decreased safety/Risk of falls, Weakness


Rehab Potential: Good


Further Therapy Indicated?: Yes


Evaluation Complexity: HISTORY: Medium (chf, copd, resp failure, age, acute 

kidney injury), EXAM OF BODY SYSTEMS: Medium (gait, SOA, balance, strength, 

posture,), CLINICAL PRESENTATION: Medium (evolving), CLINICAL DECISION MAKING: 

Medium





Short Term Goals


GOAL #1: pt demonstrate independence with rolling and scooting in bed


Goal to be met by: 04/13/18


GOAL #2: pt transfer sup to/from sit to/from stand CGA


Goal to be met by: 04/13/18


GOAL #3: pt amb with rwx 100ft with CGA with no LOB 


Goal to be met by: 04/13/18


GOAL #4: Improved BLE strength 4 to 4+/5


Goal to be met by: 04/13/18





Long Term Goals


GOAL #1: pt transferred sup to/from sit to/from stand SBA to I


Goal to be met by: 04/20/18


GOAL #2: pt amb functional household distances with rwx with no LOB with SBA


Goal to be met by: 04/20/18


GOAL #3: pt with improved dyn stand balance as noted by tinetti score 20/28


Goal to be met by: 04/20/18





Plan


Plan of Care: Therapeutic EX, Neuromuscular Re-Educ, Therapeutic Activity


Other:: gait training


Frequency of Treatment: 1-2 X day, as tolerated


Duration of Treatment: 2 Weeks


Anticipated Discharge Destination: Home


Has the Physician been added for Co-signature?: Yes

## 2018-04-06 NOTE — THERAPY DISCHARGE NOTE
Acute Care - Speech Language Pathology Discharge Summary  [facility]       Patient Name: Erin Cox  : 1935  MRN: [id number]    Today's Date: 2018  Onset of Illness/Injury or Date of Surgery: 18       Referring Physician: Dr. Roberts        Admit Date: 3/30/2018      SLP Recommendation and Plan  Pureed diet and thin liquids    Visit Dx:    ICD-10-CM ICD-9-CM   1. Impaired functional mobility and endurance Z74.09 V49.89   2. Oropharyngeal dysphagia R13.12 787.22   3. Impaired cognition R41.89 294.9   4. Decreased activities of daily living (ADL) Z78.9 V49.89               Time Calculation- SLP     Row Name 18 0909             Time Calculation- SLP    SLP Start Time 0831  -CS (r) DS (t) CS (c)      SLP Stop Time 0857  -CS (r) DS (t) CS (c)      SLP Time Calculation (min) 26 min  -CS (r) DS (t)      SLP Received On 18  -CS (r) DS (t) CS (c)        User Key  (r) = Recorded By, (t) = Taken By, (c) = Cosigned By    Initials Name Provider Type    CS Camden Powell MS CCC-SLP Speech and Language Pathologist    CLIFTON Hidalgo, Speech Therapy Student Speech Therapy Student                  SLP GOALS     Row Name 18 1400 18 0831 18 1250       Oral Nutrition/Hydration Goal 1 (SLP)    Oral Nutrition/Hydration Goal 1, SLP  -- LTG: Pt will be able to consume LRD without any overt s/s of aspiration.   -CS (r) DS (t) CS (c) LTG: Pt will be able to consume LRD without any overt s/s of aspiration.   -CS (r) DS (t) CS (c)    Time Frame (Oral Nutrition/Hydration Goal 1, SLP)  -- by discharge  -CS (r) DS (t) CS (c) by discharge  -CS (r) DS (t) CS (c)    Barriers (Oral Nutrition/Hydration Goal 1, SLP)  -- N/A  -CS (r) DS (t) CS (c) N/A  -CS (r) DS (t) CS (c)    Progress/Outcomes (Oral Nutrition/Hydration Goal 1, SLP) goal partially met  -MB continuing progress toward goal  -CS (r) DS (t) CS (c) goal ongoing  -CS (r) DS (t) CS (c)       Lingual Strengthening Goal 1 (SLP)    Activity  (Lingual Strengthening Goal 1, SLP)  -- increase lingual tone/sensation/control/coordination/movement  -CS (r) DS (t) CS (c) increase lingual tone/sensation/control/coordination/movement  -CS (r) DS (t) CS (c)    Increase Lingual Tone/Sensation/Control/Coordination/Movement  -- lingual movement exercises  -CS (r) DS (t) CS (c) lingual movement exercises  -CS (r) DS (t) CS (c)    [place]/Accuracy (Lingual Strengthening Goal 1, SLP)  -- independently (over 90% accuracy)  -CS (r) DS (t) CS (c) independently (over 90% accuracy)  -CS (r) DS (t) CS (c)    Time Frame (Lingual Strengthening Goal 1, SLP)  -- short term goal (STG);by discharge  -CS (r) DS (t) CS (c) short term goal (STG);by discharge  -CS (r) DS (t) CS (c)    Barriers (Lingual Strengthening Goal 1, SLP)  -- N/A  -CS (r) DS (t) CS (c) N/A  -CS (r) DS (t) CS (c)    Progress/Outcomes (Lingual Strengthening Goal 1, SLP) goal partially met  -MB continuing progress toward goal  -CS (r) DS (t) CS (c) goal ongoing  -CS (r) DS (t) CS (c)       Pharyngeal Strengthening Exercise Goal 1 (SLP)    Activity (Pharyngeal Strengthening Goal 1, SLP)  -- increase squeeze/positive pressure generation  -CS (r) DS (t) CS (c) increase squeeze/positive pressure generation  -CS (r) DS (t) CS (c)    Increase Squeeze/Positive Pressure Generation  -- hard effortful swallow;mesfin  -CS (r) DS (t) CS (c) hard effortful swallow;mesfin  -CS (r) DS (t) CS (c)    [place]/Accuracy (Pharyngeal Strengthening Goal 1, SLP)  -- independently (over 90% accuracy)  -CS (r) DS (t) CS (c) independently (over 90% accuracy)  -CS (r) DS (t) CS (c)    Time Frame (Pharyngeal Strengthening Goal 1, SLP)  -- short term goal (STG);by discharge  -CS (r) DS (t) CS (c) short term goal (STG);by discharge  -CS (r) DS (t) CS (c)    Barriers (Pharyngeal Strengthening Goal 1, SLP)  -- N/A  -CS (r) DS (t) CS (c) N/A  -CS (r) DS (t) CS (c)    Progress/Outcomes (Pharyngeal Strengthening Goal 1, SLP) goal partially  met  -MB continuing progress toward goal  -CS (r) DS (t) CS (c) goal ongoing  -CS (r) DS (t) CS (c)      User Key  (r) = Recorded By, (t) = Taken By, (c) = Cosigned By    Initials Name Provider Type    JENARO Lipscomb, CCC-SLP Speech and Language Pathologist    ALPHONSO Powell, MS CCC-SLP Speech and Language Pathologist    CLIFTON Hidalgo, Speech Therapy Student Speech Therapy Student                  SLP Discharge Summary  Anticipated Dischage Disposition: unknown  Reason for Discharge: discharge from this facility  Progress Toward Achieving Short/long Term Goals: goals partially met within established timelines  Discharge Destination: [facility]      Marc Lipscomb, CCC-SLP  4/6/2018

## 2018-04-06 NOTE — THERAPY DISCHARGE NOTE
Acute Care - Physical Therapy Discharge Summary  [facility]       Patient Name: Erin Cox  : 1935  MRN: [id number]    Today's Date: 2018  Onset of Illness/Injury or Date of Surgery: 18    Date of Referral to PT: 18  Referring Physician: Dr. Roberts      Admit Date: 3/30/2018      PT Recommendation and Plan    Visit Dx:    ICD-10-CM ICD-9-CM   1. Impaired functional mobility and endurance Z74.09 V49.89   2. Oropharyngeal dysphagia R13.12 787.22   3. Impaired cognition R41.89 294.9   4. Decreased activities of daily living (ADL) Z78.9 V49.89             Outcome Measures     Row Name 18 0725 18 1700 18 0900       How much help from another person do you currently need...    Turning from your back to your side while in flat bed without using bedrails?  --  -- 4  -THERESE    Moving from lying on back to sitting on the side of a flat bed without bedrails?  --  -- 4  -THERESE    Moving to and from a bed to a chair (including a wheelchair)?  --  -- 3  -THERESE    Standing up from a chair using your arms (e.g., wheelchair, bedside chair)?  --  -- 3  -THERESE    Climbing 3-5 steps with a railing?  --  -- 2  -THERESE    To walk in hospital room?  --  -- 3  -THERESE    AM-PAC 6 Clicks Score  --  -- 19  -THERESE       How much help from another is currently needed...    Putting on and taking off regular lower body clothing? 3  -CJ 2  -MM  --    Bathing (including washing, rinsing, and drying) 3  -CJ 2  -MM  --    Toileting (which includes using toilet bed pan or urinal) 3  -CJ 2  -MM  --    Putting on and taking off regular upper body clothing 3  -CJ 3  -MM  --    Taking care of personal grooming (such as brushing teeth) 3  -CJ 3  -MM  --    Eating meals 3  -CJ 3  -MM  --    Score 18  -CJ 15  -MM  --       Functional Assessment    Outcome Measure Options AM-PAC 6 Clicks Daily Activity (OT)  -CJ AM-PAC 6 Clicks Daily Activity (OT)  -MM  --    Row Name 18 1033             How much help from another person do you  currently need...    Turning from your back to your side while in flat bed without using bedrails? 3  -DANGELO      Moving from lying on back to sitting on the side of a flat bed without bedrails? 3  -DANGELO      Moving to and from a bed to a chair (including a wheelchair)? 3  -DANGELO      Standing up from a chair using your arms (e.g., wheelchair, bedside chair)? 3  -DANGELO      Climbing 3-5 steps with a railing? 2  -DANGELO      To walk in hospital room? 3  -DANGELO      AM-PAC 6 Clicks Score 17  -DANGELO         Functional Assessment    Outcome Measure Options AM-PAC 6 Clicks Basic Mobility (PT)  -DANGELO        User Key  (r) = Recorded By, (t) = Taken By, (c) = Cosigned By    Initials Name Provider Type    GUILLERMO Shelley, ZHOU/L Occupational Therapy Assistant    DANGELO Apple, PT DPT Physical Therapist    THERESE Trejo, PTA Physical Therapy Assistant    CLARITA Lindsay, OTR/L Occupational Therapist                PT Charges     Row Name 04/06/18 1132             Time Calculation    Start Time 1035  -      Stop Time 1058  -      Time Calculation (min) 23 min  -      PT Received On 04/06/18  -      PT Goal Re-Cert Due Date 04/14/18  -        User Key  (r) = Recorded By, (t) = Taken By, (c) = Cosigned By    Initials Name Provider Type    THEREES Trejo, CLAIR Physical Therapy Assistant                  PT Rehab Goals     Row Name 04/06/18 1400 04/04/18 1033          Bed Mobility Goal 1 (PT)    Activity/Assistive Device (Bed Mobility Goal 1, PT)  -- sit to supine/supine to sit;scooting  -DANGELO     [place] Level/Cues Needed (Bed Mobility Goal 1, PT)  -- independent  -DANGELO     Time Frame (Bed Mobility Goal 1, PT)  -- long term goal (LTG);10 days  -DANGELO     Progress/Outcomes (Bed Mobility Goal 1, PT) goal met  -THERESE goal ongoing  -DANGELO        Transfer Goal 1 (PT)    Activity/Assistive Device (Transfer Goal 1, PT)  -- sit-to-stand/stand-to-sit;bed-to-chair/chair-to-bed;walker, rolling;cane, straight  -DANGELO     [place]  Level/Cues Needed (Transfer Goal 1, PT)  -- independent  -DANGELO     Time Frame (Transfer Goal 1, PT)  -- long term goal (LTG);10 days  -DANGELO     Progress/Outcome (Transfer Goal 1, PT) goal met  -THERESE goal ongoing  -DANGELO        Gait Training Goal 1 (PT)    Activity/Assistive Device (Gait Training Goal 1, PT)  -- gait (walking locomotion);assistive device use;improve balance and speed;increase endurance/gait distance;cane, straight;walker, rolling  -DANGELO     [place] Level (Gait Training Goal 1, PT) contact guard assist  -THERESE independent  -DANGELO     Distance (Gait Goal 1, PT) 85  -THERESE 150  -DANGELO     Time Frame (Gait Training Goal 1, PT)  -- long term goal (LTG);10 days  -DANGELO     Progress/Outcome (Gait Training Goal 1, PT) goal not met  -THERESE goal ongoing  -DANGELO       User Key  (r) = Recorded By, (t) = Taken By, (c) = Cosigned By    Initials Name Provider Type    DANGELO Apple, PT DPT Physical Therapist    THERESE Treoj PTA Physical Therapy Assistant          Therapy Charges for Today     Code Description Service Date Service Provider Modifiers Qty    12759602231 HC PT THER PROC EA 15 MIN 4/5/2018 Claudette Trejo PTA GP 1    69088654148 HC GAIT TRAINING EA 15 MIN 4/5/2018 Claudette Trejo PTA GP 1    44878731954 HC PT THER PROC EA 15 MIN 4/5/2018 Claudette Trejo PTA GP 1    66895653335 HC GAIT TRAINING EA 15 MIN 4/5/2018 Claudette Trejo PTA GP 1    84140568273 HC PT THER PROC EA 15 MIN 4/6/2018 Claudette Trejo PTA GP, KX 1    51154350871 HC GAIT TRAINING EA 15 MIN 4/6/2018 Claudette Trejo, CLAIR GP, KX 1          PT Discharge Summary  Anticipated Discharge Disposition (PT): skilled nursing facility (SNF)  Reason for Discharge: Discharge from facility  Outcomes Achieved: Patient able to partially acheive established goals  Discharge Destination: SNF      Claudette Trejo PTA   4/6/2018

## 2018-04-06 NOTE — PLAN OF CARE
Problem: Patient Care Overview  Goal: Plan of Care Review  Outcome: Ongoing (interventions implemented as appropriate)   04/06/18 1101   Coping/Psychosocial   Plan of Care Reviewed With patient   OTHER   Outcome Summary Pt. progressing good with her adl bathing/dressing, supervision-min. assist required!

## 2018-04-07 RX ADMIN — TIOTROPIUM BROMIDE SCH CAP: 18 CAPSULE ORAL; RESPIRATORY (INHALATION) at 08:17

## 2018-04-07 RX ADMIN — IPRATROPIUM BROMIDE AND ALBUTEROL SULFATE SCH VIAL: .5; 3 SOLUTION RESPIRATORY (INHALATION) at 22:38

## 2018-04-07 RX ADMIN — APIXABAN SCH MG: 5 TABLET, FILM COATED ORAL at 21:19

## 2018-04-07 RX ADMIN — Medication PRN SYR: at 11:54

## 2018-04-07 RX ADMIN — GABAPENTIN SCH MG: 300 CAPSULE ORAL at 21:20

## 2018-04-07 RX ADMIN — ASPIRIN SCH MG: 81 TABLET, COATED ORAL at 08:17

## 2018-04-07 RX ADMIN — ATORVASTATIN CALCIUM SCH MG: 20 TABLET, FILM COATED ORAL at 21:21

## 2018-04-07 RX ADMIN — IPRATROPIUM BROMIDE AND ALBUTEROL SULFATE SCH VIAL: .5; 3 SOLUTION RESPIRATORY (INHALATION) at 10:17

## 2018-04-07 RX ADMIN — Medication SCH SYR: at 05:29

## 2018-04-07 RX ADMIN — ONDANSETRON PRN MG: 2 INJECTION INTRAMUSCULAR; INTRAVENOUS at 19:34

## 2018-04-07 RX ADMIN — DEXAMETHASONE SODIUM PHOSPHATE SCH MG: 4 INJECTION, SOLUTION INTRAMUSCULAR; INTRAVENOUS at 16:02

## 2018-04-07 RX ADMIN — BUDESONIDE AND FORMOTEROL FUMARATE DIHYDRATE SCH PUFF: 160; 4.5 AEROSOL RESPIRATORY (INHALATION) at 21:36

## 2018-04-07 RX ADMIN — LISINOPRIL SCH MG: 10 TABLET ORAL at 08:17

## 2018-04-07 RX ADMIN — LISINOPRIL SCH MG: 10 TABLET ORAL at 21:22

## 2018-04-07 RX ADMIN — CARVEDILOL SCH MG: 6.25 TABLET, FILM COATED ORAL at 08:17

## 2018-04-07 RX ADMIN — POLYETHYLENE GLYCOL 3350 SCH GM: 17 POWDER, FOR SOLUTION ORAL at 08:17

## 2018-04-07 RX ADMIN — ONDANSETRON PRN MG: 2 INJECTION INTRAMUSCULAR; INTRAVENOUS at 11:54

## 2018-04-07 RX ADMIN — TRAMADOL HYDROCHLORIDE PRN MG: 50 TABLET, FILM COATED ORAL at 19:58

## 2018-04-07 RX ADMIN — APIXABAN SCH MG: 5 TABLET, FILM COATED ORAL at 08:17

## 2018-04-07 RX ADMIN — Medication SCH SYR: at 21:25

## 2018-04-07 RX ADMIN — IPRATROPIUM BROMIDE AND ALBUTEROL SULFATE SCH VIAL: .5; 3 SOLUTION RESPIRATORY (INHALATION) at 04:46

## 2018-04-07 RX ADMIN — CARVEDILOL SCH MG: 6.25 TABLET, FILM COATED ORAL at 16:47

## 2018-04-07 RX ADMIN — BUDESONIDE AND FORMOTEROL FUMARATE DIHYDRATE SCH PUFF: 160; 4.5 AEROSOL RESPIRATORY (INHALATION) at 08:17

## 2018-04-07 RX ADMIN — Medication SCH: at 12:50

## 2018-04-07 RX ADMIN — TRAMADOL HYDROCHLORIDE PRN MG: 50 TABLET, FILM COATED ORAL at 05:29

## 2018-04-07 RX ADMIN — POLYETHYLENE GLYCOL 3350 SCH GM: 17 POWDER, FOR SOLUTION ORAL at 21:24

## 2018-04-07 RX ADMIN — BUMETANIDE SCH MG: 1 TABLET ORAL at 05:29

## 2018-04-07 RX ADMIN — BUDESONIDE AND FORMOTEROL FUMARATE DIHYDRATE SCH PUFF: 160; 4.5 AEROSOL RESPIRATORY (INHALATION) at 21:23

## 2018-04-07 RX ADMIN — Medication PRN SYR: at 19:34

## 2018-04-07 RX ADMIN — IPRATROPIUM BROMIDE AND ALBUTEROL SULFATE SCH VIAL: .5; 3 SOLUTION RESPIRATORY (INHALATION) at 14:11

## 2018-04-07 NOTE — THERAPY DISCHARGE NOTE
Acute Care - Occupational Therapy Discharge Summary  [facility]     Patient Name: Erin Cox  : 1935  MRN: [id number]    Today's Date: 2018       Date of Referral to OT: 18         Admit Date: 3/30/2018        OT Recommendation and Plan    Visit Dx:    ICD-10-CM ICD-9-CM   1. Impaired functional mobility and endurance Z74.09 V49.89   2. Oropharyngeal dysphagia R13.12 787.22   3. Impaired cognition R41.89 294.9   4. Decreased activities of daily living (ADL) Z78.9 V49.89                     OT Rehab Goals     Row Name 18 1400 18 1540          Bed Mobility Goal 1 (OT)    Activity/Assistive Device (Bed Mobility Goal 1, OT)  -- bridging;bed mobility activities, all  -MM     [place] Level/Cues Needed (Bed Mobility Goal 1, OT)  -- independent  -MM     Time Frame (Bed Mobility Goal 1, OT)  -- 10 days  -MM     Progress/Outcomes (Bed Mobility Goal 1, OT) goal not met  -KL goal ongoing  -MM        Dressing Goal 1 (OT)    Activity/Assistive Device (Dressing Goal 1, OT)  -- dressing skills, all  -MM     [place]/Cues Needed (Dressing Goal 1, OT)  -- conditional independence;set-up required   AE PRN  -MM     Time Frame (Dressing Goal 1, OT)  -- 10 days  -MM     Progress/Outcome (Dressing Goal 1, OT) goal not met  -KL goal ongoing  -MM        Toileting Goal 1 (OT)    Activity/Device (Toileting Goal 1, OT)  -- toileting skills, all  -MM     [place] Level/Cues Needed (Toileting Goal 1, OT)  -- independent  -MM     Time Frame (Toileting Goal 1, OT)  -- 10 days  -MM     Progress/Outcome (Toileting Goal 1, OT) goal not met  -KL goal ongoing  -MM       User Key  (r) = Recorded By, (t) = Taken By, (c) = Cosigned By    Initials Name Provider Type    TYREE Haro ZHOU/L Occupational Therapy Assistant    CLARITA Lindsay OTR/L Occupational Therapist                Outcome Measures     Row Name 18 0725 18 1700 18 0900       How much help from another person do  you currently need...    Turning from your back to your side while in flat bed without using bedrails?  --  -- 4  -THERESE    Moving from lying on back to sitting on the side of a flat bed without bedrails?  --  -- 4  -THERESE    Moving to and from a bed to a chair (including a wheelchair)?  --  -- 3  -THERESE    Standing up from a chair using your arms (e.g., wheelchair, bedside chair)?  --  -- 3  -THERESE    Climbing 3-5 steps with a railing?  --  -- 2  -THERESE    To walk in hospital room?  --  -- 3  -THERESE    AM-PAC 6 Clicks Score  --  -- 19  -THERESE       How much help from another is currently needed...    Putting on and taking off regular lower body clothing? 3  - 2  -MM  --    Bathing (including washing, rinsing, and drying) 3  - 2  -MM  --    Toileting (which includes using toilet bed pan or urinal) 3  - 2  -MM  --    Putting on and taking off regular upper body clothing 3  - 3  -MM  --    Taking care of personal grooming (such as brushing teeth) 3  - 3  -MM  --    Eating meals 3  - 3  -MM  --    Score 18  - 15  -MM  --       Functional Assessment    Outcome Measure Options AM-PAC 6 Clicks Daily Activity (OT)  - AM-PAC 6 Clicks Daily Activity (OT)  -MM  --      User Key  (r) = Recorded By, (t) = Taken By, (c) = Cosigned By    Initials Name Provider Type    ABELARDO Hutson/VU Occupational Therapy Assistant    THERESE Trejo [facility] Physical Therapy Assistant    CLARITA Lindsay, OTR/L Occupational Therapist              OT Discharge Summary  Reason for Discharge: Discharge from facility  Outcomes Achieved: Refer to plan of care for updates on goals achieved  Discharge Destination: [facility]      MALATHI Salvador  4/7/2018

## 2018-04-08 RX ADMIN — CARVEDILOL SCH MG: 6.25 TABLET, FILM COATED ORAL at 16:43

## 2018-04-08 RX ADMIN — BUDESONIDE AND FORMOTEROL FUMARATE DIHYDRATE SCH PUFF: 160; 4.5 AEROSOL RESPIRATORY (INHALATION) at 08:35

## 2018-04-08 RX ADMIN — IPRATROPIUM BROMIDE AND ALBUTEROL SULFATE SCH VIAL: .5; 3 SOLUTION RESPIRATORY (INHALATION) at 10:21

## 2018-04-08 RX ADMIN — Medication SCH SYR: at 05:34

## 2018-04-08 RX ADMIN — ASPIRIN SCH MG: 81 TABLET, COATED ORAL at 08:40

## 2018-04-08 RX ADMIN — LISINOPRIL SCH MG: 10 TABLET ORAL at 21:08

## 2018-04-08 RX ADMIN — TIOTROPIUM BROMIDE SCH CAP: 18 CAPSULE ORAL; RESPIRATORY (INHALATION) at 08:35

## 2018-04-08 RX ADMIN — POLYETHYLENE GLYCOL 3350 SCH: 17 POWDER, FOR SOLUTION ORAL at 08:41

## 2018-04-08 RX ADMIN — IPRATROPIUM BROMIDE AND ALBUTEROL SULFATE SCH VIAL: .5; 3 SOLUTION RESPIRATORY (INHALATION) at 13:57

## 2018-04-08 RX ADMIN — ATORVASTATIN CALCIUM SCH MG: 20 TABLET, FILM COATED ORAL at 21:07

## 2018-04-08 RX ADMIN — GABAPENTIN SCH MG: 300 CAPSULE ORAL at 21:09

## 2018-04-08 RX ADMIN — APIXABAN SCH MG: 5 TABLET, FILM COATED ORAL at 08:37

## 2018-04-08 RX ADMIN — DEXAMETHASONE SODIUM PHOSPHATE SCH MG: 4 INJECTION, SOLUTION INTRAMUSCULAR; INTRAVENOUS at 08:40

## 2018-04-08 RX ADMIN — Medication SCH SYR: at 14:11

## 2018-04-08 RX ADMIN — Medication SCH SYR: at 21:06

## 2018-04-08 RX ADMIN — POLYETHYLENE GLYCOL 3350 SCH GM: 17 POWDER, FOR SOLUTION ORAL at 21:05

## 2018-04-08 RX ADMIN — CARVEDILOL SCH MG: 6.25 TABLET, FILM COATED ORAL at 08:40

## 2018-04-08 RX ADMIN — IPRATROPIUM BROMIDE AND ALBUTEROL SULFATE SCH VIAL: .5; 3 SOLUTION RESPIRATORY (INHALATION) at 22:00

## 2018-04-08 RX ADMIN — IPRATROPIUM BROMIDE AND ALBUTEROL SULFATE SCH VIAL: .5; 3 SOLUTION RESPIRATORY (INHALATION) at 04:49

## 2018-04-08 RX ADMIN — BUMETANIDE SCH MG: 1 TABLET ORAL at 05:34

## 2018-04-08 RX ADMIN — APIXABAN SCH MG: 5 TABLET, FILM COATED ORAL at 21:09

## 2018-04-08 RX ADMIN — BUDESONIDE AND FORMOTEROL FUMARATE DIHYDRATE SCH PUFF: 160; 4.5 AEROSOL RESPIRATORY (INHALATION) at 21:05

## 2018-04-08 RX ADMIN — LISINOPRIL SCH MG: 10 TABLET ORAL at 08:37

## 2018-04-09 RX ADMIN — APIXABAN SCH MG: 5 TABLET, FILM COATED ORAL at 08:41

## 2018-04-09 RX ADMIN — IPRATROPIUM BROMIDE AND ALBUTEROL SULFATE SCH VIAL: .5; 3 SOLUTION RESPIRATORY (INHALATION) at 10:07

## 2018-04-09 RX ADMIN — Medication SCH SYR: at 05:52

## 2018-04-09 RX ADMIN — POLYETHYLENE GLYCOL 3350 SCH: 17 POWDER, FOR SOLUTION ORAL at 23:01

## 2018-04-09 RX ADMIN — APIXABAN SCH MG: 5 TABLET, FILM COATED ORAL at 21:58

## 2018-04-09 RX ADMIN — BUDESONIDE AND FORMOTEROL FUMARATE DIHYDRATE SCH PUFF: 160; 4.5 AEROSOL RESPIRATORY (INHALATION) at 22:01

## 2018-04-09 RX ADMIN — ASPIRIN SCH MG: 81 TABLET, COATED ORAL at 08:43

## 2018-04-09 RX ADMIN — BUDESONIDE AND FORMOTEROL FUMARATE DIHYDRATE SCH PUFF: 160; 4.5 AEROSOL RESPIRATORY (INHALATION) at 08:43

## 2018-04-09 RX ADMIN — IPRATROPIUM BROMIDE AND ALBUTEROL SULFATE SCH VIAL: .5; 3 SOLUTION RESPIRATORY (INHALATION) at 20:25

## 2018-04-09 RX ADMIN — TRAMADOL HYDROCHLORIDE PRN MG: 50 TABLET, FILM COATED ORAL at 10:28

## 2018-04-09 RX ADMIN — Medication SCH SYR: at 22:01

## 2018-04-09 RX ADMIN — ATORVASTATIN CALCIUM SCH MG: 20 TABLET, FILM COATED ORAL at 21:58

## 2018-04-09 RX ADMIN — CARVEDILOL SCH MG: 6.25 TABLET, FILM COATED ORAL at 08:43

## 2018-04-09 RX ADMIN — CARVEDILOL SCH MG: 6.25 TABLET, FILM COATED ORAL at 17:01

## 2018-04-09 RX ADMIN — LISINOPRIL SCH MG: 10 TABLET ORAL at 08:43

## 2018-04-09 RX ADMIN — TIOTROPIUM BROMIDE SCH CAP: 18 CAPSULE ORAL; RESPIRATORY (INHALATION) at 08:43

## 2018-04-09 RX ADMIN — IPRATROPIUM BROMIDE AND ALBUTEROL SULFATE SCH VIAL: .5; 3 SOLUTION RESPIRATORY (INHALATION) at 14:15

## 2018-04-09 RX ADMIN — LISINOPRIL SCH: 10 TABLET ORAL at 22:01

## 2018-04-09 RX ADMIN — BUMETANIDE SCH MG: 1 TABLET ORAL at 05:52

## 2018-04-09 RX ADMIN — GABAPENTIN SCH MG: 300 CAPSULE ORAL at 21:58

## 2018-04-09 RX ADMIN — POLYETHYLENE GLYCOL 3350 SCH GM: 17 POWDER, FOR SOLUTION ORAL at 08:43

## 2018-04-09 RX ADMIN — Medication SCH SYR: at 14:23

## 2018-04-09 RX ADMIN — IPRATROPIUM BROMIDE AND ALBUTEROL SULFATE SCH VIAL: .5; 3 SOLUTION RESPIRATORY (INHALATION) at 04:50

## 2018-04-09 NOTE — PCM.PROG
Attending Provider: 


ATTENDING PROVIDER:


Dr. MINDY FERNANDEZ


This patient is seen with Ebony Amaro, Nurse Practitioner.





DATE OF SERVICE:  04/09/18





SUBJECTIVE: 


This 83 year old WHITE/ F was hospitalized 04/06/18.   The patient is 

in swing bed for PT/OT.  Sitting on the side of the bed, alert.  She has been 

doing weil with PT.  Kidney function slightly elevated today and instructed to 

increase fluids orally.  





REVIEW OF SYSTEMS:


CONSTITUTIONAL:  Positive for weakness. No night sweats. No fatigue, malaise, 

lethargy. No fever or chills.


HEENT: Eyes: No visual changes. No eye pain. No eye discharge. ENT: No runny 

nose. No epistaxis. No sinus pain. No odynophagia. No congestion.


RESPIRATORY: Shortness of breath as usual.  No cough, no congestion. No 

hemoptysis. 


CARDIOVASCULAR: No angina symptoms. No CHF symptoms. No atypical chest pain for 

CAD. No palpitations. No orthopnea..


GASTROINTESTINAL: No abdominal pain. No nausea or vomiting. No diarrhea or 

constipation. No hematemesis. No hematochezia.


GENITOURINARY: No urgency. No frequency. No dysuria. No hematuria. No 

obstructive symptoms. No discharge. No pain. No significant abnormal bleeding.


MUSCULOSKELETAL: No musculoskeletal pain; no joint swelling. 


NEUROLOGICAL: Awake, alert, oriented to time, place and person. No headache. No 

neck pain. No syncope. No seizures. No dizziness.


PSYCHIATRIC: Not anxious. No depression. No suicidal thoughts. No homicidal 

thoughts.


SKIN:  No rash. No lesions. No wounds.


ENDOCRINE: No unexplained weight loss. No weight gain. 


HEMATOLOGIC/LYMPHATIC: No anemia. No purpura. No petechiae. No prolonged or 

excessive bleeding. No palpable lymph nodes.





PHYSICAL EXAMINATION:


GENERAL:  The patient is awake, alert and oriented, lying in bed in no 

distress. 


VITAL SIGNS:  Temperature 97.4 F, Pulse 58, Respiratory Rate 16, /52, 

Pulse Ox 93%


HEENT:  Head normocephalic, atraumatic.  Eyes: Extraocular muscles are intact.  

Pupils are equal, round and reactive to light and accommodation.  Ears:  No 

lesions.  Nose appeared normal. Throat: No exudate or erythema.


NECK: Supple. No JVD, no carotid bruit.  No lymphadenopathy or thyromegaly. 


LUNGS:  Diminished breath sounds. Clear to auscultation.  Percussion note 

normal.  Chest symmetrical. 


HEART:  Irregular heart rate.  S1, S2, no S3. No murmurs.  No cyanosis or 

clubbing.  No ascites.  Pulses: Dorsalis pedis and posterior tibial pulses +1 

to +2 both sides. 


ABDOMEN:  Soft.  Non-tender.  Bowel sounds active. No CVA tenderness. No mass 

felt. 


EXTREMITIES:  No leg edema.  Full range of motion of all extremities, equal. 


NEUROLOGIC:  No focal deficit. Cranial nerves II through XII are grossly 

intact.  No headache, no double vision or headache.  


SKIN: Not dry.  Intact.  Turgor-normal. 


LYMPHATIC:  No palpable lymph nodes/no lymphedema. 


MUSCULOSKELETAL:  Normal joints with no swelling. Muscle tone is normal. 








LAB REVIEW:





 04/09/18 04:45 





 04/09/18 04:45 











04/09/18 04:45: Sodium 136, Potassium 5.0, Chloride 97 L, Carbon Dioxide 30, 

Anion Gap 14.0, BUN 63 H*, Creatinine 1.72 H, Estimated GFR (MDRD) 28.00, BUN/

Creatinine Ratio 36.62, Glucose 104, Calcium 9.2, Total Bilirubin 0.5, AST 15, 

ALT 12, Alkaline Phosphatase 55, Total Protein 5.8, Albumin 3.0 L, Globulin 2.8

, Albumin/Globulin Ratio 1.07


04/09/18 04:45: WBC 5.42, RBC 3.47 L, Hgb 9.0 L, Hct 28.4 L, MCV 81.8, MCH 25.9 

L, MCHC 31.7 L, RDW Coeff of Sandra 17.5 H, Plt Count 142, Immature Gran % (Auto) 

0.9, Neut % (Auto) 55.5, Lymph % (Auto) 34.3, Mono % (Auto) 8.3, Eos % (Auto) 

0.6, Baso % (Auto) 0.4, Immature Gran # (Auto) 0.1, Neut # (Auto) 3.0, Lymph # (

Auto) 1.9, Mono # (Auto) 0.5, Eos # (Auto) 0.0, Baso # (Auto) 0.0





ASSESSMENT:  


1.  Generalized weakness


2.  Chronic respiratory failure


3.  Chronic kidney disease


4.  Mild dehydration





PLAN:


1.  Increase oral intake


2.  Continue PT/OT


3.  CBC/CMP tomorrow


4.  Encouraged to drink more fluids





Plan and coordination of the patient's care discussed in the presence of Case 

Manager and nurse.





CONDITION:  Stable











SCRIBED BY:


NAINA WARD  scribed while in presence of service performed by 

Dr. Fernandez/AINSLEY Corey on 04/09/18 ([id number])

## 2018-04-10 RX ADMIN — LISINOPRIL SCH MG: 10 TABLET ORAL at 20:12

## 2018-04-10 RX ADMIN — IPRATROPIUM BROMIDE AND ALBUTEROL SULFATE SCH VIAL: .5; 3 SOLUTION RESPIRATORY (INHALATION) at 20:15

## 2018-04-10 RX ADMIN — Medication SCH SYR: at 05:36

## 2018-04-10 RX ADMIN — BUDESONIDE AND FORMOTEROL FUMARATE DIHYDRATE SCH PUFF: 160; 4.5 AEROSOL RESPIRATORY (INHALATION) at 08:57

## 2018-04-10 RX ADMIN — CARVEDILOL SCH MG: 6.25 TABLET, FILM COATED ORAL at 16:59

## 2018-04-10 RX ADMIN — IPRATROPIUM BROMIDE AND ALBUTEROL SULFATE SCH VIAL: .5; 3 SOLUTION RESPIRATORY (INHALATION) at 10:47

## 2018-04-10 RX ADMIN — TIOTROPIUM BROMIDE SCH CAP: 18 CAPSULE ORAL; RESPIRATORY (INHALATION) at 08:57

## 2018-04-10 RX ADMIN — BUDESONIDE AND FORMOTEROL FUMARATE DIHYDRATE SCH PUFF: 160; 4.5 AEROSOL RESPIRATORY (INHALATION) at 20:11

## 2018-04-10 RX ADMIN — Medication SCH: at 14:11

## 2018-04-10 RX ADMIN — ASPIRIN SCH MG: 81 TABLET, COATED ORAL at 09:00

## 2018-04-10 RX ADMIN — IPRATROPIUM BROMIDE AND ALBUTEROL SULFATE SCH VIAL: .5; 3 SOLUTION RESPIRATORY (INHALATION) at 05:00

## 2018-04-10 RX ADMIN — POLYETHYLENE GLYCOL 3350 SCH: 17 POWDER, FOR SOLUTION ORAL at 20:13

## 2018-04-10 RX ADMIN — SODIUM CHLORIDE SCH MLS/HR: 0.9 INJECTION, SOLUTION INTRAVENOUS at 09:01

## 2018-04-10 RX ADMIN — TRAMADOL HYDROCHLORIDE PRN MG: 50 TABLET, FILM COATED ORAL at 20:17

## 2018-04-10 RX ADMIN — APIXABAN SCH MG: 5 TABLET, FILM COATED ORAL at 09:01

## 2018-04-10 RX ADMIN — LISINOPRIL SCH MG: 10 TABLET ORAL at 08:59

## 2018-04-10 RX ADMIN — BUMETANIDE SCH MG: 1 TABLET ORAL at 05:36

## 2018-04-10 RX ADMIN — GABAPENTIN SCH MG: 300 CAPSULE ORAL at 20:12

## 2018-04-10 RX ADMIN — CARVEDILOL SCH MG: 6.25 TABLET, FILM COATED ORAL at 09:00

## 2018-04-10 RX ADMIN — ATORVASTATIN CALCIUM SCH MG: 20 TABLET, FILM COATED ORAL at 20:12

## 2018-04-10 RX ADMIN — POLYETHYLENE GLYCOL 3350 SCH: 17 POWDER, FOR SOLUTION ORAL at 09:00

## 2018-04-10 RX ADMIN — APIXABAN SCH MG: 5 TABLET, FILM COATED ORAL at 20:12

## 2018-04-10 RX ADMIN — IPRATROPIUM BROMIDE AND ALBUTEROL SULFATE SCH VIAL: .5; 3 SOLUTION RESPIRATORY (INHALATION) at 14:10

## 2018-04-10 NOTE — PCM.PROG
Attending Provider: 


ATTENDING PROVIDER:


Dr. MINDY FERNANDEZ


This patient is seen with Ebony Amaro, Nurse Practitioner.





DATE OF SERVICE:  04/10/18





SUBJECTIVE: 


This 83 year old WHITE/ F was hospitalized 04/06/18.  The patient's 

kidney function is slightly worse.  Potassium is up.  Will restart IV fluids 

today.  Encourage more oral fluids.  





REVIEW OF SYSTEMS:


CONSTITUTIONAL: Positive for generalized weakness. No night sweats. No  malaise

, lethargy. No fever or chills.


HEENT: Eyes: No visual changes. No eye pain. No eye discharge. ENT: No runny 

nose. No epistaxis. No sinus pain. No odynophagia. No congestion.


RESPIRATORY: No cough, no congestion. No hemoptysis.  Shortness of breath as 

usual.


CARDIOVASCULAR: No angina symptoms. No CHF symptoms. No atypical chest pain for 

CAD. No palpitations. No orthopnea..


GASTROINTESTINAL: No abdominal pain. No nausea or vomiting. No diarrhea or 

constipation. No hematemesis. No hematochezia.


GENITOURINARY: No urgency. No frequency. No dysuria. No hematuria. No 

obstructive symptoms. No discharge. No pain. No significant abnormal bleeding.


MUSCULOSKELETAL: No musculoskeletal pain; no joint swelling. 


NEUROLOGICAL: Awake, alert, oriented to time, place and person. No headache. No 

neck pain. No syncope. No seizures. No dizziness.


PSYCHIATRIC: Not anxious. No depression. No suicidal thoughts. No homicidal 

thoughts.


SKIN:  No rash. No lesions. No wounds.


ENDOCRINE: No unexplained weight loss. No weight gain. 


HEMATOLOGIC/LYMPHATIC: No anemia. No purpura. No petechiae. No prolonged or 

excessive bleeding. No palpable lymph nodes.





PHYSICAL EXAMINATION:


GENERAL:  The patient is awake, alert and oriented, lying in bed in no 

distress. 


VITAL SIGNS:  Temperature 97.9 F, Pulse 53, Respiratory Rate 20, /72, 

Pulse Ox 100%


HEENT:  Head normocephalic, atraumatic.  Eyes: Extraocular muscles are intact.  

Pupils are equal, round and reactive to light and accommodation.  Ears:  No 

lesions.  Nose appeared normal. Throat: No exudate or erythema.


NECK: Supple. No JVD, no carotid bruit.  No lymphadenopathy or thyromegaly. 


LUNGS: Diminished breath sounds. Clear to auscultation.  Percussion note 

normal.  Chest symmetrical. 


HEART:  Irregular heart rate consistent with atrial fib. S1, S2, no S3. No 

murmurs.  No cyanosis or clubbing.  No ascites.  Pulses: Dorsalis pedis and 

posterior tibial pulses +1 to +2 both sides. 


ABDOMEN:  Soft.  Non-tender.  Bowel sounds active. No CVA tenderness. No mass 

felt. 


EXTREMITIES:  No edema.  Full range of motion of all extremities, equal. 


NEUROLOGIC:  No focal deficit. Cranial nerves II through XII are grossly 

intact.  No headache, no double vision or headache.  


SKIN: Not dry.  Intact.  Turgor-normal. 


LYMPHATIC:  No palpable lymph nodes/no lymphedema. 


MUSCULOSKELETAL:  Normal joints with no swelling. Muscle tone is normal. 








LAB REVIEW:





 04/10/18 04:15 





 04/10/18 04:15 











04/10/18 04:15: Sodium 137, Potassium 5.2 H, Chloride 100, Carbon Dioxide 29, 

Anion Gap 13.2, BUN 67 H*, Creatinine 1.51 H, Estimated GFR (MDRD) 33.00, BUN/

Creatinine Ratio 44.37, Glucose 100, Calcium 8.6, Total Bilirubin 0.5, AST 16, 

ALT 17, Alkaline Phosphatase 64, Total Protein 5.3 L, Albumin 2.8 L, Globulin 

2.5, Albumin/Globulin Ratio 1.12


04/10/18 04:15: WBC 5.85, RBC 3.29 L, Hgb 8.4 L, Hct 27.0 L, MCV 82.1, MCH 25.5 

L, MCHC 31.1 L, RDW Coeff of Sandra 17.8 H, Plt Count 149, Immature Gran % (Auto) 

1.2, Neut % (Auto) 49.8, Lymph % (Auto) 40.2, Mono % (Auto) 7.9, Eos % (Auto) 

0.7, Baso % (Auto) 0.2, Immature Gran # (Auto) 0.1, Neut # (Auto) 2.9, Lymph # (

Auto) 2.4, Mono # (Auto) 0.5, Eos # (Auto) 0.0, Baso # (Auto) 0.0





ASSESSMENT:  


1.  Generalized weakness


2.  Chronic respiratory failure


3.  Chronic kidney disease


4.  Mild dehydration





PLAN:


1.  Restart fluids NS @ 50 cc x one bag





Plan and coordination of the patient's care discussed in the presence of Case 

Manager and nurse.





CONDITION:  Stable











SCRIBED BY:


NAINA WARD  scribed while in presence of service performed by 

Dr. Fernandez/AINSLEY Corey on 04/10/18 ([id number])

## 2018-04-10 NOTE — PN
DATE OF SERVICE:  04/07/18



SUBJECTIVE:  

83 year old white female hospitalized in swing bed. The patient had acute 
respiratory failure and CHF. She has coronary artery disease with stent and 
severe chronic lung disease. The patient's condition is stable. Today's problem 
with hypotension. The patient's blood pressure went down to 70 to 80 systolic. 
The patient's legs were elevated and now the systolic is 106. Plan is to 
decrease the Lisinopril dose to 10mg one a day and she will continue Coreg. 



REVIEW OF SYSTEMS: 

CONSTITUTIONAL: No night sweats. No fatigue, malaise, lethargy. No fever or 
chills.

HEENT: Eyes: No visual changes. No eye pain. No eye discharge. ENT: No runny 
nose. No epistaxis. No sinus pain. No sore throat. No odynophagia. No 
congestion.

RESPIRATORY: No cough, no congestion. No hemoptysis. No shortness of breath. 

CARDIOVASCULAR: No angina symptoms. No CHF symptoms. No atypical chest pain for 
CAD. No palpitations. No orthopnea.

GASTROINTESTINAL: No abdominal pain. No nausea or vomiting. No diarrhea or 
constipation. No hematemesis. No hematochezia.

GENITOURINARY: No urgency. No frequency. No dysuria. No hematuria. No 
obstructive symptoms. No discharge. No pain. No significant abnormal bleeding.

MUSCULOSKELETAL: No musculoskeletal pain; no joint swelling. 

NEUROLOGICAL:  No headache. No neck pain. No syncope. No seizures. No dizziness.

PSYCHIATRIC: Not anxious. No depression. No suicidal thoughts. No homicidal 
thoughts.

SKIN:  No rash. No lesions. No wounds.

ENDOCRINE: No unexplained weight loss. No weight gain. 

HEMATOLOGIC/LYMPHATIC: No anemia. No purpura. No petechiae. No prolonged or 
excessive bleeding. No palpable lymph nodes.



PHYSICAL EXAMINATION: 

GENERAL:  The patient is oriented to time, place and person. 

VITAL SIGNS:  Temperature 97, pulse 68, respiratory rate 18, blood pressure 100/
52 and pulse ox 99%.

HEENT:  Head normocephalic, atraumatic.  Eyes: Extraocular muscles are intact.  
Pupils are equal, round and reactive to light and accommodation.  Ears:  No 
lesions.  Nose appeared normal. Throat: No exudate or erythema.

NECK: Supple. No JVD, no carotid bruit.  No lymphadenopathy or thyromegaly. 

LUNGS:  Decreased breath sounds but clear to auscultation.  Percussion note 
normal.  Chest symmetrical. 

HEART:  S1, S2, no S3. No murmurs.  No cyanosis or clubbing.  No ascites.  
Pulses: Dorsalis pedis and posterior tibial pulses +1 to +2 both sides. 

ABDOMEN:  Soft.  Nontender.  Bowel sounds active. No CVA tenderness. No mass 
felt. 

EXTREMITIES:  No edema.  Full range of motion of all extremities, equal. 

NEUROLOGIC:  No focal deficit. Cranial nerves II through XII are grossly 
intact.  No headache, no double vision or headache. 

SKIN: Not dry.  Intact.  Turgor - normal. 

LYMPHATIC:  No palpable lymph nodes/no lymphedema. 

MUSCULOSKELETAL:  Normal joints with no swelling. Muscle tone is normal. 



LABS: 

Hgb 9.5, hct 30, WBC 5,100 normal differential, creatinine 1.2, BUN 38, 
potassium 4.5.



ASSESSMENT:

1.  Hypotension, could be medicine effect or could be from low fluid intake. 
The patient is hungry so advised to eat. Her diet will be changed to high 
protein regular diet with low salt. 



PLAN:

1.  Encouraged the patient to eat. 

2.  Continue the rest of the medications but cut down the Zestril to one a day 

3.  Will monitor her oral intake, she says that she is hungry and has no 
problem with appetite. 

4.  Oxygen saturation on room air was 92% and with 2 liters it is 99%. 



CONDITION: Stable. 



TIME SPENT:  More than 30 minutes. 



Plan and coordination of the patient's care discussed in the presence of nurse. 
JULES

## 2018-04-10 NOTE — HP
DATE OF SERVICE:  04/06/18



HISTORY OF PRESENT ILLNESS:

83-year-old white female was transferred to [facility] after she was in 
acute respiratory failure status post intubation. The patient has chronic lung 
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 She was admitted with 
severe shortness of breath, acute respiratory failure, was intubated. The 
patient also has history of CHF. Initial assessment was acute respiratory 
failure, COPD exacerbation, abnormal ABGs, hyperglycemia, elevated troponin, 
elevated procalcitonin, elevated D. Dimer, anemia.  During the stay in the 
hospital, the patient was treated for acute respiratory failure, hypoxemia and 
hypercarbia. Extubated on 4/03/18. The patient had acute on chronic CHF with 
BNP 3,500, improved from 26,000. Echo with ejection fraction 47%, diastolic 
dysfunction. The patient has encephalopathy with hypoxemia. The patient has  
history of atrial fib, was in sinus rhythm at time of discharge. The patient 
was in acute renal failure. They were playing around with Bumex, elevated 
troponin with non stemi likely Type 2, troponin trend down on discharge. The 
patient has chronic anemia with pancytopenia and neutropenia which is more like 
myelodysplastic syndrome. 



On physical examination the patient is in no distress at the present time. Her 
appetite has been excellent. 



PAST MEDICAL HISTORY: 

MI times two

CAD

CHF

Dyslipidemia

Atrial fib

Chronic respiratory failure

Chronic bronchitis

Stage 2 CKD

Osteoarthritis



PAST SURGICAL HISTORY: 

Bilateral cataract surgery in 1994

Stents 2009 and 2012

Colon surgery with colostomy and reversal

Back surgery



REVIEW OF SYSTEMS:

CONSTITUTIONAL: Positive for weakness and fatigue. No night sweats.  No  malaise
, lethargy.  No fever or chills.

HEENT:  Eyes:  No visual changes.  No eye pain.  No eye discharge.  ENT:  No 
runny nose.  No epistaxis.  No sinus pain.  No sore throat.  No odynophagia.  
No ear pain.  No congestion.

RESPIRATORY:  No cough, no congestion.  No hemoptysis.  No shortness of breath.

CARDIOVASCULAR: Positive for shortness of breath on exertion. No angina 
symptoms. No CHF symptoms.  No atypical chest pain for CAD.  No palpitations. 
No PND.  No orthopnea.

GASTROINTESTINAL: Appetite has improved.  No abdominal pain.  No nausea or 
vomiting.  No diarrhea or constipation.  No hematemesis.  No hematochezia.  

GENITOURINARY:  No urgency.  No frequency.  No dysuria.  No hematuria.  No 
obstructive symptoms.  No discharge.  No pain.  No significant abnormal 
bleeding.

MUSCULOSKELETAL:  No musculoskeletal pain.  No joint swelling.  No arthritis.

NEUROLOGICAL:  No headache. No neck pain. No syncope. No seizures.  No 
dizziness. 

PSYCHIATRIC:   Not anxious. No depression.  No suicidal thoughts. No homicidal 
thoughts. 

SKIN:  No rash.  No lesions.  No wounds.

ENDOCRINE:  No unexplained weight loss.  No weight gain. 

HEMATOLOGIC/LYMPHATIC:  No anemia.  No purpura.  No petechiae.  No prolonged or 
excessive bleeding.  No palpable lymph nodes.



PERSONAL/FAMILY/SOCIAL HISTORY:

The patient is , taken care of by the daughter. Nonsmoker. No alcohol 
abuse. 



MEDICATIONS: 

Eliquis 2.5 mg twice a day

Aspirin 81 mg p.o. daily

Lipitor 20 mg p.o. daily

Symbicort 160/4.5 two puffs twice a day

Bumex 0.5 one by mouth daily

Coreg 6.25 mg b.i.d. 

Pepcid 20 mg twice a day

Neurontin 300 mg daily

Lisinopril 10 mg daily

Miralax one packet daily two times a day

Spiriva 18 mcg per inhalation capsule, place one capsule into inhaler and 
inhale daily

Ultram 50 mg t.i.d. p.r.n.



ALLERGIES: NKDA



PHYSICAL EXAMINATION: 

GENERAL:  The patient is oriented to time, place and person. 

VITAL SIGNS:  Temperature 98, pulse 90, respiratory rate 16, /70.

HEENT:  Head normocephalic, atraumatic.  Eyes: Extraocular muscles are intact.  
Pupils are equal, round and reactive to light and accommodation. Sclerae 
nonicteric. Ears:  No lesions.  Nose appeared normal. Throat: No exudate or 
erythema.

NECK: Supple. No JVD, no carotid bruit.  No lymphadenopathy or thyromegaly. 

LUNGS: Decreased breath sounds with faint expiratory wheeze. Air entry is 
acceptable. Clear to auscultation.  Percussion note normal.  Chest  
symmetrical. 

HEART:  PMI not palpable on auscultation. S1, S2, no S3. Regular.  No murmurs.  
No cyanosis or clubbing.  No ascites.  Pulses: Dorsalis pedis and posterior 
tibial pulses +1 to +2 both sides. 

ABDOMEN:  Soft.  Nontender.  Bowel sounds active. No CVA tenderness. No mass 
felt. 

EXTREMITIES:  No edema.  Full range of motion of all extremities, equal. 

NEUROLOGIC:  No focal deficit. Cranial nerves II through XII are grossly 
intact.  No headache, no double vision or headache. 

SKIN: Warm and dry.  Intact.  Turgor - Somewhat poor.

LYMPHATIC:  No palpable lymph nodes/no lymphedema. 

MUSCULOSKELETAL:  Normal joints with no swelling. Muscle tone is normal.



ASSESSMENT:

1.   HISTORY OF ACUTE RESPIRATORY FAILURE WITH EXACERBATION OF COPD

2.   HISTORY OF CHF

3.   RENAL FAILURE

4.   CHRONIC LUNG DISEASE

5.   HYPOXIC ENCEPHALOPATHY

6.   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

7.   HISTORY OF MITRAL REGURGITATION

8.   HISTORY OF AORTIC STENOSIS

9.   HISTORY OF SYSTOLIC AND DIASTOLIC HEART FAILURE

10. HISTORY OF RECURRENT PNEUMONIA

11. HISTORY OF CAD WITH STENT

12. OLD NON ST ELEVATION MI

13. DYSLIPIDEMIA

14. HYPERTENSION

15. STATUS POST BACK SURGERY

16. STATUS POST CHOLECYSTECTOMY

17. STATUS POST COLON SURGERY

18. STATUS POST EYE SURGERY INVOLVING CATARACTS





NOTE: The patient weighs 184 lbs, BMI 33. 



PLAN:

1.  Continue all the medications

2.  Telemetry for at least 24 hours

3.  EKG

4.  Chest x-ray

5.  D/C CMP today and every other day

6.  Continue all the other medications as prescribed

7.  The patient will be on Duoneb four times a day along with Spiriva and 
Symbicort

8.  Pulmonary rehab recommended, patient declined

9.  The patient strongly advised to lose weight, diet for weight loss 
discussed. The patient is deconditioned; for a long time she has had a 
sedentary lifestyle. Will start on physical therapy. 



CONDITION: Stable



PROGNOSIS: Guarded/poor



TIME SPENT: More than 70 minutes. 
MTDD

## 2018-04-11 RX ADMIN — BUMETANIDE SCH MG: 1 TABLET ORAL at 05:48

## 2018-04-11 RX ADMIN — IPRATROPIUM BROMIDE AND ALBUTEROL SULFATE SCH VIAL: .5; 3 SOLUTION RESPIRATORY (INHALATION) at 22:25

## 2018-04-11 RX ADMIN — IPRATROPIUM BROMIDE AND ALBUTEROL SULFATE SCH VIAL: .5; 3 SOLUTION RESPIRATORY (INHALATION) at 04:55

## 2018-04-11 RX ADMIN — TRAMADOL HYDROCHLORIDE PRN MG: 50 TABLET, FILM COATED ORAL at 16:57

## 2018-04-11 RX ADMIN — ATORVASTATIN CALCIUM SCH MG: 20 TABLET, FILM COATED ORAL at 20:34

## 2018-04-11 RX ADMIN — PANTOPRAZOLE SODIUM SCH MG: 40 TABLET, DELAYED RELEASE ORAL at 08:35

## 2018-04-11 RX ADMIN — APIXABAN SCH MG: 5 TABLET, FILM COATED ORAL at 20:34

## 2018-04-11 RX ADMIN — BUDESONIDE AND FORMOTEROL FUMARATE DIHYDRATE SCH PUFF: 160; 4.5 AEROSOL RESPIRATORY (INHALATION) at 08:36

## 2018-04-11 RX ADMIN — IPRATROPIUM BROMIDE AND ALBUTEROL SULFATE SCH VIAL: .5; 3 SOLUTION RESPIRATORY (INHALATION) at 10:09

## 2018-04-11 RX ADMIN — CARVEDILOL SCH MG: 6.25 TABLET, FILM COATED ORAL at 08:37

## 2018-04-11 RX ADMIN — CARVEDILOL SCH MG: 6.25 TABLET, FILM COATED ORAL at 16:55

## 2018-04-11 RX ADMIN — TRAMADOL HYDROCHLORIDE PRN MG: 50 TABLET, FILM COATED ORAL at 03:51

## 2018-04-11 RX ADMIN — BUDESONIDE AND FORMOTEROL FUMARATE DIHYDRATE SCH PUFF: 160; 4.5 AEROSOL RESPIRATORY (INHALATION) at 20:33

## 2018-04-11 RX ADMIN — LISINOPRIL SCH MG: 10 TABLET ORAL at 08:36

## 2018-04-11 RX ADMIN — POLYETHYLENE GLYCOL 3350 SCH: 17 POWDER, FOR SOLUTION ORAL at 20:37

## 2018-04-11 RX ADMIN — IPRATROPIUM BROMIDE AND ALBUTEROL SULFATE SCH VIAL: .5; 3 SOLUTION RESPIRATORY (INHALATION) at 14:47

## 2018-04-11 RX ADMIN — ASPIRIN SCH MG: 81 TABLET, COATED ORAL at 08:35

## 2018-04-11 RX ADMIN — SODIUM CHLORIDE SCH MLS/HR: 0.9 INJECTION, SOLUTION INTRAVENOUS at 05:22

## 2018-04-11 RX ADMIN — TIOTROPIUM BROMIDE SCH CAP: 18 CAPSULE ORAL; RESPIRATORY (INHALATION) at 08:36

## 2018-04-11 RX ADMIN — APIXABAN SCH MG: 5 TABLET, FILM COATED ORAL at 08:38

## 2018-04-11 RX ADMIN — POLYETHYLENE GLYCOL 3350 SCH: 17 POWDER, FOR SOLUTION ORAL at 08:38

## 2018-04-11 RX ADMIN — GABAPENTIN SCH MG: 300 CAPSULE ORAL at 20:33

## 2018-04-11 RX ADMIN — PANTOPRAZOLE SODIUM SCH MG: 40 TABLET, DELAYED RELEASE ORAL at 16:55

## 2018-04-11 RX ADMIN — LISINOPRIL SCH MG: 10 TABLET ORAL at 20:34

## 2018-04-11 NOTE — PCM.PROG
Attending Provider: 


ATTENDING PROVIDER:


Dr. MINDY SINGER





DATE OF SERVICE:  04/11/18





SUBJECTIVE: 


This 83 year old WHITE/ F was hospitalized 04/06/18 in swing bed.  She 

is doing a lot better.  Hydration status looks normal clinically.  Good skin 

turgor.  Kidney functions have improved some.  Potassium borderline high, 

stable.   Hemoglobin and hematocrit stable.  No evidence of active GI bleed. 

The patient had a sour stomach.  She has been walking with Physical Therapy and 

making good progress. 





REVIEW OF SYSTEMS:


CONSTITUTIONAL: No night sweats. No fatigue, malaise, lethargy. No fever or 

chills.


HEENT: Eyes: No visual changes. No eye pain. No eye discharge. ENT: No runny 

nose. No epistaxis. No sinus pain. No odynophagia. No congestion.


RESPIRATORY: No cough, no congestion. No hemoptysis.  No shortness of breath.


CARDIOVASCULAR: No angina symptoms. No CHF symptoms. No atypical chest pain for 

CAD. No palpitations. No orthopnea..


GASTROINTESTINAL: No abdominal pain. No nausea or vomiting. No diarrhea or 

constipation. No hematemesis. No hematochezia.


GENITOURINARY: No urgency. No frequency. No dysuria. No hematuria. No 

obstructive symptoms. No discharge. No pain. No significant abnormal bleeding.


MUSCULOSKELETAL: No musculoskeletal pain; no joint swelling. 


NEUROLOGICAL: Awake, alert, oriented to time, place and person. No headache. No 

neck pain. No syncope. No seizures. No dizziness.


PSYCHIATRIC: Not anxious. No depression. No suicidal thoughts. No homicidal 

thoughts.


SKIN:  No rash. No lesions. No wounds.


ENDOCRINE: No unexplained weight loss. No weight gain. 


HEMATOLOGIC/LYMPHATIC: No anemia. No purpura. No petechiae. No prolonged or 

excessive bleeding. No palpable lymph nodes.





PHYSICAL EXAMINATION:


GENERAL:  The patient is awake, alert and oriented, sitting on side of bed in 

no distress. 


VITAL SIGNS:  Temperature 98.4 F, Pulse 57, Respiratory Rate 20, /55, 

Pulse Ox 100%


HEENT:  Head normocephalic, atraumatic.  Eyes: Extraocular muscles are intact.  

Pupils are equal, round and reactive to light and accommodation.  Ears:  No 

lesions.  Nose appeared normal. Throat: No exudate or erythema.


NECK: Supple. No JVD, no carotid bruit.  No lymphadenopathy or thyromegaly. 


LUNGS:  Decreased breath sounds with good air entry. Clear to auscultation.  

Percussion note normal.  Chest symmetrical. 


HEART:  S1, S2, no S3. No murmurs.  No cyanosis or clubbing.  No ascites.  

Pulses: Dorsalis pedis and posterior tibial pulses +1 to +2 both sides. 


ABDOMEN:  Soft.  Non-tender.  Bowel sounds active. No CVA tenderness. No mass 

felt. 


EXTREMITIES:  No pedal edema.  Full range of motion of all extremities, equal. 


NEUROLOGIC:  No focal deficit. Cranial nerves II through XII are grossly 

intact.  No headache, no double vision or headache.  


SKIN: Warm and dry.  Intact.  Turgor-normal. 


LYMPHATIC:  No palpable lymph nodes/no lymphedema. 


MUSCULOSKELETAL:  Normal joints with no swelling. Muscle tone is normal. 








LAB REVIEW:





 04/11/18 04:45 





 04/11/18 04:45 











04/11/18 04:45: Sodium 140, Potassium 5.2 H, Chloride 107, Carbon Dioxide 26, 

Anion Gap 12.2, BUN 57 H, Creatinine 1.25, Estimated GFR (MDRD) 41.00, BUN/

Creatinine Ratio 45.60, Glucose 99, Calcium 8.5, Total Bilirubin 0.5, AST 17, 

ALT 19, Alkaline Phosphatase 60, Total Protein 5.5 L, Albumin 2.9 L, Globulin 

2.6, Albumin/Globulin Ratio 1.12


04/11/18 04:45: WBC 5.53, RBC 3.20 L, Hgb 8.3 L, Hct 27.0 L, MCV 84.4, MCH 25.9 

L, MCHC 30.7 L, RDW Coeff of Sandra 17.9 H, Plt Count 148, Immature Gran % (Auto) 

0.7, Neut % (Auto) 52.1, Lymph % (Auto) 36.9, Mono % (Auto) 9.0, Eos % (Auto) 

0.9, Baso % (Auto) 0.4, Immature Gran # (Auto) 0.0, Neut # (Auto) 2.9, Lymph # (

Auto) 2.0, Mono # (Auto) 0.5, Eos # (Auto) 0.1, Baso # (Auto) 0.0





ASSESSMENT:  


1.  RESPIRATORY FAILURE SEEMS TO BE UNDER CONTROL WITH GOOD OXYGENATION


2.  CHF CONTROLLED


3.  ANEMIA, STABLE


4.  CHRONIC KIDNEY DISEASE, STABLE


5.  HYPERKALEMIA, STABLE


6.  REFLUX SYMPTOMS, WILL START PROTONIX





PLAN:


1.  D/C  Pepcid


2.  Protonix 40 mg p.o. twice a day





Plan and coordination of the patient's care discussed in the presence of Case 

Manager and nurse.








CONDITION:  Improving











SCRIBED BY:


NAINA WARD  scribed while in presence of service performed by 

Dr. MINDY SINGER on 04/11/18 ([id number])

## 2018-04-12 RX ADMIN — IPRATROPIUM BROMIDE AND ALBUTEROL SULFATE SCH VIAL: .5; 3 SOLUTION RESPIRATORY (INHALATION) at 16:50

## 2018-04-12 RX ADMIN — LISINOPRIL SCH MG: 10 TABLET ORAL at 08:54

## 2018-04-12 RX ADMIN — Medication PRN SYR: at 21:16

## 2018-04-12 RX ADMIN — TIOTROPIUM BROMIDE SCH CAP: 18 CAPSULE ORAL; RESPIRATORY (INHALATION) at 08:52

## 2018-04-12 RX ADMIN — APIXABAN SCH MG: 5 TABLET, FILM COATED ORAL at 21:16

## 2018-04-12 RX ADMIN — ATORVASTATIN CALCIUM SCH MG: 20 TABLET, FILM COATED ORAL at 21:16

## 2018-04-12 RX ADMIN — BUDESONIDE AND FORMOTEROL FUMARATE DIHYDRATE SCH PUFF: 160; 4.5 AEROSOL RESPIRATORY (INHALATION) at 10:30

## 2018-04-12 RX ADMIN — POLYETHYLENE GLYCOL 3350 SCH: 17 POWDER, FOR SOLUTION ORAL at 08:57

## 2018-04-12 RX ADMIN — PANTOPRAZOLE SODIUM SCH MG: 40 TABLET, DELAYED RELEASE ORAL at 06:09

## 2018-04-12 RX ADMIN — ASPIRIN SCH MG: 81 TABLET, COATED ORAL at 08:53

## 2018-04-12 RX ADMIN — GABAPENTIN SCH MG: 300 CAPSULE ORAL at 21:18

## 2018-04-12 RX ADMIN — CARVEDILOL SCH MG: 6.25 TABLET, FILM COATED ORAL at 16:58

## 2018-04-12 RX ADMIN — BUDESONIDE AND FORMOTEROL FUMARATE DIHYDRATE SCH PUFF: 160; 4.5 AEROSOL RESPIRATORY (INHALATION) at 21:15

## 2018-04-12 RX ADMIN — POLYETHYLENE GLYCOL 3350 SCH: 17 POWDER, FOR SOLUTION ORAL at 23:04

## 2018-04-12 RX ADMIN — IPRATROPIUM BROMIDE AND ALBUTEROL SULFATE SCH VIAL: .5; 3 SOLUTION RESPIRATORY (INHALATION) at 05:02

## 2018-04-12 RX ADMIN — APIXABAN SCH MG: 5 TABLET, FILM COATED ORAL at 08:55

## 2018-04-12 RX ADMIN — BUMETANIDE SCH MG: 1 TABLET ORAL at 06:09

## 2018-04-12 RX ADMIN — TRAMADOL HYDROCHLORIDE PRN MG: 50 TABLET, FILM COATED ORAL at 06:13

## 2018-04-12 RX ADMIN — PANTOPRAZOLE SODIUM SCH MG: 40 TABLET, DELAYED RELEASE ORAL at 16:58

## 2018-04-12 RX ADMIN — CARVEDILOL SCH MG: 6.25 TABLET, FILM COATED ORAL at 08:54

## 2018-04-12 RX ADMIN — LISINOPRIL SCH MG: 10 TABLET ORAL at 21:18

## 2018-04-12 NOTE — PCM.PROG
Attending Provider: 


ATTENDING PROVIDER:


Dr. MINDY FERNANDEZ


This patient is seen with Ebony Amaro, Nurse Practitioner.





DATE OF SERVICE:  04/12/18





SUBJECTIVE: 


This 83 year old WHITE/ F was hospitalized 04/06/18.  The patient is 

sitting on side of bed, resting comfortably.  Slight leg edema.  She slept 

well.  Kidney function has improved. 





REVIEW OF SYSTEMS:


CONSTITUTIONAL: No night sweats. No fatigue, malaise, lethargy. No fever or 

chills.


HEENT: Eyes: No visual changes. No eye pain. No eye discharge. ENT: No runny 

nose. No epistaxis. No sinus pain. No odynophagia. No congestion.


RESPIRATORY: No cough, no congestion. No hemoptysis. Shortness of breath as 

usual.


CARDIOVASCULAR: No angina symptoms. No CHF symptoms. No atypical chest pain for 

CAD. No palpitations. No orthopnea..


GASTROINTESTINAL: No abdominal pain. No nausea or vomiting. No diarrhea or 

constipation. No hematemesis. No hematochezia.


GENITOURINARY: No urgency. No frequency. No dysuria. No hematuria. No 

obstructive symptoms. No discharge. No pain. No significant abnormal bleeding.


MUSCULOSKELETAL: Leg weakness.   


NEUROLOGICAL: Awake, alert, oriented to time, place and person. No headache. No 

neck pain. No syncope. No seizures. No dizziness.


PSYCHIATRIC: Not anxious. No depression. No suicidal thoughts. No homicidal 

thoughts.


SKIN:  No rash. No lesions. No wounds.


ENDOCRINE: No unexplained weight loss. No weight gain. 


HEMATOLOGIC/LYMPHATIC: No anemia. No purpura. No petechiae. No prolonged or 

excessive bleeding. No palpable lymph nodes.





PHYSICAL EXAMINATION:


GENERAL:  The patient is awake, alert and oriented, lying in bed in no 

distress. 


VITAL SIGNS:  Temperature 97.5 F, Pulse 59, Respiratory Rate 16, /57, 

Pulse Ox 98%


HEENT:  Head normocephalic, atraumatic.  Eyes: Extraocular muscles are intact.  

Pupils are equal, round and reactive to light and accommodation.  Ears:  No 

lesions.  Nose appeared normal. Throat: No exudate or erythema.


NECK: Supple. No JVD, no carotid bruit.  No lymphadenopathy or thyromegaly. 


LUNGS: Diminished breath sounds. Clear to auscultation.  Percussion note 

normal.  Chest symmetrical. 


HEART:  S1, S2, no S3. No murmurs.  No cyanosis or clubbing.  No ascites.  

Pulses: Dorsalis pedis and posterior tibial pulses +1 to +2 both sides. 


ABDOMEN:  Soft.  Non-tender.  Bowel sounds active. No CVA tenderness. No mass 

felt. 


EXTREMITIES:  Trace right lower extremity edema and +1 left lower extremity 

edema.  Full range of motion of all extremities, equal. 


NEUROLOGIC:  No focal deficit. Cranial nerves II through XII are grossly 

intact.  No headache, no double vision or headache.  


SKIN: Not dry.  Intact.  Turgor-normal. 


LYMPHATIC:  No palpable lymph nodes/no lymphedema. 


MUSCULOSKELETAL:  Normal joints with no swelling. Muscle tone is normal. 








LAB REVIEW:





 04/12/18 04:30 





 04/12/18 04:30 











04/12/18 04:30: Sodium 139, Potassium 5.3 H, Chloride 107, Carbon Dioxide 25, 

Anion Gap 12.3, BUN 47 H, Creatinine 1.27, Estimated GFR (MDRD) 40.00, BUN/

Creatinine Ratio 37.00, Glucose 117 H, Calcium 8.9, Total Bilirubin 0.5, AST 16

, ALT 19, Alkaline Phosphatase 59, Total Protein 5.7 L, Albumin 3.1 L, Globulin 

2.6, Albumin/Globulin Ratio 1.19


04/12/18 04:30: WBC 6.02, RBC 3.47 L, Hgb 9.1 L, Hct 29.3 L, MCV 84.4, MCH 26.2 

L, MCHC 31.1 L, RDW Coeff of Sandra 18.6 H, Plt Count 160, Immature Gran % (Auto) 

1.2, Neut % (Auto) 60.6, Lymph % (Auto) 30.6, Mono % (Auto) 6.3, Eos % (Auto) 

1.0, Baso % (Auto) 0.3, Immature Gran # (Auto) 0.1, Neut # (Auto) 3.7, Lymph # (

Auto) 1.8, Mono # (Auto) 0.4, Eos # (Auto) 0.1, Baso # (Auto) 0.0





ASSESSMENT:


1.  RESPIRATORY FAILURE SEEMS TO BE UNDER CONTROL WITH GOOD OXYGENATION


2.  CHF CONTROLLED


3.  ANEMIA, STABLE


4.  CHRONIC KIDNEY DISEASE, STABLE


5.  HYPERKALEMIA, STABLE


6.  REFLUX SYMPTOMS, WILL START PROTONIX





PLAN:


1.  D/C telemetry





Plan and coordination of the patient's care discussed in the presence of Case 

Manager and nurse.





CONDITION:  Stable











SCRIBED BY:


Gino SO scribed while in presence of service performed by 

Dr. Fernandez/AINSLEY Corey on 04/12/18 ([id number])

## 2018-04-13 RX ADMIN — CARVEDILOL SCH MG: 6.25 TABLET, FILM COATED ORAL at 17:09

## 2018-04-13 RX ADMIN — APIXABAN SCH MG: 5 TABLET, FILM COATED ORAL at 09:17

## 2018-04-13 RX ADMIN — GABAPENTIN SCH MG: 300 CAPSULE ORAL at 21:02

## 2018-04-13 RX ADMIN — TRAMADOL HYDROCHLORIDE PRN MG: 50 TABLET, FILM COATED ORAL at 15:29

## 2018-04-13 RX ADMIN — LISINOPRIL SCH MG: 10 TABLET ORAL at 21:02

## 2018-04-13 RX ADMIN — BUDESONIDE AND FORMOTEROL FUMARATE DIHYDRATE SCH PUFF: 160; 4.5 AEROSOL RESPIRATORY (INHALATION) at 21:01

## 2018-04-13 RX ADMIN — PANTOPRAZOLE SODIUM SCH MG: 40 TABLET, DELAYED RELEASE ORAL at 17:09

## 2018-04-13 RX ADMIN — POLYETHYLENE GLYCOL 3350 SCH GM: 17 POWDER, FOR SOLUTION ORAL at 09:16

## 2018-04-13 RX ADMIN — BUMETANIDE SCH MG: 1 TABLET ORAL at 05:48

## 2018-04-13 RX ADMIN — ATORVASTATIN CALCIUM SCH MG: 20 TABLET, FILM COATED ORAL at 21:02

## 2018-04-13 RX ADMIN — IPRATROPIUM BROMIDE AND ALBUTEROL SULFATE SCH VIAL: .5; 3 SOLUTION RESPIRATORY (INHALATION) at 16:45

## 2018-04-13 RX ADMIN — ASPIRIN SCH MG: 81 TABLET, COATED ORAL at 09:16

## 2018-04-13 RX ADMIN — CARVEDILOL SCH MG: 6.25 TABLET, FILM COATED ORAL at 09:16

## 2018-04-13 RX ADMIN — POLYETHYLENE GLYCOL 3350 SCH: 17 POWDER, FOR SOLUTION ORAL at 21:04

## 2018-04-13 RX ADMIN — Medication SCH SYR: at 21:01

## 2018-04-13 RX ADMIN — TIOTROPIUM BROMIDE SCH CAP: 18 CAPSULE ORAL; RESPIRATORY (INHALATION) at 09:16

## 2018-04-13 RX ADMIN — PANTOPRAZOLE SODIUM SCH MG: 40 TABLET, DELAYED RELEASE ORAL at 05:48

## 2018-04-13 RX ADMIN — IPRATROPIUM BROMIDE AND ALBUTEROL SULFATE SCH VIAL: .5; 3 SOLUTION RESPIRATORY (INHALATION) at 04:50

## 2018-04-13 RX ADMIN — BUDESONIDE AND FORMOTEROL FUMARATE DIHYDRATE SCH PUFF: 160; 4.5 AEROSOL RESPIRATORY (INHALATION) at 09:16

## 2018-04-13 RX ADMIN — LISINOPRIL SCH MG: 10 TABLET ORAL at 09:17

## 2018-04-13 RX ADMIN — APIXABAN SCH MG: 5 TABLET, FILM COATED ORAL at 21:01

## 2018-04-13 NOTE — PN
DATE OF SERVICE:  04/13/18



SUBJECTIVE:  

The patient is up and about doing well.  Hemoglobin 9.1, hematocrit 29, 
potassium 4.9. Kidney functions have improved remarkably.  Potassium is normal 
now. The blood pressure 130/60. She is up and about doing well. The patient was 
seen and examined with the nurse practitioner. Will continue physical therapy 
for swing bed. 





TIME SPENT:  More than 30 minutes. 



Plan and coordination of the patient's care discussed in the presence of nurse. 
JULES

## 2018-04-13 NOTE — PN
DATE OF SERVICE:  04/09/18



SUBJECTIVE:  

83-year-old white female hospitalized with functional decline after she had 
been sick on respirator with history of severe COPD and CHF. She has been 
hospitalized in the swing bed. The patient's condition slowly has improved. She 
is eating much better, she is getting stronger. The patient was seen and 
examined with the nurse practitioner. The patient's labs are being monitored 
especially renal azotemia which seems to be stable and improving. The patient's 
CHF, COPD under control. Nutritional status improving. Strongly advised to join 
pulmonary rehab. 



TIME SPENT:  More than 30 minutes. 



Plan and coordination of the patient's care discussed in the presence of nurse. 
JULES

## 2018-04-13 NOTE — PN
DATE OF SERVICE:  04/12/18



SUBJECTIVE:  

Billie was hospitalized on swing bed.  The patient is up and about doing well.  
Her cardiovascular status is stable with no evidence of CHF. Kidney functions 
are improving. 



PHYSICAL EXAMINATION: 

HEENT:  Head normocephalic, atraumatic.  Eyes: Extraocular muscles are intact.  
Pupils are equal, round and reactive to light and accommodation.  Ears:  No 
lesions.  Nose appeared normal. Throat: No exudate or erythema.

NECK: Supple. No JVD, no carotid bruit.  No lymphadenopathy or thyromegaly. 

LUNGS:  Decreased breath sounds. Good air entry noted.  Clear to auscultation.  
Percussion note normal.  Chest symmetrical. 

HEART:  S1, S2, no S3. No murmurs.  No cyanosis or clubbing.  No ascites.  
Pulses: Dorsalis pedis and posterior tibial pulses +1 to +2 both sides. 

ABDOMEN:  Soft.  Nontender.  Bowel sounds active. No CVA tenderness. No mass 
felt. 

EXTREMITIES:  No edema.  Full range of motion of all extremities, equal. 

NEUROLOGIC:  No focal deficit. Cranial nerves II through XII are grossly 
intact.  No headache, no double vision or headache. 

SKIN: Not dry.  Intact.  Turgor - normal. 

LYMPHATIC:  No palpable lymph nodes/no lymphedema. 

MUSCULOSKELETAL:  Normal joints with no swelling. Muscle tone is normal. 



ASSESSMENT:

The patient's condition has improved remarkably. She is walking with help. 
Strongly advised to join pulmonary rehab. She has declined. 



The patient was seen and examined with the nurse practitioner. 





TIME SPENT:  More than 30 minutes. 



Plan and coordination of the patient's care discussed in the presence of nurse. 
JULES

## 2018-04-13 NOTE — PN
DATE OF SERVICE:  04/10/18



SUBJECTIVE:  

The patient was seen today with the nurse practitioner. The patient has been 
walking with help and has improved a lot.  Ambulation has improved. Appetite 
has improved. The only problem the patient has now is anemia with hemoglobin of 
8.4 with hematocrit 27.  The patient's creatinine is 1.5 which is stable but 
the BUN has gone up to 67. Potassium is 5.2.  



PLAN:

1.  Start NS at 50 cc/hr.

2.  CBC and CMP daily



TIME SPENT:  More than 30 minutes. 



Plan and coordination of the patient's care discussed in the presence of nurse. 
JULES

## 2018-04-14 RX ADMIN — GABAPENTIN SCH MG: 300 CAPSULE ORAL at 20:45

## 2018-04-14 RX ADMIN — PANTOPRAZOLE SODIUM SCH MG: 40 TABLET, DELAYED RELEASE ORAL at 05:32

## 2018-04-14 RX ADMIN — POLYETHYLENE GLYCOL 3350 SCH: 17 POWDER, FOR SOLUTION ORAL at 20:46

## 2018-04-14 RX ADMIN — APIXABAN SCH MG: 5 TABLET, FILM COATED ORAL at 08:22

## 2018-04-14 RX ADMIN — TRAMADOL HYDROCHLORIDE PRN MG: 50 TABLET, FILM COATED ORAL at 16:50

## 2018-04-14 RX ADMIN — BUDESONIDE AND FORMOTEROL FUMARATE DIHYDRATE SCH PUFF: 160; 4.5 AEROSOL RESPIRATORY (INHALATION) at 08:22

## 2018-04-14 RX ADMIN — ATORVASTATIN CALCIUM SCH MG: 20 TABLET, FILM COATED ORAL at 20:45

## 2018-04-14 RX ADMIN — Medication SCH: at 05:56

## 2018-04-14 RX ADMIN — LISINOPRIL SCH MG: 10 TABLET ORAL at 08:22

## 2018-04-14 RX ADMIN — CARVEDILOL SCH MG: 6.25 TABLET, FILM COATED ORAL at 08:22

## 2018-04-14 RX ADMIN — IPRATROPIUM BROMIDE AND ALBUTEROL SULFATE SCH VIAL: .5; 3 SOLUTION RESPIRATORY (INHALATION) at 17:00

## 2018-04-14 RX ADMIN — BUMETANIDE SCH MG: 1 TABLET ORAL at 05:32

## 2018-04-14 RX ADMIN — Medication SCH: at 13:06

## 2018-04-14 RX ADMIN — IPRATROPIUM BROMIDE AND ALBUTEROL SULFATE SCH VIAL: .5; 3 SOLUTION RESPIRATORY (INHALATION) at 05:32

## 2018-04-14 RX ADMIN — CARVEDILOL SCH MG: 6.25 TABLET, FILM COATED ORAL at 16:47

## 2018-04-14 RX ADMIN — POLYETHYLENE GLYCOL 3350 SCH: 17 POWDER, FOR SOLUTION ORAL at 08:27

## 2018-04-14 RX ADMIN — LISINOPRIL SCH MG: 10 TABLET ORAL at 20:43

## 2018-04-14 RX ADMIN — BUDESONIDE AND FORMOTEROL FUMARATE DIHYDRATE SCH PUFF: 160; 4.5 AEROSOL RESPIRATORY (INHALATION) at 20:43

## 2018-04-14 RX ADMIN — TIOTROPIUM BROMIDE SCH CAP: 18 CAPSULE ORAL; RESPIRATORY (INHALATION) at 08:22

## 2018-04-14 RX ADMIN — PANTOPRAZOLE SODIUM SCH MG: 40 TABLET, DELAYED RELEASE ORAL at 16:47

## 2018-04-14 RX ADMIN — APIXABAN SCH MG: 5 TABLET, FILM COATED ORAL at 20:44

## 2018-04-14 RX ADMIN — ASPIRIN SCH MG: 81 TABLET, COATED ORAL at 08:22

## 2018-04-15 RX ADMIN — BUDESONIDE AND FORMOTEROL FUMARATE DIHYDRATE SCH PUFF: 160; 4.5 AEROSOL RESPIRATORY (INHALATION) at 08:34

## 2018-04-15 RX ADMIN — IPRATROPIUM BROMIDE AND ALBUTEROL SULFATE SCH VIAL: .5; 3 SOLUTION RESPIRATORY (INHALATION) at 05:08

## 2018-04-15 RX ADMIN — LISINOPRIL SCH MG: 10 TABLET ORAL at 08:35

## 2018-04-15 RX ADMIN — PANTOPRAZOLE SODIUM SCH MG: 40 TABLET, DELAYED RELEASE ORAL at 05:54

## 2018-04-15 RX ADMIN — ATORVASTATIN CALCIUM SCH MG: 20 TABLET, FILM COATED ORAL at 20:55

## 2018-04-15 RX ADMIN — APIXABAN SCH MG: 5 TABLET, FILM COATED ORAL at 08:36

## 2018-04-15 RX ADMIN — IPRATROPIUM BROMIDE AND ALBUTEROL SULFATE SCH VIAL: .5; 3 SOLUTION RESPIRATORY (INHALATION) at 17:05

## 2018-04-15 RX ADMIN — LISINOPRIL SCH MG: 10 TABLET ORAL at 20:54

## 2018-04-15 RX ADMIN — ASPIRIN SCH MG: 81 TABLET, COATED ORAL at 08:35

## 2018-04-15 RX ADMIN — GABAPENTIN SCH MG: 300 CAPSULE ORAL at 20:54

## 2018-04-15 RX ADMIN — APIXABAN SCH MG: 5 TABLET, FILM COATED ORAL at 20:53

## 2018-04-15 RX ADMIN — TIOTROPIUM BROMIDE SCH CAP: 18 CAPSULE ORAL; RESPIRATORY (INHALATION) at 08:34

## 2018-04-15 RX ADMIN — CARVEDILOL SCH MG: 6.25 TABLET, FILM COATED ORAL at 16:41

## 2018-04-15 RX ADMIN — CARVEDILOL SCH MG: 6.25 TABLET, FILM COATED ORAL at 08:35

## 2018-04-15 RX ADMIN — POLYETHYLENE GLYCOL 3350 SCH: 17 POWDER, FOR SOLUTION ORAL at 20:55

## 2018-04-15 RX ADMIN — BUMETANIDE SCH MG: 1 TABLET ORAL at 05:55

## 2018-04-15 RX ADMIN — PANTOPRAZOLE SODIUM SCH MG: 40 TABLET, DELAYED RELEASE ORAL at 16:41

## 2018-04-15 RX ADMIN — POLYETHYLENE GLYCOL 3350 SCH: 17 POWDER, FOR SOLUTION ORAL at 08:35

## 2018-04-15 RX ADMIN — BUDESONIDE AND FORMOTEROL FUMARATE DIHYDRATE SCH PUFF: 160; 4.5 AEROSOL RESPIRATORY (INHALATION) at 20:53

## 2018-04-16 RX ADMIN — APIXABAN SCH MG: 5 TABLET, FILM COATED ORAL at 09:00

## 2018-04-16 RX ADMIN — LISINOPRIL SCH MG: 10 TABLET ORAL at 21:35

## 2018-04-16 RX ADMIN — ATORVASTATIN CALCIUM SCH MG: 20 TABLET, FILM COATED ORAL at 21:35

## 2018-04-16 RX ADMIN — PANTOPRAZOLE SODIUM SCH MG: 40 TABLET, DELAYED RELEASE ORAL at 05:50

## 2018-04-16 RX ADMIN — LISINOPRIL SCH MG: 10 TABLET ORAL at 08:59

## 2018-04-16 RX ADMIN — POLYETHYLENE GLYCOL 3350 SCH: 17 POWDER, FOR SOLUTION ORAL at 08:59

## 2018-04-16 RX ADMIN — BUDESONIDE AND FORMOTEROL FUMARATE DIHYDRATE SCH PUFF: 160; 4.5 AEROSOL RESPIRATORY (INHALATION) at 08:58

## 2018-04-16 RX ADMIN — PANTOPRAZOLE SODIUM SCH MG: 40 TABLET, DELAYED RELEASE ORAL at 17:32

## 2018-04-16 RX ADMIN — IPRATROPIUM BROMIDE AND ALBUTEROL SULFATE SCH VIAL: .5; 3 SOLUTION RESPIRATORY (INHALATION) at 17:07

## 2018-04-16 RX ADMIN — CARVEDILOL SCH MG: 6.25 TABLET, FILM COATED ORAL at 08:59

## 2018-04-16 RX ADMIN — IPRATROPIUM BROMIDE AND ALBUTEROL SULFATE SCH VIAL: .5; 3 SOLUTION RESPIRATORY (INHALATION) at 05:08

## 2018-04-16 RX ADMIN — BUDESONIDE AND FORMOTEROL FUMARATE DIHYDRATE SCH PUFF: 160; 4.5 AEROSOL RESPIRATORY (INHALATION) at 21:33

## 2018-04-16 RX ADMIN — BUMETANIDE SCH MG: 1 TABLET ORAL at 05:49

## 2018-04-16 RX ADMIN — TRAMADOL HYDROCHLORIDE PRN MG: 50 TABLET, FILM COATED ORAL at 21:35

## 2018-04-16 RX ADMIN — POLYETHYLENE GLYCOL 3350 SCH: 17 POWDER, FOR SOLUTION ORAL at 21:35

## 2018-04-16 RX ADMIN — TIOTROPIUM BROMIDE SCH CAP: 18 CAPSULE ORAL; RESPIRATORY (INHALATION) at 09:06

## 2018-04-16 RX ADMIN — CARVEDILOL SCH MG: 6.25 TABLET, FILM COATED ORAL at 17:32

## 2018-04-16 RX ADMIN — ASPIRIN SCH MG: 81 TABLET, COATED ORAL at 09:00

## 2018-04-16 RX ADMIN — APIXABAN SCH MG: 5 TABLET, FILM COATED ORAL at 21:34

## 2018-04-16 RX ADMIN — GABAPENTIN SCH MG: 300 CAPSULE ORAL at 21:35

## 2018-04-16 NOTE — PN
DATE OF SERVICE:  04/14/18



SUBJECTIVE:  

83 year old white female hospitalized in the swing bed for COPD and 
deterioration in physical endurance. The patient is a lot better. She is 
walking with the walker. She is walking quite a lot. 



REVIEW OF SYSTEMS: 

CONSTITUTIONAL: No night sweats. No fatigue, malaise, lethargy. No fever or 
chills.

HEENT: Eyes: No visual changes. No eye pain. No eye discharge. ENT: No runny 
nose. No epistaxis. No sinus pain. No sore throat. No odynophagia. No 
congestion.

RESPIRATORY: No cough, no congestion. No hemoptysis. Shortness of breath on 
minimal exertion. 

CARDIOVASCULAR: No angina symptoms. No CHF symptoms. No atypical chest pain for 
CAD. No palpitations. No orthopnea.

GASTROINTESTINAL: No abdominal pain. No nausea or vomiting. No diarrhea or 
constipation. No hematemesis. No hematochezia.

GENITOURINARY: No urgency. No frequency. No dysuria. No hematuria. No 
obstructive symptoms. No discharge. No pain. No significant abnormal bleeding.

MUSCULOSKELETAL: No musculoskeletal pain; no joint swelling. 

NEUROLOGICAL:  No headache. No neck pain. No syncope. No seizures. No dizziness.

PSYCHIATRIC: Not anxious. No depression. No suicidal thoughts. No homicidal 
thoughts.

SKIN:  No rash. No lesions. No wounds.

ENDOCRINE: No unexplained weight loss. No weight gain. 

HEMATOLOGIC/LYMPHATIC: No anemia. No purpura. No petechiae. No prolonged or 
excessive bleeding. No palpable lymph nodes.



PHYSICAL EXAMINATION: 

VITAL SIGNS:  Temperature 98.3, pulse 60, respiratory rate 14, blood pressure 
100/30 and pulse ox 99%. 

HEENT:  Head normocephalic, atraumatic.  Eyes: Extraocular muscles are intact.  
Pupils are equal, round and reactive to light and accommodation.  Ears:  No 
lesions.  Nose appeared normal. Throat: No exudate or erythema.

NECK: Supple. No JVD, no carotid bruit.  No lymphadenopathy or thyromegaly. 

LUNGS: Good breath sounds with good air entry. No wheezing. Clear to 
auscultation.  Percussion note normal.  Chest symmetrical. 

HEART:  S1, S2, no S3. No murmurs.  No cyanosis or clubbing.  No ascites.  
Pulses: Dorsalis pedis and posterior tibial pulses +1 to +2 both sides. 

ABDOMEN:  Soft.  Nontender.  Bowel sounds active. No CVA tenderness. No mass 
felt. 

EXTREMITIES:  No edema.  Full range of motion of all extremities, equal. 

NEUROLOGIC:  No focal deficit. Cranial nerves II through XII are grossly 
intact.  No headache, no double vision or headache. 

SKIN: Not dry.  Intact.  Turgor - normal. 

LYMPHATIC:  No palpable lymph nodes/no lymphedema. 

MUSCULOSKELETAL:  Normal joints with no swelling. Muscle tone is normal. 



LABS: 

Hgb 8.7, hct 26, WBC 4,000 normal differential, creatinine 1.1, BUN 38. This is 
quite remarkably improvement. The patient is doing a lot better. Her potassium 
is stable. 



ASSESSMENT:

1. CHF is under control. 

2. COPD under control

3. Dementia stable. . 



PLAN:

1. The patient endurance is increasing. 

2. Advised to eat. 

3. Protein intake advanced to be increased. 

4. Ascites and CHF under control. 

5. Continue the same management. 

6. The patient's physical rehab is helping her a lot



TIME SPENT:  More than 30 minutes. 



Plan and coordination of the patient's care discussed in the presence of nurse. 
JULES

## 2018-04-16 NOTE — PN
DATE OF SERVICE:  04/16/18



SUBJECTIVE:  

The patient was seen on swing bed followup. The patient's condition has 
improved. She is more motivated, walks really good. No respiratory distress at 
rest, wheezing. She is able to do some of the things of activity of daily 
living without being short of breath. Condition it stabilizing. PT/OT helping. 
The patient was seen and examined with Nurse Practitioner.  





TIME SPENT:  More than 30 minutes. 



Plan and coordination of the patient's care discussed in the presence of nurse. 
JULES

## 2018-04-16 NOTE — PCM.PROG
Attending Provider: 


ATTENDING PROVIDER:


Dr. MINDY FERNANDEZ


This patient is seen with Ebony Amaro, Nurse Practitioner.





DATE OF SERVICE:  04/16/18





SUBJECTIVE: 


This 83 year old WHITE/ F was hospitalized 04/06/18.  The patient is 

lying in bed resting comfortably.   She was up and walked over weekend. 

Hemoglobin dropped today.   





REVIEW OF SYSTEMS:


CONSTITUTIONAL:  Weakness.  No night sweats. No malaise, lethargy. No fever or 

chills.


HEENT: Eyes: No visual changes. No eye pain. No eye discharge. ENT: No runny 

nose. No epistaxis. No sinus pain. No odynophagia. No congestion.


RESPIRATORY: No cough, no congestion. No hemoptysis. Shortness of breath.


CARDIOVASCULAR: No angina symptoms. No CHF symptoms. No atypical chest pain for 

CAD. No palpitations. No orthopnea..


GASTROINTESTINAL: No abdominal pain. No nausea or vomiting. No diarrhea or 

constipation. No hematemesis. No hematochezia.


GENITOURINARY: No urgency. No frequency. No dysuria. No hematuria. No 

obstructive symptoms. No discharge. No pain. No significant abnormal bleeding.


MUSCULOSKELETAL: No musculoskeletal pain; no joint swelling. 


NEUROLOGICAL: Awake, alert, oriented to time, place and person. No headache. No 

neck pain. No syncope. No seizures. No dizziness.


PSYCHIATRIC: Not anxious. No depression. No suicidal thoughts. No homicidal 

thoughts.


SKIN:  No rash. No lesions. No wounds.


ENDOCRINE: No unexplained weight loss. No weight gain. 


HEMATOLOGIC/LYMPHATIC: No anemia. No purpura. No petechiae. No prolonged or 

excessive bleeding. No palpable lymph nodes.





PHYSICAL EXAMINATION:


GENERAL:  The patient is awake, alert and oriented, lying in bed in no 

distress. 


VITAL SIGNS:  Temperature 98.5 F, Pulse 68, Respiratory Rate 16, /63, 

Pulse Ox 97%


HEENT:  Head normocephalic, atraumatic.  Eyes: Extraocular muscles are intact.  

Pupils are equal, round and reactive to light and accommodation.  Ears:  No 

lesions.  Nose appeared normal. Throat: No exudate or erythema.


NECK: Supple. No JVD, no carotid bruit.  No lymphadenopathy or thyromegaly. 


LUNGS:  Diminished breath sounds bilaterally. Clear to auscultation.  

Percussion note normal.  Chest symmetrical. 


HEART:  S1, S2, no S3. No murmurs.  No cyanosis or clubbing.  No ascites.  

Pulses: Dorsalis pedis and posterior tibial pulses +1 to +2 both sides. 


ABDOMEN:  Soft.  Non-tender.  Bowel sounds active. No CVA tenderness. No mass 

felt. 


EXTREMITIES:  Trace edema.  Full range of motion of all extremities, equal. 


NEUROLOGIC:  No focal deficit. Cranial nerves II through XII are grossly 

intact.  No headache, no double vision or headache.  


SKIN: Not dry.  Intact.  Turgor-normal. 


LYMPHATIC:  No palpable lymph nodes/no lymphedema. 


MUSCULOSKELETAL:  Normal joints with no swelling. Muscle tone is normal. 








LAB REVIEW:





 04/16/18 04:30 





 04/16/18 04:30 











04/16/18 04:30: Sodium 139, Potassium 4.0, Chloride 107, Carbon Dioxide 25, 

Anion Gap 11.0, BUN 31 H, Creatinine 1.10, Estimated GFR (MDRD) 48.00, BUN/

Creatinine Ratio 28.18, Glucose 117 H, Calcium 8.1 L, Total Bilirubin 0.5, AST 

12 L, ALT 10 L, Total Protein 5.4 L, Albumin 3.2 L, Globulin 2.2, Albumin/

Globulin Ratio 1.45


04/16/18 04:30: WBC 3.89 L, RBC 3.02 L, Hgb 8.0 L, Hct 25.3 L, MCV 83.8, MCH 

26.5 L, MCHC 31.6 L, RDW Coeff of Sandra 18.9 H, Plt Count 106 L, Immature Gran % (

Auto) 0.5, Neut % (Auto) 63.1, Lymph % (Auto) 26.5, Mono % (Auto) 7.5, Eos % (

Auto) 2.1, Baso % (Auto) 0.3, Immature Gran # (Auto) 0.0, Neut # (Auto) 2.5, 

Lymph # (Auto) 1.0, Mono # (Auto) 0.3 L, Eos # (Auto) 0.1, Baso # (Auto) 0.0





ASSESSMENT:  


1.  RESPIRATORY FAILURE SEEMS TO BE UNDER CONTROL WITH GOOD OXYGENATION


2.  CHF CONTROLLED


3.  ANEMIA


4.  CHRONIC KIDNEY DISEASE, STABLE


5.  HYPERKALEMIA, STABLE


6.  REFLUX SYMPTOMS, WILL START PROTONIX





PLAN:


1.  Stool for occult blood


2.  Anemia profile if not done





Plan and coordination of the patient's care discussed in the presence of Case 

Manager and nurse.





CONDITION:  Stable











SCRIBED BY:


NAINA WARD  scribed while in presence of service performed by 

Dr. Fernandez/AINSLEY Corey on 04/16/18 ([id number])

## 2018-04-17 PROCEDURE — 30233N1 TRANSFUSION OF NONAUTOLOGOUS RED BLOOD CELLS INTO PERIPHERAL VEIN, PERCUTANEOUS APPROACH: ICD-10-PCS | Performed by: INTERNAL MEDICINE

## 2018-04-17 RX ADMIN — POLYETHYLENE GLYCOL 3350 SCH: 17 POWDER, FOR SOLUTION ORAL at 20:44

## 2018-04-17 RX ADMIN — TRAMADOL HYDROCHLORIDE PRN MG: 50 TABLET, FILM COATED ORAL at 20:42

## 2018-04-17 RX ADMIN — IPRATROPIUM BROMIDE AND ALBUTEROL SULFATE SCH VIAL: .5; 3 SOLUTION RESPIRATORY (INHALATION) at 17:07

## 2018-04-17 RX ADMIN — Medication SCH SYR: at 20:41

## 2018-04-17 RX ADMIN — LISINOPRIL SCH MG: 10 TABLET ORAL at 08:37

## 2018-04-17 RX ADMIN — SUCRALFATE SCH GM: 1 TABLET ORAL at 11:05

## 2018-04-17 RX ADMIN — ATORVASTATIN CALCIUM SCH MG: 20 TABLET, FILM COATED ORAL at 20:42

## 2018-04-17 RX ADMIN — APIXABAN SCH MG: 5 TABLET, FILM COATED ORAL at 08:35

## 2018-04-17 RX ADMIN — PANTOPRAZOLE SODIUM SCH MG: 40 TABLET, DELAYED RELEASE ORAL at 16:48

## 2018-04-17 RX ADMIN — PANTOPRAZOLE SODIUM SCH MG: 40 TABLET, DELAYED RELEASE ORAL at 05:39

## 2018-04-17 RX ADMIN — BUDESONIDE AND FORMOTEROL FUMARATE DIHYDRATE SCH PUFF: 160; 4.5 AEROSOL RESPIRATORY (INHALATION) at 08:37

## 2018-04-17 RX ADMIN — ASPIRIN SCH: 81 TABLET, COATED ORAL at 09:35

## 2018-04-17 RX ADMIN — SUCRALFATE SCH GM: 1 TABLET ORAL at 16:48

## 2018-04-17 RX ADMIN — BUDESONIDE AND FORMOTEROL FUMARATE DIHYDRATE SCH PUFF: 160; 4.5 AEROSOL RESPIRATORY (INHALATION) at 20:41

## 2018-04-17 RX ADMIN — CARVEDILOL SCH MG: 6.25 TABLET, FILM COATED ORAL at 08:35

## 2018-04-17 RX ADMIN — CARVEDILOL SCH MG: 6.25 TABLET, FILM COATED ORAL at 16:48

## 2018-04-17 RX ADMIN — TIOTROPIUM BROMIDE SCH CAP: 18 CAPSULE ORAL; RESPIRATORY (INHALATION) at 08:37

## 2018-04-17 RX ADMIN — BUMETANIDE SCH MG: 1 TABLET ORAL at 05:39

## 2018-04-17 RX ADMIN — GABAPENTIN SCH MG: 300 CAPSULE ORAL at 20:42

## 2018-04-17 RX ADMIN — SUCRALFATE SCH GM: 1 TABLET ORAL at 08:34

## 2018-04-17 RX ADMIN — IPRATROPIUM BROMIDE AND ALBUTEROL SULFATE SCH VIAL: .5; 3 SOLUTION RESPIRATORY (INHALATION) at 05:12

## 2018-04-17 RX ADMIN — POLYETHYLENE GLYCOL 3350 SCH: 17 POWDER, FOR SOLUTION ORAL at 08:36

## 2018-04-17 RX ADMIN — APIXABAN SCH MG: 5 TABLET, FILM COATED ORAL at 20:42

## 2018-04-17 RX ADMIN — LISINOPRIL SCH MG: 10 TABLET ORAL at 20:42

## 2018-04-17 NOTE — PCM.PROG
Attending Provider: 


ATTENDING PROVIDER:


Dr. MINDY FERNANDEZ


This patient is seen with Ebony Amaro, Nurse Practitioner.





DATE OF SERVICE:  04/17/18





SUBJECTIVE: 


This 83 year old WHITE/ F was hospitalized 04/06/18.   The patient is 

sitting on side of bed.  She is more short of breath. She has been unable to 

work with PT as much as she would like due to shortness of breath and weakness.

  





REVIEW OF SYSTEMS:


CONSTITUTIONAL: Pallor positive. Weakness. No night sweats. No malaise, 

lethargy. No fever or chills.


HEENT: Eyes: No visual changes. No eye pain. No eye discharge. ENT: No runny 

nose. No epistaxis. No sinus pain. No odynophagia. No congestion.


RESPIRATORY: No cough, no congestion. No hemoptysis.  No shortness of breath.


CARDIOVASCULAR: No angina symptoms. No CHF symptoms. No atypical chest pain for 

CAD. No palpitations. No orthopnea..


GASTROINTESTINAL: No abdominal pain. No nausea or vomiting. No diarrhea or 

constipation. No hematemesis. No hematochezia.


GENITOURINARY: No urgency. No frequency. No dysuria. No hematuria. No 

obstructive symptoms. No discharge. No pain. No significant abnormal bleeding.


MUSCULOSKELETAL: No musculoskeletal pain; no joint swelling. 


NEUROLOGICAL: Awake, alert, oriented to time, place and person. No headache. No 

neck pain. No syncope. No seizures. No dizziness.


PSYCHIATRIC: Not anxious. No depression. No suicidal thoughts. No homicidal 

thoughts.


SKIN:  No rash. No lesions. No wounds.


ENDOCRINE: No unexplained weight loss. No weight gain. 


HEMATOLOGIC/LYMPHATIC: No anemia. No purpura. No petechiae. No prolonged or 

excessive bleeding. No palpable lymph nodes.





PHYSICAL EXAMINATION:


GENERAL:  The patient is awake, alert and oriented, sitting on side of bed in 

no distress. Pale. 


VITAL SIGNS:  Temperature 97.8 F, Pulse 60, Respiratory Rate 24, /59, 

Pulse Ox 100%


HEENT:  Head normocephalic, atraumatic.  Eyes: Extraocular muscles are intact.  

Pupils are equal, round and reactive to light and accommodation.  Ears:  No 

lesions.  Nose appeared normal. Throat: No exudate or erythema.


NECK: Supple. No JVD, no carotid bruit.  No lymphadenopathy or thyromegaly. 


LUNGS:  Diminished breath sounds. Clear to auscultation.  Percussion note 

normal.  Chest symmetrical. 


HEART:  Irregular heart rate. S1, S2, no S3. No murmurs.  No cyanosis or 

clubbing.  No ascites.  Pulses: Dorsalis pedis and posterior tibial pulses +1 

to +2 both sides. 


ABDOMEN:  Soft.  Non-tender.  Bowel sounds active. No CVA tenderness. No mass 

felt. 


EXTREMITIES:  No edema.  Full range of motion of all extremities, equal. 


NEUROLOGIC:  No focal deficit. Cranial nerves II through XII are grossly 

intact.  No headache, no double vision or headache.  


SKIN: Not dry.  Intact.  Turgor-normal. 


LYMPHATIC:  No palpable lymph nodes/no lymphedema. 


MUSCULOSKELETAL:  Normal joints with no swelling. Muscle tone is normal. 








LAB REVIEW:





 04/17/18 04:45 





 04/17/18 04:45 











04/17/18 04:45: Sodium 143, Potassium 4.1, Chloride 110 H, Carbon Dioxide 26, 

Anion Gap 11.1, BUN 31 H, Creatinine 1.08, Estimated GFR (MDRD) 49.00, BUN/

Creatinine Ratio 28.70, Glucose 99, Calcium 8.5, Total Bilirubin 0.8, AST 11 L, 

ALT 11 L, Alkaline Phosphatase 50 L, Total Protein 5.2 L, Albumin 2.8 L, 

Globulin 2.4, Albumin/Globulin Ratio 1.17


04/17/18 04:45: WBC 4.04 L, RBC 2.81 L, Hgb 7.4 L, Hct 23.9 L, MCV 85.1, MCH 

26.3 L, MCHC 31.0 L, RDW Coeff of Sandra 18.5 H, Plt Count 99 L, Immature Gran % (

Auto) 0.7, Neut % (Auto) 55.3, Lymph % (Auto) 31.4, Mono % (Auto) 9.9, Eos % (

Auto) 2.5, Baso % (Auto) 0.2, Immature Gran # (Auto) 0.0, Neut # (Auto) 2.2, 

Lymph # (Auto) 1.3, Mono # (Auto) 0.4, Eos # (Auto) 0.1, Baso # (Auto) 0.0


04/16/18 20:26: Stl Occult Blood (IFOB) Positive, Stool Occult Blood #2 No 

specimen received, Stool Occult Blood #3 No specimen received


04/16/18 04:32: Transferrin 178 L


04/16/18 04:32: Vitamin B12 459


04/16/18 04:32: Iron 47 L, TIBC 226 L, % Saturation 21, Unsat Iron Binding 179, 

Ferritin 136.65, Folate 7.0


04/16/18 04:32: Reticulocyte % (Auto) 5.77, Absolute Retic 0.1754, Retic Hgb 

Equivalent 32.2


04/16/18 04:30: Alkaline Phosphatase 63





ASSESSMENT:  Please see below.


1.  ANEMIA, SYMPTOMATIC


1.  RESPIRATORY FAILURE SEEMS TO BE UNDER CONTROL WITH GOOD OXYGENATION


2.  CHF CONTROLLED


3.  CHRONIC KIDNEY DISEASE, STABLE


4.  HYPERKALEMIA, STABLE


5.  REFLUX SYMPTOMS, WILL START PROTONIX





PLAN:


1.  Type and cross 2 units


2.  Stop Aspirin


3.  Carafate three times a day





Plan and coordination of the patient's care discussed in the presence of Case 

Manager and nurse.








CONDITION:  Stable











SCRIBED BY:


NAINA WARD  scribed while in presence of service performed by 

Dr. Fernandez/AINSLEY Corey on 04/17/18 ([id number])

## 2018-04-18 PROCEDURE — 30233N1 TRANSFUSION OF NONAUTOLOGOUS RED BLOOD CELLS INTO PERIPHERAL VEIN, PERCUTANEOUS APPROACH: ICD-10-PCS | Performed by: INTERNAL MEDICINE

## 2018-04-18 RX ADMIN — BUDESONIDE AND FORMOTEROL FUMARATE DIHYDRATE SCH PUFF: 160; 4.5 AEROSOL RESPIRATORY (INHALATION) at 20:59

## 2018-04-18 RX ADMIN — GABAPENTIN SCH MG: 300 CAPSULE ORAL at 21:00

## 2018-04-18 RX ADMIN — IPRATROPIUM BROMIDE AND ALBUTEROL SULFATE SCH VIAL: .5; 3 SOLUTION RESPIRATORY (INHALATION) at 04:43

## 2018-04-18 RX ADMIN — CARVEDILOL SCH MG: 6.25 TABLET, FILM COATED ORAL at 08:45

## 2018-04-18 RX ADMIN — POLYETHYLENE GLYCOL 3350 SCH: 17 POWDER, FOR SOLUTION ORAL at 21:00

## 2018-04-18 RX ADMIN — IPRATROPIUM BROMIDE AND ALBUTEROL SULFATE SCH VIAL: .5; 3 SOLUTION RESPIRATORY (INHALATION) at 17:00

## 2018-04-18 RX ADMIN — Medication SCH SYR: at 05:44

## 2018-04-18 RX ADMIN — SUCRALFATE SCH GM: 1 TABLET ORAL at 05:44

## 2018-04-18 RX ADMIN — BUMETANIDE SCH MG: 1 TABLET ORAL at 05:44

## 2018-04-18 RX ADMIN — SUCRALFATE SCH GM: 1 TABLET ORAL at 16:51

## 2018-04-18 RX ADMIN — APIXABAN SCH MG: 5 TABLET, FILM COATED ORAL at 21:00

## 2018-04-18 RX ADMIN — LISINOPRIL SCH MG: 10 TABLET ORAL at 08:45

## 2018-04-18 RX ADMIN — Medication SCH SYR: at 12:29

## 2018-04-18 RX ADMIN — Medication SCH SYR: at 21:00

## 2018-04-18 RX ADMIN — PANTOPRAZOLE SODIUM SCH MG: 40 TABLET, DELAYED RELEASE ORAL at 05:44

## 2018-04-18 RX ADMIN — TRAMADOL HYDROCHLORIDE PRN MG: 50 TABLET, FILM COATED ORAL at 05:47

## 2018-04-18 RX ADMIN — CARVEDILOL SCH MG: 6.25 TABLET, FILM COATED ORAL at 16:51

## 2018-04-18 RX ADMIN — SUCRALFATE SCH GM: 1 TABLET ORAL at 11:33

## 2018-04-18 RX ADMIN — TIOTROPIUM BROMIDE SCH CAP: 18 CAPSULE ORAL; RESPIRATORY (INHALATION) at 09:02

## 2018-04-18 RX ADMIN — BUDESONIDE AND FORMOTEROL FUMARATE DIHYDRATE SCH PUFF: 160; 4.5 AEROSOL RESPIRATORY (INHALATION) at 09:02

## 2018-04-18 RX ADMIN — ATORVASTATIN CALCIUM SCH MG: 20 TABLET, FILM COATED ORAL at 21:00

## 2018-04-18 RX ADMIN — PANTOPRAZOLE SODIUM SCH MG: 40 TABLET, DELAYED RELEASE ORAL at 17:21

## 2018-04-18 RX ADMIN — POLYETHYLENE GLYCOL 3350 SCH: 17 POWDER, FOR SOLUTION ORAL at 08:45

## 2018-04-18 RX ADMIN — LISINOPRIL SCH MG: 10 TABLET ORAL at 21:00

## 2018-04-18 RX ADMIN — APIXABAN SCH MG: 5 TABLET, FILM COATED ORAL at 08:45

## 2018-04-18 RX ADMIN — TRAMADOL HYDROCHLORIDE PRN MG: 50 TABLET, FILM COATED ORAL at 21:04

## 2018-04-19 VITALS — TEMPERATURE: 97.4 F | SYSTOLIC BLOOD PRESSURE: 115 MMHG | DIASTOLIC BLOOD PRESSURE: 54 MMHG

## 2018-04-19 RX ADMIN — PANTOPRAZOLE SODIUM SCH MG: 40 TABLET, DELAYED RELEASE ORAL at 06:38

## 2018-04-19 RX ADMIN — POLYETHYLENE GLYCOL 3350 SCH: 17 POWDER, FOR SOLUTION ORAL at 08:27

## 2018-04-19 RX ADMIN — IPRATROPIUM BROMIDE AND ALBUTEROL SULFATE SCH VIAL: .5; 3 SOLUTION RESPIRATORY (INHALATION) at 04:56

## 2018-04-19 RX ADMIN — APIXABAN SCH MG: 5 TABLET, FILM COATED ORAL at 08:22

## 2018-04-19 RX ADMIN — BUMETANIDE SCH MG: 1 TABLET ORAL at 06:38

## 2018-04-19 RX ADMIN — LISINOPRIL SCH MG: 10 TABLET ORAL at 08:24

## 2018-04-19 RX ADMIN — SUCRALFATE SCH GM: 1 TABLET ORAL at 06:38

## 2018-04-19 RX ADMIN — SUCRALFATE SCH GM: 1 TABLET ORAL at 11:19

## 2018-04-19 RX ADMIN — Medication SCH SYR: at 06:38

## 2018-04-19 RX ADMIN — CARVEDILOL SCH MG: 6.25 TABLET, FILM COATED ORAL at 08:26

## 2018-04-19 RX ADMIN — BUDESONIDE AND FORMOTEROL FUMARATE DIHYDRATE SCH PUFF: 160; 4.5 AEROSOL RESPIRATORY (INHALATION) at 08:28

## 2018-04-19 RX ADMIN — TIOTROPIUM BROMIDE SCH CAP: 18 CAPSULE ORAL; RESPIRATORY (INHALATION) at 08:28

## 2018-04-19 NOTE — PCM.PROG
Attending Provider: 


ATTENDING PROVIDER:


Dr. MINDY FERNANDEZ


This patient is seen with Ebony Amaro, Nurse Practitioner.





DATE OF SERVICE:  04/19/18





SUBJECTIVE: 


This 83 year old WHITE/ F was hospitalized 04/06/18.  The patient is 

lying in bed, alert.  The patient states she is ready to go home.  Hemoglobin 

has dropped to 0.5 from yesterday. Discussed in detail Eliquis use and possible 

GI bleed vs discontinuation and risks with atrial fibrillation.  The patient is 

going to consider stopping Eliquis and will get back to us with her decision.  





REVIEW OF SYSTEMS:


CONSTITUTIONAL: Weakness as usual. No night sweats. No fatigue, malaise, 

lethargy. No fever or chills.


HEENT: Eyes: No visual changes. No eye pain. No eye discharge. ENT: No runny 

nose. No epistaxis. No sinus pain. No odynophagia. No congestion.


RESPIRATORY: No cough, no congestion. No hemoptysis.  Shortness of breath as 

usual. 


CARDIOVASCULAR: No angina symptoms. No CHF symptoms. No atypical chest pain for 

CAD. No palpitations. No orthopnea..


GASTROINTESTINAL: No abdominal pain. No nausea or vomiting. No diarrhea or 

constipation. No hematemesis. No hematochezia.


GENITOURINARY: No urgency. No frequency. No dysuria. No hematuria. No 

obstructive symptoms. No discharge. No pain. No significant abnormal bleeding.


MUSCULOSKELETAL: No musculoskeletal pain; no joint swelling. 


NEUROLOGICAL: Awake, alert, oriented to time, place and person. No headache. No 

neck pain. No syncope. No seizures. No dizziness.


PSYCHIATRIC: Not anxious. No depression. No suicidal thoughts. No homicidal 

thoughts.


SKIN:  No rash. No lesions. No wounds.


ENDOCRINE: No unexplained weight loss. No weight gain. 


HEMATOLOGIC/LYMPHATIC: No anemia. No purpura. No petechiae. No prolonged or 

excessive bleeding. No palpable lymph nodes.





PHYSICAL EXAMINATION:


GENERAL:  The patient is awake, alert and oriented, lying in bed in no 

distress. 


VITAL SIGNS:  Temperature 97.4 F, Pulse 66, Respiratory Rate 18, /54, 

Pulse Ox 99%


HEENT:  Head normocephalic, atraumatic.  Eyes: Extraocular muscles are intact.  

Pupils are equal, round and reactive to light and accommodation.  Ears:  No 

lesions.  Nose appeared normal. Throat: No exudate or erythema.


NECK: Supple. No JVD, no carotid bruit.  No lymphadenopathy or thyromegaly. 


LUNGS:  Diminished breath sounds. Clear to auscultation.  Percussion note 

normal.  Chest symmetrical. 


HEART:  Irregular heart rate. S1, S2, no S3. No murmurs.  No cyanosis or 

clubbing.  No ascites.  Pulses: Dorsalis pedis and posterior tibial pulses +1 

to +2 both sides. 


ABDOMEN:  Soft.  Non-tender.  Bowel sounds active. No CVA tenderness. No mass 

felt. 


EXTREMITIES:  Trace edema.  Full range of motion of all extremities, equal. 


NEUROLOGIC:  No focal deficit. Cranial nerves II through XII are grossly 

intact.  No headache, no double vision or headache.  


SKIN: Not dry.  Intact.  Turgor-normal. 


LYMPHATIC:  No palpable lymph nodes/no lymphedema. 


MUSCULOSKELETAL:  Normal joints with no swelling. Muscle tone is normal. 








LAB REVIEW:





 04/19/18 05:00 





 04/19/18 04:30 











04/19/18 05:00: WBC 3.73 L, RBC 3.19 L, Hgb 8.6 L, Hct 27.4 L, MCV 85.9, MCH 

27.0, MCHC 31.4 L, RDW Coeff of Sandra 17.8 H, Plt Count 87 L, Immature Gran % (

Auto) 0.8, Neut % (Auto) 62.7, Lymph % (Auto) 23.6, Mono % (Auto) 9.7, Eos % (

Auto) 2.7, Baso % (Auto) 0.5, Immature Gran # (Auto) 0.0, Neut # (Auto) 2.3, 

Lymph # (Auto) 0.9, Mono # (Auto) 0.4, Eos # (Auto) 0.1, Baso # (Auto) 0.0


04/19/18 04:30: Sodium 140, Potassium 4.2, Chloride 105, Carbon Dioxide 29, 

Anion Gap 10.2, BUN 30 H, Creatinine 1.12, Estimated GFR (MDRD) 47.00, BUN/

Creatinine Ratio 26.78, Glucose 96, Calcium 8.6, Total Bilirubin 1.7 H, AST 15, 

ALT 8 L, Alkaline Phosphatase 52 L, Total Protein 5.4 L, Albumin 2.9 L, 

Globulin 2.5, Albumin/Globulin Ratio 1.16





ASSESSMENT: 


1.  Respiratory failure, under control. 


2.  CHF under control.


3.  Atrial fibrillation.


4.  Anemia





PLAN:


1.  D/C home today


2.  Recheck Monday, CBC at hospital and see in office


3.  Continue Carafate three times a day and Protonix 40 mg b.i.d. 


4.  She declines Home Health for PT at this time. 





Plan and coordination of the patient's care discussed in the presence of Case 

Manager and nurse.





CONDITION:  Stable











SCRIBED BY:


NAINA WARD  scribed while in presence of service performed by 

Dr. Fernandez/AINSLEY Corey on 04/19/18 ([id number])

## 2018-04-19 NOTE — CM.DICTOOL
ADMISSION: 


04/06/18 13:33





DISCHARGE: 


4/19/18





FINAL DIAGNOSIS


FUNCTIONAL DECLINE R/T RESPIRATORY FAILURE 


RESPIRATORY FAILURE UNDER CONTROL


CHF UNDER CONTROL


ANEMIA - IMPROVED


NON STEMI


MILD DEHYDRATION





HISTORY OF:


S/P BILATERAL CATARACT SURGERY IN 94


AMI X 2


CAD


DYSLIPIDEMIA


A-FIB


STENTS IN 2009 AND 2012


CHRONIC RESPIRATORY FAILURE, OXYGEN AT HOME


CHRONIC BRONCHITIS


S/P COLON SURGERY WITH COLOSTOMY AND THEN REVERSAL


STAGE 2 CKD


OSTEOARTHRITIS


S/P BACK SURGERY








LAST VITALS








Temp Pulse Resp BP Pulse Ox


 


 97.4 F L  66   20   115/54 L  99 


 


 04/19/18 06:00  04/19/18 06:00  04/19/18 08:00  04/19/18 06:00  04/19/18 06:00








CONTINUE THESE MEDICATIONS AT HOME


Albuterol/Ipratropium (Duoneb)  1 vial NEB RTBID [facility]


   Last Admin: 04/19/18 04:56 Dose:  1 vial


Apixaban (Eliquis)  2.5 mg PO Q12HR [facility]


   Last Admin: 04/19/18 08:22 Dose:  2.5 mg


Atorvastatin Calcium (Lipitor)  20 mg PO BEDTIME [facility]


   Last Admin: 04/18/18 21:00 Dose:  20 mg


Budesonide/Formoterol Fumarate (Symbicort 160-4.5 Mcg Inhaler)  2 puff IH BID 

[facility]


   Last Admin: 04/19/18 08:28 Dose:  2 puff


Bumetanide (Bumex)  0.5 mg PO QDAC [facility]


   Last Admin: 04/19/18 06:38 Dose:  0.5 mg


Carvedilol (Coreg)  6.25 mg PO BIDWM [facility]


   Last Admin: 04/19/18 08:26 Dose:  6.25 mg


Gabapentin (Neurontin)  300 mg PO BEDTIME [facility]


   Last Admin: 04/18/18 21:00 Dose:  300 mg


Lisinopril (Zestril)  5 mg PO BID [facility]


   Last Admin: 04/19/18 08:24 Dose:  5 mg


Pantoprazole Sodium (Protonix)  40 mg PO BIDAC [facility]


   Last Admin: 04/19/18 06:38 Dose:  40 mg


Polyethylene Glycol (Miralax)  17 gm PO BID [facility]


   Last Admin: 04/19/18 08:27 Dose:  Not Given


Sucralfate (Carafate)  1 gm PO AC [facility]


   Last Admin: 04/19/18 06:38 Dose:  1 gm


Tiotropium Bromide (Spiriva)  1 cap IH DAILY [facility]


   Last Admin: 04/19/18 08:28 Dose:  1 cap


Tramadol HCl (Ultram)  50 mg PO Q8H PRN


   PRN Reason: Analgesia


   Last Admin: 04/18/18 21:04 Dose:  50 mg





STOP THESE MEDICATIONS:


ASPIRIN 81MG DAILY AM


FAMOTIDINE 20MG BID PRN





ALLERGIES


No Known Allergies Allergy (Verified 02/15/18 11:04)





NEW PRESCRIPTIONS: 


NEW MEDICATIONS:


1. CARAFATE 1GM TAKE 1 TABLET 3 TIMES A DAY.


2. PROTONIX 40MG TAKE 1 TABLET 2 TIMES A DAY.





SMOKING: 


N/A





DISEASE SPECIFIC EDUCATION: 


RESPIRATORY FAILURE


AMI


A-FIB


ELIQUIS


GI BLEED


ACTIVITY 


DIET


HOME HEALTH





LAB REVIEW:





 04/19/18 05:00 





 04/19/18 04:30 











04/19/18 05:00: WBC 3.73 L, RBC 3.19 L, Hgb 8.6 L, Hct 27.4 L, MCV 85.9, MCH 

27.0, MCHC 31.4 L, RDW Coeff of Snadra 17.8 H, Plt Count 87 L, Immature Gran % (

Auto) 0.8, Neut % (Auto) 62.7, Lymph % (Auto) 23.6, Mono % (Auto) 9.7, Eos % (

Auto) 2.7, Baso % (Auto) 0.5, Immature Gran # (Auto) 0.0, Neut # (Auto) 2.3, 

Lymph # (Auto) 0.9, Mono # (Auto) 0.4, Eos # (Auto) 0.1, Baso # (Auto) 0.0


04/19/18 04:30: Sodium 140, Potassium 4.2, Chloride 105, Carbon Dioxide 29, 

Anion Gap 10.2, BUN 30 H, Creatinine 1.12, Estimated GFR (MDRD) 47.00, BUN/

Creatinine Ratio 26.78, Glucose 96, Calcium 8.6, Total Bilirubin 1.7 H, AST 15, 

ALT 8 L, Alkaline Phosphatase 52 L, Total Protein 5.4 L, Albumin 2.9 L, 

Globulin 2.5, Albumin/Globulin Ratio 1.16





PLAN: 


DISCHARGE HOME TODAY 4/19/18.





CONTINUE HOME MEDICATIONS PER NURSING SHEETS EXCEPT:


1. STOP THE FAMOTIDINE.


2. STOP ASPIRIN





NEW MEDICATIONS:


1. CARAFATE 1GM TAKE 1 TABLET 3 TIMES A DAY.


2. PROTONIX 40MG TAKE 1 TABLET 2 TIMES A DAY.





DIET: AS TOLERATED.





ACTIVITY: GRADUALLY RESUME ACTIVITY. KEEP LEGS ELEVATED WHEN SITTING. 





FOLLOW UP WITH DR. SINGER ON MONDAY APRIL 23RD AT 1100AM. GET LAB WORK DONE AT 

[facility] AS OUTPATIENT BEFORE YOUR APPOINTMENT WITH DR. SINGER. 

IF UNABLE TO KEEP APPOINTMENT CALL TO RESCHEDULE. [phone].








PATIENT IS A FULL CODE.


SITTING UP IN BED. ALERT AND ORIENTED X 4. CHA SCHMITZ INTO SEE PATIENT. 

PATIENT STATES SHE IS FEELING BETTER AND WANTS TO GO HOME. CHA SCHMITZ 

DISCUSSED PLAN OF CARE INCLUDING ELIQUIS CAUSING H/H TO DECLINE - EXPLAINED 

TAKING THE ELIQUIS WILL CAUSE HER TO BE ANEMIC AND NEED FOR BLOOD TRANSFUSIONS 

AND STOPPING THE ELIQUIS MAY INCREASE RISK OF STROKE DUE TO THE A-FIB. 

REQUESTED PATIENT TO THINK ABOUT WHETHER SHE WANTS TO CONTINUE THE ELIQUIS AND 

PERHAPS NEED BLOOD OR STOP THE ELIQUIS. ALSO DISCUSSED DISCHARGE INSTRUCTIONS. 

PATIENT VERBALIZES UNDERSTANDING OF ALL DISCUSSION. APPETITE IS FAIR. VITAL 

SIGNS ARE STABLE. HAS BEEN AFEBRILE. POX 99% ON O2 AT 3L/C. HEART TONES ARE 

REGULAR. LUNGS ARE CLEAR BUT DIMINISHED. NO COUGH NOTED. IS DYSPNEIC WITH 

ACTIVITY. ABDOMEN IS SOFT, NON-TENDER WITH BOWEL SOUNDS POSITIVE IN ALL 4 

QUADS. LAST BM 4/16/18. PEDAL PULSES ARE POSITIVE WITH 2+ EDEMA TO BILATERAL 

LOWER EXTREMITIES. ENCOURAGED TO KEEP FEET ELEVATED AS MUCH AS POSSIBLE. SKIN 

IS INTACT. HAS SALINE LOCK IN RIGHT AC SITE IS CLEAR. IS A STANDBY ASSIST WITH 

USE OF ROLLING WALKER. GAIT IS STEADY AND SLOW. 





 FURTHER DISCUSSED ELIQUIS USE WITH PATIENT. SHE DOES NOT WANT TO 

MAKE A DECISION AT THIS TIME AS WHETHER TO TAKE OR STOP. PATIENT STATES TO 

RESUME IT AND THEN SHE CAN STOP IT IF SHE WANTS AT HOME. INFORMED PATIENT IF 

SHE STOPS IT, TO NOTIFY DR. SINGER. PATIENT VERBALIZES UNDERSTANDING AND 

AGREEMENT. 








_________________________


DR. MINDY SINGER MD








_________________________


CHA SCHMITZ

## 2018-04-20 NOTE — PN
DATE OF SERVICE:  04/17/18



SUBJECTIVE:  

The patient was seen and examined with Nurse Practitioner. The patient's 
condition has improved and her respiratory status has improved remarkably. 
Appetite has improved. She walks much better. The physical therapy has helped 
her a lot. Hgb dropped to 7.6 so she will be given two units of packed red 
cells. The patient's Eliquis and Aspirin will be on hold. The patient's stool 
occult positive. It is going to be difficult to treat this patient with blood 
thinners until we find out the source her blood loss. 



TIME SPENT:  More than 30 minutes. 



Plan and coordination of the patient's care discussed in the presence of nurse. 
JULES

## 2018-04-23 NOTE — DS
DATE OF SERVICE:  04/19/18



FINAL DIAGNOSIS: 

1.  FUNCTIONAL DECLINE R/T RESPIRATORY FAILURE

2.  RESPIRATORY FAILURE UNDER CONTROL

3.  CHF, UNDER CONTROL

4.  ANEMIA, IMPROVED

5.  NON STEMI

6.  MILD DEHYDRATION

7.  HISTORY OF S/P BILATERAL CATARACT SURGERY IN 1994

8.  AMI TIMES TWO

9.  CAD

10. DYSLIPIDEMIA

11. A-FIBRILLATION

12. STENTS IN 2009 AND 2012

13. CHRONIC RESPIRATORY FAILURE, OXYGEN AT HOME

14. CHRONIC BRONCHITIS

15. S/P COLON SURGERY WITH COLOSTOMY AND THEN REVERSAL

16. STAGE 2 CHRONIC KIDNEY DISEASE

17. OSTEOARTHRITIS

18. S/P BACK SURGERY



DISCHARGE INSTRUCTIONS:

Followup appointment with Dr. Fernandez on Monday, April 23rd at 11 a.m.  Get lab 
work done at [facility] as outpatient before your appointment 
with Dr. Fernandez.  If unable to keep appointment, call to reschedule [phone]
).



MEDICATIONS AT DISCHARGE:

Albuterol/Ipratropium (Duoneb)one vial neb RT b.i.d. HITESH

Apixaban (Eliquis) 2.5 mg p.o. q.12hr HITESH

Atorvastatin (Lipitor) 20 mg p.o. bedtime HITESH

Budesonide/Formoterol (Symbicort 160-4.5 mcg inhaler) two puff IH b.i.d. HITESH

Bumetanide (Bumex) 0.5 mg p.o. q.d a.c. HITESH

Carvedilol (Coreg) 6.25 mg p.o. b.i.d. with meal HITESH

Gabapentin (Neurontin) 300 mg p.o. bedtime HITESH

Lisinopril (Zestril) 5 mg p.o. b.i.d. HITESH

Pantoprazole (Protonix) 40 mg p.o. b.i.d. a.c. HITESH

Polyethylene Glycol (Miralax) 17 gm p.o. b.i.d. HITESH

Sucralfate (Carafate) 1 gm p.o. a.c. HITESH

Tiotropium Bromide (Spiriva) one cap IH daily HITESH

Tramadol (Ultram) 50 mg p.o. q.8h p.r.n.



STOP THESE MEDICATIONS:

Aspirin 81 mg daily a.m.

Famotidine 20 mg b.i.d. p.r.n.



NEW PRESCRIPTIONS:

New Medications:

1.  Carafate 1 gm take one tablet three times a day

2.  Protonix 40 mg take one tablet two times a day



DIET INSTRUCTIONS: 

As tolerated



ACTIVITY:

Gradually resume activity.  Keep legs elevated when sitting. 



SMOKING:

N/A



DISEASE SPECIFIC EDUCATION:

Respiratory Failure

AMI

Atrial fibrillation

Eliquis

GI bleed

Activity

Diet

Home Health



HOSPITAL COURSE:

83-year-old female who had recently been hospitalized at [facility] with 
acute respiratory failure. She does suffer from chronic respiratory failure 
with a history of CHF, atrial fibrillation. She was admitted here with 
generalized weakness to undergo physical and occupational therapy, energy 
conservation due to her respiratory status. She has met most of her goals 
during therapy during the course of her hospital stay here. She was on 
nebulizer treatments, scheduled, Xopenex as well as continued her inhalers. All 
of her home medications were continued. As she did experience an episode last 
week around the 12th where here hemoglobin dropped to 7.4, she did become even 
more short of breath and weak. She was pale. She is on Eliquis for a history of 
atrial fibrillation. She was transfused with 2 units of blood. Her hemoglobin 
improved to 9.1. Today on day of discharge it is down to 8.6. Her stool was 
positive for blood. We have discussed in great detail the risks of bleeding 
associated with Eliquis along with the risk of stroke associated with stopping 
the Eliquis. At this time, the patient wishes to continue the Eliquis. She has 
been started on Protonix 40 mg b.i.d. along with Carafate t.i.d. before meals. 
She also has a history of chronic kidney disease and did experience some acute 
on chronic renal failure shortly after admission. Due to her CHF she was given 
IV Lasix 40 mg one time dose. Her BUN did increase to 67, creatinine up to 2. 
She was for 48 hours given slow IV rehydration at 50 cc/hr. Her kidney function 
has improved. Today, on day of discharge, BUN 30, creatinine 1.12, sodium 140, 
potassium 4.2; hemoglobin 8.6, hematocrit 27.4, platelets 87,000.  The patient 
is up and about. She has been doing physical therapy. She has a history of leg 
edema associated with her CHF for which she has trace edema at this time. Her 
respiratory failure and CHF are under control. Her anemia has improved. She is 
instructed to stop her aspirin, not to use any other NSAIDs. We will do a CBC 
before her followup appointment in the office on Monday in order to make sure 
she has not lost any more blood. Again at this time, the patient has stated 
that she would like to continue the Eliquis and has to climb to stop it. She 
also, at this time, declines home health referral for PT and OT at home. She 
wishes to go home today. She is in stable condition. Vital signs: Temperature 
97.4, heart rate 66, respirations 20, /54, pulse ox 99% on 2L. We will 
discharge her and followup with her next Monday. 



TIME SPENT:  More than 60 minutes. 
JULES

## 2018-04-27 ENCOUNTER — HOSPITAL ENCOUNTER (EMERGENCY)
Dept: HOSPITAL 58 - ED | Age: 83
Discharge: ANOTHER HOSPITAL | End: 2018-04-27

## 2018-04-27 VITALS — BODY MASS INDEX: 32.3 KG/M2

## 2018-04-27 VITALS — DIASTOLIC BLOOD PRESSURE: 42 MMHG | SYSTOLIC BLOOD PRESSURE: 125 MMHG

## 2018-04-27 VITALS — TEMPERATURE: 97.2 F

## 2018-04-27 DIAGNOSIS — I10: ICD-10-CM

## 2018-04-27 DIAGNOSIS — R17: ICD-10-CM

## 2018-04-27 DIAGNOSIS — I48.91: ICD-10-CM

## 2018-04-27 DIAGNOSIS — R53.83: ICD-10-CM

## 2018-04-27 DIAGNOSIS — I50.9: ICD-10-CM

## 2018-04-27 DIAGNOSIS — K92.1: ICD-10-CM

## 2018-04-27 DIAGNOSIS — D61.818: Primary | ICD-10-CM

## 2018-04-27 DIAGNOSIS — R11.0: ICD-10-CM

## 2018-04-27 DIAGNOSIS — R06.02: ICD-10-CM

## 2018-04-27 DIAGNOSIS — R10.12: ICD-10-CM

## 2018-04-27 DIAGNOSIS — D64.9: ICD-10-CM

## 2018-04-27 DIAGNOSIS — R53.1: ICD-10-CM

## 2018-04-27 DIAGNOSIS — J44.9: ICD-10-CM

## 2018-04-27 DIAGNOSIS — Z79.899: ICD-10-CM

## 2018-04-27 DIAGNOSIS — R00.0: ICD-10-CM

## 2018-04-27 PROCEDURE — 96374 THER/PROPH/DIAG INJ IV PUSH: CPT

## 2018-04-27 PROCEDURE — 85025 COMPLETE CBC W/AUTO DIFF WBC: CPT

## 2018-04-27 PROCEDURE — 85008 BL SMEAR W/O DIFF WBC COUNT: CPT

## 2018-04-27 PROCEDURE — 99285 EMERGENCY DEPT VISIT HI MDM: CPT

## 2018-04-27 PROCEDURE — 93010 ELECTROCARDIOGRAM REPORT: CPT

## 2018-04-27 PROCEDURE — 84145 PROCALCITONIN (PCT): CPT

## 2018-04-27 PROCEDURE — 82550 ASSAY OF CK (CPK): CPT

## 2018-04-27 PROCEDURE — 80053 COMPREHEN METABOLIC PANEL: CPT

## 2018-04-27 PROCEDURE — 36415 COLL VENOUS BLD VENIPUNCTURE: CPT

## 2018-04-27 PROCEDURE — 82272 OCCULT BLD FECES 1-3 TESTS: CPT

## 2018-04-27 PROCEDURE — 87040 BLOOD CULTURE FOR BACTERIA: CPT

## 2018-04-27 PROCEDURE — 93005 ELECTROCARDIOGRAM TRACING: CPT

## 2018-04-27 PROCEDURE — 85730 THROMBOPLASTIN TIME PARTIAL: CPT

## 2018-04-27 PROCEDURE — 82803 BLOOD GASES ANY COMBINATION: CPT

## 2018-04-27 PROCEDURE — 85610 PROTHROMBIN TIME: CPT

## 2018-04-27 PROCEDURE — 84484 ASSAY OF TROPONIN QUANT: CPT

## 2018-04-27 NOTE — ED.PDOC
General


ED Provider: 


Dr. ROSENDO TAYLOR





Chief Complaint: GI Bleed


Stated Complaint: LOWER GI BLEED


Time Seen by Physician: 13:30 (pt reports of weakness, fatiuge and shortness of 

breath lower GI bleed)


Mode of Arrival: Ambulance


Information Source: Patient


Exam Limitations: No limitations


Primary Care Provider: 


MINDY SINGER





Nursing and Triage Documentation Reviewed and Agree: Yes [facility]


Reviewed sepsis parameters & appropriate labs ordered?: No


System Inflammatory Response Syndrome: Not Applicable


Sepsis Protocol: 


For patient's 13 years and over:





Temp is 96.8 and below  and greater


Pulse >90 BPM


Resp >20/minute


Acutely Altered Mental Status





Are patient's symptoms suggestive of a new infection, such as:


   -Pneumonia


   -Skin, Soft Tissue


   -Endocarditis


   -UTI


   -Bone, Joint Infection


   -Implantable Device


   -Acute Abdominal Infection


   -Wound Infection


   -Meningitis


   -Blood Stream Catheter Infection


   -Unknown





System Inflammatory Response Syndrome: Not Applicable





GI Complaint Exam





- GI Bleed Complaint/Exam


Patient Complains of: Reports: Rectal bleeding


Onset/Duration: CHRONIC . NO GI BLOOD NOTED TODAY


Symptoms Are: Resolved


Episodes Lasting: Days


Severity: Reports: Blood-streaked stool


Location of Pain: Reports: None


Aggravating: Reports: Bowel movement


Alleviating: Reports: None


Associated Signs and Symptoms: Reports: Weakness.  Denies: Back Pain, Pallor, 

Dizziness, Syncope, Constipation, Nausea, Rectal pain, Bruising, Weight loss, 

Recent abnormal coags


Related History: Reports: Similar episode


GI Bleed Risk Factors: Reports: Aspirin use


Recent Colonoscopy: No (PER PT)


Recent EGD: No (PER PT)


Abdominal Findings: Present: None (PER PT )


Rectal Exam: Present: Normal Findings (NO GROSS BLOOD DONE WITH PT'S NURSE SAT 

BEDSIDE )


Differential Diagnoses: Blood Dyscrasia, Ulcerative Colitis, Crohn's Disease





Review of Systems





- Review Of Systems


Constitutional: Reports: Malaise, Weakness


Eyes: Reports: No symptoms


Ears, Nose, Mouth, Throat: Reports: No symptoms


Respiratory: Reports: Cough, Short of air


Cardiac: Reports: No symptoms


GI: Reports: Rectal bleeding


: Reports: No symptoms


Musculoskeletal: Reports: No symptoms


Skin: Reports: No symptoms


Neurological: Reports: No symptoms


Endocrine: Reports: No symptoms


Hematologic/Lymphatic: Reports: No symptoms


All Other Systems: Reviewed and Negative





Past Medical History





- Past Medical History


Previously Healthy: No


Endocrine: Reports: Unknown


Cardiovascular: Reports: Hypertension, CHF, A-Fib, Unknown


Respiratory: Reports: COPD


Hematological: Reports: None


Gastrointestinal: Reports: None


Genitourinary: Reports: None


Neuro/Psych: Reports: None


Musculoskeletal: Reports: None


Cancer: Reports: None


Last Menstrual Period: n/A





- Surgical History


General Surgical History: Reports: Unknown





- Family History


Family History: Reports: Unknown





- Social History


Smoking Status: Former smoker


Hx Substance Use: No


Alcohol Screening: None





- Immunizations


Tetanus Shot up to Date: Yes


Pneumococcal Vaccine up to Date: No





Physical Exam





- Physical Exam


Appearance: Ill-appearing, Well-nourished


Ill-appearing: Mild


Pain Distress: Mild


Eyes: ANITA, EOMI, Conjunctiva clear


ENT: Ears normal, Nose normal, Oropharynx normal


Respiratory: Airway patent, Breath sounds clear, Breath sounds equal, 

Respirations nonlabored


Cardiovascular: RRR, Pulses normal, No rub, No murmur


GI/: Soft, Nontender, No masses, Bowel sounds normal, No Organomegaly


Musculoskeletal: Normal strength, ROM intact, No edema, No calf tenderness


Skin: Warm, Dry, Normal color


Neurological: Sensation intact, Motor intact, Reflexes intact, Cranial nerves 

intact, Alert, Oriented


Psychiatric: Affect appropriate, Mood appropriate





Interpretation





- Radiology Interpretation


Radiology Interpretation By: Radiologist


Radiology Results: No acute changes


Exam Interpreted: CXR





- Cardiac Monitor


Rhythm: Other (AFIB WITH RVR ANTEROLATERAL WALL ISCHEMIA)





Re-Evaluation





- Re-Evaluation


Time of Re-Evaluation: 14:00


Status: Unchanged


Vital Signs Stable: Yes


Pain Level: 0


Appearance: NAD


Lungs: Clear


Skin: Warm and Dry


Neuro: Alert and Oriented X3


CV: Other (TACHYCARDIC)





- Re-Evaluation


Time of Re-Evaluation: 14:36


Status: Unchanged


Vital Signs Stable: Yes


Pain Level: 0 SEE ABOVE NO CHANGES NO GI BLEED IN ED 





Physician Notification





- Case Discussed


Physician Notified: PMD


Time of Notification: 14:36 (TRANSFER NOW)





Critical Care Note





- Critical Care Note


Total Time (mins): 0





Course





- Course


Hematology/Chemistry: 


 04/27/18 13:45





 04/27/18 13:50


Orders, Labs, Meds: 


Lab Review











  04/27/18 04/27/18 04/27/18





  13:30 13:45 13:45


 


WBC   2.90 L 


 


RBC   1.71 L 


 


Hgb   4.8 L* 


 


Hct   14.3 L* 


 


MCV   83.6 


 


MCH   28.1 


 


MCHC   33.6 


 


RDW Coeff of Sandra   16.9 H 


 


Plt Count   77 L 


 


Immature Gran % (Auto)   1.4 


 


Neut % (Auto)   36.9 


 


Lymph % (Auto)   47.9 


 


Mono % (Auto)   12.8 H 


 


Eos % (Auto)   1.0 


 


Baso % (Auto)   0.0 


 


Immature Gran # (Auto)   0.0 


 


Neut # (Auto)   1.1 L 


 


Lymph # (Auto)   1.4 


 


Mono # (Auto)   0.4 


 


Eos # (Auto)   0.0 


 


Baso # (Auto)   0.0 


 


Anisocytosis   Not present 


 


Microcytosis   3+ 


 


PT    12.7 H


 


INR    1.28


 


APTT    22.4 L


 


Puncture Site  R brach  


 


O2 Saturation  98.0  


 


ABG pH  7.41  


 


ABG pCO2  33.0 L  


 


ABG pO2  100.0  


 


ABG HCO3  21 L  


 


ABG Total CO2  22  


 


ABG Base Excess  -4 L  


 


Chon Test  +  


 


O2 Delivery Device  Nc  


 


Oxygen Liter Flow  4.00  


 


FiO2 %  36.0  


 


Sodium   


 


Potassium   


 


Chloride   


 


Carbon Dioxide   


 


Anion Gap   


 


BUN   


 


Creatinine   


 


Estimated GFR (MDRD)   


 


BUN/Creatinine Ratio   


 


Glucose   


 


Calcium   


 


Total Bilirubin   


 


AST   


 


ALT   


 


Alkaline Phosphatase   


 


Total Creatine Kinase   


 


Troponin I   


 


Total Protein   


 


Albumin   


 


Globulin   


 


Albumin/Globulin Ratio   


 


Procalcitonin   


 


Stl Occult Blood (IFOB)   


 


Stool Occult Blood #2   


 


Stool Occult Blood #3   














  04/27/18 04/27/18 04/27/18





  13:45 13:45 13:50


 


WBC   


 


RBC   


 


Hgb   


 


Hct   


 


MCV   


 


MCH   


 


MCHC   


 


RDW Coeff of Sandra   


 


Plt Count   


 


Immature Gran % (Auto)   


 


Neut % (Auto)   


 


Lymph % (Auto)   


 


Mono % (Auto)   


 


Eos % (Auto)   


 


Baso % (Auto)   


 


Immature Gran # (Auto)   


 


Neut # (Auto)   


 


Lymph # (Auto)   


 


Mono # (Auto)   


 


Eos # (Auto)   


 


Baso # (Auto)   


 


Anisocytosis   


 


Microcytosis   


 


PT   


 


INR   


 


APTT   


 


Puncture Site   


 


O2 Saturation   


 


ABG pH   


 


ABG pCO2   


 


ABG pO2   


 


ABG HCO3   


 


ABG Total CO2   


 


ABG Base Excess   


 


Chon Test   


 


O2 Delivery Device   


 


Oxygen Liter Flow   


 


FiO2 %   


 


Sodium    134 L


 


Potassium    3.4 L


 


Chloride    99


 


Carbon Dioxide    20 L


 


Anion Gap    18.4


 


BUN    53 H


 


Creatinine    1.41 H


 


Estimated GFR (MDRD)    36.00


 


BUN/Creatinine Ratio    37.58


 


Glucose    148 H


 


Calcium    8.6


 


Total Bilirubin    3.4 H


 


AST    17


 


ALT    8 L


 


Alkaline Phosphatase    55


 


Total Creatine Kinase    8


 


Troponin I    0.0730


 


Total Protein    6.2


 


Albumin    3.1 L


 


Globulin    3.1


 


Albumin/Globulin Ratio    1.00


 


Procalcitonin  0.49  


 


Stl Occult Blood (IFOB)   Negative 


 


Stool Occult Blood #2   No specimen received 


 


Stool Occult Blood #3   No specimen received 








Orders











 Category Date Time Status


 


 ABG DRAW REQUEST Stat CARDIO  04/27/18 13:30 Completed


 


 EKG-(ED ONLY) Stat CARDIO  04/27/18 13:30 Completed


 


 ABG Stat LAB  04/27/18 13:30 Completed


 


 BLOOD CULTURE Stat LAB  04/27/18 13:50 Results


 


 CBC W/ AUTO DIFF Stat LAB  04/27/18 13:45 Completed


 


 COMPREHENSIVE METABOLIC PANEL Stat LAB  04/27/18 13:50 Completed


 


 CREATINE KINASE Stat LAB  04/27/18 13:50 Completed


 


 OCCULT BLOOD, STOOL Stat LAB  04/27/18 13:45 Completed


 


 PARTIAL THROMBOPLASTIN TIME Stat LAB  04/27/18 13:45 Completed


 


 PROCALCITONIN Stat LAB  04/27/18 13:45 Completed


 


 PT WITH INR Stat LAB  04/27/18 13:45 Completed


 


 RBC MORPHOLOGY Stat LAB  04/27/18 13:45 Completed


 


 TROPONIN I Stat LAB  04/27/18 13:50 Completed


 


 Ondansetron HCl/Pf [Zofran 4 mg/2 ml] MEDS  04/27/18 16:20 Discontinued





 4 mg IVP ONCE STA   


 


 CHEST, 1V AP ONLY Stat RADS  04/27/18 13:29 Completed


 


 CT ABDOMEN/PELVIS WO CONTRAST Stat RADS  04/27/18 13:29 Completed








Medications














Discontinued Medications














Generic Name Dose Route Start Last Admin





  Trade Name Freq  PRN Reason Stop Dose Admin


 


Ondansetron HCl  4 mg  04/27/18 16:20  04/27/18 16:32





  Zofran 4 Mg/2 Ml  IVP  04/27/18 16:21  4 mg





  ONCE STA   Administration





     





     





     





     











Vital Signs: 


 











  Temp Pulse Resp BP Pulse Ox


 


 04/27/18 15:40   92 H  24  125/42 L  100


 


 04/27/18 13:27  97.2 F L  102 H  28 H  128/50 L  92 L














Departure





- Departure


Time of Disposition: 19:00


Disposition: TSF SHORT-TRM HOSP


Discharge Problem: 


 Pancytopenia





Instructions:  Anemia (ED)


Condition: Good


Pt referred to PMD for follow-up: Yes


IPMP verified?: No


Additional Instructions: 


Please call your Family Physician as soon as possible to schedule a follow-up 

appointment.


Allergies/Adverse Reactions: 


Allergies





Sulfa (Sulfonamide Antibiotics) Adverse Reaction (Verified 04/27/18 13:39)


 








Home Medications: 


Ambulatory Orders





Budesonide/Formoterol Fumarate [Symbicort 160-4.5 Mcg Inhaler] 2 puff IH BID 10/

14/14 


Gabapentin 300 mg PO BEDTIME PRN 10/14/14 


Tiotropium Bromide [Spiriva] 1 cap IH DAILY 04/30/15 


Atorvastatin Calcium 40 mg PO BEDTIME 05/19/17 


Tramadol HCl 50 mg PO Q8H PRN 05/19/17 


Carvedilol [Coreg] 12.5 mg PO BIDWM 02/15/18 


Ipratropium/Albuterol Neb [Duoneb] 1 vial NEB QID 04/06/18 


Polyethylene Glycol 3350 [Miralax] 17 gm PO BID 04/06/18 


Sucralfate [Carafate] 1 gm PO AC #90 tablet 04/19/18 


Albuterol Sulfate [Proair Hfa] 2 puff IH Q6H 04/27/18 


Calcium Carbonate/Vitamin D3 [Calcium 600 + Vit D 400 Tablet] 1 each PO DAILY 04 /27/18 


Furosemide [Lasix] 20 mg PO DAILY 04/27/18 


Hydrocodone/Acetaminophen [Norco 7.5-325 Tablet] 1 each PO BID PRN 04/27/18 


Hydroxyzine Pamoate [Vistaril] 50 mg PO DAILY 04/27/18 


Loratadine 10 mg PO DAILY 04/27/18 


Losartan Potassium [Cozaar] 25 mg PO DAILY 04/27/18 


Nitroglycerin [Nitrostat] 0.4 mg SL Q5MIN X 3 DOSES PRN 04/27/18 


Pantoprazole Sodium [Protonix] 40 mg PO DAILY 04/27/18 








Disposition Discussed With: Patient

## 2018-04-27 NOTE — DI
EXAM:  CHEST FRONTAL VIEW 

  

HISTORY:  Cough. 

COMPARISON:  04/06/2018 

  

FINDINGS:  Prominent heart size.  There is moderate atherosclerotic disease.  Lung bases are poorly s
een especially on the left and mild infiltrates in these regions cannot be excluded on this AP portab
le study.  The remainder of the lungs are unremarkable.  Normal vascularity.  No visible pleural flui
d or pneumothorax. 

  

  

IMPRESSION: 

Cannot completely exclude basilar infiltrates especially on the left.  Limited exam. If symptoms pers
ist, consider follow up with full inspiration, standing two-view chest radiography using PA and later
al technique.

## 2018-04-27 NOTE — CT
EXAM:  CT ABDOMEN AND PELVIS 

  

HISTORY:  Abdominal pain, question lower GI bleed 

  

TECHNIQUE:  CT abdomen and pelvis without intravenous contrast.  Images were reconstructed using 5 mm
 section thickness.  Reformations were prepared. 

COMPARISON: None 

  

FINDINGS: 

  

Diagnostic limitations exist without including contrast enhanced images.  No focal hepatic lesion baljinder
ntified.  The splenic size is enlarged with a length of about 19 cm and estimated splenic index of 25
00 cu cm.   There is no evidence of splenic lesion or subcapsular bleed within limits of this exam.  
Gallbladder appears to be absent.  Fatty lobulation of the pancreas.  No adrenal nodule.  Small exoph
ytic cystic appearing masses off of the renal cortices bilaterally measuring about 12 mm or less prob
ably represent cysts.  These can be correlated with ultrasound.  No hydronephrosis.  Moderate vascula
r calcifications. 

  

No gastric distension.  No appendix is identified. Postop changes of the rectosigmoid colon.  Moderat
e diverticulosis of the distal colon.  The colon at the level of the splenic flexure and the upper po
rtion of the descending demonstrates wall thickening. There is fluid within the lumen of this portion
 of the colon. No surrounding inflammatory infiltration of the abdominal fat or ascites.  No abscess 
or bowel obstruction.  Uterus and urinary bladder within normal limits. 

  

There is a left lower ventral abdominal wall hernia with a neck of about 1.6 cm containing fat.  This
 is at the general level of the umbilicus.  The bones appear demineralized.  There is severe degenera
tive disc and facet disease of the spine.  Moderate arthropathy of the hips.  Cardiomegaly is suggest
ed.  Lung bases are clear.  No pneumoperitoneum. 

  

  

IMPRESSION: 

1.  There is thickening of the splenic flexure and upper descending colon which would be most consist
ent with colitis.  Less likely diverticulitis.  Less likely a neoplastic process.  No bowel obstructi
on, free air, ascites or abscess.  There is distal colon diverticulosis without diverticulitis.  Post
op changes rectosigmoid colon. 

2.  Splenomegaly with an estimated index of 2500 cu cm. 

3.  Moderate vascular calcifications. 

4.  Small cystic masses of the kidneys probably representing cysts. 

5.  Fatty hernia left lower ventral abdominal wall.

## 2025-07-13 NOTE — NURSING NOTE
Gave report to April at [place] [facility]. Waiting on patients' home O2 to travel with family to [place]. Pt will be going to room 114.    No